# Patient Record
Sex: MALE | Race: WHITE | Employment: UNEMPLOYED | URBAN - METROPOLITAN AREA
[De-identification: names, ages, dates, MRNs, and addresses within clinical notes are randomized per-mention and may not be internally consistent; named-entity substitution may affect disease eponyms.]

---
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
## 2017-03-22 ENCOUNTER — HOSPITAL ENCOUNTER (OUTPATIENT)
Dept: LAB | Age: 52
Discharge: HOME | End: 2017-03-22
Payer: COMMERCIAL

## 2017-03-22 LAB
ALBUMIN SERPL BCP-MCNC: 3.7 G/DL (ref 3.5–5)
ALBUMIN/GLOB SERPL: 0.8 {RATIO} (ref 1.2–3.5)
ALP SERPL-CCNC: 137 U/L (ref 50–136)
ALT SERPL-CCNC: 41 U/L (ref 12–65)
ANION GAP BLD CALC-SCNC: 3 MMOL/L (ref 7–16)
AST SERPL W P-5'-P-CCNC: 47 U/L (ref 15–37)
BILIRUB SERPL-MCNC: 1.1 MG/DL (ref 0.2–1.1)
BUN SERPL-MCNC: 13 MG/DL (ref 6–23)
CALCIUM SERPL-MCNC: 8.9 MG/DL (ref 8.3–10.4)
CHLORIDE SERPL-SCNC: 99 MMOL/L (ref 98–107)
CO2 SERPL-SCNC: 33 MMOL/L (ref 21–32)
CREAT SERPL-MCNC: 1.03 MG/DL (ref 0.8–1.5)
GLOBULIN SER CALC-MCNC: 4.9 G/DL (ref 2.3–3.5)
GLUCOSE SERPL-MCNC: 94 MG/DL (ref 65–100)
POTASSIUM SERPL-SCNC: 3.1 MMOL/L (ref 3.5–5.1)
PROT SERPL-MCNC: 8.6 G/DL (ref 6.3–8.2)
SODIUM SERPL-SCNC: 135 MMOL/L (ref 136–145)

## 2017-03-22 PROCEDURE — 80053 COMPREHEN METABOLIC PANEL: CPT

## 2017-03-22 PROCEDURE — 36415 COLL VENOUS BLD VENIPUNCTURE: CPT

## 2017-06-07 ENCOUNTER — HOSPITAL ENCOUNTER (OUTPATIENT)
Dept: LAB | Age: 52
Discharge: HOME | End: 2017-06-07
Payer: SELF-PAY

## 2017-06-07 ENCOUNTER — HOSPITAL ENCOUNTER (OUTPATIENT)
Dept: LAB | Age: 52
Discharge: HOME | End: 2017-06-07
Payer: COMMERCIAL

## 2017-06-07 LAB
ALBUMIN SERPL BCP-MCNC: 3.6 G/DL (ref 3.5–5)
ALBUMIN/GLOB SERPL: 0.7 {RATIO} (ref 1.2–3.5)
ALP SERPL-CCNC: 152 U/L (ref 50–136)
ALT SERPL-CCNC: 29 U/L (ref 12–65)
ANION GAP BLD CALC-SCNC: 10 MMOL/L (ref 7–16)
APTT PPP: 27 SEC (ref 23.5–31.7)
AST SERPL W P-5'-P-CCNC: 36 U/L (ref 15–37)
BASOPHILS # BLD AUTO: 0 K/UL (ref 0–0.2)
BASOPHILS # BLD: 0 % (ref 0–2)
BILIRUB SERPL-MCNC: 1 MG/DL (ref 0.2–1.1)
BUN SERPL-MCNC: 15 MG/DL (ref 6–23)
CALCIUM SERPL-MCNC: 9.3 MG/DL (ref 8.3–10.4)
CHLORIDE SERPL-SCNC: 99 MMOL/L (ref 98–107)
CO2 SERPL-SCNC: 29 MMOL/L (ref 21–32)
CREAT SERPL-MCNC: 1.14 MG/DL (ref 0.8–1.5)
DIFFERENTIAL METHOD BLD: ABNORMAL
EOSINOPHIL # BLD: 0.1 K/UL (ref 0–0.8)
EOSINOPHIL NFR BLD: 2 % (ref 0.5–7.8)
ERYTHROCYTE [DISTWIDTH] IN BLOOD BY AUTOMATED COUNT: 13.5 % (ref 11.9–14.6)
GLOBULIN SER CALC-MCNC: 5.4 G/DL (ref 2.3–3.5)
GLUCOSE SERPL-MCNC: 97 MG/DL (ref 65–100)
HCT VFR BLD AUTO: 44.9 % (ref 41.1–50.3)
HGB BLD-MCNC: 15.6 G/DL (ref 13.6–17.2)
IMM GRANULOCYTES # BLD: 0 K/UL (ref 0–0.5)
IMM GRANULOCYTES NFR BLD AUTO: 0.2 % (ref 0–5)
INR PPP: 1.1 (ref 0.9–1.2)
LYMPHOCYTES # BLD AUTO: 20 % (ref 13–44)
LYMPHOCYTES # BLD: 0.9 K/UL (ref 0.5–4.6)
MCH RBC QN AUTO: 32.3 PG (ref 26.1–32.9)
MCHC RBC AUTO-ENTMCNC: 34.7 G/DL (ref 31.4–35)
MCV RBC AUTO: 93 FL (ref 79.6–97.8)
MONOCYTES # BLD: 0.4 K/UL (ref 0.1–1.3)
MONOCYTES NFR BLD AUTO: 9 % (ref 4–12)
NEUTS SEG # BLD: 3.2 K/UL (ref 1.7–8.2)
NEUTS SEG NFR BLD AUTO: 69 % (ref 43–78)
PLATELET # BLD AUTO: 151 K/UL (ref 150–450)
PMV BLD AUTO: 10.4 FL (ref 10.8–14.1)
POTASSIUM SERPL-SCNC: 3.4 MMOL/L (ref 3.5–5.1)
PROT SERPL-MCNC: 9 G/DL (ref 6.3–8.2)
PROTHROMBIN TIME: 11.4 SEC (ref 9.6–12)
RBC # BLD AUTO: 4.83 M/UL (ref 4.23–5.67)
SODIUM SERPL-SCNC: 138 MMOL/L (ref 136–145)
WBC # BLD AUTO: 4.6 K/UL (ref 4.3–11.1)

## 2017-06-07 PROCEDURE — 85610 PROTHROMBIN TIME: CPT | Performed by: INTERNAL MEDICINE

## 2017-06-07 PROCEDURE — 85730 THROMBOPLASTIN TIME PARTIAL: CPT | Performed by: INTERNAL MEDICINE

## 2017-06-07 PROCEDURE — 80053 COMPREHEN METABOLIC PANEL: CPT | Performed by: INTERNAL MEDICINE

## 2017-06-07 PROCEDURE — 85025 COMPLETE CBC W/AUTO DIFF WBC: CPT | Performed by: INTERNAL MEDICINE

## 2017-06-07 PROCEDURE — 80053 COMPREHEN METABOLIC PANEL: CPT

## 2017-06-08 ENCOUNTER — HOSPITAL ENCOUNTER (OUTPATIENT)
Dept: ULTRASOUND IMAGING | Age: 52
Discharge: HOME | End: 2017-06-08
Attending: INTERNAL MEDICINE
Payer: COMMERCIAL

## 2017-06-08 VITALS
DIASTOLIC BLOOD PRESSURE: 69 MMHG | RESPIRATION RATE: 18 BRPM | SYSTOLIC BLOOD PRESSURE: 111 MMHG | HEART RATE: 72 BPM | OXYGEN SATURATION: 100 %

## 2017-06-08 DIAGNOSIS — R18.8 ASCITES: ICD-10-CM

## 2017-06-08 LAB
APPEARANCE FLD: NORMAL
COLOR FLD: YELLOW
FLUID COMMENTS, FCOM: NORMAL
LYMPHOCYTES NFR FLD: 96 %
MONOS+MACROS NFR FLD: 2 %
NEUTS SEG NFR FLD: 2 %
NUC CELL # FLD: 694 /CU MM
RBC # FLD: NORMAL /CU MM
SPECIMEN SOURCE FLD: NORMAL

## 2017-06-08 PROCEDURE — 49083 ABD PARACENTESIS W/IMAGING: CPT

## 2017-06-08 PROCEDURE — 89050 BODY FLUID CELL COUNT: CPT | Performed by: INTERNAL MEDICINE

## 2017-06-08 PROCEDURE — 87205 SMEAR GRAM STAIN: CPT | Performed by: INTERNAL MEDICINE

## 2017-06-08 NOTE — PROCEDURES
Interventional Radiology Brief Procedure Note    Patient: Guillermina Hernandez MRN: [id number]  SSN: [id number]    YOB: 1965  Age: 46 y.o. Sex: male      Date of Procedure: 6/8/2017    Pre-Procedure Diagnosis: Recurret ascites. Recent abdominal hernia surgery. Cirrhosis. Post-Procedure Diagnosis: SAME    Procedure(s): Paracentesis    Brief Description of Procedure: as above    Performed By: Jim Morris MD     Assistants: None    Anesthesia: None    Estimated Blood Loss: None    Specimens: Cloudy ascites. Implants: None    Findings: as above    Complications: None    Recommendations: Discharge home. Recommend TIPS evaluation if ascites recurs. Follow Up: PRN.      Signed By: Jim Morris MD     June 8, 2017

## 2017-06-08 NOTE — PROGRESS NOTES
Interventional Radiology Post Paracentesis/Thoracentesis Note    6/8/2017    Procedure(s): Ultrasound guided Diagnostic Paracentesis Performed with 8 Sudanese catheter total volume 10,000 ml. Samples sent to lab. Preliminary Findings: moderate yellow. Complications: None    Plan:  Observation, check labs if drawn.           Chest X-Ray:  no    Full dictated report to follow    Signed By: Rylee Rothman RN

## 2017-06-08 NOTE — PROGRESS NOTES
Recovery period without difficulty. Pt alert and oriented and denies pain. Dressing is clean, dry, and intact. Reviewed discharge instructions with patient, verbalized understanding. Pt escorted to lobby discharge area. Vital signs  completed.

## 2017-06-08 NOTE — DISCHARGE INSTRUCTIONS
[address]  Department of Interventional Radiology  [facility]  [phone] Office  [phone] Fax    PARACENTESIS DISCHARGE INSTRUCTIONS    General Information:  During this procedure, the doctor will insert a needle into the abdomen to drain fluid. After the procedure, you will be able to take a deep breath much easier. The site of the puncture may ooze the first day. This will decrease and eventually stop. Paracentesis (draining fluid from the abdomen) sometimes makes patients hypotensive (low blood pressure). Your doctor may order for you to receive fluids or albumin (a volume booster) during the procedure through an IV site. Home Care Instructions:  Keep the puncture site clean and dry. No tub baths or swimming until puncture site heals. Showering is acceptable. Resume your normal diet, and resume your normal activity slowly and as you tolerate. If you are short of breath, rest. If shortness of breath does not ease, please call your ordering doctor. Fluid can re-accumulate in the chest and/or in the abdomen. If this should occur, your doctor needs to know as you may need to have the procedure done again. Call If:     You should call your Physician and/or the Radiology Nurse if you notice any signs of infection, like pus draining, or if it is swollen or reddened. Also call if you have a fever, or if you are bleeding from the puncture site more than a small amount on the dressing. Call if the puncture site keeps draining fluid. Some oozing is to be expected, but should slow and then stop. Call if you feel like you have pressure in your abdomen. SEEK IMMEDIATE CARE OR CALL 911 IF YOU SUDDENLY HAVE TROUBLE BREATHING, OR IF YOUR LIPS TURN BLUE, OR IF YOU NOTICE BLOOD IN YOUR SPUTUM. Follow-Up Instructions: Please see your ordering doctor as he/she has requested. To Reach Us:   If you have any questions about your procedure, please call the Interventional Radiology department at [phone]. After business hours (5pm) and weekends, call the answering service at [phone] and ask for the Radiologist on call to be paged. Date: 6/8/2017  Discharging Nurse: Desmond Tello RN    Interventional Radiology General Nurse Discharge    After general anesthesia or intravenous sedation, for 24 hours or while taking prescription Narcotics:  · Limit your activities  · Do not drive and operate hazardous machinery  · Do not make important personal or business decisions  · Do  not drink alcoholic beverages  · If you have not urinated within 8 hours after discharge, please contact your surgeon on call. * Please give a list of your current medications to your Primary Care Provider. * Please update this list whenever your medications are discontinued, doses are     changed, or new medications (including over-the-counter products) are added. * Please carry medication information at all times in case of emergency situations. These are general instructions for a healthy lifestyle:    No smoking/ No tobacco products/ Avoid exposure to second hand smoke  Surgeon General's Warning:  Quitting smoking now greatly reduces serious risk to your health. Obesity, smoking, and sedentary lifestyle greatly increases your risk for illness  A healthy diet, regular physical exercise & weight monitoring are important for maintaining a healthy lifestyle    You may be retaining fluid if you have a history of heart failure or if you experience any of the following symptoms:  Weight gain of 3 pounds or more overnight or 5 pounds in a week, increased swelling in our hands or feet or shortness of breath while lying flat in bed. Please call your doctor as soon as you notice any of these symptoms; do not wait until your next office visit.     Recognize signs and symptoms of STROKE:  F-face looks uneven    A-arms unable to move or move unevenly    S-speech slurred or non-existent    T-time-call 911 as soon as signs and symptoms begin-DO NOT go       Back to bed or wait to see if you get better-TIME IS BRAIN.

## 2017-06-08 NOTE — IP AVS SNAPSHOT
[address] 
[phone] Patient: Shiv Jamison MRN: [id number] :1965 You are allergic to the following No active allergies Recent Documentation Smoking Status Current Every Day Smoker Emergency Contacts Name Discharge Info Relation Home Work Mobile Wm. Chen Jernigan  Son [60] [phone] About your hospitalization You were admitted on:  2017 You last received care in the:  [facility] You were discharged on:  2017 Unit phone number:  [phone] Why you were hospitalized Your primary diagnosis was:  Not on File Providers Seen During Your Hospitalizations Provider Role Specialty Primary office phone Lottie Libman, MD Attending Provider Gastroenterology [phone] Your Primary Care Physician (PCP) Primary Care Physician Office Phone Office Fax Henna Tuttle [phone] Follow-up Information None Current Discharge Medication List  
  
ASK your doctor about these medications Dose & Instructions Dispensing Information Comments Morning Noon Evening Bedtime  
 furosemide 40 mg tablet Commonly known as:  LASIX Your last dose was: Your next dose is:    
   
   
 Dose:  40 mg Take 40 mg by mouth daily. Refills:  0  
     
   
   
   
  
 traMADol 50 mg tablet Commonly known as:  ULTRAM  
   
Your last dose was: Your next dose is:    
   
   
 Dose:  50 mg Take 1 Tab by mouth every six (6) hours as needed for Pain for up to 15 doses. Max Daily Amount: 200 mg. Quantity:  15 Tab Refills:  0 Discharge Instructions [address] Rohit Arrington Department of Interventional Radiology [facility] [phone] Office 
[phone] Fax PARACENTESIS DISCHARGE INSTRUCTIONS General Information: 
During this procedure, the doctor will insert a needle into the abdomen to drain fluid. After the procedure, you will be able to take a deep breath much easier. The site of the puncture may ooze the first day. This will decrease and eventually stop. Paracentesis (draining fluid from the abdomen) sometimes makes patients hypotensive (low blood pressure). Your doctor may order for you to receive fluids or albumin (a volume booster) during the procedure through an IV site. Home Care Instructions: 
Keep the puncture site clean and dry. No tub baths or swimming until puncture site heals. Showering is acceptable. Resume your normal diet, and resume your normal activity slowly and as you tolerate. If you are short of breath, rest. If shortness of breath does not ease, please call your ordering doctor. Fluid can re-accumulate in the chest and/or in the abdomen. If this should occur, your doctor needs to know as you may need to have the procedure done again. Call If: 
   You should call your Physician and/or the Radiology Nurse if you notice any signs of infection, like pus draining, or if it is swollen or reddened. Also call if you have a fever, or if you are bleeding from the puncture site more than a small amount on the dressing. Call if the puncture site keeps draining fluid. Some oozing is to be expected, but should slow and then stop. Call if you feel like you have pressure in your abdomen. SEEK IMMEDIATE CARE OR CALL 911 IF YOU SUDDENLY HAVE TROUBLE BREATHING, OR IF YOUR LIPS TURN BLUE, OR IF YOU NOTICE BLOOD IN YOUR SPUTUM. Follow-Up Instructions: Please see your ordering doctor as he/she has requested. To Reach Us: If you have any questions about your procedure, please call the Interventional Radiology department at [phone].  After [employer] hours (5pm) and weekends, call the answering service at [phone] and ask for the Radiologist on call to be paged. Date: 6/8/2017 Discharging Nurse: Adolph Maki RN Interventional Radiology General Nurse Discharge After general anesthesia or intravenous sedation, for 24 hours or while taking prescription Narcotics: · Limit your activities · Do not drive and operate hazardous machinery · Do not make important personal or business decisions · Do  not drink alcoholic beverages · If you have not urinated within 8 hours after discharge, please contact your surgeon on call. * Please give a list of your current medications to your Primary Care Provider. * Please update this list whenever your medications are discontinued, doses are 
   changed, or new medications (including over-the-counter products) are added. * Please carry medication information at all times in case of emergency situations. These are general instructions for a healthy lifestyle: No smoking/ No tobacco products/ Avoid exposure to second hand smoke Surgeon General's Warning:  Quitting smoking now greatly reduces serious risk to your health. Obesity, smoking, and sedentary lifestyle greatly increases your risk for illness A healthy diet, regular physical exercise & weight monitoring are important for maintaining a healthy lifestyle You may be retaining fluid if you have a history of heart failure or if you experience any of the following symptoms:  Weight gain of 3 pounds or more overnight or 5 pounds in a week, increased swelling in our hands or feet or shortness of breath while lying flat in bed. Please call your doctor as soon as you notice any of these symptoms; do not wait until your next office visit. Recognize signs and symptoms of STROKE: 
F-face looks uneven A-arms unable to move or move unevenly S-speech slurred or non-existent T-time-call 911 as soon as signs and symptoms begin-DO NOT go Back to bed or wait to see if you get better-TIME IS BRAIN. Discharge Orders None Introducing [facility]! [employer] introduces [employer] patient portal. Now you can access parts of your medical record, email your doctor's office, and request medication refills online. 1. In your internet browser, go to [email or web address] 2. Click on the First Time User? Click Here link in the Sign In box. You will see the New Member Sign Up page. 3. Enter your [employer] Access Code exactly as it appears below. You will not need to use this code after youve completed the sign-up process. If you do not sign up before the expiration date, you must request a new code. · [employer] Access Code: [email or web address] Expires: 6/19/2017  3:04 PM 
 
4. Enter the last four digits of your Social Security Number (xxxx) and Date of Birth (mm/dd/yyyy) as indicated and click Submit. You will be taken to the next sign-up page. 5. Create a [employer] ID. This will be your [employer] login ID and cannot be changed, so think of one that is secure and easy to remember. 6. Create a [employer] password. You can change your password at any time. 7. Enter your Password Reset Question and Answer. This can be used at a later time if you forget your password. 8. Enter your e-mail address. You will receive e-mail notification when new information is available in [address] 9. Click Sign Up. You can now view and download portions of your medical record. 10. Click the Download Summary menu link to download a portable copy of your medical information. If you have questions, please visit the Frequently Asked Questions section of the [employer] website. Remember, [employer] is NOT to be used for urgent needs. For medical emergencies, dial 911. Now available from your iPhone and Android! General Information Please provide this summary of care documentation to your next provider. Patient Signature:  ____________________________________________________________ Date:  ____________________________________________________________  
  
Keesha Kohler Provider Signature:  ____________________________________________________________ Date:  ____________________________________________________________

## 2017-06-11 LAB
BACTERIA SPEC CULT: NORMAL
GRAM STN SPEC: NORMAL
GRAM STN SPEC: NORMAL
SERVICE CMNT-IMP: NORMAL

## 2017-07-18 ENCOUNTER — HOSPITAL ENCOUNTER (OUTPATIENT)
Dept: ULTRASOUND IMAGING | Age: 52
Discharge: HOME | End: 2017-07-18
Attending: INTERNAL MEDICINE
Payer: COMMERCIAL

## 2017-07-18 VITALS
DIASTOLIC BLOOD PRESSURE: 71 MMHG | OXYGEN SATURATION: 97 % | SYSTOLIC BLOOD PRESSURE: 108 MMHG | RESPIRATION RATE: 18 BRPM | HEART RATE: 56 BPM

## 2017-07-18 DIAGNOSIS — R18.8 ASCITES: ICD-10-CM

## 2017-07-18 PROCEDURE — 74011250636 HC RX REV CODE- 250/636: Performed by: PHYSICIAN ASSISTANT

## 2017-07-18 PROCEDURE — 49083 ABD PARACENTESIS W/IMAGING: CPT

## 2017-07-18 PROCEDURE — P9047 ALBUMIN (HUMAN), 25%, 50ML: HCPCS | Performed by: PHYSICIAN ASSISTANT

## 2017-07-18 RX ORDER — ALBUMIN HUMAN 250 G/1000ML
25 SOLUTION INTRAVENOUS ONCE
Status: ACTIVE | OUTPATIENT
Start: 2017-07-18 | End: 2017-07-18

## 2017-07-18 NOTE — DISCHARGE INSTRUCTIONS
[address] [facility] [address]  Department of Interventional Radiology  [facility]  [phone] Office  [phone] Fax    PARACENTESIS DISCHARGE INSTRUCTIONS    General Information:  During this procedure, the doctor will insert a needle into the abdomen to drain fluid. After the procedure, you will be able to take a deep breath much easier. The site of the puncture may ooze the first day. This will decrease and eventually stop. Paracentesis (draining fluid from the abdomen) sometimes makes patients hypotensive (low blood pressure). Your doctor may order for you to receive fluids or albumin (a volume booster) during the procedure through an IV site. Home Care Instructions:  Keep the puncture site clean and dry. No tub baths or swimming until puncture site heals. Showering is acceptable. Resume your normal diet, and resume your normal activity slowly and as you tolerate. If you are short of breath, rest. If shortness of breath does not ease, please call your ordering doctor. Fluid can re-accumulate in the chest and/or in the abdomen. If this should occur, your doctor needs to know as you may need to have the procedure done again. Call If:     You should call your Physician and/or the Radiology Nurse if you notice any signs of infection, like pus draining, or if it is swollen or reddened. Also call if you have a fever, or if you are bleeding from the puncture site more than a small amount on the dressing. Call if the puncture site keeps draining fluid. Some oozing is to be expected, but should slow and then stop. Call if you feel like you have pressure in your abdomen. SEEK IMMEDIATE CARE OR CALL 911 IF YOU SUDDENLY HAVE TROUBLE BREATHING, OR IF YOUR LIPS TURN BLUE, OR IF YOU NOTICE BLOOD IN YOUR SPUTUM. Follow-Up Instructions: Please see your ordering doctor as he/she has requested. To Reach Us:       If you have any questions about your procedure, please call the Interventional Radiology department at [phone]. After business hours (5pm) and weekends, call the answering service at [phone] and ask for the Radiologist on call to be paged. Interventional Radiology General Nurse Discharge    After general anesthesia or intravenous sedation, for 24 hours or while taking prescription Narcotics:  · Limit your activities  · Do not drive and operate hazardous machinery  · Do not make important personal or business decisions  · Do  not drink alcoholic beverages  · If you have not urinated within 8 hours after discharge, please contact your surgeon on call. * Please give a list of your current medications to your Primary Care Provider. * Please update this list whenever your medications are discontinued, doses are     changed, or new medications (including over-the-counter products) are added. * Please carry medication information at all times in case of emergency situations. These are general instructions for a healthy lifestyle:    No smoking/ No tobacco products/ Avoid exposure to second hand smoke  Surgeon General's Warning:  Quitting smoking now greatly reduces serious risk to your health. Obesity, smoking, and sedentary lifestyle greatly increases your risk for illness  A healthy diet, regular physical exercise & weight monitoring are important for maintaining a healthy lifestyle    You may be retaining fluid if you have a history of heart failure or if you experience any of the following symptoms:  Weight gain of 3 pounds or more overnight or 5 pounds in a week, increased swelling in our hands or feet or shortness of breath while lying flat in bed. Please call your doctor as soon as you notice any of these symptoms; do not wait until your next office visit.     Recognize signs and symptoms of STROKE:  F-face looks uneven    A-arms unable to move or move unevenly    S-speech slurred or non-existent    T-time-call 911 as soon as signs and symptoms begin-DO NOT go       Back to bed or wait to see if you get better-TIME IS BRAIN. Date: 7/18/2017  Discharging Nurse:  Sunita Soriano RN

## 2017-07-18 NOTE — IP AVS SNAPSHOT
[address] 
[phone] Patient: Bart Carmen MRN: [id number] :1965 You are allergic to the following No active allergies Recent Documentation Smoking Status Current Every Day Smoker Emergency Contacts Name Discharge Info Relation Home Work Mobile Wm. Chen Jernigan  Son [22] [phone] About your hospitalization You were admitted on:  2017 You last received care in the:  [facility] RADIOLOGY ULTRASOUND You were discharged on:  2017 Unit phone number:  [phone] Why you were hospitalized Your primary diagnosis was:  Not on File Providers Seen During Your Hospitalizations Provider Role Specialty Primary office phone Vic Rojas MD Attending Provider Gastroenterology [phone] Your Primary Care Physician (PCP) Primary Care Physician Office Phone Office Fax [facility] [phone] Follow-up Information None Your Appointments 2017  8:00 AM EDT  
US GUIDED PARACENTISIS INIT with [facility] US LOGIC 7 UNIT 2, D NURSING, D IR RADIOLOGIST RESOURCE  
[facility] RADIOLOGY ULTRASOUND ([address] [place] [address]) [address] [place] [address]  
[phone] Interventional Radiology Procedure completed with ultrasound guidance. Referring Physicians: 1) Initial paracentesis patients required: H&P/recent office notes and lab work, no older than 30 days (CBC, BMP, PT/PTT) to Interventional Radiology to facilitate prompt scheduling. 2) Obtain clearance to hold blood thinners from prescribing Physician and give Patient instructions prior to arrival. 3) Patient may continue to eat or drink as normal prior to arrival. 4) Pt to arrive 15 minutes early.  5) Responsible adult  required to drive Patient home after recovery period. Recovery period can vary depending on sedation and patient condition. 6) Interventional Radiology Scheduling can be contacted at [phone] Current Discharge Medication List  
  
ASK your doctor about these medications Dose & Instructions Dispensing Information Comments Morning Noon Evening Bedtime  
 furosemide 40 mg tablet Commonly known as:  LASIX Your last dose was: Your next dose is:    
   
   
 Dose:  40 mg Take 40 mg by mouth daily. Refills:  0  
     
   
   
   
  
 traMADol 50 mg tablet Commonly known as:  ULTRAM  
   
Your last dose was: Your next dose is:    
   
   
 Dose:  50 mg Take 1 Tab by mouth every six (6) hours as needed for Pain for up to 15 doses. Max Daily Amount: 200 mg. Quantity:  15 Tab Refills:  0 Discharge Instructions 1102 [facility] [address] Department of Interventional Radiology [facility] 
[phone] Office 
[phone] Fax PARACENTESIS DISCHARGE INSTRUCTIONS General Information: 
During this procedure, the doctor will insert a needle into the abdomen to drain fluid. After the procedure, you will be able to take a deep breath much easier. The site of the puncture may ooze the first day. This will decrease and eventually stop. Paracentesis (draining fluid from the abdomen) sometimes makes patients hypotensive (low blood pressure). Your doctor may order for you to receive fluids or albumin (a volume booster) during the procedure through an IV site. Home Care Instructions: 
Keep the puncture site clean and dry. No tub baths or swimming until puncture site heals. Showering is acceptable. Resume your normal diet, and resume your normal activity slowly and as you tolerate.  If you are short of breath, rest. If shortness of breath does not ease, please call your ordering doctor. Fluid can re-accumulate in the chest and/or in the abdomen. If this should occur, your doctor needs to know as you may need to have the procedure done again. Call If: 
   You should call your Physician and/or the Radiology Nurse if you notice any signs of infection, like pus draining, or if it is swollen or reddened. Also call if you have a fever, or if you are bleeding from the puncture site more than a small amount on the dressing. Call if the puncture site keeps draining fluid. Some oozing is to be expected, but should slow and then stop. Call if you feel like you have pressure in your abdomen. SEEK IMMEDIATE CARE OR CALL 911 IF YOU SUDDENLY HAVE TROUBLE BREATHING, OR IF YOUR LIPS TURN BLUE, OR IF YOU NOTICE BLOOD IN YOUR SPUTUM. Follow-Up Instructions: Please see your ordering doctor as he/she has requested. To Reach Us: If you have any questions about your procedure, please call the Interventional Radiology department at [phone]. After business hours (5pm) and weekends, call the answering service at [phone] and ask for the Radiologist on call to be paged. Interventional Radiology General Nurse Discharge After general anesthesia or intravenous sedation, for 24 hours or while taking prescription Narcotics: · Limit your activities · Do not drive and operate hazardous machinery · Do not make important personal or business decisions · Do  not drink alcoholic beverages · If you have not urinated within 8 hours after discharge, please contact your surgeon on call. * Please give a list of your current medications to your Primary Care Provider. * Please update this list whenever your medications are discontinued, doses are 
   changed, or new medications (including over-the-counter products) are added. * Please carry medication information at all times in case of emergency situations. These are general instructions for a healthy lifestyle: No smoking/ No tobacco products/ Avoid exposure to second hand smoke Surgeon General's Warning:  Quitting smoking now greatly reduces serious risk to your health. Obesity, smoking, and sedentary lifestyle greatly increases your risk for illness A healthy diet, regular physical exercise & weight monitoring are important for maintaining a healthy lifestyle You may be retaining fluid if you have a history of heart failure or if you experience any of the following symptoms:  Weight gain of 3 pounds or more overnight or 5 pounds in a week, increased swelling in our hands or feet or shortness of breath while lying flat in bed. Please call your doctor as soon as you notice any of these symptoms; do not wait until your next office visit. Recognize signs and symptoms of STROKE: 
F-face looks uneven A-arms unable to move or move unevenly S-speech slurred or non-existent T-time-call 911 as soon as signs and symptoms begin-DO NOT go Back to bed or wait to see if you get better-TIME IS BRAIN. Date: 7/18/2017 Discharging Nurse: Samuel Crowder RN Discharge Orders None Introducing [facility] SERVICES! Kip Craig introduces [employer] patient portal. Now you can access parts of your medical record, email your doctor's office, and request medication refills online. 1. In your internet browser, go to [email or web address] 2. Click on the First Time User? Click Here link in the Sign In box. You will see the New Member Sign Up page. 3. Enter your [employer] Access Code exactly as it appears below. You will not need to use this code after youve completed the sign-up process. If you do not sign up before the expiration date, you must request a new code. · [employer] Access Code: [email or web address] Expires: 9/16/2017  3:49 PM 
 
4. Enter the last four digits of your Social Security Number (xxxx) and Date of Birth (mm/dd/yyyy) as indicated and click Submit.  You will be taken to the next sign-up page. 5. Create a [employer] ID. This will be your [employer] login ID and cannot be changed, so think of one that is secure and easy to remember. 6. Create a [employer] password. You can change your password at any time. 7. Enter your Password Reset Question and Answer. This can be used at a later time if you forget your password. 8. Enter your e-mail address. You will receive e-mail notification when new information is available in [address] 9. Click Sign Up. You can now view and download portions of your medical record. 10. Click the Download Summary menu link to download a portable copy of your medical information. If you have questions, please visit the Frequently Asked Questions section of the [employer] website. Remember, [employer] is NOT to be used for urgent needs. For medical emergencies, dial 911. Now available from your iPhone and Android! General Information Please provide this summary of care documentation to your next provider. Patient Signature:  ____________________________________________________________ Date:  ____________________________________________________________  
  
Gail Snider Provider Signature:  ____________________________________________________________ Date:  ____________________________________________________________

## 2017-07-18 NOTE — PROGRESS NOTES
Patient refusing to take albumin. Aldair Villarreal called to discuss with patient.  Decision made not take albumin

## 2017-07-18 NOTE — PROGRESS NOTES
After having reviewed written discharge instructions and patient not having any questions patient ambulated to waiting automobile with son at side.  Patient tolerated procedure well with no adverse effects noted

## 2017-08-17 ENCOUNTER — HOSPITAL ENCOUNTER (OUTPATIENT)
Dept: ULTRASOUND IMAGING | Age: 52
Discharge: HOME | End: 2017-08-17
Attending: INTERNAL MEDICINE
Payer: COMMERCIAL

## 2017-08-17 VITALS
HEART RATE: 77 BPM | DIASTOLIC BLOOD PRESSURE: 59 MMHG | SYSTOLIC BLOOD PRESSURE: 99 MMHG | RESPIRATION RATE: 19 BRPM | OXYGEN SATURATION: 98 %

## 2017-08-17 DIAGNOSIS — R18.8 ASCITES: ICD-10-CM

## 2017-08-17 PROCEDURE — 49083 ABD PARACENTESIS W/IMAGING: CPT

## 2017-08-17 PROCEDURE — 74011000250 HC RX REV CODE- 250: Performed by: PHYSICIAN ASSISTANT

## 2017-08-17 PROCEDURE — P9047 ALBUMIN (HUMAN), 25%, 50ML: HCPCS | Performed by: INTERNAL MEDICINE

## 2017-08-17 PROCEDURE — 74011250636 HC RX REV CODE- 250/636: Performed by: INTERNAL MEDICINE

## 2017-08-17 RX ORDER — ALBUMIN HUMAN 250 G/1000ML
50 SOLUTION INTRAVENOUS ONCE
Status: COMPLETED | OUTPATIENT
Start: 2017-08-17 | End: 2017-08-17

## 2017-08-17 RX ORDER — ALBUMIN HUMAN 250 G/1000ML
25 SOLUTION INTRAVENOUS ONCE
Status: COMPLETED | OUTPATIENT
Start: 2017-08-17 | End: 2017-08-17

## 2017-08-17 RX ORDER — LIDOCAINE HYDROCHLORIDE 20 MG/ML
.5-2 INJECTION, SOLUTION INFILTRATION; PERINEURAL ONCE
Status: COMPLETED | OUTPATIENT
Start: 2017-08-17 | End: 2017-08-17

## 2017-08-17 RX ADMIN — ALBUMIN (HUMAN) 25 G: 0.25 INJECTION, SOLUTION INTRAVENOUS at 16:48

## 2017-08-17 RX ADMIN — ALBUMIN (HUMAN) 50 G: 0.25 INJECTION, SOLUTION INTRAVENOUS at 16:36

## 2017-08-17 RX ADMIN — LIDOCAINE HYDROCHLORIDE 215 MG: 20 INJECTION, SOLUTION INFILTRATION; PERINEURAL at 15:59

## 2017-08-17 NOTE — IP AVS SNAPSHOT
[address] [place] [address] 
[phone] Patient: Vane Couch MRN: [id number] :1965 Current Discharge Medication List  
  
ASK your doctor about these medications Dose & Instructions Dispensing Information Comments Morning Noon Evening Bedtime  
 furosemide 40 mg tablet Commonly known as:  LASIX Your last dose was: Your next dose is:    
   
   
 Dose:  40 mg Take 40 mg by mouth daily. Refills:  0  
     
   
   
   
  
 traMADol 50 mg tablet Commonly known as:  ULTRAM  
   
Your last dose was: Your next dose is:    
   
   
 Dose:  50 mg Take 1 Tab by mouth every six (6) hours as needed for Pain for up to 15 doses. Max Daily Amount: 200 mg. Quantity:  15 Tab Refills:  0

## 2017-08-17 NOTE — PROGRESS NOTES
Interventional Radiology Post Paracentesis/Thoracentesis Note    8/17/2017    Procedure(s): Ultrasound guided Therapeutic Paracentesis Performed with 8 English catheter total volume 12879 ml. Preliminary Findings: large yellow and cloudy. Complications: None    Plan:  Observation, check labs if drawn.           Chest X-Ray:  no    Full dictated report to follow    Signed By: Queta Xiong

## 2017-08-17 NOTE — DISCHARGE INSTRUCTIONS
[address]  Department of Interventional Radiology  [address] [place] [address] [address] [facility]  [phone] Office  [phone] Fax    PARACENTESIS DISCHARGE INSTRUCTIONS    General Information:  During this procedure, the doctor will insert a needle into the abdomen to drain fluid. After the procedure, you will be able to take a deep breath much easier. The site of the puncture may ooze the first day. This will decrease and eventually stop. Paracentesis (draining fluid from the abdomen) sometimes makes patients hypotensive (low blood pressure). Your doctor may order for you to receive fluids or albumin (a volume booster) during the procedure through an IV site. Home Care Instructions:  Keep the puncture site clean and dry. No tub baths or swimming until puncture site heals. Showering is acceptable. Resume your normal diet, and resume your normal activity slowly and as you tolerate. If you are short of breath, rest. If shortness of breath does not ease, please call your ordering doctor. Fluid can re-accumulate in the chest and/or in the abdomen. If this should occur, your doctor needs to know as you may need to have the procedure done again. Call If:     You should call your Physician and/or the Radiology Nurse if you notice any signs of infection, like pus draining, or if it is swollen or reddened. Also call if you have a fever, or if you are bleeding from the puncture site more than a small amount on the dressing. Call if the puncture site keeps draining fluid. Some oozing is to be expected, but should slow and then stop. Call if you feel like you have pressure in your abdomen. SEEK IMMEDIATE CARE OR CALL 911 IF YOU SUDDENLY HAVE TROUBLE BREATHING, OR IF YOUR LIPS TURN BLUE, OR IF YOU NOTICE BLOOD IN YOUR SPUTUM. Follow-Up Instructions: Please see your ordering doctor as he/she has requested. To Reach Us:  If you have any questions about your procedure, please call the Interventional Radiology department at [phone]. After business hours (5pm) and weekends, call the answering service at [phone] and ask for the Radiologist on call to be paged. Interventional Radiology General Nurse Discharge    After general anesthesia or intravenous sedation, for 24 hours or while taking prescription Narcotics:  · Limit your activities  · Do not drive and operate hazardous machinery  · Do not make important personal or business decisions  · Do  not drink alcoholic beverages  · If you have not urinated within 8 hours after discharge, please contact your surgeon on call. * Please give a list of your current medications to your Primary Care Provider. * Please update this list whenever your medications are discontinued, doses are     changed, or new medications (including over-the-counter products) are added. * Please carry medication information at all times in case of emergency situations. These are general instructions for a healthy lifestyle:    No smoking/ No tobacco products/ Avoid exposure to second hand smoke  Surgeon General's Warning:  Quitting smoking now greatly reduces serious risk to your health. Obesity, smoking, and sedentary lifestyle greatly increases your risk for illness  A healthy diet, regular physical exercise & weight monitoring are important for maintaining a healthy lifestyle    You may be retaining fluid if you have a history of heart failure or if you experience any of the following symptoms:  Weight gain of 3 pounds or more overnight or 5 pounds in a week, increased swelling in our hands or feet or shortness of breath while lying flat in bed. Please call your doctor as soon as you notice any of these symptoms; do not wait until your next office visit.     Recognize signs and symptoms of STROKE:  F-face looks uneven    A-arms unable to move or move unevenly    S-speech slurred or non-existent    T-time-call 911 as soon as signs and symptoms begin-DO NOT go       Back to bed or wait to see if you get better-TIME IS BRAIN.         Date: 8/17/2017  Discharging Nurse: Randa Bailon

## 2017-08-17 NOTE — PROGRESS NOTES
Recovery period without difficulty. Pt alert and oriented and denies pain. Dressing is clean, dry, and intact. Reviewed discharge instructions with patient and he  verbalized understanding. Pt escorted to lobby discharge area via wheelchair. Vital signs completed.

## 2017-08-23 ENCOUNTER — HOSPITAL ENCOUNTER (OUTPATIENT)
Dept: NON INVASIVE DIAGNOSTICS | Age: 52
Discharge: HOME | End: 2017-08-23
Attending: INTERNAL MEDICINE
Payer: COMMERCIAL

## 2017-08-23 DIAGNOSIS — R18.8 OTHER ASCITES: ICD-10-CM

## 2017-08-23 DIAGNOSIS — K74.69 CRYPTOGENIC CIRRHOSIS (HCC): ICD-10-CM

## 2017-08-23 PROCEDURE — 74011250636 HC RX REV CODE- 250/636: Performed by: INTERNAL MEDICINE

## 2017-08-23 PROCEDURE — C8929 TTE W OR WO FOL WCON,DOPPLER: HCPCS

## 2017-08-23 PROCEDURE — 74011000250 HC RX REV CODE- 250: Performed by: INTERNAL MEDICINE

## 2017-08-23 RX ADMIN — PERFLUTREN 1 ML: 6.52 INJECTION, SUSPENSION INTRAVENOUS at 14:00

## 2017-09-07 ENCOUNTER — HOSPITAL ENCOUNTER (OUTPATIENT)
Dept: ULTRASOUND IMAGING | Age: 52
Discharge: HOME | End: 2017-09-07
Attending: INTERNAL MEDICINE
Payer: COMMERCIAL

## 2017-09-07 VITALS
OXYGEN SATURATION: 100 % | DIASTOLIC BLOOD PRESSURE: 68 MMHG | SYSTOLIC BLOOD PRESSURE: 104 MMHG | BODY MASS INDEX: 21.94 KG/M2 | WEIGHT: 162 LBS | HEART RATE: 70 BPM | RESPIRATION RATE: 17 BRPM | HEIGHT: 72 IN

## 2017-09-07 DIAGNOSIS — R18.8 ASCITES: ICD-10-CM

## 2017-09-07 PROCEDURE — 49083 ABD PARACENTESIS W/IMAGING: CPT

## 2017-09-07 PROCEDURE — 74011250636 HC RX REV CODE- 250/636: Performed by: PHYSICIAN ASSISTANT

## 2017-09-07 PROCEDURE — P9047 ALBUMIN (HUMAN), 25%, 50ML: HCPCS | Performed by: PHYSICIAN ASSISTANT

## 2017-09-07 RX ORDER — ALBUMIN HUMAN 250 G/1000ML
25-50 SOLUTION INTRAVENOUS ONCE
Status: COMPLETED | OUTPATIENT
Start: 2017-09-07 | End: 2017-09-07

## 2017-09-07 RX ADMIN — ALBUMIN (HUMAN) 50 G: 0.25 INJECTION, SOLUTION INTRAVENOUS at 12:22

## 2017-09-07 NOTE — IP AVS SNAPSHOT
[address] 
[phone] Patient: Jose  MRN: [id number] :1965 You are allergic to the following No active allergies Recent Documentation Smoking Status Current Every Day Smoker Emergency Contacts Name Discharge Info Relation Home Work Mobile Wm. Chen Jernigan  Son [22] [phone] About your hospitalization You were admitted on:  2017 You last received care in the:  [facility] RADIOLOGY ULTRASOUND You were discharged on:  2017 Unit phone number:  [phone] Why you were hospitalized Your primary diagnosis was:  Not on File Providers Seen During Your Hospitalizations Provider Role Specialty Primary office phone Franny Prado MD Attending Provider Gastroenterology [phone] Your Primary Care Physician (PCP) Primary Care Physician Office Phone Office Fax Oris Deloris [phone] Follow-up Information None Current Discharge Medication List  
  
ASK your doctor about these medications Dose & Instructions Dispensing Information Comments Morning Noon Evening Bedtime  
 furosemide 40 mg tablet Commonly known as:  LASIX Your last dose was: Your next dose is:    
   
   
 Dose:  40 mg Take 40 mg by mouth daily. Refills:  0  
     
   
   
   
  
 traMADol 50 mg tablet Commonly known as:  ULTRAM  
   
Your last dose was: Your next dose is:    
   
   
 Dose:  50 mg Take 1 Tab by mouth every six (6) hours as needed for Pain for up to 15 doses. Max Daily Amount: 200 mg. Quantity:  15 Tab Refills:  0 Discharge Instructions [address] Department of Interventional Radiology [facility] 
[phone] Office 
[phone] Fax PARACENTESIS DISCHARGE INSTRUCTIONS General Information: 
During this procedure, the doctor will insert a needle into the abdomen to drain fluid. After the procedure, you will be able to take a deep breath much easier. The site of the puncture may ooze the first day. This will decrease and eventually stop. Paracentesis (draining fluid from the abdomen) sometimes makes patients hypotensive (low blood pressure). Your doctor may order for you to receive fluids or albumin (a volume booster) during the procedure through an IV site. Home Care Instructions: 
Keep the puncture site clean and dry. No tub baths or swimming until puncture site heals. Showering is acceptable. Resume your normal diet, and resume your normal activity slowly and as you tolerate. If you are short of breath, rest. If shortness of breath does not ease, please call your ordering doctor. Fluid can re-accumulate in the chest and/or in the abdomen. If this should occur, your doctor needs to know as you may need to have the procedure done again. Call If: 
   You should call your Physician and/or the Radiology Nurse if you notice any signs of infection, like pus draining, or if it is swollen or reddened. Also call if you have a fever, or if you are bleeding from the puncture site more than a small amount on the dressing. Call if the puncture site keeps draining fluid. Some oozing is to be expected, but should slow and then stop. Call if you feel like you have pressure in your abdomen. SEEK IMMEDIATE CARE OR CALL 911 IF YOU SUDDENLY HAVE TROUBLE BREATHING, OR IF YOUR LIPS TURN BLUE, OR IF YOU NOTICE BLOOD IN YOUR SPUTUM. Follow-Up Instructions: Please see your ordering doctor as he/she has requested. To Reach Us: If you have any questions about your procedure, please call the Interventional Radiology department at [phone].  After [employer] hours (5pm) and weekends, call the answering service at [phone] and ask for the Radiologist on call to be paged. Interventional Radiology General Nurse Discharge After general anesthesia or intravenous sedation, for 24 hours or while taking prescription Narcotics: · Limit your activities · Do not drive and operate hazardous machinery · Do not make important personal or business decisions · Do  not drink alcoholic beverages · If you have not urinated within 8 hours after discharge, please contact your surgeon on call. * Please give a list of your current medications to your Primary Care Provider. * Please update this list whenever your medications are discontinued, doses are 
   changed, or new medications (including over-the-counter products) are added. * Please carry medication information at all times in case of emergency situations. These are general instructions for a healthy lifestyle: No smoking/ No tobacco products/ Avoid exposure to second hand smoke Surgeon General's Warning:  Quitting smoking now greatly reduces serious risk to your health. Obesity, smoking, and sedentary lifestyle greatly increases your risk for illness A healthy diet, regular physical exercise & weight monitoring are important for maintaining a healthy lifestyle You may be retaining fluid if you have a history of heart failure or if you experience any of the following symptoms:  Weight gain of 3 pounds or more overnight or 5 pounds in a week, increased swelling in our hands or feet or shortness of breath while lying flat in bed. Please call your doctor as soon as you notice any of these symptoms; do not wait until your next office visit. Recognize signs and symptoms of STROKE: 
F-face looks uneven A-arms unable to move or move unevenly S-speech slurred or non-existent T-time-call 911 as soon as signs and symptoms begin-DO NOT go  
 Back to bed or wait to see if you get better-TIME IS BRAIN. Date: 9/7/2017 Discharging Nurse: Devin Keith RN Discharge Orders None Introducing [facility] & HEALTH SERVICES! Jm Loyda introduces [employer] patient portal. Now you can access parts of your medical record, email your doctor's office, and request medication refills online. 1. In your internet browser, go to [email or web address] 2. Click on the First Time User? Click Here link in the Sign In box. You will see the New Member Sign Up page. 3. Enter your [employer] Access Code exactly as it appears below. You will not need to use this code after youve completed the sign-up process. If you do not sign up before the expiration date, you must request a new code. · [employer] Access Code: [email or web address] Expires: 9/16/2017  3:49 PM 
 
4. Enter the last four digits of your Social Security Number (xxxx) and Date of Birth (mm/dd/yyyy) as indicated and click Submit. You will be taken to the next sign-up page. 5. Create a [employer] ID. This will be your [employer] login ID and cannot be changed, so think of one that is secure and easy to remember. 6. Create a [employer] password. You can change your password at any time. 7. Enter your Password Reset Question and Answer. This can be used at a later time if you forget your password. 8. Enter your e-mail address. You will receive e-mail notification when new information is available in [address] 9. Click Sign Up. You can now view and download portions of your medical record. 10. Click the Download Summary menu link to download a portable copy of your medical information. If you have questions, please visit the Frequently Asked Questions section of the [employer] website. Remember, [employer] is NOT to be used for urgent needs. For medical emergencies, dial 911. Now available from your iPhone and Android! General Information Please provide this summary of care documentation to your next provider. Patient Signature:  ____________________________________________________________ Date:  ____________________________________________________________  
  
Enmanuel Cart Provider Signature:  ____________________________________________________________ Date:  ____________________________________________________________

## 2017-09-07 NOTE — IP AVS SNAPSHOT
[facility] 
 
 
 [address] [facility] [address] [facility] 
[phone] Patient: Duong Willis MRN: [id number] :1965 Current Discharge Medication List  
  
ASK your doctor about these medications Dose & Instructions Dispensing Information Comments Morning Noon Evening Bedtime  
 furosemide 40 mg tablet Commonly known as:  LASIX Your last dose was: Your next dose is:    
   
   
 Dose:  40 mg Take 40 mg by mouth daily. Refills:  0  
     
   
   
   
  
 traMADol 50 mg tablet Commonly known as:  ULTRAM  
   
Your last dose was: Your next dose is:    
   
   
 Dose:  50 mg Take 1 Tab by mouth every six (6) hours as needed for Pain for up to 15 doses. Max Daily Amount: 200 mg. Quantity:  15 Tab Refills:  0

## 2017-09-07 NOTE — DISCHARGE INSTRUCTIONS
[address]  Department of Interventional Radiology  [facility]  [phone] Office  [phone] Fax    PARACENTESIS DISCHARGE INSTRUCTIONS    General Information:  During this procedure, the doctor will insert a needle into the abdomen to drain fluid. After the procedure, you will be able to take a deep breath much easier. The site of the puncture may ooze the first day. This will decrease and eventually stop. Paracentesis (draining fluid from the abdomen) sometimes makes patients hypotensive (low blood pressure). Your doctor may order for you to receive fluids or albumin (a volume booster) during the procedure through an IV site. Home Care Instructions:  Keep the puncture site clean and dry. No tub baths or swimming until puncture site heals. Showering is acceptable. Resume your normal diet, and resume your normal activity slowly and as you tolerate. If you are short of breath, rest. If shortness of breath does not ease, please call your ordering doctor. Fluid can re-accumulate in the chest and/or in the abdomen. If this should occur, your doctor needs to know as you may need to have the procedure done again. Call If:     You should call your Physician and/or the Radiology Nurse if you notice any signs of infection, like pus draining, or if it is swollen or reddened. Also call if you have a fever, or if you are bleeding from the puncture site more than a small amount on the dressing. Call if the puncture site keeps draining fluid. Some oozing is to be expected, but should slow and then stop. Call if you feel like you have pressure in your abdomen. SEEK IMMEDIATE CARE OR CALL 911 IF YOU SUDDENLY HAVE TROUBLE BREATHING, OR IF YOUR LIPS TURN BLUE, OR IF YOU NOTICE BLOOD IN YOUR SPUTUM. Follow-Up Instructions: Please see your ordering doctor as he/she has requested. To Reach Us:   If you have any questions about your procedure, please call the Interventional Radiology department at [phone]. After business hours (5pm) and weekends, call the answering service at [phone] and ask for the Radiologist on call to be paged. Interventional Radiology General Nurse Discharge    After general anesthesia or intravenous sedation, for 24 hours or while taking prescription Narcotics:  · Limit your activities  · Do not drive and operate hazardous machinery  · Do not make important personal or business decisions  · Do  not drink alcoholic beverages  · If you have not urinated within 8 hours after discharge, please contact your surgeon on call. * Please give a list of your current medications to your Primary Care Provider. * Please update this list whenever your medications are discontinued, doses are     changed, or new medications (including over-the-counter products) are added. * Please carry medication information at all times in case of emergency situations. These are general instructions for a healthy lifestyle:    No smoking/ No tobacco products/ Avoid exposure to second hand smoke  Surgeon General's Warning:  Quitting smoking now greatly reduces serious risk to your health. Obesity, smoking, and sedentary lifestyle greatly increases your risk for illness  A healthy diet, regular physical exercise & weight monitoring are important for maintaining a healthy lifestyle    You may be retaining fluid if you have a history of heart failure or if you experience any of the following symptoms:  Weight gain of 3 pounds or more overnight or 5 pounds in a week, increased swelling in our hands or feet or shortness of breath while lying flat in bed. Please call your doctor as soon as you notice any of these symptoms; do not wait until your next office visit.     Recognize signs and symptoms of STROKE:  F-face looks uneven    A-arms unable to move or move unevenly    S-speech slurred or non-existent    T-time-call 911 as soon as signs and symptoms begin-DO NOT go       Back to bed or wait to see if you get better-TIME IS BRAIN.           Date: 9/7/2017  Discharging Nurse: Danny Newman RN

## 2017-09-07 NOTE — PROGRESS NOTES
Procedure complete. Pt tolerated well. Site covered with dermabond. No bleeding or leaking from site. Discharge instructions reviewed with pt. Copy given to pt. Pt discharged to home.

## 2017-09-07 NOTE — PROGRESS NOTES
Interventional Radiology Post Paracentesis Note    9/7/2017    Procedure(s): Ultrasound guided Therapeutic Paracentesis Performed with 8 Congolese catheter total volume 20399 ml. No samples sent to lab. Preliminary Findings: large cloudy and pale yellow. Complications: None    Plan:  Observation, check labs if drawn.           Chest X-Ray:  no    Full dictated report to follow    Signed By: Mateus Nichols RN

## 2017-09-18 ENCOUNTER — HOSPITAL ENCOUNTER (OUTPATIENT)
Dept: LAB | Age: 52
Discharge: HOME | End: 2017-09-18
Attending: RADIOLOGY
Payer: COMMERCIAL

## 2017-09-18 LAB
ALBUMIN SERPL-MCNC: 2.7 G/DL (ref 3.5–5)
ALBUMIN/GLOB SERPL: 0.6 {RATIO} (ref 1.2–3.5)
ALP SERPL-CCNC: 213 U/L (ref 50–136)
ALT SERPL-CCNC: 32 U/L (ref 12–65)
ANION GAP SERPL CALC-SCNC: 6 MMOL/L (ref 7–16)
APTT PPP: 26.1 SEC (ref 23.5–31.7)
AST SERPL-CCNC: 40 U/L (ref 15–37)
BILIRUB SERPL-MCNC: 0.7 MG/DL (ref 0.2–1.1)
BUN SERPL-MCNC: 16 MG/DL (ref 6–23)
CALCIUM SERPL-MCNC: 8.3 MG/DL (ref 8.3–10.4)
CHLORIDE SERPL-SCNC: 95 MMOL/L (ref 98–107)
CO2 SERPL-SCNC: 29 MMOL/L (ref 21–32)
CREAT SERPL-MCNC: 1.13 MG/DL (ref 0.8–1.5)
ERYTHROCYTE [DISTWIDTH] IN BLOOD BY AUTOMATED COUNT: 13.8 % (ref 11.9–14.6)
GLOBULIN SER CALC-MCNC: 4.5 G/DL (ref 2.3–3.5)
GLUCOSE SERPL-MCNC: 105 MG/DL (ref 65–100)
HCT VFR BLD AUTO: 39.8 % (ref 41.1–50.3)
HGB BLD-MCNC: 14.1 G/DL (ref 13.6–17.2)
INR PPP: 0.9 (ref 0.9–1.2)
MCH RBC QN AUTO: 32.6 PG (ref 26.1–32.9)
MCHC RBC AUTO-ENTMCNC: 35.4 G/DL (ref 31.4–35)
MCV RBC AUTO: 92.1 FL (ref 79.6–97.8)
PLATELET # BLD AUTO: 143 K/UL (ref 150–450)
PMV BLD AUTO: 9.6 FL (ref 10.8–14.1)
POTASSIUM SERPL-SCNC: 2.8 MMOL/L (ref 3.5–5.1)
PROT SERPL-MCNC: 7.2 G/DL (ref 6.3–8.2)
PROTHROMBIN TIME: 10 SEC (ref 9.6–12)
RBC # BLD AUTO: 4.32 M/UL (ref 4.23–5.67)
SODIUM SERPL-SCNC: 130 MMOL/L (ref 136–145)
WBC # BLD AUTO: 5.4 K/UL (ref 4.3–11.1)

## 2017-09-18 PROCEDURE — 85610 PROTHROMBIN TIME: CPT | Performed by: RADIOLOGY

## 2017-09-18 PROCEDURE — 85027 COMPLETE CBC AUTOMATED: CPT | Performed by: RADIOLOGY

## 2017-09-18 PROCEDURE — 80053 COMPREHEN METABOLIC PANEL: CPT | Performed by: RADIOLOGY

## 2017-09-18 PROCEDURE — 36415 COLL VENOUS BLD VENIPUNCTURE: CPT | Performed by: RADIOLOGY

## 2017-09-18 PROCEDURE — 85730 THROMBOPLASTIN TIME PARTIAL: CPT | Performed by: RADIOLOGY

## 2017-09-18 NOTE — PROGRESS NOTES
Department of Interventional Radiology  [phone]                                 Progress Note  Patient: Erik Francis MRN: [id number]  SSN: [id number]    YOB: 1965  Age: 46 y.o. Sex: male      Potassium this morning 2.8. I called pharmacy and pt this morning. Kdur 20 meq bid x 3 doses. Will recheck immediately on arrival tomorrow.      Zenobia Sommer PA-C

## 2017-09-19 ENCOUNTER — HOSPITAL ENCOUNTER (INPATIENT)
Age: 52
Setting detail: SURGERY ADMIT
LOS: 1 days | Discharge: HOME | DRG: 406 | End: 2017-09-19
Attending: RADIOLOGY | Admitting: RADIOLOGY
Payer: COMMERCIAL

## 2017-09-19 ENCOUNTER — ANESTHESIA (OUTPATIENT)
Dept: SURGERY | Age: 52
DRG: 406 | End: 2017-09-19
Payer: COMMERCIAL

## 2017-09-19 ENCOUNTER — ANESTHESIA EVENT (OUTPATIENT)
Dept: SURGERY | Age: 52
DRG: 406 | End: 2017-09-19
Payer: COMMERCIAL

## 2017-09-19 ENCOUNTER — HOSPITAL ENCOUNTER (INPATIENT)
Dept: INTERVENTIONAL RADIOLOGY/VASCULAR | Age: 52
LOS: 1 days | Discharge: HOME | DRG: 406 | End: 2017-09-20
Attending: RADIOLOGY | Admitting: RADIOLOGY
Payer: COMMERCIAL

## 2017-09-19 VITALS
DIASTOLIC BLOOD PRESSURE: 53 MMHG | SYSTOLIC BLOOD PRESSURE: 90 MMHG | RESPIRATION RATE: 19 BRPM | HEART RATE: 71 BPM | TEMPERATURE: 98.2 F | OXYGEN SATURATION: 100 %

## 2017-09-19 DIAGNOSIS — R18.8 ASCITES: ICD-10-CM

## 2017-09-19 DIAGNOSIS — K74.60 CIRRHOSIS (HCC): ICD-10-CM

## 2017-09-19 PROBLEM — K70.31 ALCOHOLIC CIRRHOSIS OF LIVER WITH ASCITES (HCC): Status: ACTIVE | Noted: 2017-09-19

## 2017-09-19 LAB
ANION GAP SERPL CALC-SCNC: 7 MMOL/L (ref 7–16)
BUN BLD-MCNC: 14 MG/DL (ref 8–26)
BUN SERPL-MCNC: 11 MG/DL (ref 6–23)
CA-I BLD-MCNC: 1.13 MMOL/L (ref 1.12–1.32)
CALCIUM SERPL-MCNC: 7.8 MG/DL (ref 8.3–10.4)
CHLORIDE BLD-SCNC: 96 MMOL/L (ref 98–107)
CHLORIDE SERPL-SCNC: 104 MMOL/L (ref 98–107)
CO2 BLD-SCNC: 23 MMOL/L (ref 21–32)
CO2 SERPL-SCNC: 26 MMOL/L (ref 21–32)
CREAT BLD-MCNC: 1 MG/DL (ref 0.8–1.5)
CREAT SERPL-MCNC: 0.74 MG/DL (ref 0.8–1.5)
GLUCOSE BLD-MCNC: 88 MG/DL (ref 65–100)
GLUCOSE SERPL-MCNC: 108 MG/DL (ref 65–100)
HGB BLD-MCNC: 12.2 G/DL (ref 13.6–17.2)
MAGNESIUM SERPL-MCNC: 1.7 MG/DL (ref 1.8–2.4)
POTASSIUM BLD-SCNC: 3.4 MMOL/L (ref 3.5–5.1)
POTASSIUM SERPL-SCNC: 3.6 MMOL/L (ref 3.5–5.1)
SODIUM BLD-SCNC: 133 MMOL/L (ref 136–145)
SODIUM SERPL-SCNC: 137 MMOL/L (ref 136–145)

## 2017-09-19 PROCEDURE — 74011250636 HC RX REV CODE- 250/636: Performed by: PHYSICIAN ASSISTANT

## 2017-09-19 PROCEDURE — 74011636320 HC RX REV CODE- 636/320: Performed by: RADIOLOGY

## 2017-09-19 PROCEDURE — 74430 CONTRAST X-RAY BLADDER: CPT

## 2017-09-19 PROCEDURE — 74011000250 HC RX REV CODE- 250

## 2017-09-19 PROCEDURE — 37182 INSERT HEPATIC SHUNT (TIPS): CPT

## 2017-09-19 PROCEDURE — 76060000035 HC ANESTHESIA 2 TO 2.5 HR: Performed by: RADIOLOGY

## 2017-09-19 PROCEDURE — 65270000029 HC RM PRIVATE

## 2017-09-19 PROCEDURE — 74011250636 HC RX REV CODE- 250/636

## 2017-09-19 PROCEDURE — 74011000250 HC RX REV CODE- 250: Performed by: RADIOLOGY

## 2017-09-19 PROCEDURE — 74011250636 HC RX REV CODE- 250/636: Performed by: RADIOLOGY

## 2017-09-19 PROCEDURE — 06183DY BYPASS PORTAL VEIN TO LOW VEIN W INTRALUM DEV, PERC: ICD-10-PCS | Performed by: RADIOLOGY

## 2017-09-19 PROCEDURE — 77030008703 HC TU ET UNCUF COVD -A: Performed by: ANESTHESIOLOGY

## 2017-09-19 PROCEDURE — 77030010507 HC ADH SKN DERMBND J&J -B

## 2017-09-19 PROCEDURE — 76210000017 HC OR PH I REC 1.5 TO 2 HR: Performed by: RADIOLOGY

## 2017-09-19 PROCEDURE — 77030018719 HC DRSG PTCH ANTIMIC J&J -A

## 2017-09-19 PROCEDURE — 0W9G3ZZ DRAINAGE OF PERITONEAL CAVITY, PERCUTANEOUS APPROACH: ICD-10-PCS | Performed by: RADIOLOGY

## 2017-09-19 PROCEDURE — 77030020782 HC GWN BAIR PAWS FLX 3M -B: Performed by: ANESTHESIOLOGY

## 2017-09-19 PROCEDURE — P9045 ALBUMIN (HUMAN), 5%, 250 ML: HCPCS | Performed by: RADIOLOGY

## 2017-09-19 PROCEDURE — C1887 CATHETER, GUIDING: HCPCS

## 2017-09-19 PROCEDURE — 77001 FLUOROGUIDE FOR VEIN DEVICE: CPT

## 2017-09-19 PROCEDURE — 74011000250 HC RX REV CODE- 250: Performed by: ANESTHESIOLOGY

## 2017-09-19 PROCEDURE — C1769 GUIDE WIRE: HCPCS

## 2017-09-19 PROCEDURE — C1751 CATH, INF, PER/CENT/MIDLINE: HCPCS

## 2017-09-19 PROCEDURE — 02H633Z INSERTION OF INFUSION DEVICE INTO RIGHT ATRIUM, PERCUTANEOUS APPROACH: ICD-10-PCS | Performed by: RADIOLOGY

## 2017-09-19 PROCEDURE — C1894 INTRO/SHEATH, NON-LASER: HCPCS

## 2017-09-19 PROCEDURE — 77030034849

## 2017-09-19 PROCEDURE — 74011250636 HC RX REV CODE- 250/636: Performed by: ANESTHESIOLOGY

## 2017-09-19 PROCEDURE — 77030002916 HC SUT ETHLN J&J -A

## 2017-09-19 PROCEDURE — C1729 CATH, DRAINAGE: HCPCS

## 2017-09-19 PROCEDURE — 74011250637 HC RX REV CODE- 250/637: Performed by: RADIOLOGY

## 2017-09-19 PROCEDURE — 77030032490 HC SLV COMPR SCD KNE COVD -B

## 2017-09-19 PROCEDURE — 80048 BASIC METABOLIC PNL TOTAL CA: CPT | Performed by: RADIOLOGY

## 2017-09-19 PROCEDURE — 77030008477 HC STYL SATN SLP COVD -A: Performed by: ANESTHESIOLOGY

## 2017-09-19 PROCEDURE — 80047 BASIC METABLC PNL IONIZED CA: CPT

## 2017-09-19 PROCEDURE — 85018 HEMOGLOBIN: CPT | Performed by: ANESTHESIOLOGY

## 2017-09-19 PROCEDURE — 83735 ASSAY OF MAGNESIUM: CPT | Performed by: PHYSICIAN ASSISTANT

## 2017-09-19 PROCEDURE — C1725 CATH, TRANSLUMIN NON-LASER: HCPCS

## 2017-09-19 PROCEDURE — C2628 CATHETER, OCCLUSION: HCPCS

## 2017-09-19 PROCEDURE — 77030019908 HC STETH ESOPH SIMS -A: Performed by: ANESTHESIOLOGY

## 2017-09-19 PROCEDURE — 77030013794 HC KT TRNSDUC BLD EDWD -B

## 2017-09-19 PROCEDURE — C1874 STENT, COATED/COV W/DEL SYS: HCPCS

## 2017-09-19 RX ORDER — ALBUMIN HUMAN 50 G/1000ML
25 SOLUTION INTRAVENOUS ONCE
Status: COMPLETED | OUTPATIENT
Start: 2017-09-19 | End: 2017-09-19

## 2017-09-19 RX ORDER — LIDOCAINE HYDROCHLORIDE 20 MG/ML
1-10 INJECTION, SOLUTION INFILTRATION; PERINEURAL ONCE
Status: COMPLETED | OUTPATIENT
Start: 2017-09-19 | End: 2017-09-19

## 2017-09-19 RX ORDER — DIPHENHYDRAMINE HCL 25 MG
50 CAPSULE ORAL
Status: DISCONTINUED | OUTPATIENT
Start: 2017-09-19 | End: 2017-09-20 | Stop reason: HOSPADM

## 2017-09-19 RX ORDER — ATROPA BELLADONNA AND OPIUM 16.2; 6 MG/1; MG/1
1 SUPPOSITORY RECTAL ONCE
Status: DISCONTINUED | OUTPATIENT
Start: 2017-09-19 | End: 2017-09-19

## 2017-09-19 RX ORDER — LIDOCAINE HYDROCHLORIDE 10 MG/ML
0.3 INJECTION INFILTRATION; PERINEURAL ONCE
Status: ACTIVE | OUTPATIENT
Start: 2017-09-19 | End: 2017-09-20

## 2017-09-19 RX ORDER — SUCCINYLCHOLINE CHLORIDE 20 MG/ML
INJECTION INTRAMUSCULAR; INTRAVENOUS AS NEEDED
Status: DISCONTINUED | OUTPATIENT
Start: 2017-09-19 | End: 2017-09-19 | Stop reason: HOSPADM

## 2017-09-19 RX ORDER — MIDAZOLAM HYDROCHLORIDE 1 MG/ML
2 INJECTION, SOLUTION INTRAMUSCULAR; INTRAVENOUS
Status: CANCELLED | OUTPATIENT
Start: 2017-09-19

## 2017-09-19 RX ORDER — LIDOCAINE HYDROCHLORIDE 20 MG/ML
INJECTION, SOLUTION EPIDURAL; INFILTRATION; INTRACAUDAL; PERINEURAL AS NEEDED
Status: DISCONTINUED | OUTPATIENT
Start: 2017-09-19 | End: 2017-09-19 | Stop reason: HOSPADM

## 2017-09-19 RX ORDER — ROCURONIUM BROMIDE 10 MG/ML
INJECTION, SOLUTION INTRAVENOUS AS NEEDED
Status: DISCONTINUED | OUTPATIENT
Start: 2017-09-19 | End: 2017-09-19 | Stop reason: HOSPADM

## 2017-09-19 RX ORDER — HYDROMORPHONE HYDROCHLORIDE 1 MG/ML
INJECTION, SOLUTION INTRAMUSCULAR; INTRAVENOUS; SUBCUTANEOUS
Status: DISCONTINUED
Start: 2017-09-19 | End: 2017-09-19

## 2017-09-19 RX ORDER — SODIUM CHLORIDE 0.9 % (FLUSH) 0.9 %
5-10 SYRINGE (ML) INJECTION EVERY 8 HOURS
Status: CANCELLED | OUTPATIENT
Start: 2017-09-19

## 2017-09-19 RX ORDER — POTASSIUM CHLORIDE 29.8 MG/ML
20 INJECTION INTRAVENOUS ONCE
Status: COMPLETED | OUTPATIENT
Start: 2017-09-19 | End: 2017-09-20

## 2017-09-19 RX ORDER — SODIUM CHLORIDE, SODIUM LACTATE, POTASSIUM CHLORIDE, CALCIUM CHLORIDE 600; 310; 30; 20 MG/100ML; MG/100ML; MG/100ML; MG/100ML
100 INJECTION, SOLUTION INTRAVENOUS CONTINUOUS
Status: DISCONTINUED | OUTPATIENT
Start: 2017-09-19 | End: 2017-09-19 | Stop reason: HOSPADM

## 2017-09-19 RX ORDER — IBUPROFEN 600 MG/1
600 TABLET ORAL
Status: DISCONTINUED | OUTPATIENT
Start: 2017-09-19 | End: 2017-09-20 | Stop reason: HOSPADM

## 2017-09-19 RX ORDER — OXYCODONE HYDROCHLORIDE 5 MG/1
10 TABLET ORAL
Status: DISCONTINUED | OUTPATIENT
Start: 2017-09-19 | End: 2017-09-19 | Stop reason: HOSPADM

## 2017-09-19 RX ORDER — HEPARIN SODIUM 200 [USP'U]/100ML
1000 INJECTION, SOLUTION INTRAVENOUS CONTINUOUS
Status: DISCONTINUED | OUTPATIENT
Start: 2017-09-19 | End: 2017-09-20 | Stop reason: HOSPADM

## 2017-09-19 RX ORDER — SODIUM CHLORIDE 0.9 % (FLUSH) 0.9 %
5-10 SYRINGE (ML) INJECTION EVERY 8 HOURS
Status: DISCONTINUED | OUTPATIENT
Start: 2017-09-19 | End: 2017-09-20 | Stop reason: HOSPADM

## 2017-09-19 RX ORDER — LACTULOSE 10 G/15ML
10 SOLUTION ORAL; RECTAL 3 TIMES DAILY
COMMUNITY
End: 2018-05-08

## 2017-09-19 RX ORDER — PROPOFOL 10 MG/ML
INJECTION, EMULSION INTRAVENOUS AS NEEDED
Status: DISCONTINUED | OUTPATIENT
Start: 2017-09-19 | End: 2017-09-19 | Stop reason: HOSPADM

## 2017-09-19 RX ORDER — ALBUMIN HUMAN 50 G/1000ML
25 SOLUTION INTRAVENOUS ONCE
Status: COMPLETED | OUTPATIENT
Start: 2017-09-19 | End: 2017-09-20

## 2017-09-19 RX ORDER — SODIUM CHLORIDE 0.9 % (FLUSH) 0.9 %
5-10 SYRINGE (ML) INJECTION AS NEEDED
Status: DISCONTINUED | OUTPATIENT
Start: 2017-09-19 | End: 2017-09-19 | Stop reason: HOSPADM

## 2017-09-19 RX ORDER — SODIUM CHLORIDE, SODIUM LACTATE, POTASSIUM CHLORIDE, CALCIUM CHLORIDE 600; 310; 30; 20 MG/100ML; MG/100ML; MG/100ML; MG/100ML
100 INJECTION, SOLUTION INTRAVENOUS CONTINUOUS
Status: DISCONTINUED | OUTPATIENT
Start: 2017-09-19 | End: 2017-09-19

## 2017-09-19 RX ORDER — ATROPA BELLADONNA AND OPIUM 16.2; 6 MG/1; MG/1
1 SUPPOSITORY RECTAL ONCE
Status: ACTIVE | OUTPATIENT
Start: 2017-09-19 | End: 2017-09-20

## 2017-09-19 RX ORDER — SODIUM CHLORIDE 9 MG/ML
125 INJECTION, SOLUTION INTRAVENOUS CONTINUOUS
Status: DISCONTINUED | OUTPATIENT
Start: 2017-09-19 | End: 2017-09-20 | Stop reason: HOSPADM

## 2017-09-19 RX ORDER — POTASSIUM CHLORIDE 29.8 MG/ML
20 INJECTION INTRAVENOUS ONCE
Status: DISPENSED | OUTPATIENT
Start: 2017-09-19 | End: 2017-09-20

## 2017-09-19 RX ORDER — HYDROMORPHONE HYDROCHLORIDE 2 MG/ML
0.5 INJECTION, SOLUTION INTRAMUSCULAR; INTRAVENOUS; SUBCUTANEOUS
Status: DISCONTINUED | OUTPATIENT
Start: 2017-09-19 | End: 2017-09-19 | Stop reason: HOSPADM

## 2017-09-19 RX ORDER — SODIUM CHLORIDE, SODIUM LACTATE, POTASSIUM CHLORIDE, CALCIUM CHLORIDE 600; 310; 30; 20 MG/100ML; MG/100ML; MG/100ML; MG/100ML
100 INJECTION, SOLUTION INTRAVENOUS CONTINUOUS
Status: CANCELLED | OUTPATIENT
Start: 2017-09-19 | End: 2017-09-20

## 2017-09-19 RX ORDER — OXYCODONE HYDROCHLORIDE 5 MG/1
5 TABLET ORAL
Status: DISCONTINUED | OUTPATIENT
Start: 2017-09-19 | End: 2017-09-20 | Stop reason: HOSPADM

## 2017-09-19 RX ORDER — LEVOFLOXACIN 5 MG/ML
500 INJECTION, SOLUTION INTRAVENOUS ONCE
Status: COMPLETED | OUTPATIENT
Start: 2017-09-19 | End: 2017-09-19

## 2017-09-19 RX ORDER — LIDOCAINE HYDROCHLORIDE 10 MG/ML
0.3 INJECTION INFILTRATION; PERINEURAL ONCE
Status: CANCELLED | OUTPATIENT
Start: 2017-09-19 | End: 2017-09-19

## 2017-09-19 RX ORDER — SODIUM CHLORIDE 0.9 % (FLUSH) 0.9 %
5-10 SYRINGE (ML) INJECTION AS NEEDED
Status: DISCONTINUED | OUTPATIENT
Start: 2017-09-19 | End: 2017-09-20 | Stop reason: HOSPADM

## 2017-09-19 RX ORDER — SODIUM CHLORIDE 0.9 % (FLUSH) 0.9 %
5-10 SYRINGE (ML) INJECTION AS NEEDED
Status: CANCELLED | OUTPATIENT
Start: 2017-09-19

## 2017-09-19 RX ORDER — MORPHINE SULFATE 10 MG/ML
2 INJECTION, SOLUTION INTRAMUSCULAR; INTRAVENOUS
Status: DISCONTINUED | OUTPATIENT
Start: 2017-09-19 | End: 2017-09-20 | Stop reason: HOSPADM

## 2017-09-19 RX ORDER — FENTANYL CITRATE 50 UG/ML
INJECTION, SOLUTION INTRAMUSCULAR; INTRAVENOUS AS NEEDED
Status: DISCONTINUED | OUTPATIENT
Start: 2017-09-19 | End: 2017-09-19 | Stop reason: HOSPADM

## 2017-09-19 RX ORDER — ZOLPIDEM TARTRATE 5 MG/1
5 TABLET ORAL
Status: DISCONTINUED | OUTPATIENT
Start: 2017-09-19 | End: 2017-09-20 | Stop reason: HOSPADM

## 2017-09-19 RX ORDER — SODIUM CHLORIDE, SODIUM LACTATE, POTASSIUM CHLORIDE, CALCIUM CHLORIDE 600; 310; 30; 20 MG/100ML; MG/100ML; MG/100ML; MG/100ML
INJECTION, SOLUTION INTRAVENOUS
Status: DISCONTINUED | OUTPATIENT
Start: 2017-09-19 | End: 2017-09-19 | Stop reason: HOSPADM

## 2017-09-19 RX ORDER — MIDAZOLAM HYDROCHLORIDE 1 MG/ML
2 INJECTION, SOLUTION INTRAMUSCULAR; INTRAVENOUS
Status: DISCONTINUED | OUTPATIENT
Start: 2017-09-19 | End: 2017-09-20 | Stop reason: HOSPADM

## 2017-09-19 RX ADMIN — FENTANYL CITRATE 50 MCG: 50 INJECTION, SOLUTION INTRAMUSCULAR; INTRAVENOUS at 09:45

## 2017-09-19 RX ADMIN — SODIUM CHLORIDE 125 ML/HR: 900 INJECTION, SOLUTION INTRAVENOUS at 15:53

## 2017-09-19 RX ADMIN — POTASSIUM CHLORIDE 20 MEQ: 400 INJECTION, SOLUTION INTRAVENOUS at 14:35

## 2017-09-19 RX ADMIN — ALBUMIN (HUMAN) 25 G: 12.5 INJECTION, SOLUTION INTRAVENOUS at 12:48

## 2017-09-19 RX ADMIN — FENTANYL CITRATE 50 MCG: 50 INJECTION, SOLUTION INTRAMUSCULAR; INTRAVENOUS at 09:52

## 2017-09-19 RX ADMIN — ROCURONIUM BROMIDE 15 MG: 10 INJECTION, SOLUTION INTRAVENOUS at 09:52

## 2017-09-19 RX ADMIN — IOPAMIDOL 90 ML: 612 INJECTION, SOLUTION INTRAVENOUS at 11:00

## 2017-09-19 RX ADMIN — HYDROMORPHONE HYDROCHLORIDE 0.5 MG: 2 INJECTION, SOLUTION INTRAMUSCULAR; INTRAVENOUS; SUBCUTANEOUS at 12:02

## 2017-09-19 RX ADMIN — HYDROMORPHONE HYDROCHLORIDE 0.5 MG: 2 INJECTION, SOLUTION INTRAMUSCULAR; INTRAVENOUS; SUBCUTANEOUS at 12:17

## 2017-09-19 RX ADMIN — HYDROMORPHONE HYDROCHLORIDE 0.5 MG: 2 INJECTION, SOLUTION INTRAMUSCULAR; INTRAVENOUS; SUBCUTANEOUS at 12:22

## 2017-09-19 RX ADMIN — Medication 10 ML: at 22:00

## 2017-09-19 RX ADMIN — HEPARIN SODIUM 2000 UNITS: 200 INJECTION, SOLUTION INTRAVENOUS at 10:19

## 2017-09-19 RX ADMIN — HYDROMORPHONE HYDROCHLORIDE 0.5 MG: 2 INJECTION, SOLUTION INTRAMUSCULAR; INTRAVENOUS; SUBCUTANEOUS at 12:43

## 2017-09-19 RX ADMIN — PROPOFOL 150 MG: 10 INJECTION, EMULSION INTRAVENOUS at 09:52

## 2017-09-19 RX ADMIN — SODIUM CHLORIDE 12.5 MG: 9 INJECTION INTRAMUSCULAR; INTRAVENOUS; SUBCUTANEOUS at 19:55

## 2017-09-19 RX ADMIN — MORPHINE SULFATE 2 MG: 10 INJECTION INTRAMUSCULAR; INTRAVENOUS; SUBCUTANEOUS at 14:34

## 2017-09-19 RX ADMIN — SUCCINYLCHOLINE CHLORIDE 180 MG: 20 INJECTION INTRAMUSCULAR; INTRAVENOUS at 09:52

## 2017-09-19 RX ADMIN — Medication 10 ML: at 14:35

## 2017-09-19 RX ADMIN — ALBUMIN (HUMAN) 25 G: 12.5 INJECTION, SOLUTION INTRAVENOUS at 13:00

## 2017-09-19 RX ADMIN — MORPHINE SULFATE 2 MG: 10 INJECTION INTRAMUSCULAR; INTRAVENOUS; SUBCUTANEOUS at 20:09

## 2017-09-19 RX ADMIN — FAMOTIDINE 20 MG: 10 INJECTION, SOLUTION INTRAVENOUS at 09:29

## 2017-09-19 RX ADMIN — SODIUM CHLORIDE, SODIUM LACTATE, POTASSIUM CHLORIDE, CALCIUM CHLORIDE: 600; 310; 30; 20 INJECTION, SOLUTION INTRAVENOUS at 09:14

## 2017-09-19 RX ADMIN — HYDROMORPHONE HYDROCHLORIDE 0.5 MG: 2 INJECTION, SOLUTION INTRAMUSCULAR; INTRAVENOUS; SUBCUTANEOUS at 11:58

## 2017-09-19 RX ADMIN — LIDOCAINE HYDROCHLORIDE 200 MG: 20 INJECTION, SOLUTION INFILTRATION; PERINEURAL at 10:26

## 2017-09-19 RX ADMIN — PROMETHAZINE HYDROCHLORIDE 6.25 MG: 25 INJECTION INTRAMUSCULAR; INTRAVENOUS at 13:25

## 2017-09-19 RX ADMIN — LIDOCAINE HYDROCHLORIDE 100 MG: 20 INJECTION, SOLUTION EPIDURAL; INFILTRATION; INTRACAUDAL; PERINEURAL at 09:52

## 2017-09-19 RX ADMIN — LEVOFLOXACIN 500 MG: 5 INJECTION, SOLUTION INTRAVENOUS at 09:43

## 2017-09-19 RX ADMIN — HYDROMORPHONE HYDROCHLORIDE 0.5 MG: 2 INJECTION, SOLUTION INTRAMUSCULAR; INTRAVENOUS; SUBCUTANEOUS at 12:53

## 2017-09-19 RX ADMIN — LACTULOSE 20 G: 20 SOLUTION ORAL at 18:04

## 2017-09-19 NOTE — PROGRESS NOTES
IR Nurse Pre-Procedure Checklist Part 2          Consent signed: Yes    H&P complete:  Yes    Antibiotics: Not applicable    Airway/Mallampati Done: Not applicable    Shaved: Yes    Pregnancy Form:Not applicable    Patient Position: Yes    MD Side: Yes     Biopsy Worksheet: Not applicable    Specimen Medium: Not applicable

## 2017-09-19 NOTE — CONSULTS
[facility] Consult Note       Primary GI Physician: Tommy Holliday MD  Consulting GI Physician: Smith Arias MD  Referring Physician:  Andre Loya MD    Consult Date:  9/19/2017  Admit Date:  9/19/2017    Chief Complaint:  Ascites / HE    Subjective:     History of Present Illness:  Patient is a 46 y.o. male who is seen in consultation at the request of Dr. Gaby Sims for Cirrhosis with ascites & HE. Patient is followed by Dr. Leanna Donovan for decompensated cryptogenic cirrhosis with ascites, portal HTN. Patient was last seen in our office on 8/18/17 for development of large volume ascites after recent repair of umbilical hernia. It was felt that pt would benefit from TIPS & underwent TIPS, para, & temporary central venous catheter this morning. Patient began lactulose several day in preparation of TIPS. He complains of pain where catheter was placed today. O/w no abdominal pain, N/V, or confusion. He is fully A&O x3. He complains of chronic fatigue & generalized weakness. No s/sx of GI bleeding. PMH:  Past Medical History:   Diagnosis Date    Cirrhosis of liver ([address]     undeterminable cause    Multiple fractures of ribs of both sides     Multiple, bilateral w/o pneumo: 2013       PSH:  Past Surgical History:   Procedure Laterality Date    HX HERNIA REPAIR      abd    HX KNEE ARTHROSCOPY  2005    HX OTHER SURGICAL      liver bx       Allergies:  No Known Allergies    Home Medications:  Prior to Admission medications    Medication Sig Start Date End Date Taking? Authorizing Provider   lactulose (CHRONULAC) 10 gram/15 mL solution Take 10 g by mouth three (3) times daily. Yes Historical Provider   torsemide (DEMADEX) 5 mg tablet Take  by mouth daily. Dose unknown, to bring dos for clarification   Yes Historical Provider   AMILORIDE HCL (AMILORIDE PO) Take  by mouth daily.  Dose unknown, to bring dos for clarification   Yes Historical Provider       Hospital Medications:  Current Facility-Administered Medications   Medication Dose Route Frequency    heparin (PF) 2 units/ml in NS infusion 2,000 Units  1,000 mL Irrigation CONTINUOUS    HYDROmorphone (PF) (DILAUDID) 1 mg/mL injection        HYDROmorphone (PF) (DILAUDID) 1 mg/mL injection        albumin human 5% (BUMINATE) solution 25 g  25 g IntraVENous ONCE    opium-belladonna (B&O 16-A SUPPRETTE) 16.2-60 mg suppository 1 Suppository  1 Suppository Rectal ONCE     Facility-Administered Medications Ordered in Other Encounters   Medication Dose Route Frequency    albumin human 5% (BUMINATE) solution 25 g  25 g IntraVENous ONCE    opium-belladonna (B&O 16-A SUPPRETTE) 16.2-60 mg suppository 1 Suppository  1 Suppository Rectal ONCE       Social History:  Social History   Substance Use Topics    Smoking status: Current Every Day Smoker     Packs/day: 0.50     Years: 4.00     Types: Cigarettes     Start date: 9/10/2010    Smokeless tobacco: Never Used    Alcohol use No       Family History:  Family History   Problem Relation Age of Onset    No Known Problems Mother     No Known Problems Father        Review of Systems:  A detailed 10 system ROS is obtained, with pertinent positives as listed above. All others are negative. Objective:     Physical Exam:  Vitals:  Visit Vitals    /68 (BP 1 Location: Right arm, BP Patient Position: At rest;Sitting)    Pulse 68    Temp 98.3 °F (36.8 °C)    Resp 16    Wt 71.7 kg (158 lb)    SpO2 100%    BMI 21.43 kg/m2     Gen:  Pt is alert, cooperative, NAD  Skin:  Extremities and face reveal no rashes. No Jaundice. HEENT: Sclerae anicteric. Extra-occular muscles are intact. No oral ulcers. No abnormal pigmentation of the lips. The neck is supple. Cardiovascular: Regular rate and rhythm. No murmurs, gallops, or rubs. Respiratory:  Clear breath sounds anteriorly with no wheezes, rales, or rhonchi. GI:  Abdomen nondistended, soft, and NONTENDER. NABS.  No enlargement of the liver or spleen. No masses palpable. Rectal:  Deferred  Musculoskeletal:  No pitting edema of the lower legs. Neurological:  Gross memory appears intact. Patient is alert and oriented. Psychiatric:  Mood appears appropriate with judgement intact. Lymphatic:  No cervical or supraclavicular adenopathy. Laboratory:    Recent Labs      09/18/17   0953   WBC  5.4   HGB  14.1   HCT  39.8*   PLT  143*   MCV  92.1   NA  130*   K  2.8*   CL  95*   CO2  29   BUN  16   CREA  1.13   CA  8.3   GLU  105*   AP  213*   SGOT  40*   ALT  32   TBILI  0.7   ALB  2.7*   TP  7.2   PTP  10.0   INR  0.9   APTT  26.1          Assessment:     Active Problems:  Alcoholic cirrhosis of liver with ascites ([facility] [address] (9/19/2017)    47 y/o male with decompensated cryptogenic cirrhosis who presents for TIPS procedure for refractory ascites. Tolerated procedure well. Presently no signs of confusion / HE. Plan:     IR to admit 23 obs. Lactulose 30cc bid. Reiterated that TIPS may cause worsening HE. Home in am if clinically stable. LETA w/ Dr. Isatu Robertson. Caridad Vincent PA-C    Patient is seen and examined in collaboration with Dr. Morelia Jimenez. Assessment and plan as per Dr. Eva Galeana    ATTENDING NOTE:  I agree with the above assessment and plan. Patient clinically without features of hepatic encephalopathy. Admitted following a TIPS today.     Morelia Jimenez MD

## 2017-09-19 NOTE — PERIOP NOTES
1146: PT received from IR s/p TIPS. C/o pain from hogan catheter. Catheter draining well. 1200: Medicating for pain  1210: Pt acting belligerent. Explained that we are giving him medication and that he does not need resort to foul language. Pt attempting to pull off leads. 1225: Continuing to medicate for pain. 1235: Spoke with Dr. Mellisa Yost and received permission to remove hogan. 1240: Hogan removed. Pt still complaining of pain. 1315:  TRANSFER - OUT REPORT:    Verbal report given to Amparo Emery (name) on [facility]  being transferred to [facility](unit) for routine post - op       Report consisted of patients Situation, Background, Assessment and   Recommendations(SBAR). Information from the following report(s) SBAR, Procedure Summary, Intake/Output and MAR was reviewed with the receiving nurse. Lines:   Quad Lumen 09/19/17 Right Internal jugular (Active)   Central Line Being Utilized No 9/19/2017 11:46 AM   Site Assessment Clean, dry, & intact 9/19/2017 11:46 AM   Infiltration Assessment 0 9/19/2017 11:46 AM   Affected Extremity/Extremities Color distal to insertion site pink (or appropriate for race); Pulses palpable 9/19/2017 11:46 AM   Date of Last Dressing Change 09/19/17 9/19/2017 11:46 AM   Dressing Status Clean, dry, & intact 9/19/2017 11:46 AM   Dressing Type Transparent 9/19/2017 11:46 AM       Peripheral IV 08/23/17 Left Antecubital (Active)       Peripheral IV 09/19/17 Left Forearm (Active)   Site Assessment Clean, dry, & intact 9/19/2017 11:46 AM   Phlebitis Assessment 0 9/19/2017 11:46 AM   Infiltration Assessment 0 9/19/2017 11:46 AM   Dressing Status Clean, dry, & intact 9/19/2017 11:46 AM   Dressing Type Transparent 9/19/2017 11:46 AM   Hub Color/Line Status Infusing 9/19/2017 11:46 AM        Opportunity for questions and clarification was provided.       Patient transported with:   Monitor  O2 @ 2 liters  Registered Nurse    VTE prophylaxis orders have been written for [employer] Alona Rodriguez. Patient and family given floor number and nurses name. Family updated re: pt status after security code verified. 1323: Pt vomited. Medicated for nausea. 1330: Transferred to ICU    See paper charting for vital sign records. Pt had two charts assigned related to IR procedure which resulted in the PACU chart being discharged preventing recording of medications and vital signs.

## 2017-09-19 NOTE — IP AVS SNAPSHOT
[facility] 
 
 
 [address] [place] [address] 
[phone] Patient: Lori Lozada MRN: [id number] :1965 You are allergic to the following No active allergies Recent Documentation Weight BMI Smoking Status 71.7 kg 21.43 kg/m2 Current Every Day Smoker Emergency Contacts Name Discharge Info Relation Home Work Mobile Wm. Chen Jernigan  Son [22] [phone] About your hospitalization You were admitted on:  2017 You last received care in the:  [facility] 3 INTENSIVE CARE You were discharged on:  2017 Unit phone number:  [phone] Why you were hospitalized Your primary diagnosis was:  Alcoholic Cirrhosis Of Liver With Ascites (Hcc) Providers Seen During Your Hospitalizations Provider Role Specialty Primary office phone Alesha Lancaster MD Attending Provider Radiology [phone] Your Primary Care Physician (PCP) Primary Care Physician Office Phone Office Fax Ismael Lewis [phone] Follow-up Information Follow up With Details Comments Contact Info Carl Gomez MD   [facility] [address] [facility] Jesenia Tran 
[phone] Current Discharge Medication List  
  
CONTINUE these medications which have NOT CHANGED Dose & Instructions Dispensing Information Comments Morning Noon Evening Bedtime AMILORIDE PO Your last dose was: Your next dose is:    
   
   
 Dose:  15 mg Take 15 mg by mouth two (2) times a day. Dose unknown, to bring dos for clarification Refills:  0  
     
   
   
   
  
 lactulose 10 gram/15 mL solution Commonly known as:  Adron Query Your last dose was: Your next dose is:    
   
   
 Dose:  10 g Take 10 g by mouth three (3) times daily. Refills:  0 torsemide 20 mg tablet Commonly known as:  DEMADEX Your last dose was: Your next dose is:    
   
   
 Dose:  40 mg Take 40 mg by mouth two (2) times a day. Refills:  0 Discharge Instructions Ascites: Care Instructions Your Care Instructions Ascites (say \"uh-SY-teez\") is a buildup of extra fluid in the belly. It can cause your belly to swell. It can also make it hard for you to breathe. Many diseases can cause ascites. But most people who get it have a liver problem. When the liver gets damaged, it can cause fluid to back up from the liver or from blood vessels. Then this fluid builds up in the belly. Your doctor may take a sample of the fluid in your belly with a thin needle. The sample is then tested to help find out the cause of the ascites. Treatment may include medicine. It may also include changing the way you eat so you don't eat a lot of salt. If your ascites is very bad and hard to treat, your doctor may need to use a needle to take out the fluid. Follow-up care is a key part of your treatment and safety. Be sure to make and go to all appointments, and call your doctor if you are having problems. It's also a good idea to know your test results and keep a list of the medicines you take. How can you care for yourself at home? · Be safe with medicines. Take your medicines exactly as prescribed. Call your doctor if you have any problems with your medicine. You will get more details on the specific medicines your doctor prescribes. · Eat low-salt foods, and don't add salt to your food. If you eat a lot of salt, it's harder to get rid of the extra fluid. Salt is in many prepared foods. These include hines, canned foods, snack foods, sauces, and soups. Look for products that are low-sodium or have reduced salt. · Do not drink any alcohol until you are all better.  Alcohol will damage the liver more. If your liver disease is caused by drinking alcohol, do not drink alcohol at all. Tell your doctor if you need help to quit. Counseling, support groups, and sometimes medicines can help you stay sober. · Talk to your doctor before you take any other medicines. These include over-the-counter medicines, vitamins, and herbal products. · Make sure your doctor knows all the medicines you take. Some medicines, such as acetaminophen (Tylenol), can make liver problems worse. When should you call for help? Call 911 anytime you think you may need emergency care. For example, call if: 
· You have severe trouble breathing. Call your doctor now or seek immediate medical care if: 
· You have new belly pain or swelling. · You have a fever not caused by the flu or some other illness that you know you have. · The swelling in your belly gets bigger, or you get a bulge near your belly button. Watch closely for changes in your health, and be sure to contact your doctor if: 
· You would like help to plan a diet that protects your liver. Where can you learn more? Go to http://elvia-danielle.info/. Enter B970 in the search box to learn more about \"Ascites: Care Instructions. \" Current as of: August 9, 2016 Content Version: 11.3 © 8455-1136 [employer]. Care instructions adapted under license by [employer] (which disclaims liability or warranty for this information). If you have questions about a medical condition or this instruction, always ask your healthcare professional. [place] [address] any warranty or liability for your use of this information. Cirrhosis: Care Instructions Your Care Instructions Cirrhosis occurs when healthy tissue in your liver gets scarred. This keeps the liver from working well. It usually happens after a liver has been inflamed for years. Cirrhosis is most often caused by alcohol abuse or hepatitis infection. But there are other causes too. These include medicines and too much fat in the liver. Conditions passed down in families and other disorders can also cause it. In some cases, no cause can be found. Treatment can't completely fix liver damage. But you may be able to slow or prevent more damage if you don't drink alcohol or use drugs that harm your liver. Follow-up care is a key part of your treatment and safety. Be sure to make and go to all appointments, and call your doctor if you are having problems. It's also a good idea to know your test results and keep a list of the medicines you take. How can you care for yourself at home? · Do not drink any alcohol. It can harm your liver. Talk to your doctor if you need help to stop drinking. · Be safe with medicines. Take your medicines exactly as prescribed. Call your doctor if you think you are having a problem with your medicine. · Talk to your doctor before you take any other medicines. These include over-the-counter medicines and herbal products. · Be careful taking acetaminophen (Tylenol), ibuprofen (Advil, Motrin), or naproxen (Aleve). These can sometimes cause more liver damage. Talk with your doctor if you're not sure which medicines are safe. · If your cirrhosis causes extra fluid to build up in your body, try not to eat a lot of salt. Use less salt when you cook and at the table. Don't eat fast foods or snack foods with a lot of salt. Extra fluid in your belly, legs, and chest can cause serious problems. · Work with your doctor or a dietitian to be sure you eat the right amount of carbohydrate, protein, fat, and sodium (salt). It's very important to choose the best foods for the health of your liver. · If your doctor recommends it, limit how much fluid you drink. · If your doctor recommends it, get more exercise. Walking is a good choice. Bit by bit, increase the amount you walk every day.  Try for at least 30 minutes on most days of the week. You also may want to swim, bike, or do other activities. When should you call for help? Call 911 anytime you think you may need emergency care. For example, call if: 
· You passed out (lost consciousness). Call your doctor now or seek immediate medical care if: 
· You feel very sleepy or confused. · You have new belly pain, or your pain gets worse. · You have a fever. · There is a new or increasing yellow tint to your skin or the whites of your eyes. · You have any abnormal bleeding, such as: 
¨ Nosebleeds. ¨ Vaginal bleeding that is different (heavier, more frequent, at a different time of the month) than what you are used to. ¨ Bloody or black stools, or rectal bleeding. ¨ Bloody or pink urine. Watch closely for changes in your health, and be sure to contact your doctor if you have any problems. Where can you learn more? Go to http://elvia-danielle.info/. Enter M412 in the search box to learn more about \"Cirrhosis: Care Instructions. \" Current as of: August 9, 2016 Content Version: 11.3 © 0013-0433 [employer]. Care instructions adapted under license by [employer] (which disclaims liability or warranty for this information). If you have questions about a medical condition or this instruction, always ask your healthcare professional. [place] [address] any warranty or liability for your use of this information. [place] [address] Department of Interventional Radiology 
[phone] Office 
[phone] Fax TRANSJUGULAR INTRAHEPATIC PORTOSYSTEMIC SHUNT (TIPS) DISCHARGE INSTRUCTIONS General Information: 
   Your gastroenterologist has spoken to our doctors and has decided for you to have a TIPS procedure done. TIPS stands for transjugular intrahepatic portosystemic shunt.  It is done to reduce the pressure within the portal vein, a large vessel in the liver. It can treat several disorders, including hemorrhage and ascites that is related to your liver disease. Home Care Instructions: You can resume your regular diet and medication regimen. Do not drink alcohol, drive, or make any important legal decisions in the next 24 hours. Do not lift anything heavier than a gallon of milk, or do anything strenuous for the next 24 hours. You will notice a dressing on your neck. This was the site of the insertion for the procedure. This dressing can be removed in 24 hours. You may take a shower after the bandage comes off. Do not take a bath, swim or immerse yourself in water until the wound on your neck has totally healed. Call If: 
   You should call your Physician and/or the Radiology Nurse if you have any bleeding other than a small spot on your bandage. Call if you have any signs of infection, fever, or increased pain at the site. Call if you have any questions of how to take care of your wound. Call if you notice your abdomen swelling. You may still have to come in for the paracentesis, but you should not have to come riddhi as often. Follow-Up Instructions: Please see your ordering doctor as he/she has requested. To Reach Us:   
 
 
Patient Signature: 
Date: 9/20/2017 Discharging Nurse: Kathie Napier RN Discharge Orders Procedure Order Date Status Priority Quantity Spec Type Associated Dx CALL YOUR DOCTOR For: Persistant nausea and vomiting. Abdominal distension. 09/20/17 0831 Normal Routine 1 Comments:  Abdominal distension. Questions: For:  Persistant nausea and vomiting. ACTIVITY AFTER DISCHARGE Patient should: Resume activity as tolerated. OK to shower tomorrow. 09/20/17 0831 Normal Routine 1 Comments:  OK to shower tomorrow. Questions: Patient should:  Resume activity as tolerated. DIET REGULAR No added salt 09/20/17 0831 Normal Routine 1 Questions: Additional options:  No added salt DRESSING, REMOVE IN 24 HOURS 09/20/17 0831 Normal Routine 1 Comments:  Remove dressing in 24 hours. NURSING-MISCELLANEOUS: Remove RIJV CVC. 09/20/17 0831 Normal Routine 1 Questions: Description of Order:  Remove RIJV CVC. Introducing [facility]! [facility] introduces MedShape patient portal. Now you can access parts of your medical record, email your doctor's office, and request medication refills online. 1. In your internet browser, go to [email or web address] 2. Click on the First Time User? Click Here link in the Sign In box. You will see the New Member Sign Up page. 3. Enter your MedShape Access Code exactly as it appears below. You will not need to use this code after youve completed the sign-up process. If you do not sign up before the expiration date, you must request a new code. · MedShape Access Code: [email or web address] Expires: 12/17/2017  9:46 AM 
 
4. Enter the last four digits of your Social Security Number (xxxx) and Date of Birth (mm/dd/yyyy) as indicated and click Submit. You will be taken to the next sign-up page. 5. Create a MedShape ID. This will be your MedShape login ID and cannot be changed, so think of one that is secure and easy to remember. 6. Create a MedShape password. You can change your password at any time. 7. Enter your Password Reset Question and Answer. This can be used at a later time if you forget your password. 8. Enter your e-mail address. You will receive e-mail notification when new information is available in [address] 9. Click Sign Up. You can now view and download portions of your medical record. 10. Click the Download Summary menu link to download a portable copy of your medical information. If you have questions, please visit the Frequently Asked Questions section of the MedShape website.  Remember, MedShape is NOT to be used for urgent needs. For medical emergencies, dial 911. Now available from your iPhone and Android! General Information Please provide this summary of care documentation to your next provider. Patient Signature:  ____________________________________________________________ Date:  ____________________________________________________________  
  
Xochitl German Provider Signature:  ____________________________________________________________ Date:  ____________________________________________________________

## 2017-09-19 NOTE — PROCEDURES
Interventional Radiology Brief Procedure Note    Patient: Lesley Yepez MRN: [id number]  SSN: [id number]    YOB: 1965  Age: 46 y.o. Sex: male      Date of Procedure: 9/19/2017    Pre-Procedure Diagnosis: Cirrhosis, Portal Hypertension, Ascites, mild Encephalopathy. Post-Procedure Diagnosis: SAME    Procedure(s): Paracentesis, Temporary Central Venous Catheter, TIPS. Brief Description of Procedure: as above. Performed By: Tere Alvarez MD     Assistants: None    Anesthesia: General    Estimated Blood Loss: Less than 10ml    Specimens: None    Implants: Vascular Stent-Graft    Findings: RA-PV gradient = 2 mmHg. RIJV CVC tip is in the RA. Chylous ascites. Complications: None    Recommendations: 1 hour bedrest.  Up in chair/Ambulate today. CVC is ready to use. Check labs tomorrow. Lactulose. Follow Up: Admit to 23 hour observation. Outpatient follow up w Dr. Rebecca Buckner.       Signed By: Tere Alvarez MD     September 19, 2017

## 2017-09-19 NOTE — PROGRESS NOTES
Patient admitted to 36. Alert, able to communicate needs. Skin assessment complete with Kati Levy RN. No breakdown noted to sacrum. Dermabond noted to R side of abdomen. VSS. Will continue to monitor closely.

## 2017-09-19 NOTE — PROGRESS NOTES
TRANSFER - OUT REPORT:    Verbal report given to Sudeep Ybarra RN with Javier Olsen CRNA on Lesley Yepez  being transferred to PACU for routine post - op       Report consisted of patients Situation, Background, Assessment and   Recommendations(SBAR). Information from the following report(s) SBAR, Kardex, Procedure Summary, Intake/Output and MAR was reviewed with the receiving nurse. Lines:   Quad Lumen 09/19/17 Right Internal jugular (Active)       Peripheral IV 08/23/17 Left Antecubital (Active)       Peripheral IV 09/19/17 Left Forearm (Active)   Site Assessment Clean, dry, & intact 9/19/2017  8:18 AM   Phlebitis Assessment 0 9/19/2017  8:18 AM   Infiltration Assessment 0 9/19/2017  8:18 AM   Dressing Status Clean, dry, & intact 9/19/2017  8:18 AM   Dressing Type Tape;Transparent 9/19/2017  8:18 AM   Hub Color/Line Status Pink;Flushed;Patent 9/19/2017  8:18 AM        Opportunity for questions and clarification was provided.

## 2017-09-19 NOTE — ANESTHESIA POSTPROCEDURE EVALUATION
Post-Anesthesia Evaluation and Assessment    Patient: Navneet Ann MRN: [id number]  SSN: [id number]    YOB: 1965  Age: 46 y.o. Sex: male       Cardiovascular Function/Vital Signs  Visit Vitals    BP 98/58 (BP 1 Location: Right arm, BP Patient Position: At rest)    Pulse 63    Temp 36.6 °C (97.8 °F)    Resp 16    SpO2 100%       Patient is status post general anesthesia for Procedure(s):  IR ANESTHESIA/ TIPS PROCEDURE /LIVER. Nausea/Vomiting: None    Postoperative hydration reviewed and adequate. Pain:  Pain Scale 1: Numeric (0 - 10) (09/19/17 1146)  Pain Intensity 1: 10 (09/19/17 1146)   Managed, he had significant pain immediately postop    Neurological Status:   Neuro (WDL): Exceptions to WDL (09/19/17 1146)  Neuro  Neurologic State: Agitated (09/19/17 1146)  LUE Motor Response: Purposeful (09/19/17 1146)  LLE Motor Response: Purposeful (09/19/17 1146)  RUE Motor Response: Purposeful (09/19/17 1146)  RLE Motor Response: Purposeful (09/19/17 1146)   At baseline    Mental Status and Level of Consciousness: Arousable    Pulmonary Status:   O2 Device: Nasal cannula (09/19/17 1146)   Adequate oxygenation and airway patent    Complications related to anesthesia: None    Post-anesthesia assessment completed.  No concerns    Signed By: Aletha Covington MD     September 19, 2017

## 2017-09-19 NOTE — CONSULTS
LEAPFROG PROTOCOL NOTE    Sandra Wells  9/19/2017    The patient is currently in the critical care setting managed by Dr. Jossy Cruz post TIPS procedure. The patient's chart is reviewed and the patient is discussed with the staff. Patient is currently hemodynamically stable. Patient has no needs identified for Intensivist management in the critical care setting at this time. Please notify us if can be of assistance. No charge billed to the patient. Thank you.     Osmani Barrientos MD

## 2017-09-19 NOTE — PROGRESS NOTES
TRANSFER - IN REPORT:    Verbal report received from [address] [place] [address] [address], RN(name) on [facility]  being received from Moya Okruga) for routine progression of care      Report consisted of patients Situation, Background, Assessment and   Recommendations(SBAR). Information from the following report(s) SBAR, Procedure Summary, Intake/Output, MAR and Recent Results was reviewed with the receiving nurse. Opportunity for questions and clarification was provided. Assessment completed upon patients arrival to unit and care assumed.

## 2017-09-19 NOTE — PROGRESS NOTES
Pressure of portal vein post TIPS 20/15 with mean 17 and right atrium pressure post TIPS 18/11 with mean of 15

## 2017-09-19 NOTE — PROGRESS NOTES
TRANSFER - OUT REPORT:    Verbal report given to Jeanette Bumpers, RN on [facility]  being transferred to ICU for routine post - op       Report consisted of patients Situation, Background, Assessment and   Recommendations(SBAR). Information from the following report(s) SBAR, Kardex, Procedure Summary, Intake/Output and MAR was reviewed with the receiving nurse. Lines:   Quad Lumen 09/19/17 Right Internal jugular (Active)       Peripheral IV 08/23/17 Left Antecubital (Active)       Peripheral IV 09/19/17 Left Forearm (Active)   Site Assessment Clean, dry, & intact 9/19/2017  8:18 AM   Phlebitis Assessment 0 9/19/2017  8:18 AM   Infiltration Assessment 0 9/19/2017  8:18 AM   Dressing Status Clean, dry, & intact 9/19/2017  8:18 AM   Dressing Type Tape;Transparent 9/19/2017  8:18 AM   Hub Color/Line Status Pink;Flushed;Patent 9/19/2017  8:18 AM        Opportunity for questions and clarification was provided.

## 2017-09-19 NOTE — ANESTHESIA PREPROCEDURE EVALUATION
Anesthetic History   No history of anesthetic complications            Review of Systems / Medical History  Patient summary reviewed and pertinent labs reviewed    Pulmonary  Within defined limits                 Neuro/Psych   Within defined limits           Cardiovascular                  Exercise tolerance: >4 METS     GI/Hepatic/Renal           Liver disease (cirrhosis)     Endo/Other  Within defined limits           Other Findings              Physical Exam    Airway  Mallampati: I  TM Distance: > 6 cm  Neck ROM: normal range of motion   Mouth opening: Normal     Cardiovascular    Rhythm: regular  Rate: normal         Dental    Dentition: Poor dentition  Comments:  Many broken and chipped   Pulmonary  Breath sounds clear to auscultation               Abdominal         Other Findings            Anesthetic Plan    ASA: 3  Anesthesia type: general          Induction: Intravenous  Anesthetic plan and risks discussed with: Patient

## 2017-09-19 NOTE — IP AVS SNAPSHOT
[facility] 
 
 
 [address] 
[phone] Patient: Madai Lozano MRN: [id number] :1965 Current Discharge Medication List  
  
CONTINUE these medications which have NOT CHANGED Dose & Instructions Dispensing Information Comments Morning Noon Evening Bedtime AMILORIDE PO Your last dose was: Your next dose is:    
   
   
 Dose:  15 mg Take 15 mg by mouth two (2) times a day. Dose unknown, to bring dos for clarification Refills:  0  
     
   
   
   
  
 lactulose 10 gram/15 mL solution Commonly known as:  Nisha Eves Your last dose was: Your next dose is:    
   
   
 Dose:  10 g Take 10 g by mouth three (3) times daily. Refills:  0  
     
   
   
   
  
 torsemide 20 mg tablet Commonly known as:  DEMADEX Your last dose was: Your next dose is:    
   
   
 Dose:  40 mg Take 40 mg by mouth two (2) times a day. Refills:  0

## 2017-09-19 NOTE — H&P
Department of Interventional Radiology  [phone]    History and Physical    Patient:  Geronimo Vance MRN:  [id number]  SSN:  [id number]    YOB: 1965  Age:  46 y.o. Sex:  male      Primary Care Provider:  Gui Sinha MD  Referring Physician:  Eleazar Perez MD    Subjective:     Chief Complaint: cirrhosis    History of the Present Illness: The patient is a 46 y.o. male who presents for TIPS placement, paracentesis. Alcoholic cirrhosis with massive ascites requiring multiple large volume paracenteses. He has been refractory to medical therapies in recent months. Began taking Lactulose the last few days in preparation for TIPS due to very mild encephalopathy. C/o fatigue, abdominal distention. NPO x meds. Took potassium supplement as directed, K now 3.4. Past Medical History:   Diagnosis Date    Cirrhosis of liver ([address]     undeterminable cause    Multiple fractures of ribs of both sides     Multiple, bilateral w/o pneumo: 2013     Past Surgical History:   Procedure Laterality Date    HX HERNIA REPAIR      abd    HX KNEE ARTHROSCOPY  2005    HX OTHER SURGICAL      liver bx        Review of Systems:    Pertinent items are noted in the History of Present Illness. No current outpatient prescriptions on file. No current facility-administered medications for this encounter.          No Known Allergies    Family History   Problem Relation Age of Onset    No Known Problems Mother     No Known Problems Father      Social History   Substance Use Topics    Smoking status: Current Every Day Smoker     Packs/day: 0.50     Years: 4.00     Types: Cigarettes     Start date: 9/10/2010    Smokeless tobacco: Never Used    Alcohol use No        Objective:       Physical Examination:    Vitals:    09/19/17 0810   BP: 109/68   Pulse: 68   Resp: 16   Temp: 98.3 °F (36.8 °C)   SpO2: 100%   Weight: 71.7 kg (158 lb)     Blood pressure 109/68, pulse 68, temperature 98.3 °F (36.8 °C), resp. rate 16, weight 71.7 kg (158 lb), SpO2 100 %.    Gen: awake, alert, NAD  No cardiac murmur  HEART: regular rate and rhythm  LUNG: clear to auscultation bilaterally  ABDOMEN: nontender, distended  EXTREMITIES: wasted, warm, no edema    Laboratory:     Lab Results   Component Value Date/Time    Sodium 130 09/18/2017 09:53 AM    Sodium 138 06/07/2017 11:38 AM    Potassium 2.8 09/18/2017 09:53 AM    Potassium 3.4 06/07/2017 11:38 AM    Chloride 95 09/18/2017 09:53 AM    Chloride 99 06/07/2017 11:38 AM    CO2 29 09/18/2017 09:53 AM    CO2 29 06/07/2017 11:38 AM    Anion gap 6 09/18/2017 09:53 AM    Anion gap 10 06/07/2017 11:38 AM    Glucose 105 09/18/2017 09:53 AM    Glucose 97 06/07/2017 11:38 AM    BUN 16 09/18/2017 09:53 AM    BUN 15 06/07/2017 11:38 AM    Creatinine 1.13 09/18/2017 09:53 AM    Creatinine 1.14 06/07/2017 11:38 AM    GFR est AA >60 09/18/2017 09:53 AM    GFR est AA >60 06/07/2017 11:38 AM    GFR est non-AA >60 09/18/2017 09:53 AM    GFR est non-AA >60 06/07/2017 11:38 AM    Calcium 8.3 09/18/2017 09:53 AM    Calcium 9.3 06/07/2017 11:38 AM    Albumin 2.7 09/18/2017 09:53 AM    Albumin 3.6 06/07/2017 11:38 AM    Protein, total 7.2 09/18/2017 09:53 AM    Protein, total 9.0 06/07/2017 11:38 AM    Globulin 4.5 09/18/2017 09:53 AM    Globulin 5.4 06/07/2017 11:38 AM    A-G Ratio 0.6 09/18/2017 09:53 AM    A-G Ratio 0.7 06/07/2017 11:38 AM    AST (SGOT) 40 09/18/2017 09:53 AM    AST (SGOT) 36 06/07/2017 11:38 AM    ALT (SGPT) 32 09/18/2017 09:53 AM    ALT (SGPT) 29 06/07/2017 11:38 AM     Lab Results   Component Value Date/Time    WBC 5.4 09/18/2017 09:53 AM    WBC 4.6 06/07/2017 11:38 AM    HGB 14.1 09/18/2017 09:53 AM    HGB 15.6 06/07/2017 11:38 AM    HCT 39.8 09/18/2017 09:53 AM    HCT 44.9 06/07/2017 11:38 AM    PLATELET 350 63/20/5756 09:53 AM    PLATELET 847 26/70/8763 11:38 AM     Lab Results   Component Value Date/Time    aPTT 26.1 09/18/2017 09:53 AM    aPTT 27.0 06/07/2017 11:38 AM Prothrombin time 10.0 09/18/2017 09:53 AM    Prothrombin time 11.4 06/07/2017 11:38 AM    INR 0.9 09/18/2017 09:53 AM    INR 1.1 06/07/2017 11:38 AM       Assessment:     Alcoholic cirrhosis, ascites    Plan:     Planned Procedure:  TIPS, paracentesis    Risks, benefits, and alternatives reviewed with patient and he agrees to proceed with the procedure.       Signed By: Adolfo Savage PA-C     September 19, 2017

## 2017-09-19 NOTE — PROGRESS NOTES
Interventional Radiology Post Paracentesis/Thoracentesis Note    9/19/2017    Procedure(s): Ultrasound guided Therapeutic Paracentesis Performed with 8 Romanian catheter total volume 87046 ml. Preliminary Findings: large cloudy and Chylous. Complications: None    Plan:  Observation, check labs if drawn.           Chest X-Ray:  no    Full dictated report to follow    Signed By: Darrian Rodgers RN

## 2017-09-20 VITALS
WEIGHT: 158 LBS | TEMPERATURE: 97.8 F | HEART RATE: 67 BPM | DIASTOLIC BLOOD PRESSURE: 64 MMHG | OXYGEN SATURATION: 100 % | SYSTOLIC BLOOD PRESSURE: 125 MMHG | RESPIRATION RATE: 38 BRPM | BODY MASS INDEX: 21.43 KG/M2

## 2017-09-20 LAB
ALBUMIN SERPL-MCNC: 1.8 G/DL (ref 3.5–5)
ALBUMIN/GLOB SERPL: 0.6 {RATIO} (ref 1.2–3.5)
ALP SERPL-CCNC: 113 U/L (ref 50–136)
ALT SERPL-CCNC: 298 U/L (ref 12–65)
ANION GAP SERPL CALC-SCNC: 7 MMOL/L (ref 7–16)
AST SERPL-CCNC: 481 U/L (ref 15–37)
BASOPHILS # BLD: 0 K/UL (ref 0–0.2)
BASOPHILS NFR BLD: 0 % (ref 0–2)
BILIRUB SERPL-MCNC: 0.8 MG/DL (ref 0.2–1.1)
BUN SERPL-MCNC: 8 MG/DL (ref 6–23)
CALCIUM SERPL-MCNC: 7.6 MG/DL (ref 8.3–10.4)
CHLORIDE SERPL-SCNC: 107 MMOL/L (ref 98–107)
CO2 SERPL-SCNC: 25 MMOL/L (ref 21–32)
CREAT SERPL-MCNC: 0.8 MG/DL (ref 0.8–1.5)
DIFFERENTIAL METHOD BLD: ABNORMAL
EOSINOPHIL # BLD: 0 K/UL (ref 0–0.8)
EOSINOPHIL NFR BLD: 1 % (ref 0.5–7.8)
ERYTHROCYTE [DISTWIDTH] IN BLOOD BY AUTOMATED COUNT: 14 % (ref 11.9–14.6)
GLOBULIN SER CALC-MCNC: 2.9 G/DL (ref 2.3–3.5)
GLUCOSE SERPL-MCNC: 113 MG/DL (ref 65–100)
HCT VFR BLD AUTO: 30.9 % (ref 41.1–50.3)
HGB BLD-MCNC: 11 G/DL (ref 13.6–17.2)
IMM GRANULOCYTES # BLD: 0 K/UL (ref 0–0.5)
IMM GRANULOCYTES NFR BLD: 0.3 % (ref 0–5)
INR PPP: 1.1 (ref 0.9–1.2)
LYMPHOCYTES # BLD: 0.5 K/UL (ref 0.5–4.6)
LYMPHOCYTES NFR BLD: 12 % (ref 13–44)
MCH RBC QN AUTO: 32.4 PG (ref 26.1–32.9)
MCHC RBC AUTO-ENTMCNC: 35.6 G/DL (ref 31.4–35)
MCV RBC AUTO: 91.2 FL (ref 79.6–97.8)
MONOCYTES # BLD: 0.4 K/UL (ref 0.1–1.3)
MONOCYTES NFR BLD: 9 % (ref 4–12)
NEUTS SEG # BLD: 3.1 K/UL (ref 1.7–8.2)
NEUTS SEG NFR BLD: 78 % (ref 43–78)
PLATELET # BLD AUTO: 96 K/UL (ref 150–450)
PMV BLD AUTO: 9.3 FL (ref 10.8–14.1)
POTASSIUM SERPL-SCNC: 3.6 MMOL/L (ref 3.5–5.1)
PROT SERPL-MCNC: 4.7 G/DL (ref 6.3–8.2)
PROTHROMBIN TIME: 12.4 SEC (ref 9.6–12)
RBC # BLD AUTO: 3.39 M/UL (ref 4.23–5.67)
SODIUM SERPL-SCNC: 139 MMOL/L (ref 136–145)
WBC # BLD AUTO: 4 K/UL (ref 4.3–11.1)

## 2017-09-20 PROCEDURE — 80053 COMPREHEN METABOLIC PANEL: CPT | Performed by: RADIOLOGY

## 2017-09-20 PROCEDURE — 74011250636 HC RX REV CODE- 250/636: Performed by: RADIOLOGY

## 2017-09-20 PROCEDURE — 74011250637 HC RX REV CODE- 250/637: Performed by: RADIOLOGY

## 2017-09-20 PROCEDURE — 85610 PROTHROMBIN TIME: CPT | Performed by: RADIOLOGY

## 2017-09-20 PROCEDURE — 85025 COMPLETE CBC W/AUTO DIFF WBC: CPT | Performed by: RADIOLOGY

## 2017-09-20 RX ORDER — TORSEMIDE 20 MG/1
40 TABLET ORAL DAILY
Status: DISCONTINUED | OUTPATIENT
Start: 2017-09-20 | End: 2017-09-20

## 2017-09-20 RX ORDER — AMILORIDE HYDROCHLORIDE 5 MG/1
5 TABLET ORAL EVERY 12 HOURS
Status: DISCONTINUED | OUTPATIENT
Start: 2017-09-20 | End: 2017-09-20

## 2017-09-20 RX ORDER — AMILORIDE HYDROCHLORIDE 5 MG/1
15 TABLET ORAL EVERY 12 HOURS
Status: DISCONTINUED | OUTPATIENT
Start: 2017-09-20 | End: 2017-09-20 | Stop reason: HOSPADM

## 2017-09-20 RX ORDER — TORSEMIDE 20 MG/1
40 TABLET ORAL 2 TIMES DAILY
Status: DISCONTINUED | OUTPATIENT
Start: 2017-09-20 | End: 2017-09-20 | Stop reason: HOSPADM

## 2017-09-20 RX ORDER — TORSEMIDE 20 MG/1
40 TABLET ORAL DAILY
COMMUNITY
End: 2021-05-27 | Stop reason: ALTCHOICE

## 2017-09-20 RX ADMIN — DIPHENHYDRAMINE HYDROCHLORIDE 50 MG: 25 CAPSULE ORAL at 03:00

## 2017-09-20 RX ADMIN — Medication 10 ML: at 05:21

## 2017-09-20 RX ADMIN — AMILORIDE HYDROCHLORIDE 15 MG: 5 TABLET ORAL at 08:46

## 2017-09-20 RX ADMIN — TORSEMIDE 40 MG: 20 TABLET ORAL at 08:46

## 2017-09-20 RX ADMIN — MORPHINE SULFATE 2 MG: 10 INJECTION INTRAMUSCULAR; INTRAVENOUS; SUBCUTANEOUS at 05:19

## 2017-09-20 RX ADMIN — OXYCODONE HYDROCHLORIDE 5 MG: 5 TABLET ORAL at 01:00

## 2017-09-20 NOTE — PROGRESS NOTES
Called IR to clarify Dr. Donna Price follow up order. PA states that IR office will call pt for follow up. Paged GI to clarify GI follow up appointment. Also offered flu vaccine to pt but pt refused.

## 2017-09-20 NOTE — DISCHARGE INSTRUCTIONS
Ascites: Care Instructions  Your Care Instructions  Ascites (say \"uh-SY-teez\") is a buildup of extra fluid in the belly. It can cause your belly to swell. It can also make it hard for you to breathe. Many diseases can cause ascites. But most people who get it have a liver problem. When the liver gets damaged, it can cause fluid to back up from the liver or from blood vessels. Then this fluid builds up in the belly. Your doctor may take a sample of the fluid in your belly with a thin needle. The sample is then tested to help find out the cause of the ascites. Treatment may include medicine. It may also include changing the way you eat so you don't eat a lot of salt. If your ascites is very bad and hard to treat, your doctor may need to use a needle to take out the fluid. Follow-up care is a key part of your treatment and safety. Be sure to make and go to all appointments, and call your doctor if you are having problems. It's also a good idea to know your test results and keep a list of the medicines you take. How can you care for yourself at home? · Be safe with medicines. Take your medicines exactly as prescribed. Call your doctor if you have any problems with your medicine. You will get more details on the specific medicines your doctor prescribes. · Eat low-salt foods, and don't add salt to your food. If you eat a lot of salt, it's harder to get rid of the extra fluid. Salt is in many prepared foods. These include hines, canned foods, snack foods, sauces, and soups. Look for products that are low-sodium or have reduced salt. · Do not drink any alcohol until you are all better. Alcohol will damage the liver more. If your liver disease is caused by drinking alcohol, do not drink alcohol at all. Tell your doctor if you need help to quit. Counseling, support groups, and sometimes medicines can help you stay sober. · Talk to your doctor before you take any other medicines.  These include over-the-counter medicines, vitamins, and herbal products. · Make sure your doctor knows all the medicines you take. Some medicines, such as acetaminophen (Tylenol), can make liver problems worse. When should you call for help? Call 911 anytime you think you may need emergency care. For example, call if:  · You have severe trouble breathing. Call your doctor now or seek immediate medical care if:  · You have new belly pain or swelling. · You have a fever not caused by the flu or some other illness that you know you have. · The swelling in your belly gets bigger, or you get a bulge near your belly button. Watch closely for changes in your health, and be sure to contact your doctor if:  · You would like help to plan a diet that protects your liver. Where can you learn more? Go to http://elvia-danielle.info/. Enter B970 in the search box to learn more about \"Ascites: Care Instructions. \"  Current as of: August 9, 2016  Content Version: 11.3  © 7232-3156 [employer]. Care instructions adapted under license by "[employer] (which disclaims liability or warranty for this information). If you have questions about a medical condition or this instruction, always ask your healthcare professional. [address] any warranty or liability for your use of this information. Cirrhosis: Care Instructions  Your Care Instructions    Cirrhosis occurs when healthy tissue in your liver gets scarred. This keeps the liver from working well. It usually happens after a liver has been inflamed for years. Cirrhosis is most often caused by alcohol abuse or hepatitis infection. But there are other causes too. These include medicines and too much fat in the liver. Conditions passed down in families and other disorders can also cause it. In some cases, no cause can be found. Treatment can't completely fix liver damage.  But you may be able to slow or prevent more damage if you don't drink alcohol or use drugs that harm your liver. Follow-up care is a key part of your treatment and safety. Be sure to make and go to all appointments, and call your doctor if you are having problems. It's also a good idea to know your test results and keep a list of the medicines you take. How can you care for yourself at home? · Do not drink any alcohol. It can harm your liver. Talk to your doctor if you need help to stop drinking. · Be safe with medicines. Take your medicines exactly as prescribed. Call your doctor if you think you are having a problem with your medicine. · Talk to your doctor before you take any other medicines. These include over-the-counter medicines and herbal products. · Be careful taking acetaminophen (Tylenol), ibuprofen (Advil, Motrin), or naproxen (Aleve). These can sometimes cause more liver damage. Talk with your doctor if you're not sure which medicines are safe. · If your cirrhosis causes extra fluid to build up in your body, try not to eat a lot of salt. Use less salt when you cook and at the table. Don't eat fast foods or snack foods with a lot of salt. Extra fluid in your belly, legs, and chest can cause serious problems. · Work with your doctor or a dietitian to be sure you eat the right amount of carbohydrate, protein, fat, and sodium (salt). It's very important to choose the best foods for the health of your liver. · If your doctor recommends it, limit how much fluid you drink. · If your doctor recommends it, get more exercise. Walking is a good choice. Bit by bit, increase the amount you walk every day. Try for at least 30 minutes on most days of the week. You also may want to swim, bike, or do other activities. When should you call for help? Call 911 anytime you think you may need emergency care. For example, call if:  · You passed out (lost consciousness). Call your doctor now or seek immediate medical care if:  · You feel very sleepy or confused.   · You have new belly pain, or your pain gets worse. · You have a fever. · There is a new or increasing yellow tint to your skin or the whites of your eyes. · You have any abnormal bleeding, such as:  ¨ Nosebleeds. ¨ Vaginal bleeding that is different (heavier, more frequent, at a different time of the month) than what you are used to. ¨ Bloody or black stools, or rectal bleeding. ¨ Bloody or pink urine. Watch closely for changes in your health, and be sure to contact your doctor if you have any problems. Where can you learn more? Go to http://elvia-danielle.info/. Enter M412 in the search box to learn more about \"Cirrhosis: Care Instructions. \"  Current as of: August 9, 2016  Content Version: 11.3  © 5318-6943 [employer]. Care instructions adapted under license by [employer] (which disclaims liability or warranty for this information). If you have questions about a medical condition or this instruction, always ask your healthcare professional. [place] [address] any warranty or liability for your use of this information. [address]  Department of Interventional Radiology  [phone] Office  [phone] Fax     TRANSJUGULAR INTRAHEPATIC PORTOSYSTEMIC SHUNT (TIPS) DISCHARGE INSTRUCTIONS    General Information:     Your gastroenterologist has spoken to our doctors and has decided for you to have a TIPS procedure done. TIPS stands for transjugular intrahepatic portosystemic shunt. It is done to reduce the pressure within the portal vein, a large vessel in the liver. It can treat several disorders, including hemorrhage and ascites that is related to your liver disease. Home Care Instructions: You can resume your regular diet and medication regimen. Do not drink alcohol, drive, or make any important legal decisions in the next 24 hours.  Do not lift anything heavier than a gallon of milk, or do anything strenuous for the next 24 hours. You will notice a dressing on your neck. This was the site of the insertion for the procedure. This dressing can be removed in 24 hours. You may take a shower after the bandage comes off. Do not take a bath, swim or immerse yourself in water until the wound on your neck has totally healed. Call If:     You should call your Physician and/or the Radiology Nurse if you have any bleeding other than a small spot on your bandage. Call if you have any signs of infection, fever, or increased pain at the site. Call if you have any questions of how to take care of your wound. Call if you notice your abdomen swelling. You may still have to come in for the paracentesis, but you should not have to come riddhi as often. Follow-Up Instructions: Please see your ordering doctor as he/she has requested.      To Reach Us:        Patient Signature:  Date: 9/20/2017  Discharging Nurse: Kellie Darden RN

## 2017-09-20 NOTE — PROGRESS NOTES
Gastroenterology Progress Note           Date of admission:  9/19/2017    Today's date:  9/20/2017    CC:    Status post TIPS    HPI:   No events overnight.      ROS:    Gen: No fevers, no chills   CV:   No chest pain, no SOB    Current Medications:     Current Facility-Administered Medications   Medication Dose Route Frequency    torsemide (DEMADEX) tablet 40 mg  40 mg Oral BID    lactulose (CHRONULAC) solution 20 g  30 mL Oral BID    aMILoride (MIDAMOR) tablet 15 mg  15 mg Oral Q12H    heparin (PF) 2 units/ml in NS infusion 2,000 Units  1,000 mL Irrigation CONTINUOUS    zolpidem (AMBIEN) tablet 5 mg  5 mg Oral QHS PRN    0.9% sodium chloride infusion  125 mL/hr IntraVENous CONTINUOUS    ibuprofen (MOTRIN) tablet 600 mg  600 mg Oral Q6H PRN    oxyCODONE IR (ROXICODONE) tablet 5 mg  5 mg Oral Q4H PRN    morphine injection 2 mg  2 mg IntraVENous Q1H PRN    promethazine (PHENERGAN) with saline injection 12.5 mg  12.5 mg IntraVENous Q4H PRN    diphenhydrAMINE (BENADRYL) capsule 50 mg  50 mg Oral Q4H PRN    midazolam (VERSED) injection 2 mg  2 mg IntraVENous ONCE PRN    sodium chloride (NS) flush 5-10 mL  5-10 mL IntraVENous Q8H    sodium chloride (NS) flush 5-10 mL  5-10 mL IntraVENous PRN       Physical Exam:   Vitals:  Visit Vitals    /62    Pulse 70    Temp 97.9 °F (36.6 °C)    Resp 21    Wt 71.7 kg (158 lb)    SpO2 100%    BMI 21.43 kg/m2     Intake/Output:     09/18 1901 - 09/20 0700  In: 800 [I.V.:800]  Out: 51704     HEENT:  No scleral icterus, no oral ulcers  Abdomen: Soft, distended, nontender  Extremities:  No cyanosis, no leg edema  Neuro:  Alert and oriented to person, place, and time    Data:     Recent Results (from the past 24 hour(s))   MAGNESIUM    Collection Time: 09/19/17  4:14 PM   Result Value Ref Range    Magnesium 1.7 (L) 1.8 - 2.4 mg/dL   HEMOGLOBIN    Collection Time: 09/19/17  4:14 PM   Result Value Ref Range    HGB 12.2 (L) 13.6 - 90.4 g/dL   METABOLIC PANEL, BASIC Collection Time: 09/19/17  4:14 PM   Result Value Ref Range    Sodium 137 136 - 145 mmol/L    Potassium 3.6 3.5 - 5.1 mmol/L    Chloride 104 98 - 107 mmol/L    CO2 26 21 - 32 mmol/L    Anion gap 7 7 - 16 mmol/L    Glucose 108 (H) 65 - 100 mg/dL    BUN 11 6 - 23 MG/DL    Creatinine 0.74 (L) 0.8 - 1.5 MG/DL    GFR est AA >60 >60 ml/min/1.73m2    GFR est non-AA >60 >60 ml/min/1.73m2    Calcium 7.8 (L) 8.3 - 10.4 MG/DL   CBC WITH AUTOMATED DIFF    Collection Time: 09/20/17  3:33 AM   Result Value Ref Range    WBC 4.0 (L) 4.3 - 11.1 K/uL    RBC 3.39 (L) 4.23 - 5.67 M/uL    HGB 11.0 (L) 13.6 - 17.2 g/dL    HCT 30.9 (L) 41.1 - 50.3 %    MCV 91.2 79.6 - 97.8 FL    MCH 32.4 26.1 - 32.9 PG    MCHC 35.6 (H) 31.4 - 35.0 g/dL    RDW 14.0 11.9 - 14.6 %    PLATELET 96 (L) 696 - 450 K/uL    MPV 9.3 (L) 10.8 - 14.1 FL    DF AUTOMATED      NEUTROPHILS 78 43 - 78 %    LYMPHOCYTES 12 (L) 13 - 44 %    MONOCYTES 9 4.0 - 12.0 %    EOSINOPHILS 1 0.5 - 7.8 %    BASOPHILS 0 0.0 - 2.0 %    IMMATURE GRANULOCYTES 0.3 0.0 - 5.0 %    ABS. NEUTROPHILS 3.1 1.7 - 8.2 K/UL    ABS. LYMPHOCYTES 0.5 0.5 - 4.6 K/UL    ABS. MONOCYTES 0.4 0.1 - 1.3 K/UL    ABS. EOSINOPHILS 0.0 0.0 - 0.8 K/UL    ABS. BASOPHILS 0.0 0.0 - 0.2 K/UL    ABS. IMM.  GRANS. 0.0 0.0 - 0.5 K/UL   PROTHROMBIN TIME + INR    Collection Time: 09/20/17  3:33 AM   Result Value Ref Range    Prothrombin time 12.4 (H) 9.6 - 12.0 sec    INR 1.1 0.9 - 1.2     METABOLIC PANEL, COMPREHENSIVE    Collection Time: 09/20/17  3:33 AM   Result Value Ref Range    Sodium 139 136 - 145 mmol/L    Potassium 3.6 3.5 - 5.1 mmol/L    Chloride 107 98 - 107 mmol/L    CO2 25 21 - 32 mmol/L    Anion gap 7 7 - 16 mmol/L    Glucose 113 (H) 65 - 100 mg/dL    BUN 8 6 - 23 MG/DL    Creatinine 0.80 0.8 - 1.5 MG/DL    GFR est AA >60 >60 ml/min/1.73m2    GFR est non-AA >60 >60 ml/min/1.73m2    Calcium 7.6 (L) 8.3 - 10.4 MG/DL    Bilirubin, total 0.8 0.2 - 1.1 MG/DL    ALT (SGPT) 298 (H) 12 - 65 U/L    AST (SGOT) 481 (H) 15 - 37 U/L    Alk. phosphatase 113 50 - 136 U/L    Protein, total 4.7 (L) 6.3 - 8.2 g/dL    Albumin 1.8 (L) 3.5 - 5.0 g/dL    Globulin 2.9 2.3 - 3.5 g/dL    A-G Ratio 0.6 (L) 1.2 - 3.5         Impression/Plan:       47 y/o male with decompensated cryptogenic cirrhosis who presents for TIPS procedure for refractory ascites. He also had hypokalemia on admission likely due to diuretic therapy. This has been corrected with potassium replacement. He will need to resume potassium sparing diuretic in addition to torsemide on discharge. 1. Agree with current strategy of care. 2. Continue torsemide 40 mg twice daily and amiloride 5 mg twice daily. 3. Lactulose per current regimen. 4. Anticipate discharge home today. Will arrange for close office follow up and recheck of BMP.     Signed:  Robin Vicente MD  9/20/2017  8:40 AM

## 2017-09-20 NOTE — PROGRESS NOTES
Department of Interventional Radiology  [phone]                                 Progress Note  Patient: Manju Dawkins MRN: [id number]  SSN: [id number]    YOB: 1965  Age: 46 y.o. Sex: male      Subjective:     Feels good except for \"gas\". No nausea or vomitting. No BM, yet. Wants to go home. Objective:     Vitals:    09/19/17 1912 09/20/17 0000 09/20/17 0219 09/20/17 0430   BP: 104/58 114/62 114/62 107/62   Pulse: 69 67 75 70   Resp: 25 24 16 21   Temp: 96.5 °F (35.8 °C) 98.2 °F (36.8 °C)  97.9 °F (36.6 °C)   SpO2: 96% 100% 100% 100%   Weight:          Intake and Output:             Physical Exam:   Heart:  RRR, no murmur. Lungs:  CTA and = Bi, anteriorly  Abdomen:  Mildly distended, umbilical hernia is soft, likely some ascites (much better than yesterday). Neck:  Dressing is C/D/I.      Lab/Data Review:  BMP:   Lab Results   Component Value Date/Time     09/20/2017 03:33 AM    K 3.6 09/20/2017 03:33 AM     09/20/2017 03:33 AM    CO2 25 09/20/2017 03:33 AM    AGAP 7 09/20/2017 03:33 AM     (H) 09/20/2017 03:33 AM    BUN 8 09/20/2017 03:33 AM    CREA 0.80 09/20/2017 03:33 AM    GFRAA >60 09/20/2017 03:33 AM    GFRNA >60 09/20/2017 03:33 AM     CMP:   Lab Results   Component Value Date/Time     09/20/2017 03:33 AM    K 3.6 09/20/2017 03:33 AM     09/20/2017 03:33 AM    CO2 25 09/20/2017 03:33 AM    AGAP 7 09/20/2017 03:33 AM     (H) 09/20/2017 03:33 AM    BUN 8 09/20/2017 03:33 AM    CREA 0.80 09/20/2017 03:33 AM    GFRAA >60 09/20/2017 03:33 AM    GFRNA >60 09/20/2017 03:33 AM    CA 7.6 (L) 09/20/2017 03:33 AM    MG 1.7 (L) 09/19/2017 04:14 PM    ALB 1.8 (L) 09/20/2017 03:33 AM    TP 4.7 (L) 09/20/2017 03:33 AM    GLOB 2.9 09/20/2017 03:33 AM    AGRAT 0.6 (L) 09/20/2017 03:33 AM    SGOT 481 (H) 09/20/2017 03:33 AM     (H) 09/20/2017 03:33 AM     CBC:   Lab Results   Component Value Date/Time    WBC 4.0 (L) 09/20/2017 03:33 AM    HGB 11.0 (L) 09/20/2017 03:33 AM    HCT 30.9 (L) 09/20/2017 03:33 AM    PLT 96 (L) 09/20/2017 03:33 AM     COAGS:   Lab Results   Component Value Date/Time    PTP 12.4 (H) 09/20/2017 03:33 AM    INR 1.1 09/20/2017 03:33 AM     Liver Panel:   Lab Results   Component Value Date/Time    ALB 1.8 (L) 09/20/2017 03:33 AM    TP 4.7 (L) 09/20/2017 03:33 AM    GLOB 2.9 09/20/2017 03:33 AM    AGRAT 0.6 (L) 09/20/2017 03:33 AM    SGOT 481 (H) 09/20/2017 03:33 AM     (H) 09/20/2017 03:33 AM     09/20/2017 03:33 AM     Assessment:   Elevated LFT's to be expected after TIPS. K+ is normal range. Plt count should improve w time. Overall, doing very well POD #1 from TIPS, Paracentesis, and CVC placment. Plan:     Remove CVC. Restart Diuretics. Rx for Roxicodone, Lactulose, Zofran given to patient. Follow up w Dr. Haley Fu. Discharge home this morning. Patient instructed to call if ascites returns. He understands that is could take 3 months to completely resolve. TIPS revision in 3-4 months, then PRN.       Signed By: Mayi Martínez MD     September 20, 2017

## 2017-09-20 NOTE — PROGRESS NOTES
IV and CVC removed per order. Dsg CDI. DC instructions given to pt. pt verbalized understanding of DC instructions. States he does not want to wait for GI to call back for follow up appointment clarifications and that he would call Babara Lombard himself. This was clarified twice with pt. Pt had scripts written by Dr. Joey Lott at discharge. He declined education on these, stating, \"I know what they are for\". VSS. NAD. 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

## 2017-09-20 NOTE — PROGRESS NOTES
Bedside and Verbal shift change report given to Isai by Femi Barron. Report included the following information SBAR, Kardex, ED Summary, Procedure Summary, Intake/Output, MAR, Accordion, Recent Results, Med Rec Status and Cardiac Rhythm NSR.

## 2017-10-13 ENCOUNTER — HOSPITAL ENCOUNTER (OUTPATIENT)
Dept: ULTRASOUND IMAGING | Age: 52
Discharge: HOME | End: 2017-10-13
Attending: INTERNAL MEDICINE
Payer: COMMERCIAL

## 2017-10-13 VITALS
OXYGEN SATURATION: 100 % | HEART RATE: 72 BPM | SYSTOLIC BLOOD PRESSURE: 112 MMHG | DIASTOLIC BLOOD PRESSURE: 54 MMHG | RESPIRATION RATE: 18 BRPM | TEMPERATURE: 97.9 F

## 2017-10-13 DIAGNOSIS — R18.8 ASCITES: ICD-10-CM

## 2017-10-13 PROCEDURE — 74011250636 HC RX REV CODE- 250/636: Performed by: RADIOLOGY

## 2017-10-13 PROCEDURE — 74011000250 HC RX REV CODE- 250: Performed by: RADIOLOGY

## 2017-10-13 PROCEDURE — P9047 ALBUMIN (HUMAN), 25%, 50ML: HCPCS | Performed by: RADIOLOGY

## 2017-10-13 PROCEDURE — 49083 ABD PARACENTESIS W/IMAGING: CPT

## 2017-10-13 RX ORDER — LIDOCAINE HYDROCHLORIDE 20 MG/ML
40-120 INJECTION, SOLUTION INFILTRATION; PERINEURAL ONCE
Status: COMPLETED | OUTPATIENT
Start: 2017-10-13 | End: 2017-10-13

## 2017-10-13 RX ORDER — ALBUMIN HUMAN 250 G/1000ML
37.5 SOLUTION INTRAVENOUS ONCE
Status: COMPLETED | OUTPATIENT
Start: 2017-10-13 | End: 2017-10-13

## 2017-10-13 RX ADMIN — LIDOCAINE HYDROCHLORIDE 60 MG: 20 INJECTION, SOLUTION INFILTRATION; PERINEURAL at 14:50

## 2017-10-13 RX ADMIN — ALBUMIN (HUMAN) 37.5 G: 0.25 INJECTION, SOLUTION INTRAVENOUS at 15:06

## 2017-10-13 NOTE — PROGRESS NOTES
Patient to [address] [facility] room for procedure. Patient awake and alert, verbalized name, , and procedure to be performed. Patient moved to procedure table independently.

## 2017-10-13 NOTE — IP AVS SNAPSHOT
Jessica Lacey 
 
 
 [address] [facility] [address] [facility] 
[phone] Patient: Mee Bergeron MRN: [id number] :1965 Current Discharge Medication List  
  
ASK your doctor about these medications Dose & Instructions Dispensing Information Comments Morning Noon Evening Bedtime AMILORIDE PO Your last dose was: Your next dose is:    
   
   
 Dose:  15 mg Take 15 mg by mouth two (2) times a day. Dose unknown, to bring dos for clarification Refills:  0  
     
   
   
   
  
 lactulose 10 gram/15 mL solution Commonly known as:  Tonna Elly Your last dose was: Your next dose is:    
   
   
 Dose:  10 g Take 10 g by mouth three (3) times daily. Refills:  0  
     
   
   
   
  
 torsemide 20 mg tablet Commonly known as:  DEMADEX Your last dose was: Your next dose is:    
   
   
 Dose:  40 mg Take 40 mg by mouth two (2) times a day. Refills:  0

## 2017-10-13 NOTE — IP AVS SNAPSHOT
[address] 
[phone] Patient: Wilmon Brittle MRN: [id number] :1965 You are allergic to the following No active allergies Recent Documentation Smoking Status Current Every Day Smoker Emergency Contacts Name Discharge Info Relation Home Work Mobile Wm. Chen Jernigan  Son [22] [phone] About your hospitalization You were admitted on:  2017 You last received care in the:  D RADIOLOGY ULTRASOUND You were discharged on:  2017 Unit phone number:  [phone] Why you were hospitalized Your primary diagnosis was:  Not on File Providers Seen During Your Hospitalizations Provider Role Specialty Primary office phone Shakira Vela MD Attending Provider Gastroenterology [phone] Your Primary Care Physician (PCP) Primary Care Physician Office Phone Office Fax Oma Indra [phone] Follow-up Information None Current Discharge Medication List  
  
ASK your doctor about these medications Dose & Instructions Dispensing Information Comments Morning Noon Evening Bedtime AMILORIDE PO Your last dose was: Your next dose is:    
   
   
 Dose:  15 mg Take 15 mg by mouth two (2) times a day. Dose unknown, to bring dos for clarification Refills:  0  
     
   
   
   
  
 lactulose 10 gram/15 mL solution Commonly known as:  Corlis Fabry Your last dose was: Your next dose is:    
   
   
 Dose:  10 g Take 10 g by mouth three (3) times daily. Refills:  0  
     
   
   
   
  
 torsemide 20 mg tablet Commonly known as:  DEMADEX Your last dose was: Your next dose is:    
   
   
 Dose:  40 mg Take 40 mg by mouth two (2) times a day. Refills:  0 Discharge Instructions Rickey 34 [address] Department of Interventional Radiology [facility] 
[phone] Office 
[phone] Fax PARACENTESIS DISCHARGE INSTRUCTIONS General Information: 
During this procedure, the doctor will insert a needle into the abdomen to drain fluid. After the procedure, you will be able to take a deep breath much easier. The site of the puncture may ooze the first day. This will decrease and eventually stop. Paracentesis (draining fluid from the abdomen) sometimes makes patients hypotensive (low blood pressure). Your doctor may order for you to receive fluids or albumin (a volume booster) during the procedure through an IV site. Home Care Instructions: 
Keep the puncture site clean and dry. No tub baths or swimming until puncture site heals. Showering is acceptable. Resume your normal diet, and resume your normal activity slowly and as you tolerate. If you are short of breath, rest. If shortness of breath does not ease, please call your ordering doctor. Fluid can re-accumulate in the chest and/or in the abdomen. If this should occur, your doctor needs to know as you may need to have the procedure done again. Call If: 
   You should call your Physician and/or the Radiology Nurse if you notice any signs of infection, like pus draining, or if it is swollen or reddened. Also call if you have a fever, or if you are bleeding from the puncture site more than a small amount on the dressing. Call if the puncture site keeps draining fluid. Some oozing is to be expected, but should slow and then stop. Call if you feel like you have pressure in your abdomen. SEEK IMMEDIATE CARE OR CALL 911 IF YOU SUDDENLY HAVE TROUBLE BREATHING, OR IF YOUR LIPS TURN BLUE, OR IF YOU NOTICE BLOOD IN YOUR SPUTUM. Follow-Up Instructions: Please see your ordering doctor as he/she has requested. To Reach Us: If you have any questions about your procedure, please call the Interventional Radiology department at [phone]. After business hours (5pm) and weekends, call the answering service at [phone] and ask for the Radiologist on call to be paged. Date: 10/13/2017 Discharging Nurse: Chava Herrera RN Discharge Orders None Introducing [facility]! [employer] introduces Runner patient portal. Now you can access parts of your medical record, email your doctor's office, and request medication refills online. 1. In your internet browser, go to [email or web address] 2. Click on the First Time User? Click Here link in the Sign In box. You will see the New Member Sign Up page. 3. Enter your Runner Access Code exactly as it appears below. You will not need to use this code after youve completed the sign-up process. If you do not sign up before the expiration date, you must request a new code. · Runner Access Code: [email or web address] Expires: 12/17/2017  9:46 AM 
 
4. Enter the last four digits of your Social Security Number (xxxx) and Date of Birth (mm/dd/yyyy) as indicated and click Submit. You will be taken to the next sign-up page. 5. Create a Runner ID. This will be your Runner login ID and cannot be changed, so think of one that is secure and easy to remember. 6. Create a Runner password. You can change your password at any time. 7. Enter your Password Reset Question and Answer. This can be used at a later time if you forget your password. 8. Enter your e-mail address. You will receive e-mail notification when new information is available in [address] 9. Click Sign Up. You can now view and download portions of your medical record. 10. Click the Download Summary menu link to download a portable copy of your medical information.  
 
If you have questions, please visit the Frequently Asked Questions section of the CoinSe[employer]. Remember, MyChart is NOT to be used for urgent needs. For medical emergencies, dial 911. Now available from your iPhone and Android! General Information Please provide this summary of care documentation to your next provider. Patient Signature:  ____________________________________________________________ Date:  ____________________________________________________________  
  
Jacqlyn Newcomer Provider Signature:  ____________________________________________________________ Date:  ____________________________________________________________

## 2017-10-13 NOTE — PROGRESS NOTES
Recovery period without difficulty. Pt alert and oriented and denies pain. Dressing is clean, dry, and intact. Reviewed discharge instructions with patient verbalized understanding. Pt escorted to lobby discharge area via. Vital signs and Sade score completed.

## 2017-10-13 NOTE — DISCHARGE INSTRUCTIONS
Pauligi 34 [address]  Department of Interventional Radiology  [facility]  [phone] Office  [phone] Fax    PARACENTESIS DISCHARGE INSTRUCTIONS    General Information:  During this procedure, the doctor will insert a needle into the abdomen to drain fluid. After the procedure, you will be able to take a deep breath much easier. The site of the puncture may ooze the first day. This will decrease and eventually stop. Paracentesis (draining fluid from the abdomen) sometimes makes patients hypotensive (low blood pressure). Your doctor may order for you to receive fluids or albumin (a volume booster) during the procedure through an IV site. Home Care Instructions:  Keep the puncture site clean and dry. No tub baths or swimming until puncture site heals. Showering is acceptable. Resume your normal diet, and resume your normal activity slowly and as you tolerate. If you are short of breath, rest. If shortness of breath does not ease, please call your ordering doctor. Fluid can re-accumulate in the chest and/or in the abdomen. If this should occur, your doctor needs to know as you may need to have the procedure done again. Call If:     You should call your Physician and/or the Radiology Nurse if you notice any signs of infection, like pus draining, or if it is swollen or reddened. Also call if you have a fever, or if you are bleeding from the puncture site more than a small amount on the dressing. Call if the puncture site keeps draining fluid. Some oozing is to be expected, but should slow and then stop. Call if you feel like you have pressure in your abdomen. SEEK IMMEDIATE CARE OR CALL 911 IF YOU SUDDENLY HAVE TROUBLE BREATHING, OR IF YOUR LIPS TURN BLUE, OR IF YOU NOTICE BLOOD IN YOUR SPUTUM. Follow-Up Instructions: Please see your ordering doctor as he/she has requested. To Reach Us:     If you have any questions about your procedure, please call the Interventional Radiology department at [phone]. After business hours (5pm) and weekends, call the answering service at [phone] and ask for the Radiologist on call to be paged.           Date: 10/13/2017  Discharging Nurse: Atilio Naqvi RN

## 2017-11-02 ENCOUNTER — HOSPITAL ENCOUNTER (OUTPATIENT)
Dept: ULTRASOUND IMAGING | Age: 52
Discharge: HOME | End: 2017-11-02
Attending: INTERNAL MEDICINE
Payer: COMMERCIAL

## 2017-11-02 DIAGNOSIS — R18.8 OTHER ASCITES: ICD-10-CM

## 2017-11-02 DIAGNOSIS — K74.69 OTHER CIRRHOSIS OF LIVER (HCC): ICD-10-CM

## 2017-11-02 DIAGNOSIS — K74.69 CRYPTOGENIC CIRRHOSIS (HCC): ICD-10-CM

## 2017-11-02 DIAGNOSIS — Z95.828 PRESENCE OF OTHER VASCULAR IMPLANTS AND GRAFTS: ICD-10-CM

## 2017-11-02 PROCEDURE — 93975 VASCULAR STUDY: CPT

## 2017-11-02 PROCEDURE — 76700 US EXAM ABDOM COMPLETE: CPT

## 2017-11-03 ENCOUNTER — HOSPITAL ENCOUNTER (OUTPATIENT)
Dept: ULTRASOUND IMAGING | Age: 52
Discharge: HOME | End: 2017-11-03
Attending: INTERNAL MEDICINE
Payer: COMMERCIAL

## 2017-11-03 VITALS
SYSTOLIC BLOOD PRESSURE: 99 MMHG | RESPIRATION RATE: 18 BRPM | DIASTOLIC BLOOD PRESSURE: 58 MMHG | TEMPERATURE: 98.1 F | OXYGEN SATURATION: 100 % | HEART RATE: 66 BPM

## 2017-11-03 DIAGNOSIS — R18.8 ASCITES: ICD-10-CM

## 2017-11-03 PROCEDURE — 74011250636 HC RX REV CODE- 250/636: Performed by: PHYSICIAN ASSISTANT

## 2017-11-03 PROCEDURE — P9047 ALBUMIN (HUMAN), 25%, 50ML: HCPCS | Performed by: PHYSICIAN ASSISTANT

## 2017-11-03 PROCEDURE — 49083 ABD PARACENTESIS W/IMAGING: CPT

## 2017-11-03 RX ORDER — ALBUMIN HUMAN 250 G/1000ML
25 SOLUTION INTRAVENOUS ONCE
Status: COMPLETED | OUTPATIENT
Start: 2017-11-03 | End: 2017-11-03

## 2017-11-03 RX ORDER — LIDOCAINE HYDROCHLORIDE 20 MG/ML
40-120 INJECTION, SOLUTION INFILTRATION; PERINEURAL ONCE
Status: ACTIVE | OUTPATIENT
Start: 2017-11-03 | End: 2017-11-04

## 2017-11-03 RX ADMIN — ALBUMIN (HUMAN) 25 G: 0.25 INJECTION, SOLUTION INTRAVENOUS at 14:26

## 2017-11-03 NOTE — IP AVS SNAPSHOT
303 [facility] 
 
 
 2329 [facility] 322 [facility] 
[phone] Patient: Lashawn Holcomb MRN: [id number] :1965 About your hospitalization You were admitted on:  November 3, 2017 You last received care in the:  [facility] RADIOLOGY ULTRASOUND You were discharged on:  November 3, 2017 Why you were hospitalized Your primary diagnosis was:  Not on File Things You Need To Do (next 8 weeks)  [address] [facility] with D US LOGIC 7 UNIT 2 at  3:30 PM  
Interventional Radiology Procedure completed with ultrasound guidance. Referring Physicians: 1) Initial paracentesis patients required: H&P/recent office notes and lab work, no older than 30 days (CBC, BMP, PT/PTT) to Interventional Radiology to facilitate prompt scheduling. 2) Obtain clearance to hold blood thinners from prescribing Physician and give Patient instructions prior to arrival. 3) Patient may continue to eat or drink as normal prior to arrival. 4) Pt to arrive 15 minutes early. 5) Responsible adult  required to drive Patient home after recovery period. Recovery period can vary depending on sedation and patient condition. 6) Interventional Radiology Scheduling can be contacted at [phone] Where:  [facility] RADIOLOGY ULTRASOUND ([address] [place] [address]) [address] [facility] with D NURSING at  3:30 PM  
Interventional Radiology Procedure completed with ultrasound guidance. Referring Physicians: 1) Initial paracentesis patients required: H&P/recent office notes and lab work, no older than 30 days (CBC, BMP, PT/PTT) to Interventional Radiology to facilitate prompt scheduling. 2) Obtain clearance to hold blood thinners from prescribing Physician and give Patient instructions prior to arrival. 3) Patient may continue to eat or drink as normal prior to arrival. 4) Pt to arrive 15 minutes early.  5) Responsible adult  required to drive Patient home after recovery period. Recovery period can vary depending on sedation and patient condition. 6) Interventional Radiology Scheduling can be contacted at [phone] Where:  [facility] ([address] [place] [address]) Freddie Bishop with IMANI VERA RESOURCE 2 at  3:30 PM  
Interventional Radiology Procedure completed with ultrasound guidance. Referring Physicians: 1) Initial paracentesis patients required: H&P/recent office notes and lab work, no older than 30 days (CBC, BMP, PT/PTT) to Interventional Radiology to facilitate prompt scheduling. 2) Obtain clearance to hold blood thinners from prescribing Physician and give Patient instructions prior to arrival. 3) Patient may continue to eat or drink as normal prior to arrival. 4) Pt to arrive 15 minutes early. 5) Responsible adult  required to drive Patient home after recovery period. Recovery period can vary depending on sedation and patient condition. 6) Interventional Radiology Scheduling can be contacted at [phone] Where:  [facility]iology [facility] ([address] [place] [address]) Discharge Orders None A check alberto indicates which time of day the medication should be taken. My Medications ASK your physician about these medications Instructions Each Dose to Equal  
 Morning Noon Evening Bedtime AMILORIDE PO Your last dose was: Your next dose is: Take 15 mg by mouth two (2) times a day. Dose unknown, to bring dos for clarification 15 mg  
    
   
   
   
  
 lactulose 10 gram/15 mL solution Commonly known as:  Eron Sensing Your last dose was: Your next dose is: Take 10 g by mouth three (3) times daily. 10 g  
    
   
   
   
  
 torsemide 20 mg tablet Commonly known as:  DEMADEX Your last dose was: Your next dose is: Take 40 mg by mouth two (2) times a day. 40 mg Discharge Instructions Jessiei 34 [address] Department of Interventional Radiology [facility] 
[phone] Office 
[phone] Fax PARACENTESIS DISCHARGE INSTRUCTIONS General Information: 
During this procedure, the doctor will insert a needle into the abdomen to drain fluid. After the procedure, you will be able to take a deep breath much easier. The site of the puncture may ooze the first day. This will decrease and eventually stop. Paracentesis (draining fluid from the abdomen) sometimes makes patients hypotensive (low blood pressure). Your doctor may order for you to receive fluids or albumin (a volume booster) during the procedure through an IV site. Home Care Instructions: 
Keep the puncture site clean and dry. No tub baths or swimming until puncture site heals. Showering is acceptable. Resume your normal diet, and resume your normal activity slowly and as you tolerate. If you are short of breath, rest. If shortness of breath does not ease, please call your ordering doctor. Fluid can re-accumulate in the chest and/or in the abdomen. If this should occur, your doctor needs to know as you may need to have the procedure done again. Call If: 
   You should call your Physician and/or the Radiology Nurse if you notice any signs of infection, like pus draining, or if it is swollen or reddened. Also call if you have a fever, or if you are bleeding from the puncture site more than a small amount on the dressing. Call if the puncture site keeps draining fluid. Some oozing is to be expected, but should slow and then stop. Call if you feel like you have pressure in your abdomen. SEEK IMMEDIATE CARE OR CALL 911 IF YOU SUDDENLY HAVE TROUBLE BREATHING, OR IF YOUR LIPS TURN BLUE, OR IF YOU NOTICE BLOOD IN YOUR SPUTUM. Follow-Up Instructions: Please see your ordering doctor as he/she has requested. To Reach Us: If you have any questions about your procedure, please call the Interventional Radiology department at [phone]. After business hours (5pm) and weekends, call the answering service at [phone] and ask for the Radiologist on call to be paged. Date: 11/3/2017 Discharging Nurse: Yahir Butler RN Introducing [facility]! [employer] introduces digitalbox patient portal. Now you can access parts of your medical record, email your doctor's office, and request medication refills online. 1. In your internet browser, go to [email or web address] 2. Click on the First Time User? Click Here link in the Sign In box. You will see the New Member Sign Up page. 3. Enter your digitalbox Access Code exactly as it appears below. You will not need to use this code after youve completed the sign-up process. If you do not sign up before the expiration date, you must request a new code. · digitalbox Access Code: [email or web address] Expires: 12/17/2017  9:46 AM 
 
4. Enter the last four digits of your Social Security Number (xxxx) and Date of Birth (mm/dd/yyyy) as indicated and click Submit. You will be taken to the next sign-up page. 5. Create a digitalbox ID. This will be your digitalbox login ID and cannot be changed, so think of one that is secure and easy to remember. 6. Create a digitalbox password. You can change your password at any time. 7. Enter your Password Reset Question and Answer. This can be used at a later time if you forget your password. 8. Enter your e-mail address. You will receive e-mail notification when new information is available in [address] 9. Click Sign Up. You can now view and download portions of your medical record. 10. Click the Download Summary menu link to download a portable copy of your medical information. If you have questions, please visit the Frequently Asked Questions section of the MyChart website. Remember, Mapflowhart is NOT to be used for urgent needs. For medical emergencies, dial 911. Now available from your iPhone and Android! Providers Seen During Your Hospitalization Provider Specialty Primary office phone Chelita Jean MD Gastroenterology [phone] Your Primary Care Physician (PCP) Primary Care Physician Office Phone Office Fax Mike Rees [phone] You are allergic to the following No active allergies Recent Documentation Smoking Status Current Every Day Smoker Emergency Contacts Name Discharge Info Relation Home Work Mobile [employer] Communications[employer]ee Siddiqui Jr. [employer]  Son [58] [phone] Patient Belongings The following personal items are in your possession at time of discharge: 
                             
 
  
  
 Please provide this summary of care documentation to your next provider. Signatures-by signing, you are acknowledging that this After Visit Summary has been reviewed with you and you have received a copy. Patient Signature:  ____________________________________________________________ Date:  ____________________________________________________________  
  
To Teixeira Provider Signature:  ____________________________________________________________ Date:  ____________________________________________________________

## 2017-11-03 NOTE — IP AVS SNAPSHOT
[address] 
[phone] Patient: Bernie Medrano MRN: [id number] :1965 My Medications ASK your physician about these medications Instructions Each Dose to Equal  
 Morning Noon Evening Bedtime AMILORIDE PO Your last dose was: Your next dose is: Take 15 mg by mouth two (2) times a day. Dose unknown, to bring dos for clarification 15 mg  
    
   
   
   
  
 lactulose 10 gram/15 mL solution Commonly known as:  Maggie Guadalupe Your last dose was: Your next dose is: Take 10 g by mouth three (3) times daily. 10 g  
    
   
   
   
  
 torsemide 20 mg tablet Commonly known as:  DEMADEX Your last dose was: Your next dose is: Take 40 mg by mouth two (2) times a day.   
 40 mg

## 2017-11-03 NOTE — PROGRESS NOTES
Post paracentesis with 9400 ml's removed, patient tolerating well. Patient denies any c/o's at this time. Patient discharged home at this time.

## 2017-11-03 NOTE — DISCHARGE INSTRUCTIONS
1108 [facility] [address]  Department of Interventional Radiology  [facility]  [phone] Office  [phone] Fax    PARACENTESIS DISCHARGE INSTRUCTIONS    General Information:  During this procedure, the doctor will insert a needle into the abdomen to drain fluid. After the procedure, you will be able to take a deep breath much easier. The site of the puncture may ooze the first day. This will decrease and eventually stop. Paracentesis (draining fluid from the abdomen) sometimes makes patients hypotensive (low blood pressure). Your doctor may order for you to receive fluids or albumin (a volume booster) during the procedure through an IV site. Home Care Instructions:  Keep the puncture site clean and dry. No tub baths or swimming until puncture site heals. Showering is acceptable. Resume your normal diet, and resume your normal activity slowly and as you tolerate. If you are short of breath, rest. If shortness of breath does not ease, please call your ordering doctor. Fluid can re-accumulate in the chest and/or in the abdomen. If this should occur, your doctor needs to know as you may need to have the procedure done again. Call If:     You should call your Physician and/or the Radiology Nurse if you notice any signs of infection, like pus draining, or if it is swollen or reddened. Also call if you have a fever, or if you are bleeding from the puncture site more than a small amount on the dressing. Call if the puncture site keeps draining fluid. Some oozing is to be expected, but should slow and then stop. Call if you feel like you have pressure in your abdomen. SEEK IMMEDIATE CARE OR CALL 911 IF YOU SUDDENLY HAVE TROUBLE BREATHING, OR IF YOUR LIPS TURN BLUE, OR IF YOU NOTICE BLOOD IN YOUR SPUTUM. Follow-Up Instructions: Please see your ordering doctor as he/she has requested. To Reach Us:     If you have any questions about your procedure, please call the Interventional Radiology department at [phone]. After business hours (5pm) and weekends, call the answering service at [phone] and ask for the Radiologist on call to be paged.        Date: 11/3/2017  Discharging Nurse: Jevon Pineda RN

## 2017-11-03 NOTE — PROCEDURES
Interventional Radiology Brief Procedure Note    Patient: Mikhail Márquez MRN: [id number]  SSN: [id number]    YOB: 1965  Age: 46 y.o. Sex: male      Date of Procedure: 11/3/2017    Pre-Procedure Diagnosis: Recurrent ascites. Post-Procedure Diagnosis: SAME    Procedure(s): Paracentesis    Brief Description of Procedure: as above    Performed By: Jossy Washington MD     Assistants: None    Anesthesia: None    Estimated Blood Loss: Less than 10ml    Specimens: None    Implants: None    Findings: Milky ascites. Complications: None    Recommendations: None     Follow Up: TIPS revision in about 4 weeks.      Signed By: Jossy Washington MD     November 3, 2017

## 2017-11-03 NOTE — PROGRESS NOTES
IR Nurse Pre-Procedure Checklist Part 2          Consent signed: Yes    H&P complete:  Not applicable    Antibiotics: Not applicable    Airway/Mallampati Done: Not applicable    Shaved: Not applicable    Pregnancy Form:Not applicable    Patient Position: Yes    MD Side: Yes     Biopsy Worksheet: Not applicable    Specimen Medium: Not applicable

## 2017-11-03 NOTE — PROGRESS NOTES
Patient to [address] [facility] [facility] room for procedure. Patient awake and alert, verbalized name, , and procedure to be performed. Patient moved to procedure table independently.

## 2017-11-26 ENCOUNTER — HOSPITAL ENCOUNTER (EMERGENCY)
Age: 52
Discharge: HOME | End: 2017-11-26
Attending: EMERGENCY MEDICINE
Payer: COMMERCIAL

## 2017-11-26 ENCOUNTER — APPOINTMENT (OUTPATIENT)
Dept: GENERAL RADIOLOGY | Age: 52
End: 2017-11-26
Attending: EMERGENCY MEDICINE
Payer: COMMERCIAL

## 2017-11-26 VITALS
DIASTOLIC BLOOD PRESSURE: 65 MMHG | WEIGHT: 135 LBS | HEIGHT: 72 IN | RESPIRATION RATE: 16 BRPM | HEART RATE: 75 BPM | SYSTOLIC BLOOD PRESSURE: 110 MMHG | BODY MASS INDEX: 18.28 KG/M2 | OXYGEN SATURATION: 99 % | TEMPERATURE: 98 F

## 2017-11-26 DIAGNOSIS — E87.6 HYPOKALEMIA: ICD-10-CM

## 2017-11-26 DIAGNOSIS — R07.9 CHEST PAIN, UNSPECIFIED TYPE: Primary | ICD-10-CM

## 2017-11-26 LAB
ALBUMIN SERPL-MCNC: 2 G/DL (ref 3.5–5)
ALBUMIN/GLOB SERPL: 0.5 {RATIO} (ref 1.2–3.5)
ALP SERPL-CCNC: 147 U/L (ref 50–136)
ALT SERPL-CCNC: 24 U/L (ref 12–65)
ANION GAP SERPL CALC-SCNC: 10 MMOL/L (ref 7–16)
AST SERPL-CCNC: 48 U/L (ref 15–37)
ATRIAL RATE: 89 BPM
BASOPHILS # BLD: 0 K/UL (ref 0–0.2)
BASOPHILS NFR BLD: 0 % (ref 0–2)
BILIRUB SERPL-MCNC: 1.1 MG/DL (ref 0.2–1.1)
BUN SERPL-MCNC: 11 MG/DL (ref 6–23)
CALCIUM SERPL-MCNC: 7.9 MG/DL (ref 8.3–10.4)
CALCULATED P AXIS, ECG09: 77 DEGREES
CALCULATED R AXIS, ECG10: 41 DEGREES
CALCULATED T AXIS, ECG11: 17 DEGREES
CHLORIDE SERPL-SCNC: 99 MMOL/L (ref 98–107)
CO2 SERPL-SCNC: 28 MMOL/L (ref 21–32)
CREAT SERPL-MCNC: 1.35 MG/DL (ref 0.8–1.5)
DIAGNOSIS, 93000: NORMAL
DIFFERENTIAL METHOD BLD: ABNORMAL
EOSINOPHIL # BLD: 0.1 K/UL (ref 0–0.8)
EOSINOPHIL NFR BLD: 2 % (ref 0.5–7.8)
ERYTHROCYTE [DISTWIDTH] IN BLOOD BY AUTOMATED COUNT: 15.4 % (ref 11.9–14.6)
GLOBULIN SER CALC-MCNC: 4.4 G/DL (ref 2.3–3.5)
GLUCOSE SERPL-MCNC: 119 MG/DL (ref 65–100)
HCT VFR BLD AUTO: 32.2 % (ref 41.1–50.3)
HGB BLD-MCNC: 11.5 G/DL (ref 13.6–17.2)
IMM GRANULOCYTES # BLD: 0 K/UL (ref 0–0.5)
IMM GRANULOCYTES NFR BLD AUTO: 0 % (ref 0–5)
LYMPHOCYTES # BLD: 0.8 K/UL (ref 0.5–4.6)
LYMPHOCYTES NFR BLD: 14 % (ref 13–44)
MCH RBC QN AUTO: 34 PG (ref 26.1–32.9)
MCHC RBC AUTO-ENTMCNC: 35.7 G/DL (ref 31.4–35)
MCV RBC AUTO: 95.3 FL (ref 79.6–97.8)
MONOCYTES # BLD: 0.3 K/UL (ref 0.1–1.3)
MONOCYTES NFR BLD: 6 % (ref 4–12)
NEUTS SEG # BLD: 4.3 K/UL (ref 1.7–8.2)
NEUTS SEG NFR BLD: 78 % (ref 43–78)
P-R INTERVAL, ECG05: 136 MS
PLATELET # BLD AUTO: 90 K/UL (ref 150–450)
PMV BLD AUTO: 9 FL (ref 10.8–14.1)
POTASSIUM SERPL-SCNC: 2.3 MMOL/L (ref 3.5–5.1)
PROT SERPL-MCNC: 6.4 G/DL (ref 6.3–8.2)
Q-T INTERVAL, ECG07: 406 MS
QRS DURATION, ECG06: 100 MS
QTC CALCULATION (BEZET), ECG08: 493 MS
RBC # BLD AUTO: 3.38 M/UL (ref 4.23–5.67)
SODIUM SERPL-SCNC: 137 MMOL/L (ref 136–145)
TROPONIN I SERPL-MCNC: <0.04 NG/ML (ref 0.02–0.05)
VENTRICULAR RATE, ECG03: 89 BPM
WBC # BLD AUTO: 5.5 K/UL (ref 4.3–11.1)

## 2017-11-26 PROCEDURE — 74011250637 HC RX REV CODE- 250/637: Performed by: EMERGENCY MEDICINE

## 2017-11-26 PROCEDURE — 71020 XR CHEST PA LAT: CPT

## 2017-11-26 PROCEDURE — 84484 ASSAY OF TROPONIN QUANT: CPT | Performed by: EMERGENCY MEDICINE

## 2017-11-26 PROCEDURE — 99284 EMERGENCY DEPT VISIT MOD MDM: CPT | Performed by: EMERGENCY MEDICINE

## 2017-11-26 PROCEDURE — 80053 COMPREHEN METABOLIC PANEL: CPT | Performed by: EMERGENCY MEDICINE

## 2017-11-26 PROCEDURE — 85025 COMPLETE CBC W/AUTO DIFF WBC: CPT | Performed by: EMERGENCY MEDICINE

## 2017-11-26 PROCEDURE — 93005 ELECTROCARDIOGRAM TRACING: CPT | Performed by: EMERGENCY MEDICINE

## 2017-11-26 RX ORDER — POTASSIUM CHLORIDE 20 MEQ/1
40 TABLET, EXTENDED RELEASE ORAL
Status: COMPLETED | OUTPATIENT
Start: 2017-11-26 | End: 2017-11-26

## 2017-11-26 RX ORDER — POTASSIUM CHLORIDE 1500 MG/1
20 TABLET, FILM COATED, EXTENDED RELEASE ORAL 2 TIMES DAILY
Qty: 14 TAB | Refills: 0 | Status: SHIPPED | OUTPATIENT
Start: 2017-11-26 | End: 2017-12-03

## 2017-11-26 RX ADMIN — POTASSIUM CHLORIDE 40 MEQ: 20 TABLET, EXTENDED RELEASE ORAL at 17:24

## 2017-11-26 NOTE — ED PROVIDER NOTES
HPI:  26-year-old male history of liver cirrhosis, ascites, rib fractures in the past is able to complain of palpitation, chest discomfort earlier today. Currently asymptomatic. Has chronic cramps that has been ongoing for months. denies any fever. Had his ascites drained approximately 1 month ago. Has an appointment this week. Feels his belly is getting larger in. No pain. Has decreased appetite overall that has been ongoing for months. Admits to using medical marijuana for appetite stimulant    ROS  Constitutional: No fever  Skin: no rash  Eye: No vision changes  ENMT: No sore throat  Respiratory: No shortness of breath  Cardiovascular: No chest pain  Gastrointestinal: No vomiting, no nausea, no diarrhea, no abdominal pain  : No dysuria  MSK: No back pain  Neuro: No headache, no change in mental status, no numbness, no tingling, no weakness  All other review of systems positive per history of present illness and the above otherwise negative or noncontributory. Visit Vitals    /61 (BP 1 Location: Left arm, BP Patient Position: At rest)    Pulse 78    Temp 97.8 °F (36.6 °C)    Resp 16    Ht 6' (1.829 m)    Wt 61.2 kg (135 lb)    SpO2 99%    BMI 18.31 kg/m2     Past Medical History:   Diagnosis Date    Cirrhosis of liver (HCC)     undeterminable cause    Multiple fractures of ribs of both sides     Multiple, bilateral w/o pneumo: 2013     Past Surgical History:   Procedure Laterality Date    HX HERNIA REPAIR      abd    HX KNEE ARTHROSCOPY  2005    HX OTHER SURGICAL      liver bx     Prior to Admission Medications   Prescriptions Last Dose Informant Patient Reported? Taking? AMILORIDE HCL (AMILORIDE PO)   Yes No   Sig: Take 15 mg by mouth two (2) times a day. Dose unknown, to bring dos for clarification    lactulose (CHRONULAC) 10 gram/15 mL solution   Yes No   Sig: Take 10 g by mouth three (3) times daily.    torsemide (DEMADEX) 20 mg tablet   Yes No   Sig: Take 40 mg by mouth two (2) times a day. Facility-Administered Medications: None         Adult Exam   General: alert, no acute distress  Head: normocephalic, atraumatic  ENT: moist mucous membranes  Neck: supple, non-tender; full range of motion  Cardiovascular: regular rate and rhythm, normal peripheral perfusion, no edema  Respiratory: lungs are clear to auscultation; normal respirations; no wheezing, rales or rhonchi  Gastrointestinal: soft, no pain. Ascites. Positive fluid wave. Anterior abdominal hernia. Does not appear consistent with strangulation, incarceration; no rebound or guarding, no peritoneal signs, no distension  Back: non-tender, full range of motion  Musculoskeletal: normal range of motion, normal strength, no gross deformities  Neurological: alert and oriented x 4, no gross focal deficits; normal speech  Psychiatric: cooperative; appropriate mood and affect    MDM:he is nontoxic well-appearing. Wants to go home at this time. Does not want to stay. Stated he feels fine now. He just wanted to get checked out. EKG obtained interpreted by me rate of 89 times her normal axis and interval.  No STEMI noted. No ST depression, T wave, U-wave noted. No ectopy of [place]. Potassium level 2.3.  mildly elevated creatinine compared to usual.  Recommend admission for IV hydration, IV potassium chloride. However the patient stated \"I hate the hospital\" I don't want to be here\" I would rather just go home. Patient is awake, alert. Oriented. Understand that by mouth treatment alone would not bring up his potassium level to an adequate level. I will give him 40 mEq of potassium chloride here and discharged home with 7 additional days of 40 mEq potassium chloride. He is aware that he should come back should he change his mind about admission to the hospital.  He is to follow-up with his doctor within the next 1-2 days for further repeat lab work and assessment.     Xr Chest Pa Lat    Result Date: 11/26/2017  History: chest pain, tachycardia Two views chest Findings: The lungs are well expanded and clear. The cardiac silhouette, and mediastinal contour, and osseous structures are normal.     Impression: Unremarkable two-view chest.     Recent Results (from the past 8 hour(s))   EKG, 12 LEAD, INITIAL    Collection Time: 11/26/17  3:32 PM   Result Value Ref Range    Ventricular Rate 89 BPM    Atrial Rate 89 BPM    P-R Interval 136 ms    QRS Duration 100 ms    Q-T Interval 406 ms    QTC Calculation (Bezet) 493 ms    Calculated P Axis 77 degrees    Calculated R Axis 41 degrees    Calculated T Axis 17 degrees    Diagnosis       !!! Poor data quality, interpretation may be adversely affected  Normal sinus rhythm  Right atrial enlargement  Possible Inferior infarct , age undetermined  Abnormal ECG  No previous ECGs available     CBC WITH AUTOMATED DIFF    Collection Time: 11/26/17  3:59 PM   Result Value Ref Range    WBC 5.5 4.3 - 11.1 K/uL    RBC 3.38 (L) 4.23 - 5.67 M/uL    HGB 11.5 (L) 13.6 - 17.2 g/dL    HCT 32.2 (L) 41.1 - 50.3 %    MCV 95.3 79.6 - 97.8 FL    MCH 34.0 (H) 26.1 - 32.9 PG    MCHC 35.7 (H) 31.4 - 35.0 g/dL    RDW 15.4 (H) 11.9 - 14.6 %    PLATELET 90 (L) 984 - 450 K/uL    MPV 9.0 (L) 10.8 - 14.1 FL    DF AUTOMATED      NEUTROPHILS 78 43 - 78 %    LYMPHOCYTES 14 13 - 44 %    MONOCYTES 6 4.0 - 12.0 %    EOSINOPHILS 2 0.5 - 7.8 %    BASOPHILS 0 0.0 - 2.0 %    IMMATURE GRANULOCYTES 0 0.0 - 5.0 %    ABS. NEUTROPHILS 4.3 1.7 - 8.2 K/UL    ABS. LYMPHOCYTES 0.8 0.5 - 4.6 K/UL    ABS. MONOCYTES 0.3 0.1 - 1.3 K/UL    ABS. EOSINOPHILS 0.1 0.0 - 0.8 K/UL    ABS. BASOPHILS 0.0 0.0 - 0.2 K/UL    ABS. IMM.  GRANS. 0.0 0.0 - 0.5 K/UL   METABOLIC PANEL, COMPREHENSIVE    Collection Time: 11/26/17  3:59 PM   Result Value Ref Range    Sodium 137 136 - 145 mmol/L    Potassium 2.3 (LL) 3.5 - 5.1 mmol/L    Chloride 99 98 - 107 mmol/L    CO2 28 21 - 32 mmol/L    Anion gap 10 7 - 16 mmol/L    Glucose 119 (H) 65 - 100 mg/dL    BUN 11 6 - 23 MG/DL    Creatinine 1.35 0.8 - 1.5 MG/DL    GFR est AA >60 >60 ml/min/1.73m2    GFR est non-AA 59 (L) >60 ml/min/1.73m2    Calcium 7.9 (L) 8.3 - 10.4 MG/DL    Bilirubin, total 1.1 0.2 - 1.1 MG/DL    ALT (SGPT) 24 12 - 65 U/L    AST (SGOT) 48 (H) 15 - 37 U/L    Alk. phosphatase 147 (H) 50 - 136 U/L    Protein, total 6.4 6.3 - 8.2 g/dL    Albumin 2.0 (L) 3.5 - 5.0 g/dL    Globulin 4.4 (H) 2.3 - 3.5 g/dL    A-G Ratio 0.5 (L) 1.2 - 3.5     TROPONIN I    Collection Time: 11/26/17  3:59 PM   Result Value Ref Range    Troponin-I, Qt. <0.04 0.02 - 0.05 NG/ML             Dragon voice recognition software was used to create this note. Although the note has been reviewed and corrected where necessary, additional errors may have been overlooked and remain in the text.

## 2017-11-26 NOTE — DISCHARGE INSTRUCTIONS
Chest Pain: Care Instructions  Your Care Instructions    There are many things that can cause chest pain. Some are not serious and will get better on their own in a few days. But some kinds of chest pain need more testing and treatment. Your doctor may have recommended a follow-up visit in the next 8 to 12 hours. If you are not getting better, you may need more tests or treatment. Even though your doctor has released you, you still need to watch for any problems. The doctor carefully checked you, but sometimes problems can develop later. If you have new symptoms or if your symptoms do not get better, get medical care right away. If you have worse or different chest pain or pressure that lasts more than 5 minutes or you passed out (lost consciousness), call 911 or seek other emergency help right away. A medical visit is only one step in your treatment. Even if you feel better, you still need to do what your doctor recommends, such as going to all suggested follow-up appointments and taking medicines exactly as directed. This will help you recover and help prevent future problems. How can you care for yourself at home? · Rest until you feel better. · Take your medicine exactly as prescribed. Call your doctor if you think you are having a problem with your medicine. · Do not drive after taking a prescription pain medicine. When should you call for help? Call 911 if:  ? · You passed out (lost consciousness). ? · You have severe difficulty breathing. ? · You have symptoms of a heart attack. These may include:  ¨ Chest pain or pressure, or a strange feeling in your chest.  ¨ Sweating. ¨ Shortness of breath. ¨ Nausea or vomiting. ¨ Pain, pressure, or a strange feeling in your back, neck, jaw, or upper belly or in one or both shoulders or arms. ¨ Lightheadedness or sudden weakness. ¨ A fast or irregular heartbeat.   After you call 911, the  may tell you to chew 1 adult-strength or 2 to 4 low-dose aspirin. Wait for an ambulance. Do not try to drive yourself. ?Call your doctor today if:  ? · You have any trouble breathing. ? · Your chest pain gets worse. ? · You are dizzy or lightheaded, or you feel like you may faint. ? · You are not getting better as expected. ? · You are having new or different chest pain. Where can you learn more? Go to http://elvia-danielle.info/. Enter A120 in the search box to learn more about \"Chest Pain: Care Instructions. \"  Current as of: March 20, 2017  Content Version: 11.4  © 5903-5600 [employer]. Care instructions adapted under license by [employer] (which disclaims liability or warranty for this information). If you have questions about a medical condition or this instruction, always ask your healthcare professional. [address] any warranty or liability for your use of this information. Hypokalemia: Care Instructions  Your Care Instructions    Hypokalemia (say \"ru-tu-lbg-ZIGGY-carlos-uh\") is a low level of potassium. The heart, muscles, kidneys, and nervous system all need potassium to work well. This problem has many different causes. Kidney problems, diet, and medicines like diuretics and laxatives can cause it. So can vomiting or diarrhea. In some cases, cancer is the cause. Your doctor may do tests to find the cause of your low potassium levels. You may need medicines to bring your potassium levels back to normal. You may also need regular blood tests to check your potassium. If you have very low potassium, you may need intravenous (IV) medicines. You also may need tests to check the electrical activity of your heart. Heart problems caused by low potassium levels can be very serious. Follow-up care is a key part of your treatment and safety. Be sure to make and go to all appointments, and call your doctor if you are having problems.  It's also a good idea to know your test results and keep a list of the medicines you take. How can you care for yourself at home? · If your doctor recommends it, eat foods that have a lot of potassium. These include fresh fruits, juices, and vegetables. They also include nuts, beans, and milk. · Be safe with medicines. If your doctor prescribes medicines or potassium supplements, take them exactly as directed. Call your doctor if you have any problems with your medicines. · Get your potassium levels tested as often as your doctor tells you. When should you call for help? Call 911 anytime you think you may need emergency care. For example, call if:  ? · You feel like your heart is missing beats. Heart problems caused by low potassium can cause death. ? · You passed out (lost consciousness). ? · You have a seizure. ?Call your doctor now or seek immediate medical care if:  ? · You feel weak or unusually tired. ? · You have severe arm or leg cramps. ? · You have tingling or numbness. ? · You feel sick to your stomach, or you vomit. ? · You have belly cramps. ? · You feel bloated or constipated. ? · You have to urinate a lot. ? · You feel very thirsty most of the time. ? · You are dizzy or lightheaded, or you feel like you may faint. ? · You feel depressed, or you lose touch with reality. ? Watch closely for changes in your health, and be sure to contact your doctor if:  ? · You do not get better as expected. Where can you learn more? Go to http://elvia-danielle.info/. Enter G358 in the search box to learn more about \"Hypokalemia: Care Instructions. \"  Current as of: May 12, 2017  Content Version: 11.4  © 8078-8359 [employer]. Care instructions adapted under license by [employer] (which disclaims liability or warranty for this information).  If you have questions about a medical condition or this instruction, always ask your healthcare professional. [place] any warranty or liability for your use of this information. Electrolyte Imbalance: Care Instructions  Your Care Instructions  Electrolytes are minerals in your blood. They include sodium, potassium, calcium, and magnesium. When they are not at the right levels, you can feel very ill. You may not know what is causing it, but you know something is wrong. You may feel weak or numb, have muscle spasms, or twitch. Your heart may beat fast. Symptoms are different with each mineral. Too much is as bad as too little. Minerals help keep your body working as it should. Vomiting, diarrhea, and fever can cause you to lose minerals. A problem with your kidneys can tip a mineral out of balance. So can taking certain medicines. Your doctor may do more tests. He or she may change your medicine and diet. If you are low in one or more minerals, they may be given through a tube into your vein (IV). Your doctor may have you take or drink special fluids or pills. If your kidneys are failing, your blood may be filtered. This is called dialysis. It can put the minerals back in balance. Follow-up care is a key part of your treatment and safety. Be sure to make and go to all appointments, and call your doctor if you are having problems. It's also a good idea to know your test results and keep a list of the medicines you take. How can you care for yourself at home? · Take your medicines exactly as prescribed. Call your doctor if you have any problems with your medicines. You will get more details on the specific medicines your doctor prescribes. · Do not take any medicine without talking to your doctor first. This includes prescription, over-the-counter, and herbal medicines. · If you have kidney, heart, or liver disease and have to limit fluids, talk with your doctor before you increase the amount of fluids you drink. · Your doctor or dietitian may give you a diet plan to help balance your minerals.  Follow the diet carefully. When should you call for help? Call 911 anytime you think you may need emergency care. For example, call if:  ? · You passed out (lost consciousness). ? · Your heartbeat seems to be irregular. It might be speeding up and then slowing down or skipping beats. ?Call your doctor now or seek immediate medical care if:  ? · You have muscle aches. ? · You feel very weak. ? · You are confused or cannot think clearly. ? Watch closely for changes in your health, and be sure to contact your doctor if:  ? · You do not get better as expected. Where can you learn more? Go to http://elvia-danielle.info/. Enter X916 in the search box to learn more about \"Electrolyte Imbalance: Care Instructions. \"  Current as of: May 12, 2017  Content Version: 11.4  © 5328-8370 [employer]. Care instructions adapted under license by [employer] (which disclaims liability or warranty for this information). If you have questions about a medical condition or this instruction, always ask your healthcare professional. [address] any warranty or liability for your use of this information.

## 2017-11-26 NOTE — ED TRIAGE NOTES
Patient advises that 1 hour prior to arrival he felt \"like my heart was beating out of my chest\". Patient complaining of chest tightness prior to arrival cannot advise if he still is or not per patient. Patient advises that he has been having some episodes of feeling dizzy. Patient advises cirrhosis and was drained av 1 month ago, due this week.

## 2017-11-29 ENCOUNTER — HOSPITAL ENCOUNTER (OUTPATIENT)
Dept: ULTRASOUND IMAGING | Age: 52
Discharge: HOME | End: 2017-11-29
Attending: INTERNAL MEDICINE
Payer: COMMERCIAL

## 2017-11-29 VITALS
WEIGHT: 163 LBS | RESPIRATION RATE: 18 BRPM | SYSTOLIC BLOOD PRESSURE: 106 MMHG | BODY MASS INDEX: 22.11 KG/M2 | DIASTOLIC BLOOD PRESSURE: 64 MMHG | TEMPERATURE: 98.4 F | HEART RATE: 73 BPM

## 2017-11-29 DIAGNOSIS — K74.69 OTHER CIRRHOSIS OF LIVER (HCC): ICD-10-CM

## 2017-11-29 PROCEDURE — P9047 ALBUMIN (HUMAN), 25%, 50ML: HCPCS | Performed by: PHYSICIAN ASSISTANT

## 2017-11-29 PROCEDURE — 74011250636 HC RX REV CODE- 250/636: Performed by: PHYSICIAN ASSISTANT

## 2017-11-29 PROCEDURE — 49083 ABD PARACENTESIS W/IMAGING: CPT

## 2017-11-29 RX ORDER — ALBUMIN HUMAN 250 G/1000ML
12.5 SOLUTION INTRAVENOUS ONCE
Status: COMPLETED | OUTPATIENT
Start: 2017-11-29 | End: 2017-11-29

## 2017-11-29 RX ADMIN — ALBUMIN (HUMAN) 12.5 G: 0.25 INJECTION, SOLUTION INTRAVENOUS at 12:39

## 2017-11-29 NOTE — DISCHARGE INSTRUCTIONS
[address]  Department of Interventional Radiology  [address] [place] [address] [address] [facility]  [phone] Office  [phone] Fax    [facility] DISCHARGE INSTRUCTIONS    General Information:  During this procedure, the doctor will insert a needle into the abdomen to drain fluid. After the procedure, you will be able to take a deep breath much easier. The site of the puncture may ooze the first day. This will decrease and eventually stop. Paracentesis (draining fluid from the abdomen) sometimes makes patients hypotensive (low blood pressure). Your doctor may order for you to receive fluids or albumin (a volume booster) during the procedure through an IV site. Home Care Instructions:  Keep the puncture site clean and dry. No tub baths or swimming until puncture site heals. Showering is acceptable. Resume your normal diet, and resume your normal activity slowly and as you tolerate. If you are short of breath, rest. If shortness of breath does not ease, please call your ordering doctor. Fluid can re-accumulate in the chest and/or in the abdomen. If this should occur, your doctor needs to know as you may need to have the procedure done again. The surgical glue should slough off in about 2 weeks. Call If:     You should call your Physician and/or the Radiology Nurse if you notice any signs of infection, like pus draining, or if it is swollen or reddened. Also call if you have a fever, or if you are bleeding from the puncture site more than a small amount on the dressing. Call if the puncture site keeps draining fluid. Some oozing is to be expected, but should slow and then stop. Call if you feel like you have pressure in your abdomen. SEEK IMMEDIATE CARE OR CALL 911 IF YOU SUDDENLY HAVE TROUBLE BREATHING, OR IF YOUR LIPS TURN BLUE, OR IF YOU NOTICE BLOOD IN YOUR SPUTUM. Follow-Up Instructions: Please see your ordering doctor as he/she has requested.      To Reach Us: If you have any questions about your procedure, please call the Interventional Radiology department at [phone]. After business hours (5pm) and weekends, call the answering service at [phone] and ask for the Radiologist on call to be paged. Interventional Radiology General Nurse Discharge      * Please give a list of your current medications to your Primary Care Provider. * Please update this list whenever your medications are discontinued, doses are     changed, or new medications (including over-the-counter products) are added. * Please carry medication information at all times in case of emergency situations. These are general instructions for a healthy lifestyle:    No smoking/ No tobacco products/ Avoid exposure to second hand smoke  Surgeon General's Warning:  Quitting smoking now greatly reduces serious risk to your health. Obesity, smoking, and sedentary lifestyle greatly increases your risk for illness  A healthy diet, regular physical exercise & weight monitoring are important for maintaining a healthy lifestyle    You may be retaining fluid if you have a history of heart failure or if you experience any of the following symptoms:  Weight gain of 3 pounds or more overnight or 5 pounds in a week, increased swelling in our hands or feet or shortness of breath while lying flat in bed. Please call your doctor as soon as you notice any of these symptoms; do not wait until your next office visit. Recognize signs and symptoms of STROKE:  F-face looks uneven    A-arms unable to move or move unevenly    S-speech slurred or non-existent    T-time-call 911 as soon as signs and symptoms begin-DO NOT go       Back to bed or wait to see if you get better-TIME IS BRAIN.           Date: 11/29/2017  Discharging Nurse: Lisy Hess RN

## 2017-11-29 NOTE — IP AVS SNAPSHOT
Corby Negron 
 
 
 [address] [facility] [address] [facility] 
[phone] Patient: Silverio Travis MRN: [id number] :1965 About your hospitalization You were admitted on:  2017 You last received care in the:  [facility] RADIOLOGY ULTRASOUND You were discharged on:  2017 Why you were hospitalized Your primary diagnosis was:  Not on File Things You Need To Do (next 8 weeks) Thursday Dec 07, 2017 IR REVISE TIPS HEP SHNT with [facility] IR UNIT 1 at  8:00 AM  
Interventional Radiology Procedure Referring Physicians: 1) Fax H&P/recent office notes and lab work, no older than 30 days (CBC, BMP, PT/PTT) to Interventional Radiology to facilitate prompt scheduling. 2)Patients with contrast dye allergies must be pre medicated prior to arrival. 3) Obtain clearance to hold blood thinners from prescribing Physician and give Patient instructions prior to arrival. 4)Hold oral diabetic medications the day of the procedure. If Insulin is required, take 1/2 dose the day of the procedure. 5) Pt should not eat or drink anything past midnight 6) Pt to arrive 1 hour to 1.5 hours early depending upon sedation method. 7) Responsible adult  required to drive Patient home after recovery period. Recovery period can vary depending on sedation and patient condition. 8) Requires approval from Radiologist prior to scheduling. 9) Interventional Radiology Scheduling can be contacted at [phone] Where:  [facility] Specials ([address] [place] [address]) IR REVISE TIPS HEP SHNT with [facility] IR RADIOLOGIST RESOURCE at  8:00 AM  
Interventional Radiology Procedure Referring Physicians: 1) Fax H&P/recent office notes and lab work, no older than 30 days (CBC, BMP, PT/PTT) to Interventional Radiology to facilitate prompt scheduling.  2)Patients with contrast dye allergies must be pre medicated prior to arrival. 3) Obtain clearance to hold blood thinners from prescribing Physician and give Patient instructions prior to arrival. 4)Hold oral diabetic medications the day of the procedure. If Insulin is required, take 1/2 dose the day of the procedure. 5) Pt should not eat or drink anything past midnight 6) Pt to arrive 1 hour to 1.5 hours early depending upon sedation method. 7) Responsible adult  required to drive Patient home after recovery period. Recovery period can vary depending on sedation and patient condition. 8) Requires approval from Radiologist prior to scheduling. 9) Interventional Radiology Scheduling can be contacted at [phone] Where:  [facility] ([address] [place] [address]) IR REVISE TIPS HEP SHNT with [facility] IR ANES NOT REQUIRED at  8:00 AM  
Interventional Radiology Procedure Referring Physicians: 1) Fax H&P/recent office notes and lab work, no older than 30 days (CBC, BMP, PT/PTT) to Interventional Radiology to facilitate prompt scheduling. 2)Patients with contrast dye allergies must be pre medicated prior to arrival. 3) Obtain clearance to hold blood thinners from prescribing Physician and give Patient instructions prior to arrival. 4)Hold oral diabetic medications the day of the procedure. If Insulin is required, take 1/2 dose the day of the procedure. 5) Pt should not eat or drink anything past midnight 6) Pt to arrive 1 hour to 1.5 hours early depending upon sedation method. 7) Responsible adult  required to drive Patient home after recovery period. Recovery period can vary depending on sedation and patient condition. 8) Requires approval from Radiologist prior to scheduling. 9) Interventional Radiology Scheduling can be contacted at [phone] Where:  [facility] ([address] [place] [address]) Discharge Orders None A check alberto indicates which time of day the medication should be taken. My Medications ASK your physician about these medications Instructions Each Dose to Equal  
 Morning Noon Evening Bedtime AMILORIDE PO Your last dose was: Your next dose is: Take 15 mg by mouth two (2) times a day. Dose unknown, to bring dos for clarification 15 mg  
    
   
   
   
  
 lactulose 10 gram/15 mL solution Commonly known as:  Donchuy Robertsier Your last dose was: Your next dose is: Take 10 g by mouth three (3) times daily. 10 g  
    
   
   
   
  
 potassium chloride SR 20 mEq tablet Commonly known as:  K-TAB Your last dose was: Your next dose is: Take 1 Tab by mouth two (2) times a day for 7 days. 20 mEq  
    
   
   
   
  
 torsemide 20 mg tablet Commonly known as:  DEMADEX Your last dose was: Your next dose is: Take 40 mg by mouth two (2) times a day. 40 mg Discharge Instructions Rickey [address] Department of Interventional Radiology [facility] 
[phone] Office 
[phone] Fax PARACENTESIS DISCHARGE INSTRUCTIONS General Information: 
During this procedure, the doctor will insert a needle into the abdomen to drain fluid. After the procedure, you will be able to take a deep breath much easier. The site of the puncture may ooze the first day. This will decrease and eventually stop. Paracentesis (draining fluid from the abdomen) sometimes makes patients hypotensive (low blood pressure). Your doctor may order for you to receive fluids or albumin (a volume booster) during the procedure through an IV site. Home Care Instructions: 
Keep the puncture site clean and dry. No tub baths or swimming until puncture site heals. Showering is acceptable. Resume your normal diet, and resume your normal activity slowly and as you tolerate.  If you are short of breath, rest. If shortness of breath does not ease, please call your ordering doctor. Fluid can re-accumulate in the chest and/or in the abdomen. If this should occur, your doctor needs to know as you may need to have the procedure done again. The surgical glue should slough off in about 2 weeks. Call If: 
   You should call your Physician and/or the Radiology Nurse if you notice any signs of infection, like pus draining, or if it is swollen or reddened. Also call if you have a fever, or if you are bleeding from the puncture site more than a small amount on the dressing. Call if the puncture site keeps draining fluid. Some oozing is to be expected, but should slow and then stop. Call if you feel like you have pressure in your abdomen. SEEK IMMEDIATE CARE OR CALL 911 IF YOU SUDDENLY HAVE TROUBLE BREATHING, OR IF YOUR LIPS TURN BLUE, OR IF YOU NOTICE BLOOD IN YOUR SPUTUM. Follow-Up Instructions: Please see your ordering doctor as he/she has requested. To Reach Us: If you have any questions about your procedure, please call the Interventional Radiology department at [phone]. After business hours (5pm) and weekends, call the answering service at [phone] and ask for the Radiologist on call to be paged. Interventional Radiology General Nurse Discharge * Please give a list of your current medications to your Primary Care Provider. * Please update this list whenever your medications are discontinued, doses are 
   changed, or new medications (including over-the-counter products) are added. * Please carry medication information at all times in case of emergency situations. These are general instructions for a healthy lifestyle: No smoking/ No tobacco products/ Avoid exposure to second hand smoke Surgeon General's Warning:  Quitting smoking now greatly reduces serious risk to your health. Obesity, smoking, and sedentary lifestyle greatly increases your risk for illness A healthy diet, regular physical exercise & weight monitoring are important for maintaining a healthy lifestyle You may be retaining fluid if you have a history of heart failure or if you experience any of the following symptoms:  Weight gain of 3 pounds or more overnight or 5 pounds in a week, increased swelling in our hands or feet or shortness of breath while lying flat in bed. Please call your doctor as soon as you notice any of these symptoms; do not wait until your next office visit. Recognize signs and symptoms of STROKE: 
F-face looks uneven A-arms unable to move or move unevenly S-speech slurred or non-existent T-time-call 911 as soon as signs and symptoms begin-DO NOT go Back to bed or wait to see if you get better-TIME IS BRAIN. Date: 11/29/2017 Discharging Nurse: Cozette Bamberger, RN Introducing [facility]! [address] introduces [employer] patient portal. Now you can access parts of your medical record, email your doctor's office, and request medication refills online. 1. In your internet browser, go to [email or web address] 2. Click on the First Time User? Click Here link in the Sign In box. You will see the New Member Sign Up page. 3. Enter your [employer] Access Code exactly as it appears below. You will not need to use this code after youve completed the sign-up process. If you do not sign up before the expiration date, you must request a new code. · [employer] Access Code: [email or web address] Expires: 12/17/2017  8:46 AM 
 
4. Enter the last four digits of your Social Security Number (xxxx) and Date of Birth (mm/dd/yyyy) as indicated and click Submit. You will be taken to the next sign-up page. 5. Create a [employer]a ID. This will be your [employer] login ID and cannot be changed, so think of one that is secure and easy to remember. 6. Create a [employer] password. You can change your password at any time. 7. Enter your Password Reset Question and Answer. This can be used at a later time if you forget your password. 8. Enter your e-mail address. You will receive e-mail notification when new information is available in [address] 9. Click Sign Up. You can now view and download portions of your medical record. 10. Click the Download Summary menu link to download a portable copy of your medical information. If you have questions, please visit the Frequently Asked Questions section of the [employer] website. Remember, [employer] is NOT to be used for urgent needs. For medical emergencies, dial 911. Now available from your iPhone and Android! Unresulted Labs-Please follow up with your PCP about these lab tests Order Current Status US GUIDE PARACENTESIS In process Providers Seen During Your Hospitalization Provider Specialty Primary office phone Franklin Rice MD Gastroenterology [phone] Your Primary Care Physician (PCP) Primary Care Physician Office Phone Office Fax Danya Long [phone] You are allergic to the following No active allergies Recent Documentation Smoking Status Current Every Day Smoker Emergency Contacts Name Discharge Info Relation Home Work Mobile [employer] S[employer]  Son [53] [phone] Patient Belongings The following personal items are in your possession at time of discharge: 
                             
 
  
  
 Please provide this summary of care documentation to your next provider. Signatures-by signing, you are acknowledging that this After Visit Summary has been reviewed with you and you have received a copy. Patient Signature:  ____________________________________________________________ Date:  ____________________________________________________________  
  
[place]  Provider Signature: ____________________________________________________________ Date:  ____________________________________________________________

## 2017-11-29 NOTE — IP AVS SNAPSHOT
[address] [place] [address] 
[phone] Patient: Nba Kapoor MRN: [id number] :1965 My Medications ASK your physician about these medications Instructions Each Dose to Equal  
 Morning Noon Evening Bedtime AMILORIDE PO Your last dose was: Your next dose is: Take 15 mg by mouth two (2) times a day. Dose unknown, to bring dos for clarification 15 mg  
    
   
   
   
  
 lactulose 10 gram/15 mL solution Commonly known as:  Rinku Guess Your last dose was: Your next dose is: Take 10 g by mouth three (3) times daily. 10 g  
    
   
   
   
  
 potassium chloride SR 20 mEq tablet Commonly known as:  K-TAB Your last dose was: Your next dose is: Take 1 Tab by mouth two (2) times a day for 7 days. 20 mEq  
    
   
   
   
  
 torsemide 20 mg tablet Commonly known as:  DEMADEX Your last dose was: Your next dose is: Take 40 mg by mouth two (2) times a day.   
 40 mg

## 2017-12-07 ENCOUNTER — HOSPITAL ENCOUNTER (OUTPATIENT)
Dept: INTERVENTIONAL RADIOLOGY/VASCULAR | Age: 52
Discharge: HOME | End: 2017-12-07
Attending: RADIOLOGY
Payer: COMMERCIAL

## 2017-12-07 VITALS
HEIGHT: 72 IN | DIASTOLIC BLOOD PRESSURE: 60 MMHG | SYSTOLIC BLOOD PRESSURE: 97 MMHG | TEMPERATURE: 98.1 F | BODY MASS INDEX: 20.99 KG/M2 | RESPIRATION RATE: 19 BRPM | HEART RATE: 62 BPM | WEIGHT: 155 LBS | OXYGEN SATURATION: 100 %

## 2017-12-07 DIAGNOSIS — R18.8 ASCITES: ICD-10-CM

## 2017-12-07 LAB
APPEARANCE FLD: NORMAL
BUN BLD-MCNC: 14 MG/DL (ref 8–26)
CA-I BLD-MCNC: 1.18 MMOL/L (ref 1.12–1.32)
CHLORIDE BLD-SCNC: 97 MMOL/L (ref 98–107)
CO2 BLD-SCNC: 27 MMOL/L (ref 21–32)
COLOR FLD: NORMAL
CREAT BLD-MCNC: 1 MG/DL (ref 0.8–1.5)
EOSINOPHIL NFR FLD MANUAL: 9 %
GLUCOSE BLD-MCNC: 92 MG/DL (ref 65–100)
LYMPHOCYTES NFR FLD: 82 %
NEUTROPHILS NFR FLD: 9 %
NUC CELL # FLD: 315 /CU MM
POTASSIUM BLD-SCNC: 3.1 MMOL/L (ref 3.5–5.1)
RBC # FLD: NORMAL /CU MM
SODIUM BLD-SCNC: 135 MMOL/L (ref 136–145)
SPECIMEN SOURCE FLD: NORMAL
WBC MORPH BLD: NORMAL

## 2017-12-07 PROCEDURE — 83615 LACTATE (LD) (LDH) ENZYME: CPT | Performed by: RADIOLOGY

## 2017-12-07 PROCEDURE — 82664 ELECTROPHORETIC TEST: CPT | Performed by: RADIOLOGY

## 2017-12-07 PROCEDURE — 84478 ASSAY OF TRIGLYCERIDES: CPT | Performed by: RADIOLOGY

## 2017-12-07 PROCEDURE — C1769 GUIDE WIRE: HCPCS

## 2017-12-07 PROCEDURE — 77030032500 HC CATH SZ PIG PRFRM MRTM -B

## 2017-12-07 PROCEDURE — 84157 ASSAY OF PROTEIN OTHER: CPT | Performed by: RADIOLOGY

## 2017-12-07 PROCEDURE — 77030013794 HC KT TRNSDUC BLD EDWD -B

## 2017-12-07 PROCEDURE — 99152 MOD SED SAME PHYS/QHP 5/>YRS: CPT

## 2017-12-07 PROCEDURE — 74011636320 HC RX REV CODE- 636/320: Performed by: RADIOLOGY

## 2017-12-07 PROCEDURE — 99153 MOD SED SAME PHYS/QHP EA: CPT

## 2017-12-07 PROCEDURE — 75889 VEIN X-RAY LIVER W/HEMODYNAM: CPT

## 2017-12-07 PROCEDURE — 80047 BASIC METABLC PNL IONIZED CA: CPT

## 2017-12-07 PROCEDURE — 74011250636 HC RX REV CODE- 250/636

## 2017-12-07 PROCEDURE — 74011000250 HC RX REV CODE- 250: Performed by: RADIOLOGY

## 2017-12-07 PROCEDURE — C1894 INTRO/SHEATH, NON-LASER: HCPCS

## 2017-12-07 PROCEDURE — C1729 CATH, DRAINAGE: HCPCS

## 2017-12-07 PROCEDURE — 89050 BODY FLUID CELL COUNT: CPT | Performed by: RADIOLOGY

## 2017-12-07 PROCEDURE — 77030004521 HC CATH ANGI DX COOK -B

## 2017-12-07 PROCEDURE — 74011250636 HC RX REV CODE- 250/636: Performed by: RADIOLOGY

## 2017-12-07 RX ORDER — MIDAZOLAM HYDROCHLORIDE 1 MG/ML
.5-2 INJECTION, SOLUTION INTRAMUSCULAR; INTRAVENOUS
Status: DISCONTINUED | OUTPATIENT
Start: 2017-12-07 | End: 2017-12-07 | Stop reason: ALTCHOICE

## 2017-12-07 RX ORDER — FENTANYL CITRATE 50 UG/ML
25-100 INJECTION, SOLUTION INTRAMUSCULAR; INTRAVENOUS
Status: DISCONTINUED | OUTPATIENT
Start: 2017-12-07 | End: 2017-12-07 | Stop reason: ALTCHOICE

## 2017-12-07 RX ORDER — DIPHENHYDRAMINE HYDROCHLORIDE 50 MG/ML
25-50 INJECTION, SOLUTION INTRAMUSCULAR; INTRAVENOUS ONCE
Status: COMPLETED | OUTPATIENT
Start: 2017-12-07 | End: 2017-12-07

## 2017-12-07 RX ORDER — OXYCODONE AND ACETAMINOPHEN 7.5; 325 MG/1; MG/1
1 TABLET ORAL
Status: DISCONTINUED | OUTPATIENT
Start: 2017-12-07 | End: 2017-12-11 | Stop reason: HOSPADM

## 2017-12-07 RX ORDER — SODIUM CHLORIDE 9 MG/ML
25 INJECTION, SOLUTION INTRAVENOUS ONCE
Status: COMPLETED | OUTPATIENT
Start: 2017-12-07 | End: 2017-12-07

## 2017-12-07 RX ORDER — SODIUM CHLORIDE 9 MG/ML
150 INJECTION, SOLUTION INTRAVENOUS CONTINUOUS
Status: ACTIVE | OUTPATIENT
Start: 2017-12-07 | End: 2017-12-08

## 2017-12-07 RX ORDER — HEPARIN SODIUM 200 [USP'U]/100ML
1000 INJECTION, SOLUTION INTRAVENOUS
Status: DISCONTINUED | OUTPATIENT
Start: 2017-12-07 | End: 2017-12-07 | Stop reason: ALTCHOICE

## 2017-12-07 RX ORDER — LIDOCAINE HYDROCHLORIDE 20 MG/ML
1-20 INJECTION, SOLUTION INFILTRATION; PERINEURAL
Status: DISCONTINUED | OUTPATIENT
Start: 2017-12-07 | End: 2017-12-07 | Stop reason: ALTCHOICE

## 2017-12-07 RX ORDER — ONDANSETRON 2 MG/ML
4 INJECTION INTRAMUSCULAR; INTRAVENOUS AS NEEDED
Status: DISCONTINUED | OUTPATIENT
Start: 2017-12-07 | End: 2017-12-11 | Stop reason: HOSPADM

## 2017-12-07 RX ADMIN — FENTANYL CITRATE 25 MCG: 50 INJECTION, SOLUTION INTRAMUSCULAR; INTRAVENOUS at 08:55

## 2017-12-07 RX ADMIN — HEPARIN SODIUM 2000 UNITS: 200 INJECTION, SOLUTION INTRAVENOUS at 09:00

## 2017-12-07 RX ADMIN — MIDAZOLAM HYDROCHLORIDE 0.5 MG: 1 INJECTION, SOLUTION INTRAMUSCULAR; INTRAVENOUS at 08:36

## 2017-12-07 RX ADMIN — FENTANYL CITRATE 50 MCG: 50 INJECTION, SOLUTION INTRAMUSCULAR; INTRAVENOUS at 08:30

## 2017-12-07 RX ADMIN — SODIUM CHLORIDE 25 ML/HR: 900 INJECTION, SOLUTION INTRAVENOUS at 08:21

## 2017-12-07 RX ADMIN — MIDAZOLAM HYDROCHLORIDE 1 MG: 1 INJECTION, SOLUTION INTRAMUSCULAR; INTRAVENOUS at 08:24

## 2017-12-07 RX ADMIN — MIDAZOLAM HYDROCHLORIDE 1 MG: 1 INJECTION, SOLUTION INTRAMUSCULAR; INTRAVENOUS at 08:30

## 2017-12-07 RX ADMIN — FENTANYL CITRATE 25 MCG: 50 INJECTION, SOLUTION INTRAMUSCULAR; INTRAVENOUS at 08:36

## 2017-12-07 RX ADMIN — MIDAZOLAM HYDROCHLORIDE 0.5 MG: 1 INJECTION, SOLUTION INTRAMUSCULAR; INTRAVENOUS at 08:55

## 2017-12-07 RX ADMIN — LIDOCAINE HYDROCHLORIDE 140 MG: 20 INJECTION, SOLUTION INFILTRATION; PERINEURAL at 08:45

## 2017-12-07 RX ADMIN — HEPARIN SODIUM 2000 UNITS: 200 INJECTION, SOLUTION INTRAVENOUS at 08:45

## 2017-12-07 RX ADMIN — IOPAMIDOL 34 ML: 612 INJECTION, SOLUTION INTRAVENOUS at 09:13

## 2017-12-07 RX ADMIN — HEPARIN SODIUM 2000 UNITS: 200 INJECTION, SOLUTION INTRAVENOUS at 09:15

## 2017-12-07 RX ADMIN — DIPHENHYDRAMINE HYDROCHLORIDE 50 MG: 50 INJECTION, SOLUTION INTRAMUSCULAR; INTRAVENOUS at 08:24

## 2017-12-07 RX ADMIN — FENTANYL CITRATE 50 MCG: 50 INJECTION, SOLUTION INTRAMUSCULAR; INTRAVENOUS at 08:24

## 2017-12-07 NOTE — DISCHARGE INSTRUCTIONS
[address]  Department of Interventional Radiology  [phone] Office  [phone] Fax     TRANSJUGULAR INTRAHEPATIC PORTOSYSTEMIC SHUNT (TIPS) DISCHARGE INSTRUCTIONS    General Information:     Your gastroenterologist has spoken to our doctors and has decided for you to have a TIPS procedure done. TIPS stands for transjugular intrahepatic portosystemic shunt. It is done to reduce the pressure within the portal vein, a large vessel in the liver. It can treat several disorders, including hemorrhage and ascites that is related to your liver disease. Home Care Instructions: You can resume your regular diet and medication regimen. Do not drink alcohol, drive, or make any important legal decisions in the next 24 hours. Do not lift anything heavier than a gallon of milk, or do anything strenuous for the next 24 hours. You will notice a dressing on your neck. This was the site of the insertion for the procedure. This dressing can be removed in 24 hours. You may take a shower after the bandage comes off. Do not take a bath, swim or immerse yourself in water until the wound on your neck has totally healed. Call If:     You should call your Physician and/or the Radiology Nurse if you have any bleeding other than a small spot on your bandage. Call if you have any signs of infection, fever, or increased pain at the site. Call if you have any questions of how to take care of your wound. Call if you notice your abdomen swelling. You may still have to come in for the paracentesis, but you should not have to come riddhi as often. Follow-Up Instructions: Please see your ordering doctor as he/she has requested. To Reach Us: If you have any questions about your procedure, please call the Interventional Radiology department at [phone].  After business hours (5pm) and weekends, call the answering service at [phone] and ask for the Radiologist on call to be paged. Tiigi 63 039 [address]  Department of Interventional Radiology  [facility]  [phone] Office  [phone] Fax    PARACENTESIS DISCHARGE INSTRUCTIONS    General Information:  During this procedure, the doctor will insert a needle into the abdomen to drain fluid. After the procedure, you will be able to take a deep breath much easier. The site of the puncture may ooze the first day. This will decrease and eventually stop. Paracentesis (draining fluid from the abdomen) sometimes makes patients hypotensive (low blood pressure). Your doctor may order for you to receive fluids or albumin (a volume booster) during the procedure through an IV site. Home Care Instructions:  Keep the puncture site clean and dry. No tub baths or swimming until puncture site heals. Showering is acceptable. Resume your normal diet, and resume your normal activity slowly and as you tolerate. If you are short of breath, rest. If shortness of breath does not ease, please call your ordering doctor. Fluid can re-accumulate in the chest and/or in the abdomen. If this should occur, your doctor needs to know as you may need to have the procedure done again. Call If:     You should call your Physician and/or the Radiology Nurse if you notice any signs of infection, like pus draining, or if it is swollen or reddened. Also call if you have a fever, or if you are bleeding from the puncture site more than a small amount on the dressing. Call if the puncture site keeps draining fluid. Some oozing is to be expected, but should slow and then stop. Call if you feel like you have pressure in your abdomen. SEEK IMMEDIATE CARE OR CALL 911 IF YOU SUDDENLY HAVE TROUBLE BREATHING, OR IF YOUR LIPS TURN BLUE, OR IF YOU NOTICE BLOOD IN YOUR SPUTUM. Follow-Up Instructions: Please see your ordering doctor as he/she has requested.      Interventional Radiology General Nurse Discharge    After general anesthesia or intravenous sedation, for 24 hours or while taking prescription Narcotics:  · Limit your activities  · Do not drive and operate hazardous machinery  · Do not make important personal or business decisions  · Do  not drink alcoholic beverages  · If you have not urinated within 8 hours after discharge, please contact your surgeon on call. * Please give a list of your current medications to your Primary Care Provider. * Please update this list whenever your medications are discontinued, doses are     changed, or new medications (including over-the-counter products) are added. * Please carry medication information at all times in case of emergency situations. These are general instructions for a healthy lifestyle:    No smoking/ No tobacco products/ Avoid exposure to second hand smoke  Surgeon General's Warning:  Quitting smoking now greatly reduces serious risk to your health. Obesity, smoking, and sedentary lifestyle greatly increases your risk for illness  A healthy diet, regular physical exercise & weight monitoring are important for maintaining a healthy lifestyle    You may be retaining fluid if you have a history of heart failure or if you experience any of the following symptoms:  Weight gain of 3 pounds or more overnight or 5 pounds in a week, increased swelling in our hands or feet or shortness of breath while lying flat in bed. Please call your doctor as soon as you notice any of these symptoms; do not wait until your next office visit. Recognize signs and symptoms of STROKE:  F-face looks uneven    A-arms unable to move or move unevenly    S-speech slurred or non-existent    T-time-call 911 as soon as signs and symptoms begin-DO NOT go       Back to bed or wait to see if you get better-TIME IS BRAIN.         Date: 12/7/2017  Discharging Nurse: Merirtt Tyler                Patient Signature:  Date: 12/7/2017  Discharging Nurse: Dash Ellis

## 2017-12-07 NOTE — PROGRESS NOTES
TRANSFER - OUT REPORT:    Verbal report given to Karl Garcia RN (name) on [facility]  being transferred to IR Recovery (unit) for routine progression of care       Report consisted of patients Situation, Background, Assessment and   Recommendations(SBAR). Information from the following report(s) Procedure Summary, Intake/Output and MAR was reviewed with the receiving nurse. Opportunity for questions and clarification was provided. Conscious Sedation:   150 Mcg of Fentanyl administered  3 Mg of Versed administered  50 mg of Benadryl administered    Pt tolerated procedure well.      Visit Vitals    BP 98/55    Pulse 69    Temp 98.1 °F (36.7 °C)    Resp 20    Ht 6' (1.829 m)    Wt 70.3 kg (155 lb)    SpO2 100%    BMI 21.02 kg/m2     Past Medical History:   Diagnosis Date    Cirrhosis of liver (HCC)     undeterminable cause    Multiple fractures of ribs of both sides     Multiple, bilateral w/o pneumo: 2013     Peripheral IV 12/07/17 Left Arm (Active)   Site Assessment Clean, dry, & intact 12/7/2017  7:53 AM   Phlebitis Assessment 0 12/7/2017  7:53 AM   Infiltration Assessment 0 12/7/2017  7:53 AM   Dressing Status Clean, dry, & intact 12/7/2017  7:53 AM

## 2017-12-07 NOTE — PROCEDURES
Department of Interventional Radiology  [phone]        Interventional Radiology Brief Procedure Note    Patient: Zac Oswald MRN: [id number]  SSN: [id number]    YOB: 1965  Age: 46 y.o. Sex: male      Date of Procedure: 12/7/2017    Pre-Procedure Diagnosis:  Recurrent ascites after TIPS (9/19/17), cirrhosis, portal hypertension, controlled encephalopathy, recurrent umbilical hernia. Post-Procedure Diagnosis: SAME    Procedure(s): TIPS revision w pressure measurement. Brief Description of Procedure: as above    Performed By: Kayce Waterman MD     Assistants: None    Anesthesia:Moderate Sedation    Estimated Blood Loss: Less than 10ml    Specimens: Ascites to Laboratory    Implants: None    Findings: Portal Vein to RA gradient = 2 mmHg. Complications: None    Recommendations: 1 hour bedrest.  Paracentesis PRN. Follow Up: Will discuss with Dr. Raquel Shane.       Signed By: Kayce Waterman MD     December 7, 2017

## 2017-12-07 NOTE — H&P
Department of Interventional Radiology  [phone]    History and Physical    Patient:  Gerald Dimas MRN:  [id number]  SSN:  [id number]    YOB: 1965  Age:  46 y.o. Sex:  male      Primary Care Provider:  Raghav Rios MD  Referring Physician:  Shireen Robles MD    Subjective:     Chief Complaint: ascites    History of the Present Illness: The patient is a 46 y.o. male who presents for TIPS revision. Alcoholic cirrhosis, s/p TIPS for refractory ascites 9/2017. Continues to accumulates ascites requiring recurrent paracentesis; although, not as frequently as pre TIPS. Not taking Lactulose-make him feel awful and foggy. Taking Xifaxin.  NPO. Past Medical History:   Diagnosis Date    Cirrhosis of liver ([address]     undeterminable cause    Multiple fractures of ribs of both sides     Multiple, bilateral w/o pneumo: 2013     Past Surgical History:   Procedure Laterality Date    HX HERNIA REPAIR      abd    HX KNEE ARTHROSCOPY  2005    HX OTHER SURGICAL      liver bx        Review of Systems:    Pertinent items are noted in the History of Present Illness. Current Outpatient Prescriptions   Medication Sig    torsemide (DEMADEX) 20 mg tablet Take 40 mg by mouth two (2) times a day.  AMILORIDE HCL (AMILORIDE PO) Take 15 mg by mouth two (2) times a day. Dose unknown, to bring dos for clarification     lactulose (CHRONULAC) 10 gram/15 mL solution Take 10 g by mouth three (3) times daily.      Current Facility-Administered Medications   Medication Dose Route Frequency    heparin (PF) 2 units/ml in NS infusion 2,000 Units  1,000 mL Irrigation Rad Multiple    0.9% sodium chloride infusion  25 mL/hr IntraVENous ONCE    diphenhydrAMINE (BENADRYL) injection 25-50 mg  25-50 mg IntraVENous ONCE    fentaNYL citrate (PF) injection  mcg   mcg IntraVENous Multiple    midazolam (VERSED) injection 0.5-2 mg  0.5-2 mg IntraVENous Rad Multiple    lidocaine (XYLOCAINE) 20 mg/mL (2 %) injection  mg  1-20 mL SubCUTAneous Multiple    iopamidol (ISOVUE 300) 61 % contrast injection 1-300 mL  1-300 mL IntraVENous RAD ONCE        No Known Allergies    Family History   Problem Relation Age of Onset    No Known Problems Mother     No Known Problems Father      Social History   Substance Use Topics    Smoking status: Current Every Day Smoker     Packs/day: 0.50     Years: 4.00     Types: Cigarettes     Start date: 9/10/2010    Smokeless tobacco: Never Used    Alcohol use No        Objective:       Physical Examination:    Vitals:    12/07/17 0752 12/07/17 0755   BP: 107/57    Pulse: 69    Resp: 18    Temp: 98.1 °F (36.7 °C)    SpO2: 99%    Weight:  70.3 kg (155 lb)   Height: 6' (1.829 m)      Blood pressure 107/57, pulse 69, temperature 98.1 °F (36.7 °C), resp. rate 18, height 6' (1.829 m), weight 70.3 kg (155 lb), SpO2 99 %. HEART: regular rate and rhythm  LUNG: clear to auscultation bilaterally  ABDOMEN: soft, sl distended with fluid wave.   EXTREMITIES: warm, wasted    Laboratory:     Lab Results   Component Value Date/Time    Sodium 137 11/26/2017 03:59 PM    Sodium 139 09/20/2017 03:33 AM    Potassium 2.3 11/26/2017 03:59 PM    Potassium 3.6 09/20/2017 03:33 AM    Chloride 99 11/26/2017 03:59 PM    Chloride 107 09/20/2017 03:33 AM    CO2 28 11/26/2017 03:59 PM    CO2 25 09/20/2017 03:33 AM    Anion gap 10 11/26/2017 03:59 PM    Anion gap 7 09/20/2017 03:33 AM    Glucose 119 11/26/2017 03:59 PM    Glucose 113 09/20/2017 03:33 AM    BUN 11 11/26/2017 03:59 PM    BUN 8 09/20/2017 03:33 AM    Creatinine 1.35 11/26/2017 03:59 PM    Creatinine 0.80 09/20/2017 03:33 AM    GFR est AA >60 11/26/2017 03:59 PM    GFR est AA >60 09/20/2017 03:33 AM    GFR est non-AA 59 11/26/2017 03:59 PM    GFR est non-AA >60 09/20/2017 03:33 AM    Calcium 7.9 11/26/2017 03:59 PM    Calcium 7.6 09/20/2017 03:33 AM    Magnesium 1.7 09/19/2017 04:14 PM    Albumin 2.0 11/26/2017 03:59 PM    Albumin 1.8 09/20/2017 03:33 AM    Protein, total 6.4 11/26/2017 03:59 PM    Protein, total 4.7 09/20/2017 03:33 AM    Globulin 4.4 11/26/2017 03:59 PM    Globulin 2.9 09/20/2017 03:33 AM    A-G Ratio 0.5 11/26/2017 03:59 PM    A-G Ratio 0.6 09/20/2017 03:33 AM    AST (SGOT) 48 11/26/2017 03:59 PM    AST (SGOT) 481 09/20/2017 03:33 AM    ALT (SGPT) 24 11/26/2017 03:59 PM    ALT (SGPT) 298 09/20/2017 03:33 AM     Lab Results   Component Value Date/Time    WBC 5.5 11/26/2017 03:59 PM    WBC 4.0 09/20/2017 03:33 AM    HGB 11.5 11/26/2017 03:59 PM    HGB 11.0 09/20/2017 03:33 AM    HCT 32.2 11/26/2017 03:59 PM    HCT 30.9 09/20/2017 03:33 AM    PLATELET 90 07/17/9940 03:59 PM    PLATELET 96 12/25/7021 03:33 AM     Lab Results   Component Value Date/Time    aPTT 26.1 09/18/2017 09:53 AM    aPTT 27.0 06/07/2017 11:38 AM    Prothrombin time 12.4 09/20/2017 03:33 AM    Prothrombin time 10.0 09/18/2017 09:53 AM    INR 1.1 09/20/2017 03:33 AM    INR 0.9 09/18/2017 09:53 AM       Assessment:     Alcoholic cirrhosis, refractory ascites    Plan:     Planned Procedure:  TIPS revision. Paracentesis    Risks, benefits, and alternatives reviewed with patient and he agrees to proceed with the procedure.       Signed By: Karyn Maddox PA-C     December 7, 2017

## 2017-12-07 NOTE — IP AVS SNAPSHOT
[address] 
[phone] Patient: Beka Smith MRN: [id number] :1965 My Medications ASK your physician about these medications Instructions Each Dose to Equal  
 Morning Noon Evening Bedtime AMILORIDE PO Your last dose was: Your next dose is: Take 15 mg by mouth two (2) times a day. Dose unknown, to bring dos for clarification 15 mg  
    
   
   
   
  
 lactulose 10 gram/15 mL solution Commonly known as:  Chanelle Kitten Your last dose was: Your next dose is: Take 10 g by mouth three (3) times daily. 10 g  
    
   
   
   
  
 torsemide 20 mg tablet Commonly known as:  DEMADEX Your last dose was: Your next dose is: Take 40 mg by mouth two (2) times a day.   
 40 mg

## 2017-12-07 NOTE — IP AVS SNAPSHOT
[address] [place] [address] 
[phone] Patient: Haile Robbins MRN: [id number] :1965 About your hospitalization You were admitted on:  2017 You last received care in the:  [facility] Radiology Specials You were discharged on:  2017 Why you were hospitalized Your primary diagnosis was:  Not on File Discharge Orders None A check alberto indicates which time of day the medication should be taken. My Medications ASK your physician about these medications Instructions Each Dose to Equal  
 Morning Noon Evening Bedtime AMILORIDE PO Your last dose was: Your next dose is: Take 15 mg by mouth two (2) times a day. Dose unknown, to bring dos for clarification 15 mg  
    
   
   
   
  
 lactulose 10 gram/15 mL solution Commonly known as:  Sung Quiver Your last dose was: Your next dose is: Take 10 g by mouth three (3) times daily. 10 g  
    
   
   
   
  
 torsemide 20 mg tablet Commonly known as:  DEMADEX Your last dose was: Your next dose is: Take 40 mg by mouth two (2) times a day. 40 mg Discharge Instructions [address] [facility] Department of Interventional Radiology 
[phone] Office 
[phone] Fax TRANSJUGULAR INTRAHEPATIC PORTOSYSTEMIC SHUNT (TIPS) DISCHARGE INSTRUCTIONS General Information: 
   Your gastroenterologist has spoken to our doctors and has decided for you to have a TIPS procedure done. TIPS stands for transjugular intrahepatic portosystemic shunt. It is done to reduce the pressure within the portal vein, a large vessel in the liver. It can treat several disorders, including hemorrhage and ascites that is related to your liver disease. Home Care Instructions: You can resume your regular diet and medication regimen. Do not drink alcohol, drive, or make any important legal decisions in the next 24 hours. Do not lift anything heavier than a gallon of milk, or do anything strenuous for the next 24 hours. You will notice a dressing on your neck. This was the site of the insertion for the procedure. This dressing can be removed in 24 hours. You may take a shower after the bandage comes off. Do not take a bath, swim or immerse yourself in water until the wound on your neck has totally healed. Call If: 
   You should call your Physician and/or the Radiology Nurse if you have any bleeding other than a small spot on your bandage. Call if you have any signs of infection, fever, or increased pain at the site. Call if you have any questions of how to take care of your wound. Call if you notice your abdomen swelling. You may still have to come in for the paracentesis, but you should not have to come riddhi as often. Follow-Up Instructions: Please see your ordering doctor as he/she has requested. To Reach Us: If you have any questions about your procedure, please call the Interventional Radiology department at [phone]. After business hours (5pm) and weekends, call the answering service at [phone] and ask for the Radiologist on call to be paged. [address] Department of Interventional Radiology [facility] 
[phone] Office 
[phone] Fax PARACENTESIS DISCHARGE INSTRUCTIONS General Information: 
During this procedure, the doctor will insert a needle into the abdomen to drain fluid. After the procedure, you will be able to take a deep breath much easier. The site of the puncture may ooze the first day. This will decrease and eventually stop. Paracentesis (draining fluid from the abdomen) sometimes makes patients hypotensive (low blood pressure).  Your doctor may order for you to receive fluids or albumin (a volume booster) during the procedure through an IV site. Home Care Instructions: 
Keep the puncture site clean and dry. No tub baths or swimming until puncture site heals. Showering is acceptable. Resume your normal diet, and resume your normal activity slowly and as you tolerate. If you are short of breath, rest. If shortness of breath does not ease, please call your ordering doctor. Fluid can re-accumulate in the chest and/or in the abdomen. If this should occur, your doctor needs to know as you may need to have the procedure done again. Call If: 
   You should call your Physician and/or the Radiology Nurse if you notice any signs of infection, like pus draining, or if it is swollen or reddened. Also call if you have a fever, or if you are bleeding from the puncture site more than a small amount on the dressing. Call if the puncture site keeps draining fluid. Some oozing is to be expected, but should slow and then stop. Call if you feel like you have pressure in your abdomen. SEEK IMMEDIATE CARE OR CALL 911 IF YOU SUDDENLY HAVE TROUBLE BREATHING, OR IF YOUR LIPS TURN BLUE, OR IF YOU NOTICE BLOOD IN YOUR SPUTUM. Follow-Up Instructions: Please see your ordering doctor as he/she has requested. Interventional Radiology General Nurse Discharge After general anesthesia or intravenous sedation, for 24 hours or while taking prescription Narcotics: · Limit your activities · Do not drive and operate hazardous machinery · Do not make important personal or business decisions · Do  not drink alcoholic beverages · If you have not urinated within 8 hours after discharge, please contact your surgeon on call. * Please give a list of your current medications to your Primary Care Provider. * Please update this list whenever your medications are discontinued, doses are 
   changed, or new medications (including over-the-counter products) are added. * Please carry medication information at all times in case of emergency situations. These are general instructions for a healthy lifestyle: No smoking/ No tobacco products/ Avoid exposure to second hand smoke Surgeon General's Warning:  Quitting smoking now greatly reduces serious risk to your health. Obesity, smoking, and sedentary lifestyle greatly increases your risk for illness A healthy diet, regular physical exercise & weight monitoring are important for maintaining a healthy lifestyle You may be retaining fluid if you have a history of heart failure or if you experience any of the following symptoms:  Weight gain of 3 pounds or more overnight or 5 pounds in a week, increased swelling in our hands or feet or shortness of breath while lying flat in bed. Please call your doctor as soon as you notice any of these symptoms; do not wait until your next office visit. Recognize signs and symptoms of STROKE: 
F-face looks uneven A-arms unable to move or move unevenly S-speech slurred or non-existent T-time-call 911 as soon as signs and symptoms begin-DO NOT go Back to bed or wait to see if you get better-TIME IS BRAIN. Date: 12/7/2017 Discharging Nurse: Sam Smith Patient Signature: 
Date: 12/7/2017 Discharging Nurse: Sam Smith Introducing [facility]! Liza Meléndez introduces [employer] patient portal. Now you can access parts of your medical record, email your doctor's office, and request medication refills online. 1. In your internet browser, go to [email or web address] 2. Click on the First Time User? Click Here link in the Sign In box. You will see the New Member Sign Up page. 3. Enter your [employer] Access Code exactly as it appears below. You will not need to use this code after youve completed the sign-up process. If you do not sign up before the expiration date, you must request a new code. · [employer] Access Code: [email or web address] Expires: 12/17/2017  8:46 AM 
 
4. Enter the last four digits of your Social Security Number (xxxx) and Date of Birth (mm/dd/yyyy) as indicated and click Submit. You will be taken to the next sign-up page. 5. Create a [employer] ID. This will be your [employer] login ID and cannot be changed, so think of one that is secure and easy to remember. 6. Create a [employer] password. You can change your password at any time. 7. Enter your Password Reset Question and Answer. This can be used at a later time if you forget your password. 8. Enter your e-mail address. You will receive e-mail notification when new information is available in [address] 9. Click Sign Up. You can now view and download portions of your medical record. 10. Click the Download Summary menu link to download a portable copy of your medical information. If you have questions, please visit the Frequently Asked Questions section of the [employer] website. Remember, [employer] is NOT to be used for urgent needs. For medical emergencies, dial 911. Now available from your iPhone and Android! Unresulted Labs-Please follow up with your PCP about these lab tests Order Current Status IR VENOGRAPHY HEPATIC W PRESSURES In process Providers Seen During Your Hospitalization Provider Specialty Primary office phone Nae Cantu MD Radiology [phone] Your Primary Care Physician (PCP) Primary Care Physician Office Phone Office Fax Shireen Garcia [phone] You are allergic to the following No active allergies Recent Documentation Height Weight BMI Smoking Status 1.829 m 70.3 kg 21.02 kg/m2 Current Every Day Smoker Emergency Contacts Name Discharge Info Relation Home Work Mobile Wm. Chen Carrera. [employer]  Son [[phone] Patient Belongings The following personal items are in your possession at time of discharge: 
     Visual Aid: None Please provide this summary of care documentation to your next provider. Signatures-by signing, you are acknowledging that this After Visit Summary has been reviewed with you and you have received a copy. Patient Signature:  ____________________________________________________________ Date:  ____________________________________________________________  
  
[facility] Provider Signature:  ____________________________________________________________ Date:  ____________________________________________________________

## 2017-12-07 NOTE — PROGRESS NOTES
Recovery period without difficulty. Pt alert and oriented and denies pain. Dressing is clean, dry, and intact. Reviewed discharge instructions with patient and sister, both verbalized understanding. [facility] Vital signs and Sade score completed.

## 2017-12-08 LAB
LDH FLD L TO P-CCNC: 111 U/L
PROT FLD-MCNC: 4.4 G/DL
SPECIMEN SOURCE FLD: NORMAL
SPECIMEN SOURCE FLD: NORMAL
SPECIMEN SOURCE: NORMAL
TRIGL FLD-MCNC: 1248 MG/DL

## 2017-12-13 LAB
CHYLOMICRON SCREEN, CHYLMT: ABNORMAL
SPECIMEN SOURCE: ABNORMAL

## 2017-12-20 ENCOUNTER — HOSPITAL ENCOUNTER (OUTPATIENT)
Dept: ULTRASOUND IMAGING | Age: 52
Discharge: HOME | End: 2017-12-20
Attending: INTERNAL MEDICINE
Payer: COMMERCIAL

## 2017-12-20 VITALS
TEMPERATURE: 97.2 F | RESPIRATION RATE: 16 BRPM | DIASTOLIC BLOOD PRESSURE: 58 MMHG | SYSTOLIC BLOOD PRESSURE: 101 MMHG | HEART RATE: 61 BPM | OXYGEN SATURATION: 98 %

## 2017-12-20 DIAGNOSIS — R18.8 ASCITES: ICD-10-CM

## 2017-12-20 PROCEDURE — 88305 TISSUE EXAM BY PATHOLOGIST: CPT | Performed by: INTERNAL MEDICINE

## 2017-12-20 PROCEDURE — 74011250636 HC RX REV CODE- 250/636: Performed by: INTERNAL MEDICINE

## 2017-12-20 PROCEDURE — P9047 ALBUMIN (HUMAN), 25%, 50ML: HCPCS | Performed by: INTERNAL MEDICINE

## 2017-12-20 PROCEDURE — 49083 ABD PARACENTESIS W/IMAGING: CPT

## 2017-12-20 PROCEDURE — 82150 ASSAY OF AMYLASE: CPT | Performed by: INTERNAL MEDICINE

## 2017-12-20 PROCEDURE — 88112 CYTOPATH CELL ENHANCE TECH: CPT | Performed by: INTERNAL MEDICINE

## 2017-12-20 RX ORDER — ALBUMIN HUMAN 250 G/1000ML
25 SOLUTION INTRAVENOUS ONCE
Status: COMPLETED | OUTPATIENT
Start: 2017-12-20 | End: 2017-12-20

## 2017-12-20 RX ADMIN — ALBUMIN (HUMAN) 25 G: 0.25 INJECTION, SOLUTION INTRAVENOUS at 10:21

## 2017-12-20 NOTE — PROGRESS NOTES
IR Nurse Pre-Procedure Checklist Part 2          Consent signed: Yes    H&P complete:  Not applicable    Antibiotics: Not applicable    Airway/Mallampati Done: Not applicable    Shaved: Not applicable    Pregnancy Form:Not applicable    Patient Position: Yes    MD Side: Yes     Biopsy Worksheet: Yes    Specimen Medium: Not applicable

## 2017-12-20 NOTE — DISCHARGE INSTRUCTIONS
[address]  Department of Interventional Radiology  [facility]  [phone] Office  [phone] Fax    PARACENTESIS DISCHARGE INSTRUCTIONS    General Information:  During this procedure, the doctor will insert a needle into the abdomen to drain fluid. After the procedure, you will be able to take a deep breath much easier. The site of the puncture may ooze the first day. This will decrease and eventually stop. Paracentesis (draining fluid from the abdomen) sometimes makes patients hypotensive (low blood pressure). Your doctor may order for you to receive fluids or albumin (a volume booster) during the procedure through an IV site. Home Care Instructions:  Keep the puncture site clean and dry. No tub baths or swimming until puncture site heals. Showering is acceptable. Resume your normal diet, and resume your normal activity slowly and as you tolerate. If you are short of breath, rest. If shortness of breath does not ease, please call your ordering doctor. Fluid can re-accumulate in the chest and/or in the abdomen. If this should occur, your doctor needs to know as you may need to have the procedure done again. Call If:     You should call your Physician and/or the Radiology Nurse if you notice any signs of infection, like pus draining, or if it is swollen or reddened. Also call if you have a fever, or if you are bleeding from the puncture site more than a small amount on the dressing. Call if the puncture site keeps draining fluid. Some oozing is to be expected, but should slow and then stop. Call if you feel like you have pressure in your abdomen. SEEK IMMEDIATE CARE OR CALL 911 IF YOU SUDDENLY HAVE TROUBLE BREATHING, OR IF YOUR LIPS TURN BLUE, OR IF YOU NOTICE BLOOD IN YOUR SPUTUM. Follow-Up Instructions: Please see your ordering doctor as he/she has requested. To Reach Us:   If you have any questions about your procedure, please call the Interventional Radiology department at [phone]. After business hours (5pm) and weekends, call the answering service at [phone] and ask for the Radiologist on call to be paged. Interventional Radiology General Nurse Discharge    After general anesthesia or intravenous sedation, for 24 hours or while taking prescription Narcotics:  · Limit your activities  · Do not drive and operate hazardous machinery  · Do not make important personal or business decisions  · Do  not drink alcoholic beverages  · If you have not urinated within 8 hours after discharge, please contact your surgeon on call. * Please give a list of your current medications to your Primary Care Provider. * Please update this list whenever your medications are discontinued, doses are     changed, or new medications (including over-the-counter products) are added. * Please carry medication information at all times in case of emergency situations. These are general instructions for a healthy lifestyle:    No smoking/ No tobacco products/ Avoid exposure to second hand smoke  Surgeon General's Warning:  Quitting smoking now greatly reduces serious risk to your health. Obesity, smoking, and sedentary lifestyle greatly increases your risk for illness  A healthy diet, regular physical exercise & weight monitoring are important for maintaining a healthy lifestyle    You may be retaining fluid if you have a history of heart failure or if you experience any of the following symptoms:  Weight gain of 3 pounds or more overnight or 5 pounds in a week, increased swelling in our hands or feet or shortness of breath while lying flat in bed. Please call your doctor as soon as you notice any of these symptoms; do not wait until your next office visit.     Recognize signs and symptoms of STROKE:  F-face looks uneven    A-arms unable to move or move unevenly    S-speech slurred or non-existent    T-time-call 911 as soon as signs and symptoms begin-DO NOT go       Back to bed or wait to see if you get better-TIME IS BRAIN.         Date: 12/20/2017  Discharging Nurse: Jose Manuel Garcia RN

## 2017-12-20 NOTE — PROGRESS NOTES
I have reviewed discharge instructions with the patient. The patient verbalized understanding. Opportunities given for questions and clarification. Patient ambulatory to exit with steady gait. Discharged with family.

## 2017-12-20 NOTE — IP AVS SNAPSHOT
Esmer Ido 
 
 
 [address] [facility] [address] [facility] 
[phone] Patient: Sandie Eugene MRN: [id number] :1965 About your hospitalization You were admitted on:  2017 You last received care in the:  [facility] RADIOLOGY ULTRASOUND You were discharged on:  2017 Why you were hospitalized Your primary diagnosis was:  Not on File Discharge Orders None A check alberto indicates which time of day the medication should be taken. My Medications ASK your physician about these medications Instructions Each Dose to Equal  
 Morning Noon Evening Bedtime AMILORIDE PO Your last dose was: Your next dose is: Take 15 mg by mouth two (2) times a day. Dose unknown, to bring dos for clarification 15 mg  
    
   
   
   
  
 lactulose 10 gram/15 mL solution Commonly known as:  Charmian Goltz Your last dose was: Your next dose is: Take 10 g by mouth three (3) times daily. 10 g  
    
   
   
   
  
 torsemide 20 mg tablet Commonly known as:  DEMADEX Your last dose was: Your next dose is: Take 40 mg by mouth two (2) times a day. 40 mg Discharge Instructions [address] Department of Interventional Radiology [facility] 
[phone] Office 
[phone] Fax PARACENTESIS DISCHARGE INSTRUCTIONS General Information: 
During this procedure, the doctor will insert a needle into the abdomen to drain fluid. After the procedure, you will be able to take a deep breath much easier. The site of the puncture may ooze the first day. This will decrease and eventually stop. Paracentesis (draining fluid from the abdomen) sometimes makes patients hypotensive (low blood pressure).  Your doctor may order for you to receive fluids or albumin (a volume booster) during the procedure through an IV site. Home Care Instructions: 
Keep the puncture site clean and dry. No tub baths or swimming until puncture site heals. Showering is acceptable. Resume your normal diet, and resume your normal activity slowly and as you tolerate. If you are short of breath, rest. If shortness of breath does not ease, please call your ordering doctor. Fluid can re-accumulate in the chest and/or in the abdomen. If this should occur, your doctor needs to know as you may need to have the procedure done again. Call If: 
   You should call your Physician and/or the Radiology Nurse if you notice any signs of infection, like pus draining, or if it is swollen or reddened. Also call if you have a fever, or if you are bleeding from the puncture site more than a small amount on the dressing. Call if the puncture site keeps draining fluid. Some oozing is to be expected, but should slow and then stop. Call if you feel like you have pressure in your abdomen. SEEK IMMEDIATE CARE OR CALL 911 IF YOU SUDDENLY HAVE TROUBLE BREATHING, OR IF YOUR LIPS TURN BLUE, OR IF YOU NOTICE BLOOD IN YOUR SPUTUM. Follow-Up Instructions: Please see your ordering doctor as he/she has requested. To Reach Us: If you have any questions about your procedure, please call the Interventional Radiology department at [phone]. After business hours (5pm) and weekends, call the answering service at [phone] and ask for the Radiologist on call to be paged. Interventional Radiology General Nurse Discharge After general anesthesia or intravenous sedation, for 24 hours or while taking prescription Narcotics: · Limit your activities · Do not drive and operate hazardous machinery · Do not make important personal or business decisions · Do  not drink alcoholic beverages · If you have not urinated within 8 hours after discharge, please contact your surgeon on call. * Please give a list of your current medications to your Primary Care Provider. * Please update this list whenever your medications are discontinued, doses are 
   changed, or new medications (including over-the-counter products) are added. * Please carry medication information at all times in case of emergency situations. These are general instructions for a healthy lifestyle: No smoking/ No tobacco products/ Avoid exposure to second hand smoke Surgeon General's Warning:  Quitting smoking now greatly reduces serious risk to your health. Obesity, smoking, and sedentary lifestyle greatly increases your risk for illness A healthy diet, regular physical exercise & weight monitoring are important for maintaining a healthy lifestyle You may be retaining fluid if you have a history of heart failure or if you experience any of the following symptoms:  Weight gain of 3 pounds or more overnight or 5 pounds in a week, increased swelling in our hands or feet or shortness of breath while lying flat in bed. Please call your doctor as soon as you notice any of these symptoms; do not wait until your next office visit. Recognize signs and symptoms of STROKE: 
F-face looks uneven A-arms unable to move or move unevenly S-speech slurred or non-existent T-time-call 911 as soon as signs and symptoms begin-DO NOT go Back to bed or wait to see if you get better-TIME IS BRAIN. Date: 12/20/2017 Discharging Nurse: Aleksandr Cordero RN Introducing [facility] & HEALTH SERVICES! Angie Rothman introduces [employer]ziy patient portal. Now you can access parts of your medical record, email your doctor's office, and request medication refills online. 1. In your internet browser, go to [email or web address] 2. Click on the First Time User? Click Here link in the Sign In box. You will see the New Member Sign Up page. 3. Enter your [place] Therapistt Access Code exactly as it appears below. You will not need to use this code after youve completed the sign-up process. If you do not sign up before the expiration date, you must request a new code. · [employer] Access Code: [facility] Expires: 3/17/2018 12:54 PM 
 
4. Enter the last four digits of your Social Security Number (xxxx) and Date of Birth (mm/dd/yyyy) as indicated and click Submit. You will be taken to the next sign-up page. 5. Create a [place] Therapistt ID. This will be your [employer] login ID and cannot be changed, so think of one that is secure and easy to remember. 6. Create a [employer] password. You can change your password at any time. 7. Enter your Password Reset Question and Answer. This can be used at a later time if you forget your password. 8. Enter your e-mail address. You will receive e-mail notification when new information is available in [address] 9. Click Sign Up. You can now view and download portions of your medical record. 10. Click the Download Summary menu link to download a portable copy of your medical information. If you have questions, please visit the Frequently Asked Questions section of the [employer] website. Remember, [employer] is NOT to be used for urgent needs. For medical emergencies, dial 911. Now available from your iPhone and Android! Unresulted Labs-Please follow up with your PCP about these lab tests Order Current Status US GUIDE PARACENTESIS In process Providers Seen During Your Hospitalization Provider Specialty Primary office phone Sharri Leija MD Gastroenterology [phone] Your Primary Care Physician (PCP) Primary Care Physician Office Phone Office Fax Teagan Lock [phone] You are allergic to the following No active allergies Recent Documentation Smoking Status Current Every Day Smoker Emergency Contacts Name Discharge Info Relation Home Work Mobile Wm. Chen Jernigan  Son [93] [phone] Patient Belongings The following personal items are in your possession at time of discharge: 
                             
 
  
  
 Please provide this summary of care documentation to your next provider. Signatures-by signing, you are acknowledging that this After Visit Summary has been reviewed with you and you have received a copy. Patient Signature:  ____________________________________________________________ Date:  ____________________________________________________________  
  
[facility] Provider Signature:  ____________________________________________________________ Date:  ____________________________________________________________

## 2017-12-20 NOTE — PROGRESS NOTES
Interventional Radiology Post Paracentesis/Thoracentesis Note    12/20/2017    Procedure(s): Ultrasound guided Diagnostic Paracentesis Performed with 8 Maori catheter total volume 8600 ml. Samples sent to lab. Preliminary Findings: moderate yellow and cloudy. Complications: None    Plan:  Observation, check labs if drawn.           Chest X-Ray:  no    Full dictated report to follow    Signed By: Ricardo Lam RN

## 2017-12-21 LAB
AMYLASE FLD-CCNC: 57 U/L
SPECIMEN SOURCE FLD: NORMAL

## 2018-01-17 ENCOUNTER — HOSPITAL ENCOUNTER (OUTPATIENT)
Dept: ULTRASOUND IMAGING | Age: 53
Discharge: HOME | End: 2018-01-17
Attending: INTERNAL MEDICINE
Payer: MEDICARE

## 2018-01-17 VITALS
HEIGHT: 72 IN | RESPIRATION RATE: 16 BRPM | OXYGEN SATURATION: 99 % | DIASTOLIC BLOOD PRESSURE: 55 MMHG | HEART RATE: 65 BPM | TEMPERATURE: 98.1 F | BODY MASS INDEX: 20.99 KG/M2 | SYSTOLIC BLOOD PRESSURE: 95 MMHG | WEIGHT: 155 LBS

## 2018-01-17 DIAGNOSIS — K74.60 CIRRHOSIS OF LIVER (HCC): ICD-10-CM

## 2018-01-17 PROCEDURE — 74011250636 HC RX REV CODE- 250/636: Performed by: PHYSICIAN ASSISTANT

## 2018-01-17 PROCEDURE — 77030010507 US GUIDE PARACENTESIS

## 2018-01-17 PROCEDURE — P9047 ALBUMIN (HUMAN), 25%, 50ML: HCPCS | Performed by: PHYSICIAN ASSISTANT

## 2018-01-17 RX ORDER — ALBUMIN HUMAN 250 G/1000ML
25 SOLUTION INTRAVENOUS ONCE
Status: COMPLETED | OUTPATIENT
Start: 2018-01-17 | End: 2018-01-17

## 2018-01-17 RX ADMIN — ALBUMIN (HUMAN) 12.5 G: 0.25 INJECTION, SOLUTION INTRAVENOUS at 12:51

## 2018-01-17 NOTE — IP AVS SNAPSHOT
[address] 
 
 
 [address] [facility] [address] [facility] 
[phone] Patient: Daya Thompson MRN: [id number] :1965 About your hospitalization You were admitted on:  2018 You last received care in the:  [facility] RADIOLOGY ULTRASOUND You were discharged on:  2018 Why you were hospitalized Your primary diagnosis was:  Not on File Follow-up Information None Your Scheduled Appointments 2018  2:00 PM EST  
US GUIDED PARACENTISIS INIT with SFD US LOGIC 7 UNIT 2, SFD NURSING, SFD IR PA RESOURCE 2  
[facility] RADIOLOGY ULTRASOUND ([address] [place] [address]) [address] [facility] [address] [facility]  
[phone] Interventional Radiology Procedure completed with ultrasound guidance. Referring Physicians: 1) Initial paracentesis patients required: H&P/recent office notes and lab work, no older than 30 days (CBC, BMP, PT/PTT) to Interventional Radiology to facilitate prompt scheduling. 2) Obtain clearance to hold blood thinners from prescribing Physician and give Patient instructions prior to arrival. 3) Patient may continue to eat or drink as normal prior to arrival. 4) Pt to arrive 15 minutes early. 5) Responsible adult  required to drive Patient home after recovery period. Recovery period can vary depending on sedation and patient condition. 6) Interventional Radiology Scheduling can be contacted at [phone] Discharge Orders None A check alberto indicates which time of day the medication should be taken. My Medications ASK your doctor about these medications Instructions Each Dose to Equal  
 Morning Noon Evening Bedtime AMILORIDE PO Your last dose was: Your next dose is: Take 15 mg by mouth two (2) times a day. Dose unknown, to bring dos for clarification  15 mg  
    
   
   
   
  
 lactulose 10 gram/15 mL solution Commonly known as:  Ebony Sessions Your last dose was: Your next dose is: Take 10 g by mouth three (3) times daily. 10 g  
    
   
   
   
  
 torsemide 20 mg tablet Commonly known as:  DEMADEX Your last dose was: Your next dose is: Take 40 mg by mouth two (2) times a day. 40 mg Discharge Instructions Rickey [address] Department of Interventional Radiology [facility] 
[phone] Office 
[phone] Fax PARACENTESIS DISCHARGE INSTRUCTIONS General Information: 
During this procedure, the doctor will insert a needle into the abdomen to drain fluid. After the procedure, you will be able to take a deep breath much easier. The site of the puncture may ooze the first day. This will decrease and eventually stop. Paracentesis (draining fluid from the abdomen) sometimes makes patients hypotensive (low blood pressure). Your doctor may order for you to receive fluids or albumin (a volume booster) during the procedure through an IV site. Home Care Instructions: 
Keep the puncture site clean and dry. No tub baths or swimming until puncture site heals. Showering is acceptable. Resume your normal diet, and resume your normal activity slowly and as you tolerate. If you are short of breath, rest. If shortness of breath does not ease, please call your ordering doctor. Fluid can re-accumulate in the chest and/or in the abdomen. If this should occur, your doctor needs to know as you may need to have the procedure done again. Call If: 
   You should call your Physician and/or the Radiology Nurse if you notice any signs of infection, like pus draining, or if it is swollen or reddened. Also call if you have a fever, or if you are bleeding from the puncture site more than a small amount on the dressing.  Call if the puncture site keeps draining fluid. Some oozing is to be expected, but should slow and then stop. Call if you feel like you have pressure in your abdomen. SEEK IMMEDIATE CARE OR CALL 911 IF YOU SUDDENLY HAVE TROUBLE BREATHING, OR IF YOUR LIPS TURN BLUE, OR IF YOU NOTICE BLOOD IN YOUR SPUTUM. Follow-Up Instructions: Please see your ordering doctor as he/she has requested. To Reach Us: If you have any questions about your procedure, please call the Interventional Radiology department at [phone]. After business hours (5pm) and weekends, call the answering service at [phone] and ask for the Radiologist on call to be paged. Si tiene Preguntas acerca del procedimiento, por favor llame al departamento de Radiología Intervencional al [phone]. Después de horas de oficina (5 pm) y los fines de [place], llamar al [place] de llamadas al [phone] y pregunte por el Radiologo de Sarah Bankshikortney. Interventional Radiology General Nurse Discharge After general anesthesia or intravenous sedation, for 24 hours or while taking prescription Narcotics: · Limit your activities · Do not drive and operate hazardous machinery · Do not make important personal or business decisions · Do  not drink alcoholic beverages · If you have not urinated within 8 hours after discharge, please contact your surgeon on call. * Please give a list of your current medications to your Primary Care Provider. * Please update this list whenever your medications are discontinued, doses are 
   changed, or new medications (including over-the-counter products) are added. * Please carry medication information at all times in case of emergency situations. These are general instructions for a healthy lifestyle: No smoking/ No tobacco products/ Avoid exposure to second hand smoke Surgeon General's Warning:  Quitting smoking now greatly reduces serious risk to your health. Obesity, smoking, and sedentary lifestyle greatly increases your risk for illness A healthy diet, regular physical exercise & weight monitoring are important for maintaining a healthy lifestyle You may be retaining fluid if you have a history of heart failure or if you experience any of the following symptoms:  Weight gain of 3 pounds or more overnight or 5 pounds in a week, increased swelling in our hands or feet or shortness of breath while lying flat in bed. Please call your doctor as soon as you notice any of these symptoms; do not wait until your next office visit. Recognize signs and symptoms of STROKE: 
F-face looks uneven A-arms unable to move or move unevenly S-speech slurred or non-existent T-time-call 911 as soon as signs and symptoms begin-DO NOT go Back to bed or wait to see if you get better-TIME IS BRAIN. Date: 1/17/2018 Discharging Nurse: Chipper Angelucci, RN Introducing [facility] & HEALTH SERVICES! Kennedi Miranda introduces [employer] patient portal. Now you can access parts of your medical record, email your doctor's office, and request medication refills online. 1. In your internet browser, go to [email or web address] 2. Click on the First Time User? Click Here link in the Sign In box. You will see the New Member Sign Up page. 3. Enter your [employer] Access Code exactly as it appears below. You will not need to use this code after youve completed the sign-up process. If you do not sign up before the expiration date, you must request a new code. · [employer] Access Code: [facility] Expires: 3/17/2018 12:54 PM 
 
4. Enter the last four digits of your Social Security Number (xxxx) and Date of Birth (mm/dd/yyyy) as indicated and click Submit. You will be taken to the next sign-up page. 5. Create a [employer] ID. This will be your [employer] login ID and cannot be changed, so think of one that is secure and easy to remember. 6. Create a [employer] password. You can change your password at any time. 7. Enter your Password Reset Question and Answer. This can be used at a later time if you forget your password. 8. Enter your e-mail address. You will receive e-mail notification when new information is available in [address] 9. Click Sign Up. You can now view and download portions of your medical record. 10. Click the Download Summary menu link to download a portable copy of your medical information. If you have questions, please visit the Frequently Asked Questions section of the [employer] website. Remember, [employer] is NOT to be used for urgent needs. For medical emergencies, dial 911. Now available from your iPhone and Android! Providers Seen During Your Hospitalization Provider Specialty Primary office phone Libra Pettit MD Gastroenterology [phone] Your Primary Care Physician (PCP) Primary Care Physician Office Phone Office Fax Gisell [facility] [phone] You are allergic to the following No active allergies Recent Documentation Height Weight BMI Smoking Status 1.829 m 70.3 kg 21.02 kg/m2 Current Every Day Smoker Emergency Contacts Name Discharge Info Relation Home Work Mobile [employer]. [place] JrGee SpectraRep  Son [22] [phone] Patient Belongings The following personal items are in your possession at time of discharge: 
                             
 
  
  
 Please provide this summary of care documentation to your next provider. Signatures-by signing, you are acknowledging that this After Visit Summary has been reviewed with you and you have received a copy. Patient Signature:  ____________________________________________________________ Date:  ____________________________________________________________  
  
Denny Payor  Provider Signature: ____________________________________________________________ Date:  ____________________________________________________________

## 2018-01-17 NOTE — IP AVS SNAPSHOT
[place] 
 
 
 [address] [facility]  [address] [facility] 
[phone] Patient: Mitali Eli MRN: [id number] :1965 A check alberto indicates which time of day the medication should be taken. My Medications ASK your doctor about these medications Instructions Each Dose to Equal  
 Morning Noon Evening Bedtime AMILORIDE PO Your last dose was: Your next dose is: Take 15 mg by mouth two (2) times a day. Dose unknown, to bring dos for clarification 15 mg  
    
   
   
   
  
 lactulose 10 gram/15 mL solution Commonly known as:  Marisue Natter Your last dose was: Your next dose is: Take 10 g by mouth three (3) times daily. 10 g  
    
   
   
   
  
 torsemide 20 mg tablet Commonly known as:  DEMADEX Your last dose was: Your next dose is: Take 40 mg by mouth two (2) times a day.   
 40 mg

## 2018-01-17 NOTE — DISCHARGE INSTRUCTIONS
Jessiei 34 [address]  Department of Interventional Radiology  [facility]  [phone] Office  [phone] Fax    PARACENTESIS DISCHARGE INSTRUCTIONS    General Information:  During this procedure, the doctor will insert a needle into the abdomen to drain fluid. After the procedure, you will be able to take a deep breath much easier. The site of the puncture may ooze the first day. This will decrease and eventually stop. Paracentesis (draining fluid from the abdomen) sometimes makes patients hypotensive (low blood pressure). Your doctor may order for you to receive fluids or albumin (a volume booster) during the procedure through an IV site. Home Care Instructions:  Keep the puncture site clean and dry. No tub baths or swimming until puncture site heals. Showering is acceptable. Resume your normal diet, and resume your normal activity slowly and as you tolerate. If you are short of breath, rest. If shortness of breath does not ease, please call your ordering doctor. Fluid can re-accumulate in the chest and/or in the abdomen. If this should occur, your doctor needs to know as you may need to have the procedure done again. Call If:     You should call your Physician and/or the Radiology Nurse if you notice any signs of infection, like pus draining, or if it is swollen or reddened. Also call if you have a fever, or if you are bleeding from the puncture site more than a small amount on the dressing. Call if the puncture site keeps draining fluid. Some oozing is to be expected, but should slow and then stop. Call if you feel like you have pressure in your abdomen. SEEK IMMEDIATE CARE OR CALL 911 IF YOU SUDDENLY HAVE TROUBLE BREATHING, OR IF YOUR LIPS TURN BLUE, OR IF YOU NOTICE BLOOD IN YOUR SPUTUM. Follow-Up Instructions: Please see your ordering doctor as he/she has requested. To Reach Us:     If you have any questions about your procedure, please call the Interventional Radiology department at [phone]. After business hours (5pm) and weekends, call the answering service at [phone] and ask for the Radiologist on call to be paged. Si tiene Preguntas acerca del procedimiento, por favor llame al departamento de Radiología Intervencional al [phone]. Después de horas de oficina (5 pm) y los fines de [place], llamar al Charlee Raj de llamadas al [phone] y pregunte por el Radiologo de Sarah Aaron. Interventional Radiology General Nurse Discharge    After general anesthesia or intravenous sedation, for 24 hours or while taking prescription Narcotics:  · Limit your activities  · Do not drive and operate hazardous machinery  · Do not make important personal or business decisions  · Do  not drink alcoholic beverages  · If you have not urinated within 8 hours after discharge, please contact your surgeon on call. * Please give a list of your current medications to your Primary Care Provider. * Please update this list whenever your medications are discontinued, doses are     changed, or new medications (including over-the-counter products) are added. * Please carry medication information at all times in case of emergency situations. These are general instructions for a healthy lifestyle:    No smoking/ No tobacco products/ Avoid exposure to second hand smoke  Surgeon General's Warning:  Quitting smoking now greatly reduces serious risk to your health. Obesity, smoking, and sedentary lifestyle greatly increases your risk for illness  A healthy diet, regular physical exercise & weight monitoring are important for maintaining a healthy lifestyle    You may be retaining fluid if you have a history of heart failure or if you experience any of the following symptoms:  Weight gain of 3 pounds or more overnight or 5 pounds in a week, increased swelling in our hands or feet or shortness of breath while lying flat in bed.   Please call your doctor as soon as you notice any of these symptoms; do not wait until your next office visit. Recognize signs and symptoms of STROKE:  F-face looks uneven    A-arms unable to move or move unevenly    S-speech slurred or non-existent    T-time-call 911 as soon as signs and symptoms begin-DO NOT go       Back to bed or wait to see if you get better-TIME IS BRAIN.       Date: 1/17/2018  Discharging Nurse: Oscar Serrano RN

## 2018-02-08 ENCOUNTER — HOSPITAL ENCOUNTER (OUTPATIENT)
Dept: ULTRASOUND IMAGING | Age: 53
Discharge: HOME | End: 2018-02-08
Attending: INTERNAL MEDICINE
Payer: MEDICARE

## 2018-02-08 VITALS
OXYGEN SATURATION: 100 % | DIASTOLIC BLOOD PRESSURE: 62 MMHG | HEART RATE: 74 BPM | BODY MASS INDEX: 23.57 KG/M2 | SYSTOLIC BLOOD PRESSURE: 108 MMHG | WEIGHT: 174 LBS | TEMPERATURE: 97.5 F | RESPIRATION RATE: 16 BRPM | HEIGHT: 72 IN

## 2018-02-08 DIAGNOSIS — I89.8: ICD-10-CM

## 2018-02-08 LAB
INR BLD: 1.3 (ref 0.9–1.2)
PT BLD: 15.6 SECS (ref 9.6–11.6)

## 2018-02-08 PROCEDURE — 74011250636 HC RX REV CODE- 250/636: Performed by: PHYSICIAN ASSISTANT

## 2018-02-08 PROCEDURE — 85610 PROTHROMBIN TIME: CPT

## 2018-02-08 PROCEDURE — C1729 CATH, DRAINAGE: HCPCS

## 2018-02-08 PROCEDURE — P9047 ALBUMIN (HUMAN), 25%, 50ML: HCPCS | Performed by: PHYSICIAN ASSISTANT

## 2018-02-08 PROCEDURE — 74011000250 HC RX REV CODE- 250: Performed by: PHYSICIAN ASSISTANT

## 2018-02-08 RX ORDER — ALBUMIN HUMAN 250 G/1000ML
25 SOLUTION INTRAVENOUS ONCE
Status: COMPLETED | OUTPATIENT
Start: 2018-02-08 | End: 2018-02-08

## 2018-02-08 RX ORDER — LIDOCAINE HYDROCHLORIDE 20 MG/ML
20-200 INJECTION, SOLUTION INFILTRATION; PERINEURAL ONCE
Status: COMPLETED | OUTPATIENT
Start: 2018-02-08 | End: 2018-02-08

## 2018-02-08 RX ADMIN — ALBUMIN (HUMAN) 25 G: 0.25 INJECTION, SOLUTION INTRAVENOUS at 15:40

## 2018-02-08 RX ADMIN — LIDOCAINE HYDROCHLORIDE 200 MG: 20 INJECTION, SOLUTION INFILTRATION; PERINEURAL at 14:45

## 2018-02-08 NOTE — DISCHARGE INSTRUCTIONS
[address]  Department of Interventional Radiology  [facility]  [phone] Office  [phone] Fax    PARACENTESIS DISCHARGE INSTRUCTIONS    General Information:  During this procedure, the doctor will insert a needle into the abdomen to drain fluid. After the procedure, you will be able to take a deep breath much easier. The site of the puncture may ooze the first day. This will decrease and eventually stop. Paracentesis (draining fluid from the abdomen) sometimes makes patients hypotensive (low blood pressure). Your doctor may order for you to receive fluids or albumin (a volume booster) during the procedure through an IV site. Home Care Instructions:  Keep the puncture site clean and dry. No tub baths or swimming until puncture site heals. Showering is acceptable. Resume your normal diet, and resume your normal activity slowly and as you tolerate. If you are short of breath, rest. If shortness of breath does not ease, please call your ordering doctor. Fluid can re-accumulate in the chest and/or in the abdomen. If this should occur, your doctor needs to know as you may need to have the procedure done again. Call If:     You should call your Physician and/or the Radiology Nurse if you notice any signs of infection, like pus draining, or if it is swollen or reddened. Also call if you have a fever, or if you are bleeding from the puncture site more than a small amount on the dressing. Call if the puncture site keeps draining fluid. Some oozing is to be expected, but should slow and then stop. Call if you feel like you have pressure in your abdomen. SEEK IMMEDIATE CARE OR CALL 911 IF YOU SUDDENLY HAVE TROUBLE BREATHING, OR IF YOUR LIPS TURN BLUE, OR IF YOU NOTICE BLOOD IN YOUR SPUTUM. Follow-Up Instructions: Please see your ordering doctor as he/she has requested. To Reach Us:     If you have any questions about your procedure, please call the Interventional Radiology department at [phone]. After business hours (5pm) and weekends, call the answering service at [phone] and ask for the Radiologist on call to be paged. Interventional Radiology General Nurse Discharge    After general anesthesia or intravenous sedation, for 24 hours or while taking prescription Narcotics:  · Limit your activities  · Do not drive and operate hazardous machinery  · Do not make important personal or business decisions  · Do  not drink alcoholic beverages  · If you have not urinated within 8 hours after discharge, please contact your surgeon on call. * Please give a list of your current medications to your Primary Care Provider. * Please update this list whenever your medications are discontinued, doses are     changed, or new medications (including over-the-counter products) are added. * Please carry medication information at all times in case of emergency situations. These are general instructions for a healthy lifestyle:    No smoking/ No tobacco products/ Avoid exposure to second hand smoke  Surgeon General's Warning:  Quitting smoking now greatly reduces serious risk to your health. Obesity, smoking, and sedentary lifestyle greatly increases your risk for illness  A healthy diet, regular physical exercise & weight monitoring are important for maintaining a healthy lifestyle    You may be retaining fluid if you have a history of heart failure or if you experience any of the following symptoms:  Weight gain of 3 pounds or more overnight or 5 pounds in a week, increased swelling in our hands or feet or shortness of breath while lying flat in bed. Please call your doctor as soon as you notice any of these symptoms; do not wait until your next office visit.     Recognize signs and symptoms of STROKE:  F-face looks uneven    A-arms unable to move or move unevenly    S-speech slurred or non-existent    T-time-call 911 as soon as signs and symptoms begin-DO NOT go       Back to bed or wait to see if you get better-TIME IS BRAIN. Date: 2/8/2018  Discharging Nurse:  Godwin Peña RN

## 2018-02-08 NOTE — PROGRESS NOTES
Interventional Radiology Post Paracentesis  2/8/2018    Procedure(s): Ultrasound guided Diagnostic Paracentesis Performed with 8 Bengali catheter total volume 8600 ml. Samples sent to lab. Preliminary Findings: large cloudy, pink. Complications: None    Plan:  Observation, check labs if drawn.           Chest X-Ray:  no    Full dictated report to follow    Signed By: Josue Lopez RN

## 2018-02-08 NOTE — PROGRESS NOTES
Verbal order received from Luann Pitts PA-C to administer 25g of albumin IV for 8 liters of ascites removed.

## 2018-02-08 NOTE — IP AVS SNAPSHOT
[address] [facility] 
 
 
 [address] [facility] 
[phone] Patient: Aleja Friedman MRN: [id number] :1965 About your hospitalization You were admitted on:  2018 You last received care in the:  [facility] RADIOLOGY ULTRASOUND You were discharged on:  2018 Why you were hospitalized Your primary diagnosis was:  Not on File Follow-up Information None Your Scheduled Appointments 2018  3:45 PM EST  
CT CHEST ABD PELVIS W WO CONT with SFD CT 59 SLICE UNIT 1  
[facility] RADIOLOGY CT SCAN ([address] [place] [address]) [address] [facility]  
[phone] PATIENT ARRIVAL 1. Arrive 1.5 hours before exam time for registration and to drink oral contrast. 2. Please arrive well hydrated. Drink plenty of fluids the day before and the day of your study. Do not eat anything 4 hours before your arrival time, however please continue to drink clear liquids until 2 hours prior to the arrival time. 3. If diabetic, please bring a list of any diabetic medications. 4. Dialysis patients do not need to have hydration. It is recommended the patient have dialysis within 24 hours after the test.  
  
    
  
Discharge Orders None A check alberto indicates which time of day the medication should be taken. My Medications ASK your doctor about these medications Instructions Each Dose to Equal  
 Morning Noon Evening Bedtime AMILORIDE PO Your last dose was: Your next dose is: Take 15 mg by mouth two (2) times a day. Dose unknown, to bring dos for clarification 15 mg  
    
   
   
   
  
 lactulose 10 gram/15 mL solution Commonly known as:  Marlea Morelle Your last dose was: Your next dose is: Take 10 g by mouth three (3) times daily. 10 g  
    
   
   
   
  
 torsemide 20 mg tablet Commonly known as:  DEMADEX Your last dose was: Your next dose is: Take 40 mg by mouth two (2) times a day. 40 mg Discharge Instructions [address] Department of Interventional Radiology [address] [place] [address] [facility] 
[phone] Office 
[phone] Fax PARACENTESIS DISCHARGE INSTRUCTIONS General Information: 
During this procedure, the doctor will insert a needle into the abdomen to drain fluid. After the procedure, you will be able to take a deep breath much easier. The site of the puncture may ooze the first day. This will decrease and eventually stop. Paracentesis (draining fluid from the abdomen) sometimes makes patients hypotensive (low blood pressure). Your doctor may order for you to receive fluids or albumin (a volume booster) during the procedure through an IV site. Home Care Instructions: 
Keep the puncture site clean and dry. No tub baths or swimming until puncture site heals. Showering is acceptable. Resume your normal diet, and resume your normal activity slowly and as you tolerate. If you are short of breath, rest. If shortness of breath does not ease, please call your ordering doctor. Fluid can re-accumulate in the chest and/or in the abdomen. If this should occur, your doctor needs to know as you may need to have the procedure done again. Call If: 
   You should call your Physician and/or the Radiology Nurse if you notice any signs of infection, like pus draining, or if it is swollen or reddened. Also call if you have a fever, or if you are bleeding from the puncture site more than a small amount on the dressing. Call if the puncture site keeps draining fluid. Some oozing is to be expected, but should slow and then stop. Call if you feel like you have pressure in your abdomen.  SEEK IMMEDIATE CARE OR CALL 911 IF YOU SUDDENLY HAVE TROUBLE BREATHING, OR IF YOUR LIPS TURN BLUE, OR IF YOU NOTICE BLOOD IN YOUR SPUTUM. Follow-Up Instructions: Please see your ordering doctor as he/she has requested. To Reach Us: If you have any questions about your procedure, please call the Interventional Radiology department at [phone]. After business hours (5pm) and weekends, call the answering service at [phone] and ask for the Radiologist on call to be paged. Interventional Radiology General Nurse Discharge After general anesthesia or intravenous sedation, for 24 hours or while taking prescription Narcotics: · Limit your activities · Do not drive and operate hazardous machinery · Do not make important personal or business decisions · Do  not drink alcoholic beverages · If you have not urinated within 8 hours after discharge, please contact your surgeon on call. * Please give a list of your current medications to your Primary Care Provider. * Please update this list whenever your medications are discontinued, doses are 
   changed, or new medications (including over-the-counter products) are added. * Please carry medication information at all times in case of emergency situations. These are general instructions for a healthy lifestyle: No smoking/ No tobacco products/ Avoid exposure to second hand smoke Surgeon General's Warning:  Quitting smoking now greatly reduces serious risk to your health. Obesity, smoking, and sedentary lifestyle greatly increases your risk for illness A healthy diet, regular physical exercise & weight monitoring are important for maintaining a healthy lifestyle You may be retaining fluid if you have a history of heart failure or if you experience any of the following symptoms:  Weight gain of 3 pounds or more overnight or 5 pounds in a week, increased swelling in our hands or feet or shortness of breath while lying flat in bed.   Please call your doctor as soon as you notice any of these symptoms; do not wait until your next office visit. Recognize signs and symptoms of STROKE: 
F-face looks uneven A-arms unable to move or move unevenly S-speech slurred or non-existent T-time-call 911 as soon as signs and symptoms begin-DO NOT go Back to bed or wait to see if you get better-TIME IS BRAIN. Date: 2/8/2018 Discharging Nurse: Slayden Area, RN Introducing [facility]! Dany Gentile introduces [employer] patient portal. Now you can access parts of your medical record, email your doctor's office, and request medication refills online. 1. In your internet browser, go to [email or web address] 2. Click on the First Time User? Click Here link in the Sign In box. You will see the New Member Sign Up page. 3. Enter your [employer] Access Code exactly as it appears below. You will not need to use this code after youve completed the sign-up process. If you do not sign up before the expiration date, you must request a new code. · [employer] Access Code: [facility] Expires: 3/17/2018 12:54 PM 
 
4. Enter the last four digits of your Social Security Number (xxxx) and Date of Birth (mm/dd/yyyy) as indicated and click Submit. You will be taken to the next sign-up page. 5. Create a [employer] ID. This will be your [employer] login ID and cannot be changed, so think of one that is secure and easy to remember. 6. Create a [employer] password. You can change your password at any time. 7. Enter your Password Reset Question and Answer. This can be used at a later time if you forget your password. 8. Enter your e-mail address. You will receive e-mail notification when new information is available in [address] 9. Click Sign Up. You can now view and download portions of your medical record. 10. Click the Download Summary menu link to download a portable copy of your medical information. If you have questions, please visit the Frequently Asked Questions section of the MyChart website. Remember, MyChart is NOT to be used for urgent needs. For medical emergencies, dial 911. Now available from your iPhone and Android! Providers Seen During Your Hospitalization Provider Specialty Primary office phone Kristina Carreon MD Gastroenterology [phone] Your Primary Care Physician (PCP) Primary Care Physician Office Phone Office Fax Joaquim Burnette [phone] You are allergic to the following No active allergies Recent Documentation Smoking Status Current Every Day Smoker Emergency Contacts Name Discharge Info Relation Home Work Mobile [employer]  Son [[phone]] [phone] Patient Belongings The following personal items are in your possession at time of discharge: 
                             
 
  
  
 Please provide this summary of care documentation to your next provider. Signatures-by signing, you are acknowledging that this After Visit Summary has been reviewed with you and you have received a copy. Patient Signature:  ____________________________________________________________ Date:  ____________________________________________________________  
  
Naima Torrez Provider Signature:  ____________________________________________________________ Date:  ____________________________________________________________

## 2018-02-08 NOTE — PROGRESS NOTES
Notified by Karen Loaiza in lab, flow cytometry unable to be completed at this time as there is no diagnosis under consideration noted. Dr. Antonio Tamez office notified. Awaiting return call from Dr. Antonio Tamez for additional orders.

## 2018-02-09 PROCEDURE — 88185 FLOWCYTOMETRY/TC ADD-ON: CPT | Performed by: INTERNAL MEDICINE

## 2018-02-09 PROCEDURE — 88184 FLOWCYTOMETRY/ TC 1 MARKER: CPT | Performed by: INTERNAL MEDICINE

## 2018-02-22 ENCOUNTER — HOSPITAL ENCOUNTER (OUTPATIENT)
Dept: ULTRASOUND IMAGING | Age: 53
Discharge: HOME | End: 2018-02-22
Attending: INTERNAL MEDICINE
Payer: MEDICARE

## 2018-02-22 VITALS
HEART RATE: 61 BPM | OXYGEN SATURATION: 99 % | DIASTOLIC BLOOD PRESSURE: 68 MMHG | TEMPERATURE: 98 F | RESPIRATION RATE: 16 BRPM | SYSTOLIC BLOOD PRESSURE: 113 MMHG

## 2018-02-22 DIAGNOSIS — I89.9 LYMPHATIC CHANNEL DISORDER, NONINFECTIOUS: ICD-10-CM

## 2018-02-22 PROCEDURE — P9047 ALBUMIN (HUMAN), 25%, 50ML: HCPCS | Performed by: INTERNAL MEDICINE

## 2018-02-22 PROCEDURE — 77030010507 US GUIDE PARACENTESIS

## 2018-02-22 PROCEDURE — 74011250636 HC RX REV CODE- 250/636: Performed by: INTERNAL MEDICINE

## 2018-02-22 RX ORDER — ALBUMIN HUMAN 250 G/1000ML
50 SOLUTION INTRAVENOUS ONCE
Status: COMPLETED | OUTPATIENT
Start: 2018-02-22 | End: 2018-02-22

## 2018-02-22 RX ADMIN — ALBUMIN (HUMAN) 50 G: 0.25 INJECTION, SOLUTION INTRAVENOUS at 17:19

## 2018-02-22 NOTE — DISCHARGE INSTRUCTIONS
[address]  Department of Interventional Radiology  [facility]  [phone] Office  [phone] Fax    PARACENTESIS DISCHARGE INSTRUCTIONS    General Information:  During this procedure, the doctor will insert a needle into the abdomen to drain fluid. After the procedure, you will be able to take a deep breath much easier. The site of the puncture may ooze the first day. This will decrease and eventually stop. Paracentesis (draining fluid from the abdomen) sometimes makes patients hypotensive (low blood pressure). Your doctor may order for you to receive fluids or albumin (a volume booster) during the procedure through an IV site. Home Care Instructions:  Keep the puncture site clean and dry. No tub baths or swimming until puncture site heals. Showering is acceptable. Resume your normal diet, and resume your normal activity slowly and as you tolerate. If you are short of breath, rest. If shortness of breath does not ease, please call your ordering doctor. Fluid can re-accumulate in the chest and/or in the abdomen. If this should occur, your doctor needs to know as you may need to have the procedure done again. Call If:     You should call your Physician and/or the Radiology Nurse if you notice any signs of infection, like pus draining, or if it is swollen or reddened. Also call if you have a fever, or if you are bleeding from the puncture site more than a small amount on the dressing. Call if the puncture site keeps draining fluid. Some oozing is to be expected, but should slow and then stop. Call if you feel like you have pressure in your abdomen. SEEK IMMEDIATE CARE OR CALL 911 IF YOU SUDDENLY HAVE TROUBLE BREATHING, OR IF YOUR LIPS TURN BLUE, OR IF YOU NOTICE BLOOD IN YOUR SPUTUM. Follow-Up Instructions: Please see your ordering doctor as he/she has requested. To Reach Us:   If you have any questions about your procedure, please call the Interventional Radiology department at [phone]. After business hours (5pm) and weekends, call the answering service at [phone] and ask for the Radiologist on call to be paged. Interventional Radiology General Nurse Discharge    After general anesthesia or intravenous sedation, for 24 hours or while taking prescription Narcotics:  · Limit your activities  · Do not drive and operate hazardous machinery  · Do not make important personal or business decisions  · Do  not drink alcoholic beverages  · If you have not urinated within 8 hours after discharge, please contact your surgeon on call. * Please give a list of your current medications to your Primary Care Provider. * Please update this list whenever your medications are discontinued, doses are     changed, or new medications (including over-the-counter products) are added. * Please carry medication information at all times in case of emergency situations. These are general instructions for a healthy lifestyle:    No smoking/ No tobacco products/ Avoid exposure to second hand smoke  Surgeon General's Warning:  Quitting smoking now greatly reduces serious risk to your health. Obesity, smoking, and sedentary lifestyle greatly increases your risk for illness  A healthy diet, regular physical exercise & weight monitoring are important for maintaining a healthy lifestyle    You may be retaining fluid if you have a history of heart failure or if you experience any of the following symptoms:  Weight gain of 3 pounds or more overnight or 5 pounds in a week, increased swelling in our hands or feet or shortness of breath while lying flat in bed. Please call your doctor as soon as you notice any of these symptoms; do not wait until your next office visit.     Recognize signs and symptoms of STROKE:  F-face looks uneven    A-arms unable to move or move unevenly    S-speech slurred or non-existent    T-time-call 101 as soon as signs and symptoms begin-DO NOT go       Back to bed or wait to see if you get better-TIME IS BRAIN.         Date: 2/22/2018  Discharging Nurse: Denae Spain RN

## 2018-02-22 NOTE — IP AVS SNAPSHOT
303 [facility] 
 
 
 [address] [place] [address] 322 [facility] 
[phone] Patient: Wendie Austin MRN: [id number] :1965 About your hospitalization You were admitted on:  2018 You last received care in the:  D RADIOLOGY ULTRASOUND You were discharged on:  2018 Why you were hospitalized Your primary diagnosis was:  Not on File Follow-up Information None Your Scheduled Appointments 2018  3:45 PM EST  
CT CHEST WO CONT with SFD CT 59 SLICE UNIT 1  
[facility] RADIOLOGY CT SCAN ([address] [place] [address]) [address] [facility] [address] 322 [address] [facility]  
[phone] PATIENT ARRIVAL Please report 30 minutes early to check in. Discharge Orders None A check alberto indicates which time of day the medication should be taken. My Medications ASK your doctor about these medications Instructions Each Dose to Equal  
 Morning Noon Evening Bedtime AMILORIDE PO Your last dose was: Your next dose is: Take 15 mg by mouth two (2) times a day. Dose unknown, to bring dos for clarification 15 mg  
    
   
   
   
  
 lactulose 10 gram/15 mL solution Commonly known as:  Wilmon Mark Your last dose was: Your next dose is: Take 10 g by mouth three (3) times daily. 10 g  
    
   
   
   
  
 torsemide 20 mg tablet Commonly known as:  DEMADEX Your last dose was: Your next dose is: Take 40 mg by mouth two (2) times a day. 40 mg Discharge Instructions 111 [facility] [address] Department of Interventional Radiology [facility] 
[phone] Office 
[phone] Fax PARACENTESIS DISCHARGE INSTRUCTIONS General Information: During this procedure, the doctor will insert a needle into the abdomen to drain fluid. After the procedure, you will be able to take a deep breath much easier. The site of the puncture may ooze the first day. This will decrease and eventually stop. Paracentesis (draining fluid from the abdomen) sometimes makes patients hypotensive (low blood pressure). Your doctor may order for you to receive fluids or albumin (a volume booster) during the procedure through an IV site. Home Care Instructions: 
Keep the puncture site clean and dry. No tub baths or swimming until puncture site heals. Showering is acceptable. Resume your normal diet, and resume your normal activity slowly and as you tolerate. If you are short of breath, rest. If shortness of breath does not ease, please call your ordering doctor. Fluid can re-accumulate in the chest and/or in the abdomen. If this should occur, your doctor needs to know as you may need to have the procedure done again. Call If: 
   You should call your Physician and/or the Radiology Nurse if you notice any signs of infection, like pus draining, or if it is swollen or reddened. Also call if you have a fever, or if you are bleeding from the puncture site more than a small amount on the dressing. Call if the puncture site keeps draining fluid. Some oozing is to be expected, but should slow and then stop. Call if you feel like you have pressure in your abdomen. SEEK IMMEDIATE CARE OR CALL 911 IF YOU SUDDENLY HAVE TROUBLE BREATHING, OR IF YOUR LIPS TURN BLUE, OR IF YOU NOTICE BLOOD IN YOUR SPUTUM. Follow-Up Instructions: Please see your ordering doctor as he/she has requested. To Reach Us: If you have any questions about your procedure, please call the Interventional Radiology department at [phone]. After business hours (5pm) and weekends, call the answering service at [phone] and ask for the Radiologist on call to be paged. Interventional Radiology General Nurse Discharge After general anesthesia or intravenous sedation, for 24 hours or while taking prescription Narcotics: · Limit your activities · Do not drive and operate hazardous machinery · Do not make important personal or business decisions · Do  not drink alcoholic beverages · If you have not urinated within 8 hours after discharge, please contact your surgeon on call. * Please give a list of your current medications to your Primary Care Provider. * Please update this list whenever your medications are discontinued, doses are 
   changed, or new medications (including over-the-counter products) are added. * Please carry medication information at all times in case of emergency situations. These are general instructions for a healthy lifestyle: No smoking/ No tobacco products/ Avoid exposure to second hand smoke Surgeon General's Warning:  Quitting smoking now greatly reduces serious risk to your health. Obesity, smoking, and sedentary lifestyle greatly increases your risk for illness A healthy diet, regular physical exercise & weight monitoring are important for maintaining a healthy lifestyle You may be retaining fluid if you have a history of heart failure or if you experience any of the following symptoms:  Weight gain of 3 pounds or more overnight or 5 pounds in a week, increased swelling in our hands or feet or shortness of breath while lying flat in bed. Please call your doctor as soon as you notice any of these symptoms; do not wait until your next office visit. Recognize signs and symptoms of STROKE: 
F-face looks uneven A-arms unable to move or move unevenly S-speech slurred or non-existent T-time-call 911 as soon as signs and symptoms begin-DO NOT go Back to bed or wait to see if you get better-TIME IS BRAIN. Date: 2/22/2018 Discharging Nurse: Farhad Olivarez RN Introducing [facility] & HEALTH SERVICES! Adela Villegas introduces MaxVision patient portal. Now you can access parts of your medical record, email your doctor's office, and request medication refills online. 1. In your internet browser, go to [email or web address] 2. Click on the First Time User? Click Here link in the Sign In box. You will see the New Member Sign Up page. 3. Enter your MaxVision Access Code exactly as it appears below. You will not need to use this code after youve completed the sign-up process. If you do not sign up before the expiration date, you must request a new code. · MaxVision Access Code: [facility] Expires: 3/17/2018 12:54 PM 
 
4. Enter the last four digits of your Social Security Number (xxxx) and Date of Birth (mm/dd/yyyy) as indicated and click Submit. You will be taken to the next sign-up page. 5. Create a MaxVision ID. This will be your MaxVision login ID and cannot be changed, so think of one that is secure and easy to remember. 6. Create a MaxVision password. You can change your password at any time. 7. Enter your Password Reset Question and Answer. This can be used at a later time if you forget your password. 8. Enter your e-mail address. You will receive e-mail notification when new information is available in [address] 9. Click Sign Up. You can now view and download portions of your medical record. 10. Click the Download Summary menu link to download a portable copy of your medical information. If you have questions, please visit the Frequently Asked Questions section of the MaxVision website. Remember, MaxVision is NOT to be used for urgent needs. For medical emergencies, dial 911. Now available from your iPhone and Android! Providers Seen During Your Hospitalization Provider Specialty Primary office phone Divya Phillips MD Gastroenterology [phone] Your Primary Care Physician (PCP) Primary Care Physician Office Phone Office Fax [facility] [phone] You are allergic to the following No active allergies Recent Documentation Smoking Status Current Every Day Smoker Emergency Contacts Name Discharge Info Relation Home Work Mobile . [employer]  Son [89] [phone] Patient Belongings The following personal items are in your possession at time of discharge: 
                             
 
  
  
 Please provide this summary of care documentation to your next provider. Signatures-by signing, you are acknowledging that this After Visit Summary has been reviewed with you and you have received a copy. Patient Signature:  ____________________________________________________________ Date:  ____________________________________________________________  
  
Titus Phen Provider Signature:  ____________________________________________________________ Date:  ____________________________________________________________

## 2018-02-22 NOTE — PROGRESS NOTES
I have reviewed discharge instructions with the patient. The patient verbalized understanding. Opportunities given for questions and clarification. Patient ambulatory to exit with steady gait.

## 2018-02-22 NOTE — PROGRESS NOTES
Interventional Radiology Post Paracentesis/Thoracentesis Note    2/22/2018    Procedure(s): Ultrasound guided Therapeutic Paracentesis Performed with 8 Syriac catheter total volume 87143 ml. Samples sent to lab. Preliminary Findings: large yellow and cloudy. Complications: None    Plan:  Observation, check labs if drawn.           Chest X-Ray:  no    Full dictated report to follow    Signed By: Dinesh Schuler RN

## 2018-02-22 NOTE — PROCEDURES
Interventional Radiology Brief Procedure Note    Patient: Wendie Austin MRN: [id number]  SSN: [id number]    YOB: 1965  Age: 46 y.o. Sex: male      Date of Procedure: 2/22/2018    Pre-Procedure Diagnosis: Liver failure. Chylous ascites. Post-Procedure Diagnosis: SAME    Procedure(s): Paracentesis    Brief Description of Procedure: as above    Performed By: Yayo Romero MD     Assistants: None    Anesthesia: Lidocaine    Estimated Blood Loss: None    Specimens: None    Implants: None    Findings: Milky-peach colored ascites    Complications: None    Recommendations: Return when necessary. Follow Up: PRN.      Signed By: Yayo Romero MD     February 22, 2018

## 2018-03-14 ENCOUNTER — HOSPITAL ENCOUNTER (OUTPATIENT)
Dept: ULTRASOUND IMAGING | Age: 53
Discharge: HOME | End: 2018-03-14
Attending: INTERNAL MEDICINE
Payer: MEDICARE

## 2018-03-14 VITALS
DIASTOLIC BLOOD PRESSURE: 59 MMHG | HEART RATE: 64 BPM | WEIGHT: 174 LBS | BODY MASS INDEX: 23.57 KG/M2 | SYSTOLIC BLOOD PRESSURE: 106 MMHG | OXYGEN SATURATION: 100 % | HEIGHT: 72 IN | RESPIRATION RATE: 16 BRPM | TEMPERATURE: 98.3 F

## 2018-03-14 DIAGNOSIS — I89.8: ICD-10-CM

## 2018-03-14 PROCEDURE — 77030010507 US GUIDE PARACENTESIS

## 2018-03-14 PROCEDURE — P9047 ALBUMIN (HUMAN), 25%, 50ML: HCPCS | Performed by: PHYSICIAN ASSISTANT

## 2018-03-14 PROCEDURE — 74011250636 HC RX REV CODE- 250/636: Performed by: PHYSICIAN ASSISTANT

## 2018-03-14 PROCEDURE — 74011000250 HC RX REV CODE- 250: Performed by: PHYSICIAN ASSISTANT

## 2018-03-14 RX ORDER — ALBUMIN HUMAN 250 G/1000ML
12.5 SOLUTION INTRAVENOUS ONCE
Status: COMPLETED | OUTPATIENT
Start: 2018-03-14 | End: 2018-03-14

## 2018-03-14 RX ORDER — ALBUMIN HUMAN 250 G/1000ML
12.5 SOLUTION INTRAVENOUS ONCE
Status: ACTIVE | OUTPATIENT
Start: 2018-03-14 | End: 2018-03-15

## 2018-03-14 RX ORDER — LIDOCAINE HYDROCHLORIDE 20 MG/ML
1-20 INJECTION, SOLUTION INFILTRATION; PERINEURAL
Status: DISCONTINUED | OUTPATIENT
Start: 2018-03-14 | End: 2018-03-18 | Stop reason: HOSPADM

## 2018-03-14 RX ADMIN — LIDOCAINE HYDROCHLORIDE 100 MG: 20 INJECTION, SOLUTION INFILTRATION; PERINEURAL at 15:39

## 2018-03-14 RX ADMIN — ALBUMIN (HUMAN) 12.5 G: 0.25 INJECTION, SOLUTION INTRAVENOUS at 15:57

## 2018-03-14 RX ADMIN — ALBUMIN (HUMAN) 12.5 G: 0.25 INJECTION, SOLUTION INTRAVENOUS at 16:07

## 2018-03-14 RX ADMIN — SODIUM BICARBONATE 1 ML: 0.2 INJECTION, SOLUTION INTRAVENOUS at 15:39

## 2018-03-14 NOTE — IP AVS SNAPSHOT
303 [facility] 
 
 
 2329 [facility] 322 [address] [facility] 
[phone] Patient: Madalyn Martinez MRN: [id number] :1965 About your hospitalization You were admitted on:  2018 You last received care in the:  [facility] RADIOLOGY ULTRASOUND You were discharged on:  2018 Why you were hospitalized Your primary diagnosis was:  Not on File Follow-up Information None Your Scheduled Appointments 2018  3:30 PM EDT  
CT CHEST W CONT with SFD CT 59 SLICE UNIT 1  
[facility] RADIOLOGY CT SCAN ([address] [place] [address]) 2329 [facility] 322 [address] [facility]  
[phone] PATIENT ARRIVAL 1. Please report 30 minutes before exam for registration. 2. Please arrive well hydrated. Drink plenty of fluids the day before and the day of your study. Do not eat anything 4 hours before your arrival time, however please continue to drink clear liquids until 2 hours prior to the arrival time. 3. If diabetic, please bring a list of any diabetic medications. 4. Dialysis patients do not need to have hydration. It is recommended the patient have dialysis within 24 hours after the test.  
  
    
[address] [facility] [address] [place], [address] [facility]- 2nd floor of Outpatient Medical Office Building Discharge Orders None A check alberto indicates which time of day the medication should be taken. My Medications ASK your doctor about these medications Instructions Each Dose to Equal  
 Morning Noon Evening Bedtime AMILORIDE PO Your last dose was: Your next dose is: Take 15 mg by mouth two (2) times a day. Dose unknown, to bring dos for clarification 15 mg  
    
   
   
   
  
 lactulose 10 gram/15 mL solution Commonly known as:  Dawson Niño Your last dose was: Your next dose is: Take 10 g by mouth three (3) times daily. 10 g torsemide 20 mg tablet Commonly known as:  DEMADEX Your last dose was: Your next dose is: Take 40 mg by mouth two (2) times a day. 40 mg Discharge Instructions Rickey [address] Department of Interventional Radiology [facility] 
[phone] Office 
[phone] Fax PARACENTESIS DISCHARGE INSTRUCTIONS General Information: 
During this procedure, the doctor will insert a needle into the abdomen to drain fluid. After the procedure, you will be able to take a deep breath much easier. The site of the puncture may ooze the first day. This will decrease and eventually stop. Paracentesis (draining fluid from the abdomen) sometimes makes patients hypotensive (low blood pressure). Your doctor may order for you to receive fluids or albumin (a volume booster) during the procedure through an IV site. Home Care Instructions: 
Keep the puncture site clean and dry. No tub baths or swimming until puncture site heals. Showering is acceptable. Resume your normal diet, and resume your normal activity slowly and as you tolerate. If you are short of breath, rest. If shortness of breath does not ease, please call your ordering doctor. Fluid can re-accumulate in the chest and/or in the abdomen. If this should occur, your doctor needs to know as you may need to have the procedure done again. Call If: 
   You should call your Physician and/or the Radiology Nurse if you notice any signs of infection, like pus draining, or if it is swollen or reddened. Also call if you have a fever, or if you are bleeding from the puncture site more than a small amount on the dressing. Call if the puncture site keeps draining fluid. Some oozing is to be expected, but should slow and then stop.  Call if you feel like you have pressure in your abdomen. SEEK IMMEDIATE CARE OR CALL 911 IF YOU SUDDENLY HAVE TROUBLE BREATHING, OR IF YOUR LIPS TURN BLUE, OR IF YOU NOTICE BLOOD IN YOUR SPUTUM. Follow-Up Instructions: Please see your ordering doctor as he/she has requested. To Reach Us: If you have any questions about your procedure, please call the Interventional Radiology department at [phone]. After business hours (5pm) and weekends, call the answering service at [phone] and ask for the Radiologist on call to be paged. Si tiene Preguntas acerca del procedimiento, por favor llame al departamento de Radiología Intervencional al [phone]. Después de horas de oficina (5 pm) y los fines de [place], llamar al Stewart Buenrostro de llamadas al [phone] y pregunte por el Radiologo de Sarah Bankshikortney. Interventional Radiology General Nurse Discharge After general anesthesia or intravenous sedation, for 24 hours or while taking prescription Narcotics: · Limit your activities · Do not drive and operate hazardous machinery · Do not make important personal or business decisions · Do  not drink alcoholic beverages · If you have not urinated within 8 hours after discharge, please contact your surgeon on call. * Please give a list of your current medications to your Primary Care Provider. * Please update this list whenever your medications are discontinued, doses are 
   changed, or new medications (including over-the-counter products) are added. * Please carry medication information at all times in case of emergency situations. These are general instructions for a healthy lifestyle: No smoking/ No tobacco products/ Avoid exposure to second hand smoke Surgeon General's Warning:  Quitting smoking now greatly reduces serious risk to your health. Obesity, smoking, and sedentary lifestyle greatly increases your risk for illness A healthy diet, regular physical exercise & weight monitoring are important for maintaining a healthy lifestyle You may be retaining fluid if you have a history of heart failure or if you experience any of the following symptoms:  Weight gain of 3 pounds or more overnight or 5 pounds in a week, increased swelling in our hands or feet or shortness of breath while lying flat in bed. Please call your doctor as soon as you notice any of these symptoms; do not wait until your next office visit. Recognize signs and symptoms of STROKE: 
F-face looks uneven A-arms unable to move or move unevenly S-speech slurred or non-existent T-time-call 911 as soon as signs and symptoms begin-DO NOT go Back to bed or wait to see if you get better-TIME IS BRAIN. Date: 3/14/2018 Discharging Nurse: Marin Rodríguez RN Introducing [facility] & HEALTH SERVICES! [facility] introduces Link To Media patient portal. Now you can access parts of your medical record, email your doctor's office, and request medication refills online. 1. In your internet browser, go to [email or web address]. [email or web address] Online 2. Click on the First Time User? Click Here link in the Sign In box. You will see the New Member Sign Up page. 3. Enter your Link To Media Access Code exactly as it appears below. You will not need to use this code after youve completed the sign-up process. If you do not sign up before the expiration date, you must request a new code. · [employer] To [employer] Access Code: [email or web address] Expires: 6/11/2018  5:23 AM 
 
4. Enter the last four digits of your Social Security Number (xxxx) and Date of Birth (mm/dd/yyyy) as indicated and click Submit. You will be taken to the next sign-up page. 5. Create a Link To Media ID. This will be your Link To Media login ID and cannot be changed, so think of one that is secure and easy to remember. 6. Create a [employer] password. You can change your password at any time. 7. Enter your Password Reset Question and Answer.  This can be used at a later time if you forget your password. 8. Enter your e-mail address. You will receive e-mail notification when new information is available in [address] 9. Click Sign Up. You can now view and download portions of your medical record. 10. Click the Download Summary menu link to download a portable copy of your medical information. If you have questions, please visit the Frequently Asked Questions section of the [employer] website. Remember, [employer] is NOT to be used for urgent needs. For medical emergencies, dial 911. Now available from your iPhone and Android! Unresulted Labs-Please follow up with your PCP about these lab tests Order Current Status US GUIDE PARACENTESIS In process Providers Seen During Your Hospitalization Provider Specialty Primary office phone Destiny Guzmán MD Gastroenterology [phone] Your Primary Care Physician (PCP) Primary Care Physician Office Phone Office Fax Osiel Arroyoesteban [phone] You are allergic to the following No active allergies Recent Documentation Height Weight BMI Smoking Status 1.829 m 78.9 kg 23.6 kg/m2 Current Every Day Smoker Emergency Contacts Name Discharge Info Relation Home Work Mobile [employer]. [employer]  Son [82] [phone] Patient Belongings The following personal items are in your possession at time of discharge: 
     Visual Aid: Glasses Please provide this summary of care documentation to your next provider. Signatures-by signing, you are acknowledging that this After Visit Summary has been reviewed with you and you have received a copy. Patient Signature:  ____________________________________________________________ Date:  ____________________________________________________________  
  
Vera Weller  Provider Signature: ____________________________________________________________ Date:  ____________________________________________________________

## 2018-03-14 NOTE — PROGRESS NOTES
Interventional Radiology Post Paracentesis/Thoracentesis Note    10/12/2017    Procedure(s): Ultrasound guided Therapeutic Paracentesis Performed with 8 Turkish catheter total volume 8600 ml. Samples sent to lab. Preliminary Findings: moderate yellow and cloudy. Complications: None    Plan:  Observation, check labs if drawn.           Chest X-Ray:  no    Full dictated report to follow

## 2018-03-14 NOTE — DISCHARGE INSTRUCTIONS
[address] [facility] [address]  Department of Interventional Radiology  [facility]  [phone] Office  [phone] Fax    PARACENTESIS DISCHARGE INSTRUCTIONS    General Information:  During this procedure, the doctor will insert a needle into the abdomen to drain fluid. After the procedure, you will be able to take a deep breath much easier. The site of the puncture may ooze the first day. This will decrease and eventually stop. Paracentesis (draining fluid from the abdomen) sometimes makes patients hypotensive (low blood pressure). Your doctor may order for you to receive fluids or albumin (a volume booster) during the procedure through an IV site. Home Care Instructions:  Keep the puncture site clean and dry. No tub baths or swimming until puncture site heals. Showering is acceptable. Resume your normal diet, and resume your normal activity slowly and as you tolerate. If you are short of breath, rest. If shortness of breath does not ease, please call your ordering doctor. Fluid can re-accumulate in the chest and/or in the abdomen. If this should occur, your doctor needs to know as you may need to have the procedure done again. Call If:     You should call your Physician and/or the Radiology Nurse if you notice any signs of infection, like pus draining, or if it is swollen or reddened. Also call if you have a fever, or if you are bleeding from the puncture site more than a small amount on the dressing. Call if the puncture site keeps draining fluid. Some oozing is to be expected, but should slow and then stop. Call if you feel like you have pressure in your abdomen. SEEK IMMEDIATE CARE OR CALL 911 IF YOU SUDDENLY HAVE TROUBLE BREATHING, OR IF YOUR LIPS TURN BLUE, OR IF YOU NOTICE BLOOD IN YOUR SPUTUM. Follow-Up Instructions: Please see your ordering doctor as he/she has requested. To Reach Us:     If you have any questions about your procedure, please call the Interventional Radiology department at [phone]. After business hours (5pm) and weekends, call the answering service at [phone] and ask for the Radiologist on call to be paged. Si tiene Preguntas acerca del procedimiento, por favor llame al departamento de Radiología Intervencional al [phone]. Después de horas de oficina (5 pm) y los fines de [place], llamar al Porfirio Finely de llamadas al [phone] y pregunte por el Radiologo de [place] [facility]. Interventional Radiology General Nurse Discharge    After general anesthesia or intravenous sedation, for 24 hours or while taking prescription Narcotics:  · Limit your activities  · Do not drive and operate hazardous machinery  · Do not make important personal or business decisions  · Do  not drink alcoholic beverages  · If you have not urinated within 8 hours after discharge, please contact your surgeon on call. * Please give a list of your current medications to your Primary Care Provider. * Please update this list whenever your medications are discontinued, doses are     changed, or new medications (including over-the-counter products) are added. * Please carry medication information at all times in case of emergency situations. These are general instructions for a healthy lifestyle:    No smoking/ No tobacco products/ Avoid exposure to second hand smoke  Surgeon General's Warning:  Quitting smoking now greatly reduces serious risk to your health. Obesity, smoking, and sedentary lifestyle greatly increases your risk for illness  A healthy diet, regular physical exercise & weight monitoring are important for maintaining a healthy lifestyle    You may be retaining fluid if you have a history of heart failure or if you experience any of the following symptoms:  Weight gain of 3 pounds or more overnight or 5 pounds in a week, increased swelling in our hands or feet or shortness of breath while lying flat in bed.   Please call your doctor as soon as you notice any of these symptoms; do not wait until your next office visit. Recognize signs and symptoms of STROKE:  F-face looks uneven    A-arms unable to move or move unevenly    S-speech slurred or non-existent    T-time-call 911 as soon as signs and symptoms begin-DO NOT go       Back to bed or wait to see if you get better-TIME IS BRAIN.       Date: 3/14/2018  Discharging Nurse: Easton Aparicio RN

## 2018-03-30 ENCOUNTER — HOSPITAL ENCOUNTER (OUTPATIENT)
Dept: CT IMAGING | Age: 53
Discharge: HOME | End: 2018-03-30
Attending: INTERNAL MEDICINE
Payer: MEDICARE

## 2018-03-30 ENCOUNTER — HOSPITAL ENCOUNTER (OUTPATIENT)
Dept: ULTRASOUND IMAGING | Age: 53
Discharge: HOME | End: 2018-03-30
Attending: INTERNAL MEDICINE
Payer: MEDICARE

## 2018-03-30 VITALS
TEMPERATURE: 98.1 F | HEART RATE: 66 BPM | SYSTOLIC BLOOD PRESSURE: 105 MMHG | DIASTOLIC BLOOD PRESSURE: 60 MMHG | RESPIRATION RATE: 16 BRPM | OXYGEN SATURATION: 100 %

## 2018-03-30 DIAGNOSIS — I89.8: ICD-10-CM

## 2018-03-30 DIAGNOSIS — I89.8 OTHER SPECIFIED NONINFECTIVE DISORDERS OF LYMPHATIC VESSELS AND LYMPH NODES: ICD-10-CM

## 2018-03-30 DIAGNOSIS — R18.8 OTHER ASCITES: ICD-10-CM

## 2018-03-30 DIAGNOSIS — K72.90 HEPATIC FAILURE, UNSPECIFIED WITHOUT COMA (HCC): ICD-10-CM

## 2018-03-30 PROCEDURE — 74011250636 HC RX REV CODE- 250/636: Performed by: INTERNAL MEDICINE

## 2018-03-30 PROCEDURE — P9047 ALBUMIN (HUMAN), 25%, 50ML: HCPCS | Performed by: INTERNAL MEDICINE

## 2018-03-30 PROCEDURE — 74011000250 HC RX REV CODE- 250: Performed by: PHYSICIAN ASSISTANT

## 2018-03-30 PROCEDURE — C1729 CATH, DRAINAGE: HCPCS

## 2018-03-30 PROCEDURE — 74011636320 HC RX REV CODE- 636/320: Performed by: INTERNAL MEDICINE

## 2018-03-30 PROCEDURE — 74011000258 HC RX REV CODE- 258: Performed by: INTERNAL MEDICINE

## 2018-03-30 PROCEDURE — P9047 ALBUMIN (HUMAN), 25%, 50ML: HCPCS | Performed by: PHYSICIAN ASSISTANT

## 2018-03-30 PROCEDURE — 71260 CT THORAX DX C+: CPT

## 2018-03-30 PROCEDURE — 74011250636 HC RX REV CODE- 250/636: Performed by: PHYSICIAN ASSISTANT

## 2018-03-30 RX ORDER — ALBUMIN HUMAN 250 G/1000ML
12.5 SOLUTION INTRAVENOUS ONCE
Status: COMPLETED | OUTPATIENT
Start: 2018-03-30 | End: 2018-03-30

## 2018-03-30 RX ORDER — LIDOCAINE HYDROCHLORIDE 20 MG/ML
1-20 INJECTION, SOLUTION INFILTRATION; PERINEURAL
Status: DISCONTINUED | OUTPATIENT
Start: 2018-03-30 | End: 2018-03-30

## 2018-03-30 RX ORDER — LIDOCAINE HYDROCHLORIDE 10 MG/ML
1-20 INJECTION INFILTRATION; PERINEURAL
Status: DISCONTINUED | OUTPATIENT
Start: 2018-03-30 | End: 2018-04-03 | Stop reason: HOSPADM

## 2018-03-30 RX ORDER — SODIUM CHLORIDE 0.9 % (FLUSH) 0.9 %
10 SYRINGE (ML) INJECTION
Status: COMPLETED | OUTPATIENT
Start: 2018-03-30 | End: 2018-03-30

## 2018-03-30 RX ADMIN — IOPAMIDOL 80 ML: 755 INJECTION, SOLUTION INTRAVENOUS at 14:01

## 2018-03-30 RX ADMIN — LIDOCAINE HYDROCHLORIDE 8 ML: 10 INJECTION, SOLUTION INFILTRATION; PERINEURAL at 13:04

## 2018-03-30 RX ADMIN — ALBUMIN (HUMAN) 12.5 G: 0.25 INJECTION, SOLUTION INTRAVENOUS at 13:28

## 2018-03-30 RX ADMIN — SODIUM BICARBONATE 2 ML: 0.2 INJECTION, SOLUTION INTRAVENOUS at 13:04

## 2018-03-30 RX ADMIN — Medication 10 ML: at 14:01

## 2018-03-30 RX ADMIN — SODIUM CHLORIDE 100 ML: 900 INJECTION, SOLUTION INTRAVENOUS at 14:01

## 2018-03-30 RX ADMIN — ALBUMIN (HUMAN) 12.5 G: 0.25 INJECTION, SOLUTION INTRAVENOUS at 13:40

## 2018-03-30 NOTE — PROGRESS NOTES
Interventional Radiology Post Paracentesis/Thoracentesis Note    10/12/2017    Procedure(s): Ultrasound guided Therapeutic Paracentesis Performed with 8 Greek catheter total volume 8350 ml. Samples sent to lab. Preliminary Findings: large yellow and cloudy. Complications: None    Plan:  Observation, check labs if drawn.           Chest X-Ray:  no    Full dictated report to follow

## 2018-03-30 NOTE — IP AVS SNAPSHOT
[address] [facility] 
 
 
 [address] 
[phone] Patient: Ryan Yeager MRN: [id number] :1965 About your hospitalization You were admitted on:  2018 You last received care in the:  [facility] RADIOLOGY ULTRASOUND You were discharged on:  2018 Why you were hospitalized Your primary diagnosis was:  Not on File Follow-up Information None Your Scheduled Appointments 2018  3:30 PM EDT  
CT CHEST W CONT with SFD CT 59 SLICE UNIT 1  
[facility] RADIOLOGY CT SCAN ([address] [place] [address]) [address] [place] [address]  
[phone] PATIENT ARRIVAL 1. Please report 30 minutes before exam for registration. 2. Please arrive well hydrated. Drink plenty of fluids the day before and the day of your study. Do not eat anything 4 hours before your arrival time, however please continue to drink clear liquids until 2 hours prior to the arrival time. 3. If diabetic, please bring a list of any diabetic medications. 4. Dialysis patients do not need to have hydration. It is recommended the patient have dialysis within 24 hours after the test.  
  
    
[address], [address] [place] [address]- 2nd floor of Outpatient Medical Office Building Discharge Orders None A check alberto indicates which time of day the medication should be taken. My Medications ASK your doctor about these medications Instructions Each Dose to Equal  
 Morning Noon Evening Bedtime AMILORIDE PO Your last dose was: Your next dose is: Take 15 mg by mouth two (2) times a day. Dose unknown, to bring dos for clarification 15 mg  
    
   
   
   
  
 lactulose 10 gram/15 mL solution Commonly known as:  Maggy Anderson Your last dose was: Your next dose is: Take 10 g by mouth three (3) times daily. 10 g torsemide 20 mg tablet Commonly known as:  DEMADEX Your last dose was: Your next dose is: Take 40 mg by mouth two (2) times a day. 40 mg Discharge Instructions Rickey [address] Department of Interventional Radiology [facility] 
[phone] Office 
[phone] Fax PARACENTESIS DISCHARGE INSTRUCTIONS General Information: 
During this procedure, the doctor will insert a needle into the abdomen to drain fluid. After the procedure, you will be able to take a deep breath much easier. The site of the puncture may ooze the first day. This will decrease and eventually stop. Paracentesis (draining fluid from the abdomen) sometimes makes patients hypotensive (low blood pressure). Your doctor may order for you to receive fluids or albumin (a volume booster) during the procedure through an IV site. Home Care Instructions: 
Keep the puncture site clean and dry. No tub baths or swimming until puncture site heals. Showering is acceptable. Resume your normal diet, and resume your normal activity slowly and as you tolerate. If you are short of breath, rest. If shortness of breath does not ease, please call your ordering doctor. Fluid can re-accumulate in the chest and/or in the abdomen. If this should occur, your doctor needs to know as you may need to have the procedure done again. Call If: 
   You should call your Physician and/or the Radiology Nurse if you notice any signs of infection, like pus draining, or if it is swollen or reddened. Also call if you have a fever, or if you are bleeding from the puncture site more than a small amount on the dressing. Call if the puncture site keeps draining fluid. Some oozing is to be expected, but should slow and then stop.  Call if you feel like you have pressure in your abdomen. SEEK IMMEDIATE CARE OR CALL 911 IF YOU SUDDENLY HAVE TROUBLE BREATHING, OR IF YOUR LIPS TURN BLUE, OR IF YOU NOTICE BLOOD IN YOUR SPUTUM. Follow-Up Instructions: Please see your ordering doctor as he/she has requested. To Reach Us: If you have any questions about your procedure, please call the Interventional Radiology department at [phone]. After business hours (5pm) and weekends, call the answering service at [phone] and ask for the Radiologist on call to be paged. Si tiene Preguntas acerca del procedimiento, por favor llame al departamento de Radiología Intervencional al [phone]. Después de horas de oficina (5 pm) y los fines de [place], llamar al [place] de llamadas al [phone] y pregunte por el Radiologo de [facility]. Interventional Radiology General Nurse Discharge After general anesthesia or intravenous sedation, for 24 hours or while taking prescription Narcotics: · Limit your activities · Do not drive and operate hazardous machinery · Do not make important personal or business decisions · Do  not drink alcoholic beverages · If you have not urinated within 8 hours after discharge, please contact your surgeon on call. * Please give a list of your current medications to your Primary Care Provider. * Please update this list whenever your medications are discontinued, doses are 
   changed, or new medications (including over-the-counter products) are added. * Please carry medication information at all times in case of emergency situations. These are general instructions for a healthy lifestyle: No smoking/ No tobacco products/ Avoid exposure to second hand smoke Surgeon General's Warning:  Quitting smoking now greatly reduces serious risk to your health. Obesity, smoking, and sedentary lifestyle greatly increases your risk for illness A healthy diet, regular physical exercise & weight monitoring are important for maintaining a healthy lifestyle You may be retaining fluid if you have a history of heart failure or if you experience any of the following symptoms:  Weight gain of 3 pounds or more overnight or 5 pounds in a week, increased swelling in our hands or feet or shortness of breath while lying flat in bed. Please call your doctor as soon as you notice any of these symptoms; do not wait until your next office visit. Recognize signs and symptoms of STROKE: 
F-face looks uneven A-arms unable to move or move unevenly S-speech slurred or non-existent T-time-call 911 as soon as signs and symptoms begin-DO NOT go Back to bed or wait to see if you get better-TIME IS BRAIN. Date: 3/30/2018 Discharging Nurse: Robert Bergman RN Introducing [facility]! [facility] introduces [employer] patient portal. Now you can access parts of your medical record, email your doctor's office, and request medication refills online. 1. In your internet browser, go to [email or web address] 2. Click on the First Time User? Click Here link in the Sign In box. You will see the New Member Sign Up page. 3. Enter your [employer] Access Code exactly as it appears below. You will not need to use this code after youve completed the sign-up process. If you do not sign up before the expiration date, you must request a new code. · [employer] Access Code: [email or web address] Expires: 6/11/2018  5:23 AM 
 
4. Enter the last four digits of your Social Security Number (xxxx) and Date of Birth (mm/dd/yyyy) as indicated and click Submit. You will be taken to the next sign-up page. 5. Create a [employer] ID. This will be your [employer] login ID and cannot be changed, so think of one that is secure and easy to remember. 6. Create a [employer] password. You can change your password at any time. 7. Enter your Password Reset Question and Answer.  This can be used at a later time if you forget your password. 8. Enter your e-mail address. You will receive e-mail notification when new information is available in [address] 9. Click Sign Up. You can now view and download portions of your medical record. 10. Click the Download Summary menu link to download a portable copy of your medical information. If you have questions, please visit the Frequently Asked Questions section of the [employer] website. Remember, [employer] is NOT to be used for urgent needs. For medical emergencies, dial 911. Now available from your iPhone and Android! Introducing Kevin Spear As a [facility] patient, I wanted to make you aware of our electronic visit tool called Kevin Spear. [facility] 24/7 allows you to connect within minutes with a medical provider 24 hours a day, seven days a week via a mobile device or tablet or logging into a secure website from your computer. You can access Kevin Spear from anywhere in the [place]. A virtual visit might be right for you when you have a simple condition and feel like you just dont want to get out of bed, or cant get away from work for an appointment, when your regular [facility] provider is not available (evenings, weekends or holidays), or when youre out of town and need minor care. Electronic visits cost only $49 and if the [facility] 24/7 provider determines a prescription is needed to treat your condition, one can be electronically transmitted to a nearby pharmacy*. Please take a moment to enroll today if you have not already done so. The enrollment process is free and takes just a few minutes. To enroll, please download the [facility] 24/7 av to your tablet or phone, or visit [email or web address] to enroll on your computer.    
And, as an [address] [place] [address] patient with a [employer] account, the results of your visits will be scanned into your electronic medical record and your primary care provider will be able to view the scanned results. We urge you to continue to see your regular [facility] provider for your ongoing medical care. And while your primary care provider may not be the one available when you seek a Kevin Spear virtual visit, the peace of mind you get from getting a real diagnosis real time can be priceless. For more information on Kevin Carrerafin, view our Frequently Asked Questions (FAQs) at [email or web address]. Sincerely, 
 
Apple Cohen MD 
Chief Medical Officer [address] *:  certain medications cannot be prescribed via Kevin [facility] pte Unresulted Labs-Please follow up with your PCP about these lab tests Order Current Status US PARACENTESIS ABD W IMAGING In process Providers Seen During Your Hospitalization Provider Specialty Primary office phone Lana Cohen MD Gastroenterology [phone] Your Primary Care Physician (PCP) Primary Care Physician Office Phone Office Fax Osbaldo Daniel [phone] You are allergic to the following No active allergies Recent Documentation Smoking Status Current Every Day Smoker Emergency Contacts Name Discharge Info Relation Home Work Mobile Animail Chen [employer]  Son [21] [phone] Patient Belongings The following personal items are in your possession at time of discharge: 
                             
 
  
  
 Please provide this summary of care documentation to your next provider. Signatures-by signing, you are acknowledging that this After Visit Summary has been reviewed with you and you have received a copy. Patient Signature:  ____________________________________________________________ Date:  ____________________________________________________________  
  
Larri Hazard  Provider Signature: ____________________________________________________________ Date:  ____________________________________________________________

## 2018-04-02 ENCOUNTER — HOSPITAL ENCOUNTER (EMERGENCY)
Age: 53
Discharge: HOME | End: 2018-04-02
Attending: EMERGENCY MEDICINE
Payer: MEDICARE

## 2018-04-02 VITALS
RESPIRATION RATE: 20 BRPM | SYSTOLIC BLOOD PRESSURE: 122 MMHG | HEART RATE: 84 BPM | HEIGHT: 72 IN | OXYGEN SATURATION: 99 % | DIASTOLIC BLOOD PRESSURE: 60 MMHG | WEIGHT: 160 LBS | BODY MASS INDEX: 21.67 KG/M2 | TEMPERATURE: 98.4 F

## 2018-04-02 DIAGNOSIS — K42.0 INCARCERATED UMBILICAL HERNIA: Primary | ICD-10-CM

## 2018-04-02 LAB
ALBUMIN SERPL-MCNC: 2.1 G/DL (ref 3.5–5)
ALBUMIN/GLOB SERPL: 0.5 {RATIO} (ref 1.2–3.5)
ALP SERPL-CCNC: 90 U/L (ref 50–136)
ALT SERPL-CCNC: 24 U/L (ref 12–65)
ANION GAP SERPL CALC-SCNC: 9 MMOL/L (ref 7–16)
APTT PPP: 28.4 SEC (ref 23.2–35.3)
AST SERPL-CCNC: 40 U/L (ref 15–37)
BASOPHILS # BLD: 0 K/UL (ref 0–0.2)
BASOPHILS NFR BLD: 0 % (ref 0–2)
BILIRUB SERPL-MCNC: 1.1 MG/DL (ref 0.2–1.1)
BUN SERPL-MCNC: 13 MG/DL (ref 6–23)
CALCIUM SERPL-MCNC: 8 MG/DL (ref 8.3–10.4)
CHLORIDE SERPL-SCNC: 100 MMOL/L (ref 98–107)
CO2 SERPL-SCNC: 28 MMOL/L (ref 21–32)
CREAT SERPL-MCNC: 1.23 MG/DL (ref 0.8–1.5)
DIFFERENTIAL METHOD BLD: ABNORMAL
EOSINOPHIL # BLD: 0.1 K/UL (ref 0–0.8)
EOSINOPHIL NFR BLD: 2 % (ref 0.5–7.8)
ERYTHROCYTE [DISTWIDTH] IN BLOOD BY AUTOMATED COUNT: 14 % (ref 11.9–14.6)
GLOBULIN SER CALC-MCNC: 4.1 G/DL (ref 2.3–3.5)
GLUCOSE SERPL-MCNC: 99 MG/DL (ref 65–100)
HCT VFR BLD AUTO: 36.8 % (ref 41.1–50.3)
HGB BLD-MCNC: 13 G/DL (ref 13.6–17.2)
IMM GRANULOCYTES # BLD: 0 K/UL (ref 0–0.5)
IMM GRANULOCYTES NFR BLD AUTO: 0 % (ref 0–5)
INR PPP: 1.2
LYMPHOCYTES # BLD: 0.6 K/UL (ref 0.5–4.6)
LYMPHOCYTES NFR BLD: 10 % (ref 13–44)
MCH RBC QN AUTO: 33.1 PG (ref 26.1–32.9)
MCHC RBC AUTO-ENTMCNC: 35.3 G/DL (ref 31.4–35)
MCV RBC AUTO: 93.6 FL (ref 79.6–97.8)
MONOCYTES # BLD: 0.4 K/UL (ref 0.1–1.3)
MONOCYTES NFR BLD: 6 % (ref 4–12)
NEUTS SEG # BLD: 4.5 K/UL (ref 1.7–8.2)
NEUTS SEG NFR BLD: 82 % (ref 43–78)
PLATELET # BLD AUTO: 89 K/UL (ref 150–450)
PMV BLD AUTO: 9.8 FL (ref 10.8–14.1)
POTASSIUM SERPL-SCNC: 3.2 MMOL/L (ref 3.5–5.1)
PROT SERPL-MCNC: 6.2 G/DL (ref 6.3–8.2)
PROTHROMBIN TIME: 15.4 SEC (ref 11.5–14.5)
RBC # BLD AUTO: 3.93 M/UL (ref 4.23–5.67)
SODIUM SERPL-SCNC: 137 MMOL/L (ref 136–145)
WBC # BLD AUTO: 5.5 K/UL (ref 4.3–11.1)

## 2018-04-02 PROCEDURE — 74011250636 HC RX REV CODE- 250/636: Performed by: EMERGENCY MEDICINE

## 2018-04-02 PROCEDURE — 96374 THER/PROPH/DIAG INJ IV PUSH: CPT | Performed by: EMERGENCY MEDICINE

## 2018-04-02 PROCEDURE — 81003 URINALYSIS AUTO W/O SCOPE: CPT | Performed by: EMERGENCY MEDICINE

## 2018-04-02 PROCEDURE — 85025 COMPLETE CBC W/AUTO DIFF WBC: CPT | Performed by: EMERGENCY MEDICINE

## 2018-04-02 PROCEDURE — 96376 TX/PRO/DX INJ SAME DRUG ADON: CPT | Performed by: EMERGENCY MEDICINE

## 2018-04-02 PROCEDURE — 80053 COMPREHEN METABOLIC PANEL: CPT | Performed by: EMERGENCY MEDICINE

## 2018-04-02 PROCEDURE — 85610 PROTHROMBIN TIME: CPT | Performed by: EMERGENCY MEDICINE

## 2018-04-02 PROCEDURE — 96375 TX/PRO/DX INJ NEW DRUG ADDON: CPT | Performed by: EMERGENCY MEDICINE

## 2018-04-02 PROCEDURE — 85730 THROMBOPLASTIN TIME PARTIAL: CPT | Performed by: EMERGENCY MEDICINE

## 2018-04-02 PROCEDURE — 99283 EMERGENCY DEPT VISIT LOW MDM: CPT | Performed by: EMERGENCY MEDICINE

## 2018-04-02 RX ORDER — ONDANSETRON 2 MG/ML
4 INJECTION INTRAMUSCULAR; INTRAVENOUS
Status: COMPLETED | OUTPATIENT
Start: 2018-04-02 | End: 2018-04-02

## 2018-04-02 RX ORDER — HYDROMORPHONE HYDROCHLORIDE 2 MG/ML
1 INJECTION, SOLUTION INTRAMUSCULAR; INTRAVENOUS; SUBCUTANEOUS
Status: COMPLETED | OUTPATIENT
Start: 2018-04-02 | End: 2018-04-02

## 2018-04-02 RX ADMIN — HYDROMORPHONE HYDROCHLORIDE 1 MG: 2 INJECTION, SOLUTION INTRAMUSCULAR; INTRAVENOUS; SUBCUTANEOUS at 18:41

## 2018-04-02 RX ADMIN — ONDANSETRON 4 MG: 2 INJECTION INTRAMUSCULAR; INTRAVENOUS at 17:56

## 2018-04-02 RX ADMIN — HYDROMORPHONE HYDROCHLORIDE 1 MG: 2 INJECTION, SOLUTION INTRAMUSCULAR; INTRAVENOUS; SUBCUTANEOUS at 17:56

## 2018-04-02 NOTE — ED TRIAGE NOTES
Patient complains of abdominal pain, patient has an umbilical hernia and is unable to be reduced per patient.

## 2018-04-02 NOTE — DISCHARGE INSTRUCTIONS
Try to avoid straining. See her gastroenterologist to recheck in the next day or 2. Recheck for recurrent problems or high fever or vomiting. Hernia: Care Instructions  Your Care Instructions    A hernia develops when tissue bulges through a weak spot in the wall of your belly. The groin area and the navel are common areas for a hernia. A hernia can also develop near the area of a surgery you had before. Pressure from lifting, straining, or coughing can tear the weak area, causing the hernia to bulge and be painful. If you cannot push a hernia back into place, the tissue may become trapped outside the belly wall. If the hernia gets twisted and loses its blood supply, it will swell and die. This is called a strangulated hernia. It usually causes a lot of pain. It needs treatment right away. Some hernias need to be repaired to prevent a strangulated hernia. If your hernia causes symptoms or is large, you may need surgery. Follow-up care is a key part of your treatment and safety. Be sure to make and go to all appointments, and call your doctor if you are having problems. It's also a good idea to know your test results and keep a list of the medicines you take. How can you care for yourself at home? · Take care when lifting heavy objects. · Stay at a healthy weight. · Do not smoke. Smoking can cause coughing, which can cause your hernia to bulge. If you need help quitting, talk to your doctor about stop-smoking programs and medicines. These can increase your chances of quitting for good. · Talk with your doctor before wearing a corset or truss for a hernia. These devices are not recommended for treating hernias and sometimes can do more harm than good. There may be certain situations when your doctor thinks a truss would work, but these are rare. When should you call for help? Call your doctor now or seek immediate medical care if:  ? · You have new or worse belly pain. ? · You are vomiting.    ? · You cannot pass stools or gas. ? · You cannot push the hernia back into place with gentle pressure when you are lying down. ? · The area over the hernia turns red or becomes tender. ? Watch closely for changes in your health, and be sure to contact your doctor if you have any problems. Where can you learn more? Go to http://elvia-danielle.info/. Enter C129 in the search box to learn more about \"Hernia: Care Instructions. \"  Current as of: May 12, 2017  Content Version: 11.4  © 4853-1318 [employer]. Care instructions adapted under license by [employer] (which disclaims liability or warranty for this information). If you have questions about a medical condition or this instruction, always ask your healthcare professional. [address] any warranty or liability for your use of this information.

## 2018-04-02 NOTE — ED NOTES
I have reviewed discharge instructions with the patient. The patient verbalized understanding. Patient left ED via Discharge Method: ambulatory to Home with (family). Opportunity for questions and clarification provided. Patient given 0 scripts. To continue your aftercare when you leave the hospital, you may receive an automated call from our care team to check in on how you are doing. This is a free service and part of our promise to provide the best care and service to meet your aftercare needs.  If you have questions, or wish to unsubscribe from this service please call [phone]. Thank you for Choosing our [facility].

## 2018-04-02 NOTE — ED PROVIDER NOTES
HPI Comments: 63-year-old male  Has a history of cirrhosis, TIPS procedure, repair of umbilical hernia. He had paracentesis recently. Noticing increasing swelling around his umbilical hernia repair site. Increasing pain in the last 12-25 hours. No vomiting no bleeding no diarrhea with blood. Had surgery on this umbilical hernia about one year ago. Patient is a 46 y.o. male presenting with abdominal pain. The history is provided by the patient. Abdominal Pain    This is a new problem. The current episode started 12 to 24 hours ago. The problem occurs constantly. The problem has not changed since onset. The pain is located in the periumbilical region. The pain is moderate. Associated symptoms include nausea. Pertinent negatives include no fever, no diarrhea, no vomiting, no dysuria, no chest pain and no back pain. Nothing worsens the pain. The pain is relieved by nothing. Past Medical History:   Diagnosis Date    Cirrhosis of liver ([facility]     undeterminable cause    Multiple fractures of ribs of both sides     Multiple, bilateral w/o pneumo: 2013       Past Surgical History:   Procedure Laterality Date    HX HERNIA REPAIR      abd    HX KNEE ARTHROSCOPY  2005    HX OTHER SURGICAL      liver bx         Family History:   Problem Relation Age of Onset    No Known Problems Mother     No Known Problems Father        Social History     Social History    Marital status: SINGLE     Spouse name: N/A    Number of children: N/A    Years of education: N/A     Occupational History    Not on file. Social History Main Topics    Smoking status: Current Every Day Smoker     Packs/day: 0.50     Years: 4.00     Types: Cigarettes     Start date: 9/10/2010    Smokeless tobacco: Never Used    Alcohol use No    Drug use: Yes     Special: Marijuana    Sexual activity: Not on file     Other Topics Concern    Not on file     Social History Narrative    The patient is .   He has a  for a food . He has no known exposure to TB. He has always lived in this general area. ALLERGIES: Review of patient's allergies indicates no known allergies. Review of Systems   Constitutional: Negative for chills and fever. Respiratory: Negative for cough and shortness of breath. Cardiovascular: Negative for chest pain and palpitations. Gastrointestinal: Positive for abdominal pain and nausea. Negative for diarrhea and vomiting. Genitourinary: Negative for dysuria and flank pain. Musculoskeletal: Negative for back pain and neck pain. Skin: Negative for color change and rash. All other systems reviewed and are negative. Vitals:    04/02/18 1657   BP: 116/55   Pulse: 77   Resp: 18   Temp: 98.1 °F (36.7 °C)   SpO2: 100%   Weight: 72.6 kg (160 lb)   Height: 6' (1.829 m)            Physical Exam   Constitutional: He is oriented to person, place, and time. He appears well-developed and well-nourished. No distress. HENT:   Head: Normocephalic and atraumatic. Mouth/Throat: No oropharyngeal exudate. Eyes: Conjunctivae and EOM are normal. Pupils are equal, round, and reactive to light. Neck: Normal range of motion. Neck supple. Cardiovascular: Normal rate, regular rhythm and intact distal pulses. No murmur heard. Pulmonary/Chest: Breath sounds normal. No respiratory distress. Abdominal: Soft. Bowel sounds are normal. He exhibits no mass. There is tenderness in the periumbilical area. There is no rebound, no guarding, no tenderness at McBurney's point and negative Barron's sign. A hernia is present. patient has mass effect which feels like small bowel all in the region of the umbilicus. It is tender. The most inferior aspect of the mass, called to the incision, appears to be most tender. Neurological: He is alert and oriented to person, place, and time. Gait normal.   Nl speech   Skin: Skin is warm and dry. Psychiatric: He has a normal mood and affect.  His speech is normal.   Nursing note and vitals reviewed. MDM  Number of Diagnoses or Management Options  Diagnosis management comments: Hernia, possibly strangulated. Sedation, Trendelenburg, attempted reduction. If that fails, CT scan. Amount and/or Complexity of Data Reviewed  Clinical lab tests: ordered and reviewed  Review and summarize past medical records: yes    Risk of Complications, Morbidity, and/or Mortality  Presenting problems: moderate  Diagnostic procedures: low  Management options: moderate    Patient Progress  Patient progress: stable        ED Course       Procedures      Results Include:    Recent Results (from the past 24 hour(s))   CBC WITH AUTOMATED DIFF    Collection Time: 04/02/18  5:55 PM   Result Value Ref Range    WBC 5.5 4.3 - 11.1 K/uL    RBC 3.93 (L) 4.23 - 5.67 M/uL    HGB 13.0 (L) 13.6 - 17.2 g/dL    HCT 36.8 (L) 41.1 - 50.3 %    MCV 93.6 79.6 - 97.8 FL    MCH 33.1 (H) 26.1 - 32.9 PG    MCHC 35.3 (H) 31.4 - 35.0 g/dL    RDW 14.0 11.9 - 14.6 %    PLATELET 89 (L) 376 - 450 K/uL    MPV 9.8 (L) 10.8 - 14.1 FL    DF AUTOMATED      NEUTROPHILS 82 (H) 43 - 78 %    LYMPHOCYTES 10 (L) 13 - 44 %    MONOCYTES 6 4.0 - 12.0 %    EOSINOPHILS 2 0.5 - 7.8 %    BASOPHILS 0 0.0 - 2.0 %    IMMATURE GRANULOCYTES 0 0.0 - 5.0 %    ABS. NEUTROPHILS 4.5 1.7 - 8.2 K/UL    ABS. LYMPHOCYTES 0.6 0.5 - 4.6 K/UL    ABS. MONOCYTES 0.4 0.1 - 1.3 K/UL    ABS. EOSINOPHILS 0.1 0.0 - 0.8 K/UL    ABS. BASOPHILS 0.0 0.0 - 0.2 K/UL    ABS. IMM.  GRANS. 0.0 0.0 - 0.5 K/UL   METABOLIC PANEL, COMPREHENSIVE    Collection Time: 04/02/18  5:55 PM   Result Value Ref Range    Sodium 137 136 - 145 mmol/L    Potassium 3.2 (L) 3.5 - 5.1 mmol/L    Chloride 100 98 - 107 mmol/L    CO2 28 21 - 32 mmol/L    Anion gap 9 7 - 16 mmol/L    Glucose 99 65 - 100 mg/dL    BUN 13 6 - 23 MG/DL    Creatinine 1.23 0.8 - 1.5 MG/DL    GFR est AA >60 >60 ml/min/1.73m2    GFR est non-AA >60 >60 ml/min/1.73m2    Calcium 8.0 (L) 8.3 - 10.4 MG/DL Bilirubin, total 1.1 0.2 - 1.1 MG/DL    ALT (SGPT) 24 12 - 65 U/L    AST (SGOT) 40 (H) 15 - 37 U/L    Alk. phosphatase 90 50 - 136 U/L    Protein, total 6.2 (L) 6.3 - 8.2 g/dL    Albumin 2.1 (L) 3.5 - 5.0 g/dL    Globulin 4.1 (H) 2.3 - 3.5 g/dL    A-G Ratio 0.5 (L) 1.2 - 3.5     PROTHROMBIN TIME + INR    Collection Time: 04/02/18  5:55 PM   Result Value Ref Range    Prothrombin time 15.4 (H) 11.5 - 14.5 sec    INR 1.2     PTT    Collection Time: 04/02/18  5:55 PM   Result Value Ref Range    aPTT 28.4 23.2 - 35.3 SEC        after second dose of pain medication and the patient was able to reduce the hernia himself. Very minimal tenderness now of. Bowel still purchased through the large defect but is easily reducible. But with the patient's gastroenterologist regarding follow-up.

## 2018-04-03 ENCOUNTER — APPOINTMENT (OUTPATIENT)
Dept: CT IMAGING | Age: 53
End: 2018-04-03
Attending: EMERGENCY MEDICINE
Payer: MEDICARE

## 2018-04-03 ENCOUNTER — APPOINTMENT (OUTPATIENT)
Dept: GENERAL RADIOLOGY | Age: 53
End: 2018-04-03
Attending: EMERGENCY MEDICINE
Payer: MEDICARE

## 2018-04-03 ENCOUNTER — HOSPITAL ENCOUNTER (EMERGENCY)
Age: 53
Discharge: HOSPICE | End: 2018-04-03
Attending: EMERGENCY MEDICINE
Payer: MEDICARE

## 2018-04-03 VITALS
RESPIRATION RATE: 22 BRPM | OXYGEN SATURATION: 99 % | HEART RATE: 71 BPM | SYSTOLIC BLOOD PRESSURE: 112 MMHG | DIASTOLIC BLOOD PRESSURE: 61 MMHG

## 2018-04-03 DIAGNOSIS — R10.84 ABDOMINAL PAIN, GENERALIZED: Primary | ICD-10-CM

## 2018-04-03 DIAGNOSIS — K70.31 ALCOHOLIC CIRRHOSIS OF LIVER WITH ASCITES (HCC): ICD-10-CM

## 2018-04-03 DIAGNOSIS — K56.609 SMALL BOWEL OBSTRUCTION (HCC): ICD-10-CM

## 2018-04-03 LAB
ANION GAP SERPL CALC-SCNC: 6 MMOL/L (ref 7–16)
BUN SERPL-MCNC: 16 MG/DL (ref 6–23)
CALCIUM SERPL-MCNC: 8.8 MG/DL (ref 8.3–10.4)
CHLORIDE SERPL-SCNC: 96 MMOL/L (ref 98–107)
CO2 SERPL-SCNC: 32 MMOL/L (ref 21–32)
CREAT SERPL-MCNC: 1.29 MG/DL (ref 0.8–1.5)
ERYTHROCYTE [DISTWIDTH] IN BLOOD BY AUTOMATED COUNT: 13.9 % (ref 11.9–14.6)
GLUCOSE SERPL-MCNC: 103 MG/DL (ref 65–100)
HCT VFR BLD AUTO: 34.2 % (ref 41.1–50.3)
HGB BLD-MCNC: 12.4 G/DL (ref 13.6–17.2)
LACTATE BLD-SCNC: 2.6 MMOL/L (ref 0.5–1.9)
LACTATE BLD-SCNC: 2.9 MMOL/L (ref 0.5–1.9)
MCH RBC QN AUTO: 33.9 PG (ref 26.1–32.9)
MCHC RBC AUTO-ENTMCNC: 36.3 G/DL (ref 31.4–35)
MCV RBC AUTO: 93.4 FL (ref 79.6–97.8)
PHOSPHATE SERPL-MCNC: 3.2 MG/DL (ref 2.5–4.5)
PLATELET # BLD AUTO: 94 K/UL (ref 150–450)
PMV BLD AUTO: 10 FL (ref 10.8–14.1)
POTASSIUM SERPL-SCNC: 2.7 MMOL/L (ref 3.5–5.1)
RBC # BLD AUTO: 3.66 M/UL (ref 4.23–5.67)
SODIUM SERPL-SCNC: 134 MMOL/L (ref 136–145)
WBC # BLD AUTO: 5.5 K/UL (ref 4.3–11.1)

## 2018-04-03 PROCEDURE — 74011250636 HC RX REV CODE- 250/636

## 2018-04-03 PROCEDURE — 96368 THER/DIAG CONCURRENT INF: CPT | Performed by: EMERGENCY MEDICINE

## 2018-04-03 PROCEDURE — 85027 COMPLETE CBC AUTOMATED: CPT | Performed by: EMERGENCY MEDICINE

## 2018-04-03 PROCEDURE — 74018 RADEX ABDOMEN 1 VIEW: CPT

## 2018-04-03 PROCEDURE — 96365 THER/PROPH/DIAG IV INF INIT: CPT | Performed by: EMERGENCY MEDICINE

## 2018-04-03 PROCEDURE — 80048 BASIC METABOLIC PNL TOTAL CA: CPT | Performed by: EMERGENCY MEDICINE

## 2018-04-03 PROCEDURE — 84100 ASSAY OF PHOSPHORUS: CPT | Performed by: EMERGENCY MEDICINE

## 2018-04-03 PROCEDURE — 99284 EMERGENCY DEPT VISIT MOD MDM: CPT | Performed by: EMERGENCY MEDICINE

## 2018-04-03 PROCEDURE — 74177 CT ABD & PELVIS W/CONTRAST: CPT

## 2018-04-03 PROCEDURE — 74011636320 HC RX REV CODE- 636/320: Performed by: EMERGENCY MEDICINE

## 2018-04-03 PROCEDURE — 74011250636 HC RX REV CODE- 250/636: Performed by: SURGERY

## 2018-04-03 PROCEDURE — 74011000258 HC RX REV CODE- 258: Performed by: EMERGENCY MEDICINE

## 2018-04-03 PROCEDURE — 96361 HYDRATE IV INFUSION ADD-ON: CPT | Performed by: EMERGENCY MEDICINE

## 2018-04-03 PROCEDURE — 74011250637 HC RX REV CODE- 250/637: Performed by: EMERGENCY MEDICINE

## 2018-04-03 PROCEDURE — 96376 TX/PRO/DX INJ SAME DRUG ADON: CPT | Performed by: EMERGENCY MEDICINE

## 2018-04-03 PROCEDURE — 83605 ASSAY OF LACTIC ACID: CPT

## 2018-04-03 PROCEDURE — 96366 THER/PROPH/DIAG IV INF ADDON: CPT | Performed by: EMERGENCY MEDICINE

## 2018-04-03 PROCEDURE — 74011250636 HC RX REV CODE- 250/636: Performed by: EMERGENCY MEDICINE

## 2018-04-03 PROCEDURE — 96375 TX/PRO/DX INJ NEW DRUG ADDON: CPT | Performed by: EMERGENCY MEDICINE

## 2018-04-03 RX ORDER — MIDAZOLAM HYDROCHLORIDE 1 MG/ML
0.5 INJECTION, SOLUTION INTRAMUSCULAR; INTRAVENOUS ONCE
Status: COMPLETED | OUTPATIENT
Start: 2018-04-03 | End: 2018-04-03

## 2018-04-03 RX ORDER — POTASSIUM CHLORIDE 14.9 MG/ML
20 INJECTION INTRAVENOUS
Status: DISCONTINUED | OUTPATIENT
Start: 2018-04-03 | End: 2018-04-03 | Stop reason: HOSPADM

## 2018-04-03 RX ORDER — FENTANYL 50 UG/1
1 PATCH TRANSDERMAL
Status: DISCONTINUED | OUTPATIENT
Start: 2018-04-03 | End: 2018-04-03 | Stop reason: HOSPADM

## 2018-04-03 RX ORDER — SODIUM CHLORIDE 9 MG/ML
250 INJECTION, SOLUTION INTRAVENOUS CONTINUOUS
Status: DISCONTINUED | OUTPATIENT
Start: 2018-04-03 | End: 2018-04-03 | Stop reason: HOSPADM

## 2018-04-03 RX ORDER — ONDANSETRON 2 MG/ML
8 INJECTION INTRAMUSCULAR; INTRAVENOUS
Status: COMPLETED | OUTPATIENT
Start: 2018-04-03 | End: 2018-04-03

## 2018-04-03 RX ORDER — ONDANSETRON 2 MG/ML
4 INJECTION INTRAMUSCULAR; INTRAVENOUS
Status: COMPLETED | OUTPATIENT
Start: 2018-04-03 | End: 2018-04-03

## 2018-04-03 RX ORDER — POTASSIUM CHLORIDE 29.8 MG/ML
40 INJECTION INTRAVENOUS ONCE
Status: DISCONTINUED | OUTPATIENT
Start: 2018-04-03 | End: 2018-04-03

## 2018-04-03 RX ORDER — MORPHINE SULFATE 2 MG/ML
2 INJECTION, SOLUTION INTRAMUSCULAR; INTRAVENOUS
Status: COMPLETED | OUTPATIENT
Start: 2018-04-03 | End: 2018-04-03

## 2018-04-03 RX ORDER — ONDANSETRON 2 MG/ML
INJECTION INTRAMUSCULAR; INTRAVENOUS
Status: COMPLETED
Start: 2018-04-03 | End: 2018-04-03

## 2018-04-03 RX ORDER — SODIUM CHLORIDE 0.9 % (FLUSH) 0.9 %
10 SYRINGE (ML) INJECTION
Status: COMPLETED | OUTPATIENT
Start: 2018-04-03 | End: 2018-04-03

## 2018-04-03 RX ORDER — MORPHINE SULFATE 4 MG/ML
4 INJECTION, SOLUTION INTRAMUSCULAR; INTRAVENOUS ONCE
Status: COMPLETED | OUTPATIENT
Start: 2018-04-03 | End: 2018-04-03

## 2018-04-03 RX ORDER — MORPHINE SULFATE 4 MG/ML
4 INJECTION, SOLUTION INTRAMUSCULAR; INTRAVENOUS
Status: COMPLETED | OUTPATIENT
Start: 2018-04-03 | End: 2018-04-03

## 2018-04-03 RX ADMIN — SODIUM CHLORIDE 100 ML: 900 INJECTION, SOLUTION INTRAVENOUS at 12:18

## 2018-04-03 RX ADMIN — PIPERACILLIN SODIUM,TAZOBACTAM SODIUM 4.5 G: 4; .5 INJECTION, POWDER, FOR SOLUTION INTRAVENOUS at 14:33

## 2018-04-03 RX ADMIN — MORPHINE SULFATE 2 MG: 2 INJECTION, SOLUTION INTRAMUSCULAR; INTRAVENOUS at 13:19

## 2018-04-03 RX ADMIN — MORPHINE SULFATE 4 MG: 4 INJECTION, SOLUTION INTRAMUSCULAR; INTRAVENOUS at 12:32

## 2018-04-03 RX ADMIN — ONDANSETRON 4 MG: 2 INJECTION INTRAMUSCULAR; INTRAVENOUS at 11:24

## 2018-04-03 RX ADMIN — POTASSIUM CHLORIDE 20 MEQ: 200 INJECTION, SOLUTION INTRAVENOUS at 13:20

## 2018-04-03 RX ADMIN — ONDANSETRON 8 MG: 2 INJECTION INTRAMUSCULAR; INTRAVENOUS at 15:31

## 2018-04-03 RX ADMIN — MORPHINE SULFATE 4 MG: 4 INJECTION, SOLUTION INTRAMUSCULAR; INTRAVENOUS at 11:24

## 2018-04-03 RX ADMIN — Medication 10 ML: at 12:18

## 2018-04-03 RX ADMIN — MIDAZOLAM HYDROCHLORIDE 0.5 MG: 1 INJECTION, SOLUTION INTRAMUSCULAR; INTRAVENOUS at 13:19

## 2018-04-03 RX ADMIN — IOPAMIDOL 100 ML: 755 INJECTION, SOLUTION INTRAVENOUS at 12:18

## 2018-04-03 RX ADMIN — SODIUM CHLORIDE 250 ML/HR: 900 INJECTION, SOLUTION INTRAVENOUS at 11:22

## 2018-04-03 NOTE — ED NOTES
TRANSFER - OUT REPORT:    Verbal report given to [facility] RN(name) on Walter Zhong  being transferred to [address] ED(unit) for routine progression of care. Report consisted of patients Situation, Background, Assessment and   Recommendations(SBAR). Information from the following report(s) ED Summary was reviewed with the receiving nurse. Lines:   Peripheral IV 04/03/18 Right Antecubital (Active)   Site Assessment Clean, dry, & intact 4/3/2018  3:05 PM   Phlebitis Assessment 0 4/3/2018  3:05 PM   Infiltration Assessment 0 4/3/2018  3:05 PM   Dressing Status Clean, dry, & intact 4/3/2018  3:05 PM        Opportunity for questions and clarification was provided.       Patient transported with:  Denver Dust

## 2018-04-03 NOTE — PROGRESS NOTES
's initial visit in ER to convey care and concern. No needs were voiced by  Jordin January.     Kinza Chaudhry 68  Board Certified

## 2018-04-03 NOTE — ED NOTES
Patient actively vomiting at this time, talked with Dr. Deja Pleitez who advises that he will order 8 mg Zofran IV.

## 2018-04-03 NOTE — ED NOTES
Patient leaving facility at this time with [facility] in no acute distress.

## 2018-04-03 NOTE — CONSULTS
[facility]  [facility]. [address] [place] [address], [address] [facility]  [phone]    Consult Note   Ariel Wagner   MRN: [id number]     : 1965        HPI: Ariel Wagner is a 46 y.o. male who is seen at the request of Dr. Julian Noland at [address] for PSBO secondary to recurrent umbilical hernia. The patient has recurrent ascites and due to thinning skin over an umbilical hernia it was repaired last year by Dr. Sy Morales at [address]. The patient has noticed recurrence of his hernia over the last several weeks. It is difficult to assess with wax and wane of ascites. He had not had issues with obstipation or obstruction or severe pain until yesterday. He continued to pass gas and bowel movements this morning. CT shows a multi-defect periumbilical hernia. There are decompressed loops beneath the inferior defect consistent with partial obstruction. He has a history of alcoholic cirrhosis status post paracentesis presents to the emergency department with worsening abdominal pain. He was seen here yesterday had an incarcerated umbilical hernia. With time morphine Trendelenburg and gentle pressure it was reduced and he was able to go home     States he started having recurrent pain. Nausea. Not able to take anything by mouth. No fever. No alleviating     Patient states he is being evaluated for possible transplant by the [facility] liver transplant team but is now being placed on [address] list due to improved MELD score. He doesn't want to have any mesh in his abdomen. Had a previous hernia repair that [address] last with primary repair.   Pain started yesterday  The pain is described as sharp  Severity of pain is moderate  Onset of pain was gradual  Pain is located in the diffuse  Aggravating factors: everything  Associated symptoms: nausea      Past Medical History:   Diagnosis Date    Cirrhosis of liver ([facility])     undeterminable cause    Multiple fractures of ribs of both sides Multiple, bilateral w/o pneumo: 2013     Past Surgical History:   Procedure Laterality Date    HX HERNIA REPAIR      abd    HX KNEE ARTHROSCOPY  2005    HX OTHER SURGICAL      liver bx     Current Facility-Administered Medications   Medication Dose Route Frequency    0.9% sodium chloride infusion  250 mL/hr IntraVENous CONTINUOUS    potassium chloride 20 mEq in 100 ml IVPB  20 mEq IntraVENous Q2H     Current Outpatient Prescriptions   Medication Sig    torsemide (DEMADEX) 20 mg tablet Take 40 mg by mouth two (2) times a day.  lactulose (CHRONULAC) 10 gram/15 mL solution Take 10 g by mouth three (3) times daily.  AMILORIDE HCL (AMILORIDE PO) Take 15 mg by mouth two (2) times a day. Dose unknown, to bring dos for clarification      ALLERGIES:  Review of patient's allergies indicates no known allergies. Social History     Social History    Marital status: SINGLE     Spouse name: N/A    Number of children: N/A    Years of education: N/A     Social History Main Topics    Smoking status: Current Every Day Smoker     Packs/day: 0.50     Years: 4.00     Types: Cigarettes     Start date: 9/10/2010    Smokeless tobacco: Never Used    Alcohol use No    Drug use: Yes     Special: Marijuana    Sexual activity: Not Asked     Other Topics Concern    None     Social History Narrative    The patient is . He has a  for a food . He has no known exposure to TB. He has always lived in this general area. History   Smoking Status    Current Every Day Smoker    Packs/day: 0.50    Years: 4.00    Types: Cigarettes    Start date: 9/10/2010   Smokeless Tobacco    Never Used     Family History   Problem Relation Age of Onset    No Known Problems Mother     No Known Problems Father        ROS: The patient has no difficulty with chest pain or shortness of breath. No fever or chills. The patient denies any personal or family history of abnormal clotting or bleeding.   Comprehensive review of systems was otherwise unremarkable except as noted above. Physical Exam:   Constitutional: Alert oriented cooperative patient in no acute distress. Visit Vitals    /61    Pulse 71    Resp 22    SpO2 99%     Eyes:Sclera are clear without icterus. ENMT: no obvious neck masses, no ear or lip lesions  CV: RRR. Normal perfusion  Resp: No JVD. Breathing is  non-labored. GI: soft and non-distended, right lower quadrant ecchymosis from recent paracentesis, well-healed transverse periumbilical incision scar, the abdomen is soft and nondistended, intra-abdominal contents are easily palpable via this 6 mm meter defect. Suture material is palpable towards the right side of the defect. A more firm loop of bowel is present to the right lower side of this defect that is compressible but not reducible. There is some pain with this manipulation. Attempts to reduce it from manipulation through the hernia repair were also not possible. Musculoskeletal: unremarkable with normal function. Neuro:  No obvious focal deficits  Psychiatric: normal affect and mood, no memory impairment    Lab Results   Component Value Date/Time    WBC 5.5 04/03/2018 11:27 AM    HGB 12.4 (L) 04/03/2018 11:27 AM    HCT 34.2 (L) 04/03/2018 11:27 AM    PLATELET 94 (L) 61/98/3049 11:27 AM    MCV 93.4 04/03/2018 11:27 AM       Lab Results   Component Value Date/Time    Sodium 134 (L) 04/03/2018 11:27 AM    Potassium 2.7 (LL) 04/03/2018 11:27 AM    Chloride 96 (L) 04/03/2018 11:27 AM    CO2 32 04/03/2018 11:27 AM    BUN 16 04/03/2018 11:27 AM    Creatinine 1.29 04/03/2018 11:27 AM    Glucose 103 (H) 04/03/2018 11:27 AM    INR 1.2 04/02/2018 05:55 PM    aPTT 28.4 04/02/2018 05:55 PM    Bilirubin, total 1.1 04/02/2018 05:55 PM    AST (SGOT) 40 (H) 04/02/2018 05:55 PM    Alk.  phosphatase 90 04/02/2018 05:55 PM    Lipase 194 08/07/2015 01:40 PM     Lab Results   Component Value Date/Time    ALT (SGPT) 24 04/02/2018 05:55 PM     CT abdomen and pelvis done with IV contrast only using ER protocol April 3, 2018  Reference exam: September 13, 2017  Indication: History of cirrhosis, TIPSS procedure, hernia and small bowel  obstruction  Technique: Radiation dose reduction techniques were used for this study using  one or more of the following: automated exposure control; adjustment of mA  and/or kV (according to patient size);  iterative reconstruction. 5 mm axial  images were taken through the abdomen and pelvis using IV contrast. 100 cc  Isovue-370 IV contrast was administered to better evaluate the abdominal and  pelvic contents. Abdomen: Right lower lobe posteromedially again shows a pleural-based smooth  bordered nodule measuring 5 mm unchanged with some dependent edema now seen in  both lower lobes, left lung base shows a questionable nodule within the edema  measuring only 1 mm. Large amount of free fluid is seen in the abdomen, with a  TIPS shunt now present within the liver. The liver has a very lobular contour  and is small in appearance, spleen, pancreas, adrenals, kidneys appear normal.  There is normal-appearing opacification of the portal vein although the  intrahepatic portions are very small. There is no intraperitoneal free air nor  abnormal adenopathy seen. There are air-filled loops of small bowel present,  extending out into a anterior abdominal wall hernia that appears to have at  least 2 separate orifices near the umbilicus, with some fluid extending out as  well. The more inferior orifice shows air-filled loop of bowel entering, and  decompressed bowel exiting suggesting this is the area of partial obstruction. Pelvis: Free fluid is seen in the pelvis, there is no free air nor abnormal  adenopathy seen. The bladder appears normal. There are some diverticula without  evidence of diverticulitis. The appendix appears normal.     IMPRESSION:  1.  What appear to be 2 orifices for anterior abdominal wall hernias, the more  inferior and right lateral appears to be the cause for at least partial small  bowel obstruction. 2. Pulmonary nodule in the right base again seen with one on the left that may  be new. Please see below. Fleischner Society 2017 recommendations for follow-up of small lung nodules  detected incidentally on CT scan (patient's greater than 28years old):  Multiple:  < 6 mm  low risk: No follow-up needed. High risk: optional CT at 12 months. If stable no further  follow-up  Low risk: Minimal or no smoking history or other risk factors. High risk: History of smoking or other risk factors. Risk factors: Lung cancer in first-degree relative; exposure to asbestos, radon,  uranium. Title: Ultrasound guided paracentesis: 3/30/2018  History: 63-year-old male with alcoholic cirrhosis, ascites. :  Rosalba Cannon PA-C  Supervising Physician: Ashu Pelaez M.D. Consent:  Informed written and oral consent was obtained from the patient after  the risks and benefits were discussed. All questions were answered and the  patient requested that we proceed. Procedure:  Maximal sterile barrier technique was used. Following routine prep  and drape of the abdomen, a local field block with 1% lidocaine was achieved. Ultrasound evaluation was performed. Fluid was identified in the peritoneal cavity. Using real-time ultrasound  guidance, the peritoneal cavity was accessed with an 8 Croatian centesis catheter. The catheter was connected to Vacutainer bottles. A total of 8350 cc of chylous fluid was removed. The catheter was removed and a  bandage applied. Complications: None. Findings: Large-volume chylous ascites.     IMPRESSION: Uncomplicated ultrasound guided paracentesis. Assessment/Plan:     Marilou Johnson is a 46 y.o. male who has stage IV liver disease with recalcitrant ascites being treated with frequent paracentesis.   He has recurrent umbilical hernia that was repaired last year at [address]. He has at least 1 Incarcerated Loop of bowel that is not reducible. He is in no distress. CT did not show any evidence of ischemia or perforation. Discussion for transfer to [address] was performed and he was accepted for transfer. He is kept n.p.o. He was given a dose of Zosyn. Patient is in agreement with relocation to [facility]. He will arrive at their emergency department.     Problem List  Date Reviewed: 9/20/2017          Codes Class Noted    Alcoholic cirrhosis of liver with ascites ([facility] ICD-10-CM: K70.31  ICD-9-CM: 571.2  9/19/2017        Pulmonary nodule (Chronic) ICD-10-CM: R91.1  ICD-9-CM: 793.11  3/25/2014        Hx of tobacco use (Chronic) ICD-10-CM: Y57.380  ICD-9-CM: V15.82  3/25/2014        Adenopathy, subcarinal (Chronic) ICD-10-CM: R59.1  ICD-9-CM: 785.6  3/25/2014                Ruperto Bumpers, MD,  FACS

## 2018-04-03 NOTE — ED PROVIDER NOTES
CDNotes Templates                               Emergency Department    Chief Complaint:  Abdominal pain  HPI:  26-year-old male with history of alcoholic cirrhosis status post paracentesis presents to the emergency department with worsening abdominal pain. He was seen here yesterday had an incarcerated umbilical hernia. With time morphine Trendelenburg and gentle pressure it was reduced and he was able to go home    States he started having recurrent pain. Nausea. Not able to take anything by mouth. No fever. No alleviating    Patient states he is being evaluated for possible transplant by the [facility] liver transplant team.  He doesn't want to have any mesh in his abdomen. Had a previous hernia repair that [facility] sometime ago which clearly did not work. Pain started yesterday  The pain is described as sharp  Severity of pain is moderate  Onset of pain was gradual  Pain is located in the diffuse  Aggravating factors: everything  Associated symptoms: nausea  Historian:   patient  Review of Systems:  Include pertinent positive and negatives.     CONST:  Denies: fever, chills, weakness  ENT:  Denies: sore throat, nasal congestion  EYES:  Denies: vision changes  CARDIO: Denies: chest pain, palpitations  RESP:  Denies: cough, shortness of breath  GI:  Per HPI  :  Denies: urinary symptoms  MUSC: Denies: muscle, joint pain  SKIN:  Denies: rash  NEURO: Denies: headache  Past Medical History:  Past Medical History:   Diagnosis Date    Cirrhosis of liver ([address]     undeterminable cause    Multiple fractures of ribs of both sides     Multiple, bilateral w/o pneumo: 2013     Past Surgical History:   Procedure Laterality Date    HX HERNIA REPAIR      abd    HX KNEE ARTHROSCOPY  2005    HX OTHER SURGICAL      liver bx     Social History   Substance Use Topics    Smoking status: Current Every Day Smoker     Packs/day: 0.50     Years: 4.00     Types: Cigarettes     Start date: 9/10/2010    Smokeless tobacco: Never Used  Alcohol use No     Family History   Problem Relation Age of Onset    No Known Problems Mother     No Known Problems Father      Previous Medications    AMILORIDE HCL (AMILORIDE PO)    Take 15 mg by mouth two (2) times a day. Dose unknown, to bring dos for clarification     LACTULOSE (CHRONULAC) 10 GRAM/15 ML SOLUTION    Take 10 g by mouth three (3) times daily. TORSEMIDE (DEMADEX) 20 MG TABLET    Take 40 mg by mouth two (2) times a day. Allergies as of 04/03/2018    (No Known Allergies)         Physical Exam:    Vital signs:   Visit Vitals    /61    Pulse 71    Resp 22    SpO2 99%       Vital signs were reviewed. General Appear: alert, no acute distress  Ears/Nose/Throat: mucosa moist, pharynx clear, no cervical nodes  Eyes:   PERRL, anicteric  Cardiovascular: regular rate and rhythm, no murmur  Respiratory:  clear to auscultation bilaterally without wheeze/rales/rhonchi  Abdomen:  Abdomen is soft. Diffusely tender. Positive percussion tenderness. Hypoactive bowel sounds.   Musculoskeletal: no joint tenderness  Skin:   no rash, skin dry  Neurologic:  alert and oriented, non focal  _______________________________________________________________________    LABS/RADIOLOGY/EKG:    Labs:     Results for orders placed or performed during the hospital encounter of 04/03/18   CBC W/O DIFF   Result Value Ref Range    WBC 5.5 4.3 - 11.1 K/uL    RBC 3.66 (L) 4.23 - 5.67 M/uL    HGB 12.4 (L) 13.6 - 17.2 g/dL    HCT 34.2 (L) 41.1 - 50.3 %    MCV 93.4 79.6 - 97.8 FL    MCH 33.9 (H) 26.1 - 32.9 PG    MCHC 36.3 (H) 31.4 - 35.0 g/dL    RDW 13.9 11.9 - 14.6 %    PLATELET 94 (L) 443 - 450 K/uL    MPV 10.0 (L) 10.8 - 58.9 FL   METABOLIC PANEL, BASIC   Result Value Ref Range    Sodium 134 (L) 136 - 145 mmol/L    Potassium 2.7 (LL) 3.5 - 5.1 mmol/L    Chloride 96 (L) 98 - 107 mmol/L    CO2 32 21 - 32 mmol/L    Anion gap 6 (L) 7 - 16 mmol/L    Glucose 103 (H) 65 - 100 mg/dL    BUN 16 6 - 23 MG/DL    Creatinine 1. 29 0.8 - 1.5 MG/DL    GFR est AA >60 >60 ml/min/1.73m2    GFR est non-AA >60 >60 ml/min/1.73m2    Calcium 8.8 8.3 - 10.4 MG/DL   POC LACTIC ACID   Result Value Ref Range    Lactic Acid (POC) 2.9 (H) 0.5 - 1.9 mmol/L         Labs were reviewed and interpreted by me. Radiology studies performed:    XR ABD (KUB)   Final Result   IMPRESSION: Ileus versus an early or partial small bowel obstruction, follow-up   imaging recommended. CT ABD PELV W CONT    (Results Pending)       ________________________________________________________________________  Progress:    Patient resting. Appears uncomfortable. KUB shows dilated loops of small bowel. Discussed with Katharine Perez from surgery. They will come and see. We'll obtain a CT abdomen and pelvis. Patient's lactate is noted to be elevated. I don't think he has an infectious process at this time. continue to monitor vital signs and labs to see if he develops Sirs or signs of organ dysfunction. Nursing notes were reviewed: yes  Old medical records: obtained and reviewed:  yes  Discussed patient with another provider: yes  _______________________________________________________________________  Assessment/Plan:    Differential includes incarcerated hernia. Strength hernia. Bowel instructions secondary to scar tissue.  _______________________________________________________________________  Condition:  guarded  Disposition:  Likely admission  Diagnosis:  Abdominal pain, small bowel instruction        Timmy Klinefelter, M.D. [facility] [employer]; version 2.0; revised April, 2016.

## 2018-04-03 NOTE — ED TRIAGE NOTES
Pt was seen here for the same thing yesterday. Pt states that he needs to lay down now and isn't going to answer triage questions as he \"was just here last night\".

## 2018-05-08 ENCOUNTER — HOSPITAL ENCOUNTER (OUTPATIENT)
Dept: ULTRASOUND IMAGING | Age: 53
Discharge: HOME | End: 2018-05-08
Attending: INTERNAL MEDICINE
Payer: MEDICARE

## 2018-05-08 VITALS
WEIGHT: 160 LBS | SYSTOLIC BLOOD PRESSURE: 107 MMHG | BODY MASS INDEX: 21.7 KG/M2 | DIASTOLIC BLOOD PRESSURE: 58 MMHG | HEART RATE: 76 BPM | RESPIRATION RATE: 16 BRPM | OXYGEN SATURATION: 100 %

## 2018-05-08 DIAGNOSIS — I89.8 OTHER SPECIFIED NONINFECTIVE DISORDERS OF LYMPHATIC VESSELS AND LYMPH NODES: ICD-10-CM

## 2018-05-08 LAB
INR BLD: 1.3 (ref 0.9–1.2)
PT BLD: 14.9 SECS (ref 9.6–11.6)

## 2018-05-08 PROCEDURE — 74011000250 HC RX REV CODE- 250: Performed by: PHYSICIAN ASSISTANT

## 2018-05-08 PROCEDURE — 85610 PROTHROMBIN TIME: CPT

## 2018-05-08 PROCEDURE — C1729 CATH, DRAINAGE: HCPCS

## 2018-05-08 RX ORDER — HYDROXYZINE 50 MG/1
50 TABLET, FILM COATED ORAL
COMMUNITY
End: 2019-04-29

## 2018-05-08 RX ORDER — MIRTAZAPINE 15 MG/1
15 TABLET, FILM COATED ORAL
COMMUNITY
End: 2019-04-29

## 2018-05-08 RX ORDER — WARFARIN SODIUM 5 MG/1
5 TABLET ORAL DAILY
COMMUNITY
End: 2019-04-29

## 2018-05-08 RX ORDER — POTASSIUM CHLORIDE 1500 MG/1
40 TABLET, FILM COATED, EXTENDED RELEASE ORAL DAILY
COMMUNITY
End: 2018-09-26

## 2018-05-08 RX ORDER — ERGOCALCIFEROL 1.25 MG/1
50000 CAPSULE ORAL
COMMUNITY
End: 2019-04-29

## 2018-05-08 RX ORDER — ENOXAPARIN SODIUM 100 MG/ML
70 INJECTION SUBCUTANEOUS EVERY 12 HOURS
COMMUNITY
End: 2019-04-29

## 2018-05-08 RX ORDER — LIDOCAINE HYDROCHLORIDE 10 MG/ML
1-20 INJECTION, SOLUTION EPIDURAL; INFILTRATION; INTRACAUDAL; PERINEURAL
Status: DISCONTINUED | OUTPATIENT
Start: 2018-05-08 | End: 2018-05-12 | Stop reason: HOSPADM

## 2018-05-08 RX ORDER — AMILORIDE HYDROCHLORIDE 5 MG/1
30 TABLET ORAL DAILY
COMMUNITY
End: 2021-02-23

## 2018-05-08 RX ADMIN — SODIUM BICARBONATE 2 ML: 0.2 INJECTION, SOLUTION INTRAVENOUS at 15:14

## 2018-05-08 RX ADMIN — LIDOCAINE HYDROCHLORIDE 5 ML: 10 INJECTION, SOLUTION EPIDURAL; INFILTRATION; INTRACAUDAL at 15:14

## 2018-05-08 NOTE — IP AVS SNAPSHOT
[facility] 
 
 
 [address] [address] [address] [facility] 
[phone] Patient: Le Vallejo MRN: [id number] :1965 About your hospitalization You were admitted on: May 8, 2018 You last received care in the:  [facility] RADIOLOGY ULTRASOUND You were discharged on: May 8, 2018 Why you were hospitalized Your primary diagnosis was:  Not on File Follow-up Information None Your Scheduled Appointments Friday May 18, 2018 12:00 PM EDT  
US PARACENTISIS ABD WITH IMG with SFD US LOGIC 7 UNIT 1, SFD NURSING, SFD IR RADIOLOGIST RESOURCE  
[facility] RADIOLOGY ULTRASOUND ([address] [place] [address]) [address] [address] [address] [facility]  
[phone] PATIENT ARRIVAL Please report 30 minutes early to check in.  - Sedation: No sedation required. - Lab work:  No labs required.  
  
    
[address]- 2nd floor of Outpatient Medical Office Building Discharge Orders None A check alberto indicates which time of day the medication should be taken. My Medications ASK your doctor about these medications Instructions Each Dose to Equal  
 Morning Noon Evening Bedtime aMILoride 5 mg tablet Commonly known as:  Juan Miguel Dexter What changed:  Another medication with the same name was removed. Continue taking this medication, and follow the directions you see here. Your last dose was: Your next dose is: Take 15 mg by mouth daily. 15 mg COUMADIN 5 mg tablet Generic drug:  warfarin Your last dose was: Your next dose is: Take 5 mg by mouth daily. 5 mg  
    
   
   
   
  
 hydrOXYzine HCl 50 mg tablet Commonly known as:  ATARAX Your last dose was: Your next dose is: Take 50 mg by mouth daily as needed for Itching.   
 50 mg  
    
   
   
   
  
 LOVENOX 80 mg/0.8 mL injection Generic drug:  enoxaparin Your last dose was: Your next dose is:    
   
   
 70 mg by SubCUTAneous route every twelve (12) hours. 70 mg  
    
   
   
   
  
 potassium chloride SR 20 mEq tablet Commonly known as:  K-TAB Your last dose was: Your next dose is: Take 40 mEq by mouth daily. 40 mEq REMERON 15 mg tablet Generic drug:  mirtazapine Your last dose was: Your next dose is: Take 15 mg by mouth nightly as needed for Other (sleep). 15 mg  
    
   
   
   
  
 torsemide 20 mg tablet Commonly known as:  DEMADEX Your last dose was: Your next dose is: Take 40 mg by mouth daily. 40 mg  
    
   
   
   
  
 VITAMIN D2 50,000 unit capsule Generic drug:  ergocalciferol Your last dose was: Your next dose is: Take 50,000 Units by mouth every seven (7) days. [address]24 Units Discharge Instructions Rickey [address] Department of Interventional Radiology [facility] 
[phone] Office 
[phone] Fax PARACENTESIS DISCHARGE INSTRUCTIONS General Information: 
During this procedure, the doctor will insert a needle into the abdomen to drain fluid. After the procedure, you will be able to take a deep breath much easier. The site of the puncture may ooze the first day. This will decrease and eventually stop. Paracentesis (draining fluid from the abdomen) sometimes makes patients hypotensive (low blood pressure). Your doctor may order for you to receive fluids or albumin (a volume booster) during the procedure through an IV site. Home Care Instructions: 
Keep the puncture site clean and dry. No tub baths or swimming until puncture site heals. Showering is acceptable.  Resume your normal diet, and resume your normal activity slowly and as you tolerate. If you are short of breath, rest. If shortness of breath does not ease, please call your ordering doctor. Fluid can re-accumulate in the chest and/or in the abdomen. If this should occur, your doctor needs to know as you may need to have the procedure done again. Call If: 
   You should call your Physician and/or the Radiology Nurse if you notice any signs of infection, like pus draining, or if it is swollen or reddened. Also call if you have a fever, or if you are bleeding from the puncture site more than a small amount on the dressing. Call if the puncture site keeps draining fluid. Some oozing is to be expected, but should slow and then stop. Call if you feel like you have pressure in your abdomen. SEEK IMMEDIATE CARE OR CALL 911 IF YOU SUDDENLY HAVE TROUBLE BREATHING, OR IF YOUR LIPS TURN BLUE, OR IF YOU NOTICE BLOOD IN YOUR SPUTUM. Follow-Up Instructions: Please see your ordering doctor as he/she has requested. To Reach Us: If you have any questions about your procedure, please call the Interventional Radiology department at [phone]. After business hours (5pm) and weekends, call the answering service at [phone] and ask for the Radiologist on call to be paged. Interventional Radiology General Nurse Discharge * Please give a list of your current medications to your Primary Care Provider. * Please update this list whenever your medications are discontinued, doses are 
   changed, or new medications (including over-the-counter products) are added. * Please carry medication information at all times in case of emergency situations. These are general instructions for a healthy lifestyle: No smoking/ No tobacco products/ Avoid exposure to second hand smoke Surgeon General's Warning:  Quitting smoking now greatly reduces serious risk to your health.  
 
Obesity, smoking, and sedentary lifestyle greatly increases your risk for illness A healthy diet, regular physical exercise & weight monitoring are important for maintaining a healthy lifestyle You may be retaining fluid if you have a history of heart failure or if you experience any of the following symptoms:  Weight gain of 3 pounds or more overnight or 5 pounds in a week, increased swelling in our hands or feet or shortness of breath while lying flat in bed. Please call your doctor as soon as you notice any of these symptoms; do not wait until your next office visit. Recognize signs and symptoms of STROKE: 
F-face looks uneven A-arms unable to move or move unevenly S-speech slurred or non-existent T-time-call 911 as soon as signs and symptoms begin-DO NOT go Back to bed or wait to see if you get better-TIME IS BRAIN. Date: 5/8/2018 Discharging Nurse: Jm Hebert RN Introducing [facility]! [facility] introduces [employer] patient portal. Now you can access parts of your medical record, email your doctor's office, and request medication refills online. 1. In your internet browser, go to [email or web address]://[email or web address]. [employer] [facility] 2. Click on the First Time User? Click Here link in the Sign In box. You will see the New Member Sign Up page. 3. Enter your [employer] Access Code exactly as it appears below. You will not need to use this code after youve completed the sign-up process. If you do not sign up before the expiration date, you must request a new code. · [employer] Access Code: [email or web address] Expires: 6/11/2018  5:23 AM 
 
4. Enter the last four digits of your Social Security Number (xxxx) and Date of Birth (mm/dd/yyyy) as indicated and click Submit. You will be taken to the next sign-up page. 5. Create a DealDash ID. This will be your [employer] login ID and cannot be changed, so think of one that is secure and easy to remember. 6. Create a [employer] password. You can change your password at any time. 7. Enter your Password Reset Question and Answer. This can be used at a later time if you forget your password. 8. Enter your e-mail address. You will receive e-mail notification when new information is available in [address] 9. Click Sign Up. You can now view and download portions of your medical record. 10. Click the Download Summary menu link to download a portable copy of your medical information. If you have questions, please visit the Frequently Asked Questions section of the Mail[employer] website. Remember, Mailpile is NOT to be used for urgent needs. For medical emergencies, dial 911. Now available from your iPhone and Android! Introducing Kevin Spear As a [employer] patient, I wanted to make you aware of our electronic visit tool called Kevin Spear. [employer] 24/7 allows you to connect within minutes with a medical provider 24 hours a day, seven days a week via a mobile device or tablet or logging into a secure website from your computer. You can access Kevin Spear from anywhere in the [place]. A virtual visit might be right for you when you have a simple condition and feel like you just dont want to get out of bed, or cant get away from work for an appointment, when your regular [employer] provider is not available (evenings, weekends or holidays), or when youre out of town and need minor care. Electronic visits cost only $49 and if the [employer] 24/7 provider determines a prescription is needed to treat your condition, one can be electronically transmitted to a nearby pharmacy*. Please take a moment to enroll today if you have not already done so. The enrollment process is free and takes just a few minutes. To enroll, please download the [employer] 24/7 av to your tablet or phone, or visit [email or web address] to enroll on your computer.    
And, as an [address] [place] [address] patient with a [employer] account, the results of your visits will be scanned into your electronic medical record and your primary care provider will be able to view the scanned results. We urge you to continue to see your regular [facility] provider for your ongoing medical care. And while your primary care provider may not be the one available when you seek a Kevin Carrerafin virtual visit, the peace of mind you get from getting a real diagnosis real time can be priceless. For more information on [facility] [employer], view our Frequently Asked Questions (FAQs) at [email or web address]. Sincerely, 
 
Maryana Grace MD 
Chief Medical Officer [facility] Teri Bhatti *:  certain medications cannot be prescribed via [facility] Providers Seen During Your Hospitalization Provider Specialty Primary office phone Christo West MD Gastroenterology [phone] Your Primary Care Physician (PCP) Primary Care Physician Office Phone Office Fax RACHNA BAL ** None ** ** None ** You are allergic to the following Allergen Reactions Tramadol Nausea and Vomiting Pt. states Recent Documentation Weight BMI Smoking Status 72.6 kg 21.7 kg/m2 Current Every Day Smoker Emergency Contacts Name Discharge Info Relation Home Work Mobile [employer] Chen Poshmark  Son [22] [phone] Christopher Krishnamurthy DISCHARGE CAREGIVER [3] Brother [24] [phone] Patient Belongings The following personal items are in your possession at time of discharge: 
                             
 
  
  
 Please provide this summary of care documentation to your next provider. Signatures-by signing, you are acknowledging that this After Visit Summary has been reviewed with you and you have received a copy. Patient Signature:  ____________________________________________________________ Date:  ____________________________________________________________  
  
Siddiqui Livings Provider Signature:  ____________________________________________________________ Date:  ____________________________________________________________

## 2018-05-08 NOTE — DISCHARGE INSTRUCTIONS
[address] [facility]  Department of Interventional Radiology  [facility]  [phone] Office  [phone] Fax    PARACENTESIS DISCHARGE INSTRUCTIONS    General Information:  During this procedure, the doctor will insert a needle into the abdomen to drain fluid. After the procedure, you will be able to take a deep breath much easier. The site of the puncture may ooze the first day. This will decrease and eventually stop. Paracentesis (draining fluid from the abdomen) sometimes makes patients hypotensive (low blood pressure). Your doctor may order for you to receive fluids or albumin (a volume booster) during the procedure through an IV site. Home Care Instructions:  Keep the puncture site clean and dry. No tub baths or swimming until puncture site heals. Showering is acceptable. Resume your normal diet, and resume your normal activity slowly and as you tolerate. If you are short of breath, rest. If shortness of breath does not ease, please call your ordering doctor. Fluid can re-accumulate in the chest and/or in the abdomen. If this should occur, your doctor needs to know as you may need to have the procedure done again. Call If:     You should call your Physician and/or the Radiology Nurse if you notice any signs of infection, like pus draining, or if it is swollen or reddened. Also call if you have a fever, or if you are bleeding from the puncture site more than a small amount on the dressing. Call if the puncture site keeps draining fluid. Some oozing is to be expected, but should slow and then stop. Call if you feel like you have pressure in your abdomen. SEEK IMMEDIATE CARE OR CALL 911 IF YOU SUDDENLY HAVE TROUBLE BREATHING, OR IF YOUR LIPS TURN BLUE, OR IF YOU NOTICE BLOOD IN YOUR SPUTUM. Follow-Up Instructions: Please see your ordering doctor as he/she has requested. To Reach Us:   If you have any questions about your procedure, please call the Interventional Radiology department at [phone]. After business hours (5pm) and weekends, call the answering service at [phone] and ask for the Radiologist on call to be paged. Interventional Radiology General Nurse Discharge      * Please give a list of your current medications to your Primary Care Provider. * Please update this list whenever your medications are discontinued, doses are     changed, or new medications (including over-the-counter products) are added. * Please carry medication information at all times in case of emergency situations. These are general instructions for a healthy lifestyle:    No smoking/ No tobacco products/ Avoid exposure to second hand smoke  Surgeon General's Warning:  Quitting smoking now greatly reduces serious risk to your health. Obesity, smoking, and sedentary lifestyle greatly increases your risk for illness  A healthy diet, regular physical exercise & weight monitoring are important for maintaining a healthy lifestyle    You may be retaining fluid if you have a history of heart failure or if you experience any of the following symptoms:  Weight gain of 3 pounds or more overnight or 5 pounds in a week, increased swelling in our hands or feet or shortness of breath while lying flat in bed. Please call your doctor as soon as you notice any of these symptoms; do not wait until your next office visit. Recognize signs and symptoms of STROKE:  F-face looks uneven    A-arms unable to move or move unevenly    S-speech slurred or non-existent    T-time-call 911 as soon as signs and symptoms begin-DO NOT go       Back to bed or wait to see if you get better-TIME IS BRAIN.             Date: 5/8/2018  Discharging Nurse: Dolores Berman RN

## 2018-05-08 NOTE — IP AVS SNAPSHOT
[address] 
[phone] Patient: Kenrick Tristan MRN: [id number] :1965 A check alberto indicates which time of day the medication should be taken. My Medications ASK your doctor about these medications Instructions Each Dose to Equal  
 Morning Noon Evening Bedtime aMILoride 5 mg tablet Commonly known as:  Felton Maldonado What changed:  Another medication with the same name was removed. Continue taking this medication, and follow the directions you see here. Your last dose was: Your next dose is: Take 15 mg by mouth daily. 15 mg COUMADIN 5 mg tablet Generic drug:  warfarin Your last dose was: Your next dose is: Take 5 mg by mouth daily. 5 mg  
    
   
   
   
  
 hydrOXYzine HCl 50 mg tablet Commonly known as:  ATARAX Your last dose was: Your next dose is: Take 50 mg by mouth daily as needed for Itching. 50 mg  
    
   
   
   
  
 LOVENOX 80 mg/0.8 mL injection Generic drug:  enoxaparin Your last dose was: Your next dose is:    
   
   
 70 mg by SubCUTAneous route every twelve (12) hours. 70 mg  
    
   
   
   
  
 potassium chloride SR 20 mEq tablet Commonly known as:  K-TAB Your last dose was: Your next dose is: Take 40 mEq by mouth daily. 40 mEq REMERON 15 mg tablet Generic drug:  mirtazapine Your last dose was: Your next dose is: Take 15 mg by mouth nightly as needed for Other (sleep). 15 mg  
    
   
   
   
  
 torsemide 20 mg tablet Commonly known as:  DEMADEX Your last dose was: Your next dose is: Take 40 mg by mouth daily. 40 mg  
    
   
   
   
  
 VITAMIN D2 50,000 unit capsule Generic drug:  ergocalciferol Your last dose was: Your next dose is: Take 50,000 Units by mouth every seven (7) days. [address] Units

## 2018-05-18 ENCOUNTER — HOSPITAL ENCOUNTER (OUTPATIENT)
Dept: ULTRASOUND IMAGING | Age: 53
Discharge: HOME | End: 2018-05-18
Attending: INTERNAL MEDICINE
Payer: MEDICARE

## 2018-05-18 VITALS
DIASTOLIC BLOOD PRESSURE: 57 MMHG | SYSTOLIC BLOOD PRESSURE: 98 MMHG | HEART RATE: 74 BPM | RESPIRATION RATE: 16 BRPM | TEMPERATURE: 98 F | BODY MASS INDEX: 21.67 KG/M2 | OXYGEN SATURATION: 100 % | HEIGHT: 72 IN | WEIGHT: 160 LBS

## 2018-05-18 DIAGNOSIS — I89.8 OTHER SPECIFIED NONINFECTIVE DISORDERS OF LYMPHATIC VESSELS AND LYMPH NODES: ICD-10-CM

## 2018-05-18 LAB
INR BLD: 1.8 (ref 0.9–1.2)
PT BLD: 20.6 SECS (ref 9.6–11.6)

## 2018-05-18 PROCEDURE — 77030010507 US PARACENTESIS ABD W IMAGING

## 2018-05-18 PROCEDURE — 74011000250 HC RX REV CODE- 250: Performed by: PHYSICIAN ASSISTANT

## 2018-05-18 PROCEDURE — 85610 PROTHROMBIN TIME: CPT

## 2018-05-18 RX ORDER — LIDOCAINE HYDROCHLORIDE 20 MG/ML
1.5 INJECTION, SOLUTION EPIDURAL; INFILTRATION; INTRACAUDAL; PERINEURAL ONCE
Status: COMPLETED | OUTPATIENT
Start: 2018-05-18 | End: 2018-05-18

## 2018-05-18 RX ADMIN — SODIUM BICARBONATE 2 ML: 0.2 INJECTION, SOLUTION INTRAVENOUS at 12:55

## 2018-05-18 RX ADMIN — LIDOCAINE HYDROCHLORIDE 80 MG: 20 INJECTION, SOLUTION EPIDURAL; INFILTRATION; INTRACAUDAL; PERINEURAL at 12:55

## 2018-05-18 NOTE — DISCHARGE INSTRUCTIONS
[address]  Department of Interventional Radiology  [address] [place] [address] [address] [facility]  [phone] Office  [phone] Fax    PARACENTESIS DISCHARGE INSTRUCTIONS    General Information:  During this procedure, the doctor will insert a needle into the abdomen to drain fluid. After the procedure, you will be able to take a deep breath much easier. The site of the puncture may ooze the first day. This will decrease and eventually stop. Paracentesis (draining fluid from the abdomen) sometimes makes patients hypotensive (low blood pressure). Your doctor may order for you to receive fluids or albumin (a volume booster) during the procedure through an IV site. Home Care Instructions:  Keep the puncture site clean and dry. No tub baths or swimming until puncture site heals. Showering is acceptable. Resume your normal diet, and resume your normal activity slowly and as you tolerate. If you are short of breath, rest. If shortness of breath does not ease, please call your ordering doctor. Fluid can re-accumulate in the chest and/or in the abdomen. If this should occur, your doctor needs to know as you may need to have the procedure done again. Call If:     You should call your Physician and/or the Radiology Nurse if you notice any signs of infection, like pus draining, or if it is swollen or reddened. Also call if you have a fever, or if you are bleeding from the puncture site more than a small amount on the dressing. Call if the puncture site keeps draining fluid. Some oozing is to be expected, but should slow and then stop. Call if you feel like you have pressure in your abdomen. SEEK IMMEDIATE CARE OR CALL 911 IF YOU SUDDENLY HAVE TROUBLE BREATHING, OR IF YOUR LIPS TURN BLUE, OR IF YOU NOTICE BLOOD IN YOUR SPUTUM. Follow-Up Instructions: Please see your ordering doctor as he/she has requested. To Reach Us:   If you have any questions about your procedure, please call the Interventional Radiology department at [phone]. After business hours (5pm) and weekends, call the answering service at [phone] and ask for the Radiologist on call to be paged. Interventional Radiology General Nurse Discharge    After general anesthesia or intravenous sedation, for 24 hours or while taking prescription Narcotics:  · Limit your activities  · Do not drive and operate hazardous machinery  · Do not make important personal or business decisions  · Do  not drink alcoholic beverages  · If you have not urinated within 8 hours after discharge, please contact your surgeon on call. * Please give a list of your current medications to your Primary Care Provider. * Please update this list whenever your medications are discontinued, doses are     changed, or new medications (including over-the-counter products) are added. * Please carry medication information at all times in case of emergency situations. These are general instructions for a healthy lifestyle:    No smoking/ No tobacco products/ Avoid exposure to second hand smoke  Surgeon General's Warning:  Quitting smoking now greatly reduces serious risk to your health. Obesity, smoking, and sedentary lifestyle greatly increases your risk for illness  A healthy diet, regular physical exercise & weight monitoring are important for maintaining a healthy lifestyle    You may be retaining fluid if you have a history of heart failure or if you experience any of the following symptoms:  Weight gain of 3 pounds or more overnight or 5 pounds in a week, increased swelling in our hands or feet or shortness of breath while lying flat in bed. Please call your doctor as soon as you notice any of these symptoms; do not wait until your next office visit.     Recognize signs and symptoms of STROKE:  F-face looks uneven    A-arms unable to move or move unevenly    S-speech slurred or non-existent    T-time-call [phone] as soon as signs and symptoms begin-DO NOT go       Back to bed or wait to see if you get better-TIME IS BRAIN.         Date: 5/18/2018  Discharging Nurse: Olga Lidia Kramer RN

## 2018-05-18 NOTE — PROGRESS NOTES
Interventional Radiology Post Paracentesis/Thoracentesis Note    5/18/2018    Procedure(s): Ultrasound guided Therapeutic Paracentesis Performed with 8 Sinhala catheter total volume 4400 ml. Preliminary Findings: moderate yellow and cloudy. Complications: None    Plan:  Observation, check labs if drawn.           Chest X-Ray:  no    Full dictated report to follow    Signed By: Lindsay Lemus RN

## 2018-05-18 NOTE — IP AVS SNAPSHOT
[address] 
[phone] Patient: Thelbert Mcardle MRN: [id number] :1965 About your hospitalization You were admitted on:  May 18, 2018 You last received care in the:  [facility] You were discharged on:  May 18, 2018 Why you were hospitalized Your primary diagnosis was:  Not on File Follow-up Information None Discharge Orders None A check alberto indicates which time of day the medication should be taken. My Medications ASK your doctor about these medications Instructions Each Dose to Equal  
 Morning Noon Evening Bedtime aMILoride 5 mg tablet Commonly known as:  Enrico Mole Your last dose was: Your next dose is: Take 15 mg by mouth daily. 15 mg COUMADIN 5 mg tablet Generic drug:  warfarin Your last dose was: Your next dose is: Take 5 mg by mouth daily. 5 mg  
    
   
   
   
  
 hydrOXYzine HCl 50 mg tablet Commonly known as:  ATARAX Your last dose was: Your next dose is: Take 50 mg by mouth daily as needed for Itching. 50 mg  
    
   
   
   
  
 LOVENOX 80 mg/0.8 mL injection Generic drug:  enoxaparin Your last dose was: Your next dose is:    
   
   
 70 mg by SubCUTAneous route every twelve (12) hours. 70 mg  
    
   
   
   
  
 potassium chloride SR 20 mEq tablet Commonly known as:  K-TAB Your last dose was: Your next dose is: Take 40 mEq by mouth daily. 40 mEq REMERON 15 mg tablet Generic drug:  mirtazapine Your last dose was: Your next dose is: Take 15 mg by mouth nightly as needed for Other (sleep). 15 mg  
    
   
   
   
  
 torsemide 20 mg tablet Commonly known as:  DEMADEX Your last dose was: Your next dose is: Take 40 mg by mouth daily. 40 mg  
    
   
   
   
  
 VITAMIN D2 50,000 unit capsule Generic drug:  ergocalciferol Your last dose was: Your next dose is: Take 50,000 Units by mouth every seven (7) days. [address]31 Units Discharge Instructions [place] [address] Department of Interventional Radiology [address] [facility] 
[phone] Office 
[phone] Fax PARACENTESIS DISCHARGE INSTRUCTIONS General Information: 
During this procedure, the doctor will insert a needle into the abdomen to drain fluid. After the procedure, you will be able to take a deep breath much easier. The site of the puncture may ooze the first day. This will decrease and eventually stop. Paracentesis (draining fluid from the abdomen) sometimes makes patients hypotensive (low blood pressure). Your doctor may order for you to receive fluids or albumin (a volume booster) during the procedure through an IV site. Home Care Instructions: 
Keep the puncture site clean and dry. No tub baths or swimming until puncture site heals. Showering is acceptable. Resume your normal diet, and resume your normal activity slowly and as you tolerate. If you are short of breath, rest. If shortness of breath does not ease, please call your ordering doctor. Fluid can re-accumulate in the chest and/or in the abdomen. If this should occur, your doctor needs to know as you may need to have the procedure done again. Call If: 
   You should call your Physician and/or the Radiology Nurse if you notice any signs of infection, like pus draining, or if it is swollen or reddened. Also call if you have a fever, or if you are bleeding from the puncture site more than a small amount on the dressing. Call if the puncture site keeps draining fluid.  Some oozing is to be expected, but should slow and then stop. Call if you feel like you have pressure in your abdomen. SEEK IMMEDIATE CARE OR CALL 911 IF YOU SUDDENLY HAVE TROUBLE BREATHING, OR IF YOUR LIPS TURN BLUE, OR IF YOU NOTICE BLOOD IN YOUR SPUTUM. Follow-Up Instructions: Please see your ordering doctor as he/she has requested. To Reach Us: If you have any questions about your procedure, please call the Interventional Radiology department at [phone]. After business hours (5pm) and weekends, call the answering service at [phone] and ask for the Radiologist on call to be paged. Interventional Radiology General Nurse Discharge After general anesthesia or intravenous sedation, for 24 hours or while taking prescription Narcotics: · Limit your activities · Do not drive and operate hazardous machinery · Do not make important personal or business decisions · Do  not drink alcoholic beverages · If you have not urinated within 8 hours after discharge, please contact your surgeon on call. * Please give a list of your current medications to your Primary Care Provider. * Please update this list whenever your medications are discontinued, doses are 
   changed, or new medications (including over-the-counter products) are added. * Please carry medication information at all times in case of emergency situations. These are general instructions for a healthy lifestyle: No smoking/ No tobacco products/ Avoid exposure to second hand smoke Surgeon General's Warning:  Quitting smoking now greatly reduces serious risk to your health. Obesity, smoking, and sedentary lifestyle greatly increases your risk for illness A healthy diet, regular physical exercise & weight monitoring are important for maintaining a healthy lifestyle You may be retaining fluid if you have a history of heart failure or if you experience any of the following symptoms:  Weight gain of 3 pounds or more overnight or 5 pounds in a week, increased swelling in our hands or feet or shortness of breath while lying flat in bed. Please call your doctor as soon as you notice any of these symptoms; do not wait until your next office visit. Recognize signs and symptoms of STROKE: 
F-face looks uneven A-arms unable to move or move unevenly S-speech slurred or non-existent T-time-call 911 as soon as signs and symptoms begin-DO NOT go Back to bed or wait to see if you get better-TIME IS BRAIN. Date: 5/18/2018 Discharging Nurse: Rima Uribe RN Introducing [facility]! [facility] introduces [employer] patient portal. Now you can access parts of your medical record, email your doctor's office, and request medication refills online. 1. In your internet browser, go to [email or web address] 2. Click on the First Time User? Click Here link in the Sign In box. You will see the New Member Sign Up page. 3. Enter your [employer] Access Code exactly as it appears below. You will not need to use this code after youve completed the sign-up process. If you do not sign up before the expiration date, you must request a new code. · [employer] Access Code: [email or web address] Expires: 6/11/2018  5:23 AM 
 
4. Enter the last four digits of your Social Security Number (xxxx) and Date of Birth (mm/dd/yyyy) as indicated and click Submit. You will be taken to the next sign-up page. 5. Create a [employer] ID. This will be your [employer] login ID and cannot be changed, so think of one that is secure and easy to remember. 6. Create a [employer] password. You can change your password at any time. 7. Enter your Password Reset Question and Answer. This can be used at a later time if you forget your password. 8. Enter your e-mail address. You will receive e-mail notification when new information is available in [address] 9. Click Sign Up. You can now view and download portions of your medical record. 10. Click the Download Summary menu link to download a portable copy of your medical information. If you have questions, please visit the Frequently Asked Questions section of the [employer] website. Remember, [employer] is NOT to be used for urgent needs. For medical emergencies, dial 911. Now available from your iPhone and Android! Introducing Kevin Spear As a [facility] patient, I wanted to make you aware of our electronic visit tool called Kevin Spear. [employer] [facility] 24/7 allows you to connect within minutes with a medical provider 24 hours a day, seven days a week via a mobile device or tablet or logging into a secure website from your computer. You can access Kevin Spear from anywhere in the [place]. A virtual visit might be right for you when you have a simple condition and feel like you just dont want to get out of bed, or cant get away from work for an appointment, when your regular [facility] provider is not available (evenings, weekends or holidays), or when youre out of town and need minor care. Electronic visits cost only $49 and if the [facility] 24/7 provider determines a prescription is needed to treat your condition, one can be electronically transmitted to a nearby pharmacy*. Please take a moment to enroll today if you have not already done so. The enrollment process is free and takes just a few minutes. To enroll, please download the [employer] 24/7 av to your tablet or phone, or visit [email or web address] to enroll on your computer. And, as an [address] [place] [address] patient with a [employer] account, the results of your visits will be scanned into your electronic medical record and your primary care provider will be able to view the scanned results.    
We urge you to continue to see your regular [facility] provider for your ongoing medical care. And while your primary care provider may not be the one available when you seek a Kevin Spear virtual visit, the peace of mind you get from getting a real diagnosis real time can be priceless. For more information on Kevin Spear, view our Frequently Asked Questions (FAQs) at [email or web address]. Sincerely, 
 
Jaimee Estrada MD 
Chief Medical Officer Khadijah Bhatti *:  certain medications cannot be prescribed via Kevin Spear Providers Seen During Your Hospitalization Provider Specialty Primary office phone Wiliam Amaro MD Gastroenterology [phone] Your Primary Care Physician (PCP) Primary Care Physician Office Phone Office Fax RACHNA BAL ** None ** ** None ** You are allergic to the following Allergen Reactions Tramadol Nausea and Vomiting Pt. states Recent Documentation Height Weight BMI Smoking Status 1.829 m 72.6 kg 21.7 kg/m2 Current Every Day Smoker Emergency Contacts Name Discharge Info Relation Home Work Mobile . [employer]  Son [22] [phone] Christopher Krishnamurthy DISCHARGE CAREGIVER [3] Brother [24] [phone] Patient Belongings The following personal items are in your possession at time of discharge: 
                             
 
  
  
 Please provide this summary of care documentation to your next provider. Signatures-by signing, you are acknowledging that this After Visit Summary has been reviewed with you and you have received a copy. Patient Signature:  ____________________________________________________________ Date:  ____________________________________________________________  
  
Hermes Skaggs Provider Signature:  ____________________________________________________________ Date:  ____________________________________________________________

## 2018-05-29 ENCOUNTER — HOSPITAL ENCOUNTER (OUTPATIENT)
Dept: ULTRASOUND IMAGING | Age: 53
Discharge: HOME | End: 2018-05-29
Attending: INTERNAL MEDICINE
Payer: MEDICARE

## 2018-05-29 VITALS
SYSTOLIC BLOOD PRESSURE: 102 MMHG | OXYGEN SATURATION: 100 % | HEART RATE: 71 BPM | RESPIRATION RATE: 12 BRPM | DIASTOLIC BLOOD PRESSURE: 77 MMHG

## 2018-05-29 DIAGNOSIS — I89.8: ICD-10-CM

## 2018-05-29 DIAGNOSIS — R18.8 ASCITES: ICD-10-CM

## 2018-05-29 PROCEDURE — 74011000250 HC RX REV CODE- 250: Performed by: RADIOLOGY

## 2018-05-29 PROCEDURE — C1729 CATH, DRAINAGE: HCPCS

## 2018-05-29 RX ORDER — LIDOCAINE HYDROCHLORIDE 20 MG/ML
20-200 INJECTION, SOLUTION INFILTRATION; PERINEURAL ONCE
Status: COMPLETED | OUTPATIENT
Start: 2018-05-29 | End: 2018-05-29

## 2018-05-29 RX ORDER — ALBUMIN HUMAN 250 G/1000ML
12.5 SOLUTION INTRAVENOUS ONCE
Status: DISCONTINUED | OUTPATIENT
Start: 2018-05-29 | End: 2018-05-29

## 2018-05-29 RX ADMIN — SODIUM BICARBONATE 2 ML: 0.2 INJECTION, SOLUTION INTRAVENOUS at 10:38

## 2018-05-29 RX ADMIN — LIDOCAINE HYDROCHLORIDE 160 MG: 20 INJECTION, SOLUTION INFILTRATION; PERINEURAL at 10:38

## 2018-05-29 NOTE — DISCHARGE INSTRUCTIONS
[place] [address]  Department of Interventional Radiology  [facility]  [phone] Office  [phone] Fax    PARACENTESIS DISCHARGE INSTRUCTIONS    General Information:  During this procedure, the doctor will insert a needle into the abdomen to drain fluid. After the procedure, you will be able to take a deep breath much easier. The site of the puncture may ooze the first day. This will decrease and eventually stop. Paracentesis (draining fluid from the abdomen) sometimes makes patients hypotensive (low blood pressure). Your doctor may order for you to receive fluids or albumin (a volume booster) during the procedure through an IV site. Home Care Instructions:  Keep the puncture site clean and dry. No tub baths or swimming until puncture site heals. Showering is acceptable. Resume your normal diet, and resume your normal activity slowly and as you tolerate. If you are short of breath, rest. If shortness of breath does not ease, please call your ordering doctor. Fluid can re-accumulate in the chest and/or in the abdomen. If this should occur, your doctor needs to know as you may need to have the procedure done again. Call If:     You should call your Physician and/or the Radiology Nurse if you notice any signs of infection, like pus draining, or if it is swollen or reddened. Also call if you have a fever, or if you are bleeding from the puncture site more than a small amount on the dressing. Call if the puncture site keeps draining fluid. Some oozing is to be expected, but should slow and then stop. Call if you feel like you have pressure in your abdomen. SEEK IMMEDIATE CARE OR CALL 911 IF YOU SUDDENLY HAVE TROUBLE BREATHING, OR IF YOUR LIPS TURN BLUE, OR IF YOU NOTICE BLOOD IN YOUR SPUTUM. Follow-Up Instructions: Please see your ordering doctor as he/she has requested. To Reach Us:     If you have any questions about your procedure, please call the Interventional Radiology department at [phone]. After business hours (5pm) and weekends, call the answering service at [phone] and ask for the Radiologist on call to be paged. Interventional Radiology General Nurse Discharge    After general anesthesia or intravenous sedation, for 24 hours or while taking prescription Narcotics:  · Limit your activities  · Do not drive and operate hazardous machinery  · Do not make important personal or business decisions  · Do  not drink alcoholic beverages  · If you have not urinated within 8 hours after discharge, please contact your surgeon on call. * Please give a list of your current medications to your Primary Care Provider. * Please update this list whenever your medications are discontinued, doses are     changed, or new medications (including over-the-counter products) are added. * Please carry medication information at all times in case of emergency situations. These are general instructions for a healthy lifestyle:    No smoking/ No tobacco products/ Avoid exposure to second hand smoke  Surgeon General's Warning:  Quitting smoking now greatly reduces serious risk to your health. Obesity, smoking, and sedentary lifestyle greatly increases your risk for illness  A healthy diet, regular physical exercise & weight monitoring are important for maintaining a healthy lifestyle    You may be retaining fluid if you have a history of heart failure or if you experience any of the following symptoms:  Weight gain of 3 pounds or more overnight or 5 pounds in a week, increased swelling in our hands or feet or shortness of breath while lying flat in bed. Please call your doctor as soon as you notice any of these symptoms; do not wait until your next office visit.     Recognize signs and symptoms of STROKE:  F-face looks uneven    A-arms unable to move or move unevenly    S-speech slurred or non-existent    T-time-call 911 as soon as signs and symptoms begin-DO NOT go       Back to bed or wait to see if you get better-TIME IS BRAIN.       Date: 5/29/2018  Discharging Nurse: Willy Friedman

## 2018-05-29 NOTE — IP AVS SNAPSHOT
[address] [phone] Patient: Marilou Johnson MRN: [id number] :1965 About your hospitalization You were admitted on:  May 29, 2018 You last received care in the:  [facility] You were discharged on:  May 29, 2018 Why you were hospitalized Your primary diagnosis was:  Not on File Follow-up Information None Discharge Orders None A check alberto indicates which time of day the medication should be taken. My Medications ASK your doctor about these medications Instructions Each Dose to Equal  
 Morning Noon Evening Bedtime aMILoride 5 mg tablet Commonly known as:  Jeraldene Fragmin Your last dose was: Your next dose is: Take 15 mg by mouth daily. 15 mg COUMADIN 5 mg tablet Generic drug:  warfarin Your last dose was: Your next dose is: Take 5 mg by mouth daily. 5 mg  
    
   
   
   
  
 hydrOXYzine HCl 50 mg tablet Commonly known as:  ATARAX Your last dose was: Your next dose is: Take 50 mg by mouth daily as needed for Itching. 50 mg  
    
   
   
   
  
 LOVENOX 80 mg/0.8 mL injection Generic drug:  enoxaparin Your last dose was: Your next dose is:    
   
   
 70 mg by SubCUTAneous route every twelve (12) hours. 70 mg  
    
   
   
   
  
 potassium chloride SR 20 mEq tablet Commonly known as:  K-TAB Your last dose was: Your next dose is: Take 40 mEq by mouth daily. 40 mEq REMERON 15 mg tablet Generic drug:  mirtazapine Your last dose was: Your next dose is: Take 15 mg by mouth nightly as needed for Other (sleep). 15 mg  
    
   
   
   
  
 torsemide 20 mg tablet Commonly known as:  DEMADEX Your last dose was: Your next dose is: Take 40 mg by mouth daily. 40 mg  
    
   
   
   
  
 VITAMIN D2 50,000 unit capsule Generic drug:  ergocalciferol Your last dose was: Your next dose is: Take 50,000 Units by mouth every seven (7) days. 20511 Units Discharge Instructions Rickey [address] Department of Interventional Radiology [facility] 
[phone] Office 
[phone] Fax PARACENTESIS DISCHARGE INSTRUCTIONS General Information: 
During this procedure, the doctor will insert a needle into the abdomen to drain fluid. After the procedure, you will be able to take a deep breath much easier. The site of the puncture may ooze the first day. This will decrease and eventually stop. Paracentesis (draining fluid from the abdomen) sometimes makes patients hypotensive (low blood pressure). Your doctor may order for you to receive fluids or albumin (a volume booster) during the procedure through an IV site. Home Care Instructions: 
Keep the puncture site clean and dry. No tub baths or swimming until puncture site heals. Showering is acceptable. Resume your normal diet, and resume your normal activity slowly and as you tolerate. If you are short of breath, rest. If shortness of breath does not ease, please call your ordering doctor. Fluid can re-accumulate in the chest and/or in the abdomen. If this should occur, your doctor needs to know as you may need to have the procedure done again. Call If: 
   You should call your Physician and/or the Radiology Nurse if you notice any signs of infection, like pus draining, or if it is swollen or reddened. Also call if you have a fever, or if you are bleeding from the puncture site more than a small amount on the dressing. Call if the puncture site keeps draining fluid.  Some oozing is to be expected, but should slow and then stop. Call if you feel like you have pressure in your abdomen. SEEK IMMEDIATE CARE OR CALL 911 IF YOU SUDDENLY HAVE TROUBLE BREATHING, OR IF YOUR LIPS TURN BLUE, OR IF YOU NOTICE BLOOD IN YOUR SPUTUM. Follow-Up Instructions: Please see your ordering doctor as he/she has requested. To Reach Us: If you have any questions about your procedure, please call the Interventional Radiology department at [phone]. After business hours (5pm) and weekends, call the answering service at [phone] and ask for the Radiologist on call to be paged. Interventional Radiology General Nurse Discharge After general anesthesia or intravenous sedation, for 24 hours or while taking prescription Narcotics: · Limit your activities · Do not drive and operate hazardous machinery · Do not make important personal or business decisions · Do  not drink alcoholic beverages · If you have not urinated within 8 hours after discharge, please contact your surgeon on call. * Please give a list of your current medications to your Primary Care Provider. * Please update this list whenever your medications are discontinued, doses are 
   changed, or new medications (including over-the-counter products) are added. * Please carry medication information at all times in case of emergency situations. These are general instructions for a healthy lifestyle: No smoking/ No tobacco products/ Avoid exposure to second hand smoke Surgeon General's Warning:  Quitting smoking now greatly reduces serious risk to your health. Obesity, smoking, and sedentary lifestyle greatly increases your risk for illness A healthy diet, regular physical exercise & weight monitoring are important for maintaining a healthy lifestyle You may be retaining fluid if you have a history of heart failure or if you experience any of the following symptoms:  Weight gain of 3 pounds or more overnight or 5 pounds in a week, increased swelling in our hands or feet or shortness of breath while lying flat in bed. Please call your doctor as soon as you notice any of these symptoms; do not wait until your next office visit. Recognize signs and symptoms of STROKE: 
F-face looks uneven A-arms unable to move or move unevenly S-speech slurred or non-existent T-time-call 911 as soon as signs and symptoms begin-DO NOT go Back to bed or wait to see if you get better-TIME IS BRAIN. Date: 5/29/2018 Discharging Nurse: Fer Lott Introducing [facility]! Jacek Polyflorentino introduces [employer] patient portal. Now you can access parts of your medical record, email your doctor's office, and request medication refills online. 1. In your internet browser, go to [email or web address] 2. Click on the First Time User? Click Here link in the Sign In box. You will see the New Member Sign Up page. 3. Enter your [employer] Access Code exactly as it appears below. You will not need to use this code after youve completed the sign-up process. If you do not sign up before the expiration date, you must request a new code. · [employer] Access Code: [email or web address] Expires: 6/11/2018  5:23 AM 
 
4. Enter the last four digits of your Social Security Number (xxxx) and Date of Birth (mm/dd/yyyy) as indicated and click Submit. You will be taken to the next sign-up page. 5. Create a [employer] ID. This will be your [employer] login ID and cannot be changed, so think of one that is secure and easy to remember. 6. Create a [employer] password. You can change your password at any time. 7. Enter your Password Reset Question and Answer. This can be used at a later time if you forget your password. 8. Enter your e-mail address. You will receive e-mail notification when new information is available in [address] 9. Click Sign Up.  You can now view and download portions of your medical record. 10. Click the Download Summary menu link to download a portable copy of your medical information. If you have questions, please visit the Frequently Asked Questions section of the [employer] website. Remember, D[employer]ink is NOT to be used for urgent needs. For medical emergencies, dial 911. Now available from your iPhone and Android! Introducing Kevin Spear As a [employer] patient, I wanted to make you aware of our electronic visit tool called Kevin Spear. [employer] 24/7 allows you to connect within minutes with a medical provider 24 hours a day, seven days a week via a mobile device or tablet or logging into a secure website from your computer. You can access Kevin Spear from anywhere in the [place]. A virtual visit might be right for you when you have a simple condition and feel like you just dont want to get out of bed, or cant get away from work for an appointment, when your regular [employer] provider is not available (evenings, weekends or holidays), or when youre out of town and need minor care. Electronic visits cost only $49 and if the [employer] 24/7 provider determines a prescription is needed to treat your condition, one can be electronically transmitted to a nearby pharmacy*. Please take a moment to enroll today if you have not already done so. The enrollment process is free and takes just a few minutes. To enroll, please download the [employer] 24/7 av to your tablet or phone, or visit [email or web address] to enroll on your computer. And, as an [address] [place] [address] patient with a [employer] account, the results of your visits will be scanned into your electronic medical record and your primary care provider will be able to view the scanned results. We urge you to continue to see your regular [employer] provider for your ongoing medical care.   And while your primary care provider may not be the one available when you seek a Kevin Spear virtual visit, the peace of mind you get from getting a real diagnosis real time can be priceless. For more information on Kevin Spear, view our Frequently Asked Questions (FAQs) at [email or web address]. Sincerely, 
 
Maurice Castellanos MD 
Chief Medical Officer Khadijah Bhatti *:  certain medications cannot be prescribed via Kevin Spear Providers Seen During Your Hospitalization Provider Specialty Primary office phone Yonathan Becker MD Gastroenterology [phone] Your Primary Care Physician (PCP) Primary Care Physician Office Phone Office Fax RACHNA BAL ** None ** ** None ** You are allergic to the following Allergen Reactions Tramadol Nausea and Vomiting Pt. states Recent Documentation Smoking Status Current Every Day Smoker Emergency Contacts Name Discharge Info Relation Home Work Mobile . Chen "[employer]  Son [22] [phone] Christopher Krishnamurthy DISCHARGE CAREGIVER [3] Brother [24] [phone] Patient Belongings The following personal items are in your possession at time of discharge: 
                             
 
  
  
 Please provide this summary of care documentation to your next provider. Signatures-by signing, you are acknowledging that this After Visit Summary has been reviewed with you and you have received a copy. Patient Signature:  ____________________________________________________________ Date:  ____________________________________________________________  
  
Cindy Hong Provider Signature:  ____________________________________________________________ Date:  ____________________________________________________________

## 2018-05-29 NOTE — PROGRESS NOTES
Interventional Radiology Post Paracentesis/Thoracentesis Note    5/29/2018    Procedure(s): Ultrasound guided Therapeutic Paracentesis Performed with 8 [place] catheter total volume 5,350 ml. Preliminary Findings: moderate yellow and cloudy. Complications: None    Plan:  Observation, check labs if drawn.           Chest X-Ray:  no    Full dictated report to follow    Signed By: Juice Hansen

## 2018-05-29 NOTE — PROGRESS NOTES
Pt reports INR from \"last Friday\" was 1.4; Jeannette Noriega PA-C reviewed and said OK to proceed with procedure.

## 2018-05-29 NOTE — PROGRESS NOTES
Pt refusing albumin administration. \"We didn't take out much more than last time\" and \"I don't think I need it, I've been doing OK without it\". He reports that taking potassium supplements helps with the cramping he used to experience after a paracentesis, and he has no other symptoms. Actions of albumin reviewed with pt. He will monitor himself and take the albumin in the future if he feels that it will help him. Pt declined paper D/C instructions; \"I found the one from last time\".

## 2018-06-04 ENCOUNTER — HOSPITAL ENCOUNTER (OUTPATIENT)
Dept: ULTRASOUND IMAGING | Age: 53
Discharge: HOME | End: 2018-06-04
Attending: INTERNAL MEDICINE
Payer: MEDICARE

## 2018-06-04 VITALS
BODY MASS INDEX: 21.67 KG/M2 | SYSTOLIC BLOOD PRESSURE: 109 MMHG | HEIGHT: 72 IN | RESPIRATION RATE: 20 BRPM | OXYGEN SATURATION: 99 % | WEIGHT: 160 LBS | TEMPERATURE: 97.6 F | DIASTOLIC BLOOD PRESSURE: 58 MMHG | HEART RATE: 78 BPM

## 2018-06-04 DIAGNOSIS — I89.8: ICD-10-CM

## 2018-06-04 DIAGNOSIS — R18.8 ASCITES: ICD-10-CM

## 2018-06-04 PROCEDURE — C1729 CATH, DRAINAGE: HCPCS

## 2018-06-04 PROCEDURE — 74011000250 HC RX REV CODE- 250: Performed by: RADIOLOGY

## 2018-06-04 RX ORDER — LIDOCAINE HYDROCHLORIDE 20 MG/ML
20-200 INJECTION, SOLUTION INFILTRATION; PERINEURAL ONCE
Status: COMPLETED | OUTPATIENT
Start: 2018-06-04 | End: 2018-06-04

## 2018-06-04 RX ADMIN — SODIUM BICARBONATE 2 ML: 0.2 INJECTION, SOLUTION INTRAVENOUS at 11:57

## 2018-06-04 RX ADMIN — LIDOCAINE HYDROCHLORIDE 60 MG: 20 INJECTION, SOLUTION INFILTRATION; PERINEURAL at 11:57

## 2018-06-04 NOTE — DISCHARGE INSTRUCTIONS
[address]  Department of Interventional Radiology  [facility]  [phone] Office  [phone] Fax    PARACENTESIS DISCHARGE INSTRUCTIONS    General Information:  During this procedure, the doctor will insert a needle into the abdomen to drain fluid. After the procedure, you will be able to take a deep breath much easier. The site of the puncture may ooze the first day. This will decrease and eventually stop. Paracentesis (draining fluid from the abdomen) sometimes makes patients hypotensive (low blood pressure). Your doctor may order for you to receive fluids or albumin (a volume booster) during the procedure through an IV site. Home Care Instructions:  Keep the puncture site clean and dry. No tub baths or swimming until puncture site heals. Showering is acceptable. Resume your normal diet, and resume your normal activity slowly and as you tolerate. If you are short of breath, rest. If shortness of breath does not ease, please call your ordering doctor. Fluid can re-accumulate in the chest and/or in the abdomen. If this should occur, your doctor needs to know as you may need to have the procedure done again. Call If:     You should call your Physician and/or the Radiology Nurse if you notice any signs of infection, like pus draining, or if it is swollen or reddened. Also call if you have a fever, or if you are bleeding from the puncture site more than a small amount on the dressing. Call if the puncture site keeps draining fluid. Some oozing is to be expected, but should slow and then stop. Call if you feel like you have pressure in your abdomen. SEEK IMMEDIATE CARE OR CALL 911 IF YOU SUDDENLY HAVE TROUBLE BREATHING, OR IF YOUR LIPS TURN BLUE, OR IF YOU NOTICE BLOOD IN YOUR SPUTUM.   Interventional Radiology General Nurse Discharge    After general anesthesia or intravenous sedation, for 24 hours or while taking prescription Narcotics:  · Limit your activities  · Do not drive and operate hazardous machinery  · Do not make important personal or business decisions  · Do  not drink alcoholic beverages  · If you have not urinated within 8 hours after discharge, please contact your surgeon on call. * Please give a list of your current medications to your Primary Care Provider. * Please update this list whenever your medications are discontinued, doses are     changed, or new medications (including over-the-counter products) are added. * Please carry medication information at all times in case of emergency situations. These are general instructions for a healthy lifestyle:    No smoking/ No tobacco products/ Avoid exposure to second hand smoke  Surgeon General's Warning:  Quitting smoking now greatly reduces serious risk to your health. Obesity, smoking, and sedentary lifestyle greatly increases your risk for illness  A healthy diet, regular physical exercise & weight monitoring are important for maintaining a healthy lifestyle    You may be retaining fluid if you have a history of heart failure or if you experience any of the following symptoms:  Weight gain of 3 pounds or more overnight or 5 pounds in a week, increased swelling in our hands or feet or shortness of breath while lying flat in bed. Please call your doctor as soon as you notice any of these symptoms; do not wait until your next office visit. Recognize signs and symptoms of STROKE:  F-face looks uneven    A-arms unable to move or move unevenly    S-speech slurred or non-existent    T-time-call 911 as soon as signs and symptoms begin-DO NOT go       Back to bed or wait to see if you get better-TIME IS BRAIN. Follow-Up Instructions: Please see your ordering doctor as he/she has requested. To Reach Us: If you have any questions about your procedure, please call the Interventional Radiology department at [phone].  After business hours (5pm) and weekends, call the answering service at [phone] and ask for the Radiologist on call to be paged. Si tiene Preguntas acerca del procedimiento, por favor llame al departamento de Radiología Intervencional al [phone]. Después de horas de oficina (5 pm) y los fines de [place], llamar al [place] de llamadas al [phone] y pregunte por el Radiologo de [facility].           Date: 6/4/2018  Discharging Nurse: Kojo Martinez RN

## 2018-06-04 NOTE — PROGRESS NOTES
Interventional Radiology Post Paracentesis/Thoracentesis Note    6/4/2018    Procedure(s): Ultrasound guided Therapeutic Paracentesis Performed with 8 [place] catheter total volume 4000 ml. Preliminary Findings: moderate cloudy. Complications: None    Plan:  Observation, check labs if drawn.           Chest X-Ray:  no    Full dictated report to follow    Signed By: Iraida Gonzales RN

## 2018-06-04 NOTE — IP AVS SNAPSHOT
[address] 
[phone] Patient: Michelle Avilez MRN: [id number] :1965 A check alberto indicates which time of day the medication should be taken. My Medications ASK your doctor about these medications Instructions Each Dose to Equal  
 Morning Noon Evening Bedtime aMILoride 5 mg tablet Commonly known as:  [place] [employer] Your last dose was: Your next dose is: Take 15 mg by mouth daily. 15 mg COUMADIN 5 mg tablet Generic drug:  warfarin Your last dose was: Your next dose is: Take 5 mg by mouth daily. 5 mg  
    
   
   
   
  
 hydrOXYzine HCl 50 mg tablet Commonly known as:  ATARAX Your last dose was: Your next dose is: Take 50 mg by mouth daily as needed for Itching. 50 mg  
    
   
   
   
  
 LOVENOX 80 mg/0.8 mL injection Generic drug:  enoxaparin Your last dose was: Your next dose is:    
   
   
 70 mg by SubCUTAneous route every twelve (12) hours. 70 mg  
    
   
   
   
  
 potassium chloride SR 20 mEq tablet Commonly known as:  K-TAB Your last dose was: Your next dose is: Take 40 mEq by mouth daily. 40 mEq REMERON 15 mg tablet Generic drug:  mirtazapine Your last dose was: Your next dose is: Take 15 mg by mouth nightly as needed for Other (sleep). 15 mg  
    
   
   
   
  
 torsemide 20 mg tablet Commonly known as:  DEMADEX Your last dose was: Your next dose is: Take 40 mg by mouth daily. 40 mg  
    
   
   
   
  
 VITAMIN D2 50,000 unit capsule Generic drug:  ergocalciferol Your last dose was: Your next dose is: Take 50,000 Units by mouth every seven (7) days. [address]93 Units

## 2018-06-04 NOTE — PROGRESS NOTES
Recovery period without difficulty. Pt alert and oriented and denies pain. Dressing is clean, dry, and intact. Reviewed discharge instructions with patient , both verbalized understanding. Pt escorted to lobby discharge area via wheelchair. Vital signs  completed.

## 2018-06-04 NOTE — IP AVS SNAPSHOT
Camila Garnica 
 
 
 [address] [facility] [address] [facility] 
[phone] Patient: Marilou Johnson MRN: [id number] :1965 About your hospitalization You were admitted on:  2018 You last received care in the:  D RADIOLOGY ULTRASOUND You were discharged on:  2018 Why you were hospitalized Your primary diagnosis was:  Not on File Follow-up Information None Discharge Orders None A check alberto indicates which time of day the medication should be taken. My Medications ASK your doctor about these medications Instructions Each Dose to Equal  
 Morning Noon Evening Bedtime aMILoride 5 mg tablet Commonly known as:  Jeraldene Fragmin Your last dose was: Your next dose is: Take 15 mg by mouth daily. 15 mg COUMADIN 5 mg tablet Generic drug:  warfarin Your last dose was: Your next dose is: Take 5 mg by mouth daily. 5 mg  
    
   
   
   
  
 hydrOXYzine HCl 50 mg tablet Commonly known as:  ATARAX Your last dose was: Your next dose is: Take 50 mg by mouth daily as needed for Itching. 50 mg  
    
   
   
   
  
 LOVENOX 80 mg/0.8 mL injection Generic drug:  enoxaparin Your last dose was: Your next dose is:    
   
   
 70 mg by SubCUTAneous route every twelve (12) hours. 70 mg  
    
   
   
   
  
 potassium chloride SR 20 mEq tablet Commonly known as:  K-TAB Your last dose was: Your next dose is: Take 40 mEq by mouth daily. 40 mEq REMERON 15 mg tablet Generic drug:  mirtazapine Your last dose was: Your next dose is: Take 15 mg by mouth nightly as needed for Other (sleep). 15 mg  
    
   
   
   
  
 torsemide 20 mg tablet Commonly known as:  DEMADEX Your last dose was: Your next dose is: Take 40 mg by mouth daily. 40 mg  
    
   
   
   
  
 VITAMIN D2 50,000 unit capsule Generic drug:  ergocalciferol Your last dose was: Your next dose is: Take 50,000 Units by mouth every seven (7) days. 69914 Units Discharge Instructions Rickey [address] Department of Interventional Radiology [facility] 
[phone] Office 
[phone] Fax PARACENTESIS DISCHARGE INSTRUCTIONS General Information: 
During this procedure, the doctor will insert a needle into the abdomen to drain fluid. After the procedure, you will be able to take a deep breath much easier. The site of the puncture may ooze the first day. This will decrease and eventually stop. Paracentesis (draining fluid from the abdomen) sometimes makes patients hypotensive (low blood pressure). Your doctor may order for you to receive fluids or albumin (a volume booster) during the procedure through an IV site. Home Care Instructions: 
Keep the puncture site clean and dry. No tub baths or swimming until puncture site heals. Showering is acceptable. Resume your normal diet, and resume your normal activity slowly and as you tolerate. If you are short of breath, rest. If shortness of breath does not ease, please call your ordering doctor. Fluid can re-accumulate in the chest and/or in the abdomen. If this should occur, your doctor needs to know as you may need to have the procedure done again. Call If: 
   You should call your Physician and/or the Radiology Nurse if you notice any signs of infection, like pus draining, or if it is swollen or reddened. Also call if you have a fever, or if you are bleeding from the puncture site more than a small amount on the dressing. Call if the puncture site keeps draining fluid.  Some oozing is to be expected, but should slow and then stop. Call if you feel like you have pressure in your abdomen. SEEK IMMEDIATE CARE OR CALL 911 IF YOU SUDDENLY HAVE TROUBLE BREATHING, OR IF YOUR LIPS TURN BLUE, OR IF YOU NOTICE BLOOD IN YOUR SPUTUM. Interventional Radiology General Nurse Discharge After general anesthesia or intravenous sedation, for 24 hours or while taking prescription Narcotics: · Limit your activities · Do not drive and operate hazardous machinery · Do not make important personal or business decisions · Do  not drink alcoholic beverages · If you have not urinated within 8 hours after discharge, please contact your surgeon on call. * Please give a list of your current medications to your Primary Care Provider. * Please update this list whenever your medications are discontinued, doses are 
   changed, or new medications (including over-the-counter products) are added. * Please carry medication information at all times in case of emergency situations. These are general instructions for a healthy lifestyle: No smoking/ No tobacco products/ Avoid exposure to second hand smoke Surgeon General's Warning:  Quitting smoking now greatly reduces serious risk to your health. Obesity, smoking, and sedentary lifestyle greatly increases your risk for illness A healthy diet, regular physical exercise & weight monitoring are important for maintaining a healthy lifestyle You may be retaining fluid if you have a history of heart failure or if you experience any of the following symptoms:  Weight gain of 3 pounds or more overnight or 5 pounds in a week, increased swelling in our hands or feet or shortness of breath while lying flat in bed. Please call your doctor as soon as you notice any of these symptoms; do not wait until your next office visit. Recognize signs and symptoms of STROKE: 
F-face looks uneven A-arms unable to move or move unevenly S-speech slurred or non-existent T-time-call 911 as soon as signs and symptoms begin-DO NOT go Back to bed or wait to see if you get better-TIME IS BRAIN. Follow-Up Instructions: Please see your ordering doctor as he/she has requested. To Reach Us: If you have any questions about your procedure, please call the Interventional Radiology department at [phone]. After business hours (5pm) and weekends, call the answering service at [phone] and ask for the Radiologist on call to be paged. Si tiene Preguntas acerca del procedimiento, por favor llame al departamento de Radiología Intervencional al [phone]. Después de horas de oficina (5 pm) y los fines de Conesus, llamar al [place] de llamadas al [phone] y pregunte por el Radiologo de [facility] [facility]. Date: 6/4/2018 Discharging Nurse: William Grayson RN Introducing [facility]! Jacek Titus introduces AboutOne patient portal. Now you can access parts of your medical record, email your doctor's office, and request medication refills online. 1. In your internet browser, go to [email or web address] 2. Click on the First Time User? Click Here link in the Sign In box. You will see the New Member Sign Up page. 3. Enter your AboutOne Access Code exactly as it appears below. You will not need to use this code after youve completed the sign-up process. If you do not sign up before the expiration date, you must request a new code. · AboutOne Access Code: [email or web address] Expires: 6/11/2018  5:23 AM 
 
4. Enter the last four digits of your Social Security Number (xxxx) and Date of Birth (mm/dd/yyyy) as indicated and click Submit. You will be taken to the next sign-up page. 5. Create a AboutOne ID. This will be your AboutOne login ID and cannot be changed, so think of one that is secure and easy to remember. 6. Create a [employer] password. You can change your password at any time. 7. Enter your Password Reset Question and Answer. This can be used at a later time if you forget your password. 8. Enter your e-mail address. You will receive e-mail notification when new information is available in [address] 9. Click Sign Up. You can now view and download portions of your medical record. 10. Click the Download Summary menu link to download a portable copy of your medical information. If you have questions, please visit the Frequently Asked Questions section of the [employer] website. Remember, [employer] is NOT to be used for urgent needs. For medical emergencies, dial 911. Now available from your iPhone and Android! Introducing Kevin Spear As a [employer] patient, I wanted to make you aware of our electronic visit tool called Kevin Spear. [facility] 24/7 allows you to connect within minutes with a medical provider 24 hours a day, seven days a week via a mobile device or tablet or logging into a secure website from your computer. You can access Kevin Spear from anywhere in the [place]. A virtual visit might be right for you when you have a simple condition and feel like you just dont want to get out of bed, or cant get away from work for an appointment, when your regular [employer] provider is not available (evenings, weekends or holidays), or when youre out of town and need minor care. Electronic visits cost only $49 and if the [employer] 24/7 provider determines a prescription is needed to treat your condition, one can be electronically transmitted to a nearby pharmacy*. Please take a moment to enroll today if you have not already done so. The enrollment process is free and takes just a few minutes. To enroll, please download the [employer] 24/7 av to your tablet or phone, or visit [email or web address] to enroll on your computer.    
And, as an [address] [place] [address] patient with a [employer] account, the results of your visits will be scanned into your electronic medical record and your primary care provider will be able to view the scanned results. We urge you to continue to see your regular [employer] provider for your ongoing medical care. And while your primary care provider may not be the one available when you seek a Kevin Spear virtual visit, the peace of mind you get from getting a real diagnosis real time can be priceless. For more information on [employer], view our Frequently Asked Questions (FAQs) at [email or web address]. Sincerely, 
 
Hadley Still MD 
Chief Medical Officer [employer] *:  certain medications cannot be prescribed via [email or web address] Unresulted Labs-Please follow up with your PCP about these lab tests Order Current Status US GUIDE PARACENTESIS In process Providers Seen During Your Hospitalization Provider Specialty Primary office phone Loyde Hamman, MD Gastroenterology [phone] Your Primary Care Physician (PCP) Primary Care Physician Office Phone Office Fax RACHNA BAL ** None ** ** None ** You are allergic to the following Allergen Reactions Tramadol Nausea and Vomiting Pt. states Recent Documentation Height Weight BMI Smoking Status 1.829 m 72.6 kg 21.7 kg/m2 Current Every Day Smoker Emergency Contacts Name Discharge Info Relation Home Work Mobile . [place] Friend Traveler  Son [22] [phone] Christopher Krishnamurthy DISCHARGE CAREGIVER [3] Brother [24] [phone] Patient Belongings The following personal items are in your possession at time of discharge: 
     Visual Aid: None Please provide this summary of care documentation to your next provider.  
  
  
 
  
Signatures-by signing, you are acknowledging that this After Visit Summary has been reviewed with you and you have received a copy. Patient Signature:  ____________________________________________________________ Date:  ____________________________________________________________  
  
Sigmund [place] Provider Signature:  ____________________________________________________________ Date:  ____________________________________________________________

## 2018-06-13 ENCOUNTER — HOSPITAL ENCOUNTER (OUTPATIENT)
Dept: ULTRASOUND IMAGING | Age: 53
Discharge: HOME | End: 2018-06-13
Attending: INTERNAL MEDICINE
Payer: MEDICARE

## 2018-06-13 VITALS
TEMPERATURE: 98 F | HEART RATE: 71 BPM | RESPIRATION RATE: 16 BRPM | DIASTOLIC BLOOD PRESSURE: 59 MMHG | SYSTOLIC BLOOD PRESSURE: 107 MMHG | OXYGEN SATURATION: 100 %

## 2018-06-13 DIAGNOSIS — R18.8 ASCITES: ICD-10-CM

## 2018-06-13 DIAGNOSIS — I89.8: ICD-10-CM

## 2018-06-13 LAB
INR BLD: 1.3 (ref 0.9–1.2)
PT BLD: 15.4 SECS (ref 9.6–11.6)

## 2018-06-13 PROCEDURE — 85610 PROTHROMBIN TIME: CPT

## 2018-06-13 PROCEDURE — 74011000250 HC RX REV CODE- 250: Performed by: PHYSICIAN ASSISTANT

## 2018-06-13 PROCEDURE — C1729 CATH, DRAINAGE: HCPCS

## 2018-06-13 RX ORDER — LIDOCAINE HYDROCHLORIDE 10 MG/ML
1-20 INJECTION INFILTRATION; PERINEURAL ONCE
Status: COMPLETED | OUTPATIENT
Start: 2018-06-13 | End: 2018-06-13

## 2018-06-13 RX ADMIN — LIDOCAINE HYDROCHLORIDE 8 ML: 10 INJECTION, SOLUTION INFILTRATION; PERINEURAL at 14:25

## 2018-06-13 RX ADMIN — SODIUM BICARBONATE 2 ML: 0.2 INJECTION, SOLUTION INTRAVENOUS at 14:25

## 2018-06-13 NOTE — DISCHARGE INSTRUCTIONS
[address] [facility]  Department of Interventional Radiology  [facility]  [phone] Office  [phone] Fax    PARACENTESIS DISCHARGE INSTRUCTIONS    General Information:  During this procedure, the doctor will insert a needle into the abdomen to drain fluid. After the procedure, you will be able to take a deep breath much easier. The site of the puncture may ooze the first day. This will decrease and eventually stop. Paracentesis (draining fluid from the abdomen) sometimes makes patients hypotensive (low blood pressure). Your doctor may order for you to receive fluids or albumin (a volume booster) during the procedure through an IV site. Home Care Instructions:  Keep the puncture site clean and dry. No tub baths or swimming until puncture site heals. Showering is acceptable. Resume your normal diet, and resume your normal activity slowly and as you tolerate. If you are short of breath, rest. If shortness of breath does not ease, please call your ordering doctor. Fluid can re-accumulate in the chest and/or in the abdomen. If this should occur, your doctor needs to know as you may need to have the procedure done again. Call If:     You should call your Physician and/or the Radiology Nurse if you notice any signs of infection, like pus draining, or if it is swollen or reddened. Also call if you have a fever, or if you are bleeding from the puncture site more than a small amount on the dressing. Call if the puncture site keeps draining fluid. Some oozing is to be expected, but should slow and then stop. Call if you feel like you have pressure in your abdomen. SEEK IMMEDIATE CARE OR CALL 911 IF YOU SUDDENLY HAVE TROUBLE BREATHING, OR IF YOUR LIPS TURN BLUE, OR IF YOU NOTICE BLOOD IN YOUR SPUTUM. Follow-Up Instructions: Please see your ordering doctor as he/she has requested. To Reach Us:  If you have any questions about your procedure, please call the Interventional Radiology department at [phone]. After business hours (5pm) and weekends, call the answering service at [phone] and ask for the Radiologist on call to be paged. Interventional Radiology General Nurse Discharge    After general anesthesia or intravenous sedation, for 24 hours or while taking prescription Narcotics:  · Limit your activities  · Do not drive and operate hazardous machinery  · Do not make important personal or business decisions  · Do  not drink alcoholic beverages  · If you have not urinated within 8 hours after discharge, please contact your surgeon on call. * Please give a list of your current medications to your Primary Care Provider. * Please update this list whenever your medications are discontinued, doses are     changed, or new medications (including over-the-counter products) are added. * Please carry medication information at all times in case of emergency situations. These are general instructions for a healthy lifestyle:    No smoking/ No tobacco products/ Avoid exposure to second hand smoke  Surgeon General's Warning:  Quitting smoking now greatly reduces serious risk to your health. Obesity, smoking, and sedentary lifestyle greatly increases your risk for illness  A healthy diet, regular physical exercise & weight monitoring are important for maintaining a healthy lifestyle    You may be retaining fluid if you have a history of heart failure or if you experience any of the following symptoms:  Weight gain of 3 pounds or more overnight or 5 pounds in a week, increased swelling in our hands or feet or shortness of breath while lying flat in bed. Please call your doctor as soon as you notice any of these symptoms; do not wait until your next office visit.     Recognize signs and symptoms of STROKE:  F-face looks uneven    A-arms unable to move or move unevenly    S-speech slurred or non-existent    T-time-call 911 as soon as signs and symptoms begin-DO NOT go       Back to bed or wait to see if you get better-TIME IS BRAIN.           Date: 6/13/2018  Discharging Nurse: Zay Avila RN

## 2018-06-13 NOTE — PROGRESS NOTES
Interventional Radiology Post Paracentesis/Thoracentesis Note    6/13/2018    Procedure(s): Ultrasound guided Therapeutic Paracentesis Performed with 8 Greenlandic catheter total volume 4500 ml. Preliminary Findings: moderate cloudy, yellow. Complications: None    Plan:  Observation, check labs if drawn.           Chest X-Ray:  no    Full dictated report to follow    Signed By: Olga Lidia Kramer RN

## 2018-06-13 NOTE — IP AVS SNAPSHOT
[address] 
[phone] Patient: Abelino Livingston MRN: [id number] :1965 About your hospitalization You were admitted on:  2018 You last received care in the:  [facility] RADIOLOGY ULTRASOUND You were discharged on:  2018 Why you were hospitalized Your primary diagnosis was:  Not on File Follow-up Information None Discharge Orders None A check alberto indicates which time of day the medication should be taken. My Medications ASK your doctor about these medications Instructions Each Dose to Equal  
 Morning Noon Evening Bedtime aMILoride 5 mg tablet Commonly known as:  Perfecto Searing Your last dose was: Your next dose is: Take 15 mg by mouth daily. 15 mg COUMADIN 5 mg tablet Generic drug:  warfarin Your last dose was: Your next dose is: Take 5 mg by mouth daily. 5 mg  
    
   
   
   
  
 hydrOXYzine HCl 50 mg tablet Commonly known as:  ATARAX Your last dose was: Your next dose is: Take 50 mg by mouth daily as needed for Itching. 50 mg  
    
   
   
   
  
 LOVENOX 80 mg/0.8 mL injection Generic drug:  enoxaparin Your last dose was: Your next dose is:    
   
   
 70 mg by SubCUTAneous route every twelve (12) hours. 70 mg  
    
   
   
   
  
 potassium chloride SR 20 mEq tablet Commonly known as:  K-TAB Your last dose was: Your next dose is: Take 40 mEq by mouth daily. 40 mEq REMERON 15 mg tablet Generic drug:  mirtazapine Your last dose was: Your next dose is: Take 15 mg by mouth nightly as needed for Other (sleep). 15 mg  
    
   
   
   
  
 torsemide 20 mg tablet Commonly known as:  DEMADEX Your last dose was: Your next dose is: Take 40 mg by mouth daily. 40 mg  
    
   
   
   
  
 VITAMIN D2 50,000 unit capsule Generic drug:  ergocalciferol Your last dose was: Your next dose is: Take 50,000 Units by mouth every seven (7) days. 88133 Units Discharge Instructions Rickey [address] Department of Interventional Radiology [facility] 
[phone] Office 
[phone] Fax PARACENTESIS DISCHARGE INSTRUCTIONS General Information: 
During this procedure, the doctor will insert a needle into the abdomen to drain fluid. After the procedure, you will be able to take a deep breath much easier. The site of the puncture may ooze the first day. This will decrease and eventually stop. Paracentesis (draining fluid from the abdomen) sometimes makes patients hypotensive (low blood pressure). Your doctor may order for you to receive fluids or albumin (a volume booster) during the procedure through an IV site. Home Care Instructions: 
Keep the puncture site clean and dry. No tub baths or swimming until puncture site heals. Showering is acceptable. Resume your normal diet, and resume your normal activity slowly and as you tolerate. If you are short of breath, rest. If shortness of breath does not ease, please call your ordering doctor. Fluid can re-accumulate in the chest and/or in the abdomen. If this should occur, your doctor needs to know as you may need to have the procedure done again. Call If: 
   You should call your Physician and/or the Radiology Nurse if you notice any signs of infection, like pus draining, or if it is swollen or reddened. Also call if you have a fever, or if you are bleeding from the puncture site more than a small amount on the dressing. Call if the puncture site keeps draining fluid.  Some oozing is to be expected, but should slow and then stop. Call if you feel like you have pressure in your abdomen. SEEK IMMEDIATE CARE OR CALL 911 IF YOU SUDDENLY HAVE TROUBLE BREATHING, OR IF YOUR LIPS TURN BLUE, OR IF YOU NOTICE BLOOD IN YOUR SPUTUM. Follow-Up Instructions: Please see your ordering doctor as he/she has requested. To Reach Us: If you have any questions about your procedure, please call the Interventional Radiology department at [phone]. After business hours (5pm) and weekends, call the answering service at [phone] and ask for the Radiologist on call to be paged. Interventional Radiology General Nurse Discharge After general anesthesia or intravenous sedation, for 24 hours or while taking prescription Narcotics: · Limit your activities · Do not drive and operate hazardous machinery · Do not make important personal or business decisions · Do  not drink alcoholic beverages · If you have not urinated within 8 hours after discharge, please contact your surgeon on call. * Please give a list of your current medications to your Primary Care Provider. * Please update this list whenever your medications are discontinued, doses are 
   changed, or new medications (including over-the-counter products) are added. * Please carry medication information at all times in case of emergency situations. These are general instructions for a healthy lifestyle: No smoking/ No tobacco products/ Avoid exposure to second hand smoke Surgeon General's Warning:  Quitting smoking now greatly reduces serious risk to your health. Obesity, smoking, and sedentary lifestyle greatly increases your risk for illness A healthy diet, regular physical exercise & weight monitoring are important for maintaining a healthy lifestyle You may be retaining fluid if you have a history of heart failure or if you experience any of the following symptoms:  Weight gain of 3 pounds or more overnight or 5 pounds in a week, increased swelling in our hands or feet or shortness of breath while lying flat in bed. Please call your doctor as soon as you notice any of these symptoms; do not wait until your next office visit. Recognize signs and symptoms of STROKE: 
F-face looks uneven A-arms unable to move or move unevenly S-speech slurred or non-existent T-time-call 911 as soon as signs and symptoms begin-DO NOT go Back to bed or wait to see if you get better-TIME IS BRAIN. Date: 6/13/2018 Discharging Nurse: Evy Serrano RN Introducing hospitals & HEALTH SERVICES! Patrick Nguyễn introduces [employer] patient portal. Now you can access parts of your medical record, email your doctor's office, and request medication refills online. 1. In your internet browser, go to [email or web address] 2. Click on the First Time User? Click Here link in the Sign In box. You will see the New Member Sign Up page. 3. Enter your Gu[employer] Access Code exactly as it appears below. You will not need to use this code after youve completed the sign-up process. If you do not sign up before the expiration date, you must request a new code. · [employer] Access Code: [email or web address] Expires: 9/9/2018  1:32 PM 
 
4. Enter the last four digits of your Social Security Number (xxxx) and Date of Birth (mm/dd/yyyy) as indicated and click Submit. You will be taken to the next sign-up page. 5. Create a Gu[employer]illa [employer] ID. This will be your [employer] login ID and cannot be changed, so think of one that is secure and easy to remember. 6. Create a [employer] password. You can change your password at any time. 7. Enter your Password Reset Question and Answer. This can be used at a later time if you forget your password. 8. Enter your e-mail address. You will receive e-mail notification when new information is available in [address] 9. Click Sign Up. You can now view and download portions of your medical record. 10. Click the Download Summary menu link to download a portable copy of your medical information. If you have questions, please visit the Frequently Asked Questions section of the [employer] website. Remember, WISETIVI is NOT to be used for urgent needs. For medical emergencies, dial 911. Now available from your iPhone and Android! Introducing Kevin Spear As a [employer] patient, I wanted to make you aware of our electronic visit tool called Kevin Spear. [employer] 24/7 allows you to connect within minutes with a medical provider 24 hours a day, seven days a week via a mobile device or tablet or logging into a secure website from your computer. You can access Kevin Spear from anywhere in the [place]. A virtual visit might be right for you when you have a simple condition and feel like you just dont want to get out of bed, or cant get away from work for an appointment, when your regular [employer] provider is not available (evenings, weekends or holidays), or when youre out of town and need minor care. Electronic visits cost only $49 and if the [employer] 24/7 provider determines a prescription is needed to treat your condition, one can be electronically transmitted to a nearby pharmacy*. Please take a moment to enroll today if you have not already done so. The enrollment process is free and takes just a few minutes. To enroll, please download the [employer] 24/7 av to your tablet or phone, or visit [email or web address] to enroll on your computer. And, as an [address] [place] [address] patient with a [employer] account, the results of your visits will be scanned into your electronic medical record and your primary care provider will be able to view the scanned results.    
We urge you to continue to see your regular [employer] provider for your ongoing medical care. And while your primary care provider may not be the one available when you seek a Kevin Spear virtual visit, the peace of mind you get from getting a real diagnosis real time can be priceless. For more information on Kevin Spear, view our Frequently Asked Questions (FAQs) at [email or web address]. Sincerely, 
 
Harsh Robledo MD 
Chief Medical Officer Khadijah Bhatti *:  certain medications cannot be prescribed via Kevin Wittshirasonia Unresulted tests-please follow up with your PCP on these results Procedure/Test Authorizing Provider Reginaldo Augustin MD  
  
Providers Seen During Your Hospitalization Provider Specialty Primary office phone Maragux Krueger MD Gastroenterology [phone] Your Primary Care Physician (PCP) Primary Care Physician Office Phone Office Fax RACHNA BAL ** None ** ** None ** You are allergic to the following Allergen Reactions Tramadol Nausea and Vomiting Pt. states Recent Documentation Smoking Status Current Every Day Smoker Emergency Contacts Name Discharge Info Relation Home Work Mobile . Chen . [employer]  Son [22] [phone] Christopher Krishnamurthy DISCHARGE CAREGIVER [3] Brother [24] [phone] Patient Belongings The following personal items are in your possession at time of discharge: 
                             
 
  
  
 Please provide this summary of care documentation to your next provider. Signatures-by signing, you are acknowledging that this After Visit Summary has been reviewed with you and you have received a copy. Patient Signature:  ____________________________________________________________ Date:  ____________________________________________________________  
  
Sujata Ramos  Provider Signature: ____________________________________________________________ Date:  ____________________________________________________________

## 2018-06-13 NOTE — IP AVS SNAPSHOT
[address] 
[phone] Patient: Caterina Metz MRN: [id number] :1965 A check alberto indicates which time of day the medication should be taken. My Medications ASK your doctor about these medications Instructions Each Dose to Equal  
 Morning Noon Evening Bedtime aMILoride 5 mg tablet Commonly known as:  Sabrina Zapien Your last dose was: Your next dose is: Take 15 mg by mouth daily. 15 mg COUMADIN 5 mg tablet Generic drug:  warfarin Your last dose was: Your next dose is: Take 5 mg by mouth daily. 5 mg  
    
   
   
   
  
 hydrOXYzine HCl 50 mg tablet Commonly known as:  ATARAX Your last dose was: Your next dose is: Take 50 mg by mouth daily as needed for Itching. 50 mg  
    
   
   
   
  
 LOVENOX 80 mg/0.8 mL injection Generic drug:  enoxaparin Your last dose was: Your next dose is:    
   
   
 70 mg by SubCUTAneous route every twelve (12) hours. 70 mg  
    
   
   
   
  
 potassium chloride SR 20 mEq tablet Commonly known as:  K-TAB Your last dose was: Your next dose is: Take 40 mEq by mouth daily. 40 mEq REMERON 15 mg tablet Generic drug:  mirtazapine Your last dose was: Your next dose is: Take 15 mg by mouth nightly as needed for Other (sleep). 15 mg  
    
   
   
   
  
 torsemide 20 mg tablet Commonly known as:  DEMADEX Your last dose was: Your next dose is: Take 40 mg by mouth daily. 40 mg  
    
   
   
   
  
 VITAMIN D2 50,000 unit capsule Generic drug:  ergocalciferol Your last dose was: Your next dose is: Take 50,000 Units by mouth every seven (7) days. 52701 Units

## 2018-06-21 ENCOUNTER — HOSPITAL ENCOUNTER (OUTPATIENT)
Dept: ULTRASOUND IMAGING | Age: 53
Discharge: HOME | End: 2018-06-21
Attending: INTERNAL MEDICINE
Payer: MEDICARE

## 2018-06-21 VITALS
OXYGEN SATURATION: 99 % | SYSTOLIC BLOOD PRESSURE: 108 MMHG | HEART RATE: 67 BPM | DIASTOLIC BLOOD PRESSURE: 68 MMHG | TEMPERATURE: 97.9 F | RESPIRATION RATE: 18 BRPM

## 2018-06-21 DIAGNOSIS — I89.9 NONINFECTIVE DISORDER OF LYMPHATIC VESSELS AND LYMPH NODES, UNSPECIFIED: ICD-10-CM

## 2018-06-21 LAB
INR BLD: 1.3 (ref 0.9–1.2)
PT BLD: 15.6 SECS (ref 9.6–11.6)

## 2018-06-21 PROCEDURE — 77030037400 US GUIDE PARACENTESIS

## 2018-06-21 PROCEDURE — 85610 PROTHROMBIN TIME: CPT

## 2018-06-21 NOTE — PROGRESS NOTES
Interventional Radiology Post Paracentesis/Thoracentesis Note    6/21/2018    Procedure(s): Ultrasound guided Therapeutic Paracentesis Performed with 8 Slovenian catheter total volume 5400 ml. Samples sent to lab. Preliminary Findings: moderate clear and yellow.     Complications: None    Plan:  Observation          Chest X-Ray:  no    Full dictated report to follow    Signed By: Eric Greenfield RN

## 2018-06-29 ENCOUNTER — HOSPITAL ENCOUNTER (OUTPATIENT)
Dept: ULTRASOUND IMAGING | Age: 53
Discharge: HOME | End: 2018-06-29
Attending: INTERNAL MEDICINE
Payer: MEDICARE

## 2018-06-29 VITALS
SYSTOLIC BLOOD PRESSURE: 102 MMHG | DIASTOLIC BLOOD PRESSURE: 56 MMHG | OXYGEN SATURATION: 100 % | RESPIRATION RATE: 18 BRPM | HEART RATE: 64 BPM | TEMPERATURE: 98.4 F

## 2018-06-29 DIAGNOSIS — I89.9 NONINFECTIVE DISORDER OF LYMPHATIC VESSELS AND LYMPH NODES, UNSPECIFIED: ICD-10-CM

## 2018-06-29 LAB
INR BLD: 1.5 (ref 0.9–1.2)
PT BLD: 17.1 SECS (ref 9.6–11.6)

## 2018-06-29 PROCEDURE — 85610 PROTHROMBIN TIME: CPT

## 2018-06-29 PROCEDURE — 77030037400 US GUIDE PARACENTESIS

## 2018-06-29 PROCEDURE — 74011000250 HC RX REV CODE- 250: Performed by: PHYSICIAN ASSISTANT

## 2018-06-29 RX ORDER — LIDOCAINE HYDROCHLORIDE 10 MG/ML
20-200 INJECTION INFILTRATION; PERINEURAL ONCE
Status: COMPLETED | OUTPATIENT
Start: 2018-06-29 | End: 2018-06-29

## 2018-06-29 RX ADMIN — LIDOCAINE HYDROCHLORIDE 10 ML: 10 INJECTION, SOLUTION INFILTRATION; PERINEURAL at 08:54

## 2018-06-29 RX ADMIN — SODIUM BICARBONATE 1 ML: 0.2 INJECTION, SOLUTION INTRAVENOUS at 08:54

## 2018-06-29 NOTE — IP AVS SNAPSHOT
303 [facility] 
 
 
 [address] 
[phone] Patient: Guanako Walden MRN: [id number] :1965 About your hospitalization You were admitted on:  2018 You last received care in the:  [facility] RADIOLOGY ULTRASOUND You were discharged on:  2018 Why you were hospitalized Your primary diagnosis was:  Not on File Follow-up Information None Your Scheduled Appointments 2018  2:00 PM EDT  
[address] [facility] with S[facility] US LOGIC 7 UNIT 1, S[facility] NURSING, [facility] IR PA RESOURCE 2  
[facility] RADIOLOGY ULTRASOUND ([address] [place] [address]) [address]  
[phone] Interventional Radiology Procedure completed with ultrasound guidance. Referring Physicians: 1) Initial paracentesis patients required: H&P/recent office notes and lab work, no older than 30 days (CBC, BMP, PT/PTT) to Interventional Radiology to facilitate prompt scheduling. 2) Obtain clearance to hold blood thinners from prescribing Physician and give Patient instructions prior to arrival. 3) Patient may continue to eat or drink as normal prior to arrival. 4) Pt to arrive 15 minutes early. 5) Responsible adult  required to drive Patient home after recovery period. Recovery period can vary depending on sedation and patient condition. 6) Interventional Radiology Scheduling can be contacted at [phone] Zaria Lanier, [address] [place] [address]- 2nd floor of Outpatient Medical Office Building Discharge Orders None A check alberto indicates which time of day the medication should be taken. My Medications ASK your doctor about these medications Instructions Each Dose to Equal  
 Morning Noon Evening Bedtime aMILoride 5 mg tablet Commonly known as:  Yu Michaels Your last dose was: Your next dose is: Take 15 mg by mouth daily. 15 mg COUMADIN 5 mg tablet Generic drug:  warfarin Your last dose was: Your next dose is: Take 5 mg by mouth daily. 5 mg  
    
   
   
   
  
 hydrOXYzine HCl 50 mg tablet Commonly known as:  ATARAX Your last dose was: Your next dose is: Take 50 mg by mouth daily as needed for Itching. 50 mg  
    
   
   
   
  
 LOVENOX 80 mg/0.8 mL injection Generic drug:  enoxaparin Your last dose was: Your next dose is:    
   
   
 70 mg by SubCUTAneous route every twelve (12) hours. 70 mg  
    
   
   
   
  
 potassium chloride SR 20 mEq tablet Commonly known as:  K-TAB Your last dose was: Your next dose is: Take 40 mEq by mouth daily. 40 mEq REMERON 15 mg tablet Generic drug:  mirtazapine Your last dose was: Your next dose is: Take 15 mg by mouth nightly as needed for Other (sleep). 15 mg  
    
   
   
   
  
 torsemide 20 mg tablet Commonly known as:  DEMADEX Your last dose was: Your next dose is: Take 40 mg by mouth daily. 40 mg  
    
   
   
   
  
 VITAMIN D2 50,000 unit capsule Generic drug:  ergocalciferol Your last dose was: Your next dose is: Take 50,000 Units by mouth every seven (7) days. 52095 Units Discharge Instructions [facility] 34 [address] Department of Interventional Radiology [facility] 
[phone] Office 
[phone] Fax PARACENTESIS DISCHARGE INSTRUCTIONS General Information: 
During this procedure, the doctor will insert a needle into the abdomen to drain fluid. After the procedure, you will be able to take a deep breath much easier. The site of the puncture may ooze the first day.  This will decrease and eventually stop. Paracentesis (draining fluid from the abdomen) sometimes makes patients hypotensive (low blood pressure). Your doctor may order for you to receive fluids or albumin (a volume booster) during the procedure through an IV site. Home Care Instructions: 
Keep the puncture site clean and dry. No tub baths or swimming until puncture site heals. Showering is acceptable. Resume your normal diet, and resume your normal activity slowly and as you tolerate. If you are short of breath, rest. If shortness of breath does not ease, please call your ordering doctor. Fluid can re-accumulate in the chest and/or in the abdomen. If this should occur, your doctor needs to know as you may need to have the procedure done again. Call If: 
   You should call your Physician and/or the Radiology Nurse if you notice any signs of infection, like pus draining, or if it is swollen or reddened. Also call if you have a fever, or if you are bleeding from the puncture site more than a small amount on the dressing. Call if the puncture site keeps draining fluid. Some oozing is to be expected, but should slow and then stop. Call if you feel like you have pressure in your abdomen. SEEK IMMEDIATE CARE OR CALL [phone] IF YOU SUDDENLY HAVE TROUBLE BREATHING, OR IF YOUR LIPS TURN BLUE, OR IF YOU NOTICE BLOOD IN YOUR SPUTUM. Follow-Up Instructions: Please see your ordering doctor as he/she has requested. To Reach Us: If you have any questions about your procedure, please call the Interventional Radiology department at [phone]. After business hours (5pm) and weekends, call the answering service at [phone] and ask for the Radiologist on call to be paged. Si tiene Preguntas acerca del procedimiento, por favor llame al departamento de Radiología Intervencional al [phone].  Después de horas de oficina (5 pm) y los fines de [place], llamar al servicio de mary ellen cervantes [phone] y pregunte por el Radiologo de Sarah Aaron. Date: 6/29/2018 Discharging Nurse: Octavio Luke RN Introducing [facility] & HEALTH SERVICES! Jaswinderkeith Castroob introduces [employer] patient portal. Now you can access parts of your medical record, email your doctor's office, and request medication refills online. 1. In your internet browser, go to [email or web address] 2. Click on the First Time User? Click Here link in the Sign In box. You will see the New Member Sign Up page. 3. Enter your [employer] Access Code exactly as it appears below. You will not need to use this code after youve completed the sign-up process. If you do not sign up before the expiration date, you must request a new code. · [employer] Access Code: [email or web address] Expires: 9/9/2018  1:32 PM 
 
4. Enter the last four digits of your Social Security Number (xxxx) and Date of Birth (mm/dd/yyyy) as indicated and click Submit. You will be taken to the next sign-up page. 5. Create a [employer]One ID. This will be your [employer] login ID and cannot be changed, so think of one that is secure and easy to remember. 6. Create a [employer] password. You can change your password at any time. 7. Enter your Password Reset Question and Answer. This can be used at a later time if you forget your password. 8. Enter your e-mail address. You will receive e-mail notification when new information is available in [address] 9. Click Sign Up. You can now view and download portions of your medical record. 10. Click the Download Summary menu link to download a portable copy of your medical information. If you have questions, please visit the Frequently Asked Questions section of the [employer] website. Remember, [employer] is NOT to be used for urgent needs. For medical emergencies, dial 911. Now available from your iPhone and Android! Introducing Kevin Spear As a [facility] patient, I wanted to make you aware of our electronic visit tool called Kevin CarreraShoutlet. [employer]/DuckDuckGo allows you to connect within minutes with a medical provider 24 hours a day, seven days a week via a mobile device or tablet or logging into a secure website from your computer. You can access Kevin Carrerafin from anywhere in the [place]. A virtual visit might be right for you when you have a simple condition and feel like you just dont want to get out of bed, or cant get away from work for an appointment, when your regular [facility] provider is not available (evenings, weekends or holidays), or when youre out of town and need minor care. Electronic visits cost only $49 and if the [employer]/7 provider determines a prescription is needed to treat your condition, one can be electronically transmitted to a nearby pharmacy*. Please take a moment to enroll today if you have not already done so. The enrollment process is free and takes just a few minutes. To enroll, please download the [employer]/DuckDuckGo av to your tablet or phone, or visit [email or web address] to enroll on your computer. And, as an [address] [place] [address] patient with a [employer] account, the results of your visits will be scanned into your electronic medical record and your primary care provider will be able to view the scanned results. We urge you to continue to see your regular [facility] provider for your ongoing medical care. And while your primary care provider may not be the one available when you seek a Kevin Wittshirafin virtual visit, the peace of mind you get from getting a real diagnosis real time can be priceless. For more information on Kevin Wittshirafin, view our Frequently Asked Questions (FAQs) at [email or web address]. Sincerely, 
 
Naseem Carlos MD 
Chief Medical Officer Godfrey8 Teri Bhatti *:  certain medications cannot be prescribed via Kevin Spear Unresulted Labs-Please follow up with your PCP about these lab tests Order Current Status US GUIDE PARACENTESIS In process Providers Seen During Your Hospitalization Provider Specialty Primary office phone Ayanna Malik MD Gastroenterology [phone] Your Primary Care Physician (PCP) Primary Care Physician Office Phone Office Fax RACHNA BAL ** None ** ** None ** You are allergic to the following Allergen Reactions Tramadol Nausea and Vomiting Pt. states Recent Documentation Smoking Status Current Every Day Smoker Emergency Contacts Name Discharge Info Relation Home Work Mobile [employer]. Chen Carma  Son [22] [phone] Christopher Krihsnamurthy DISCHARGE CAREGIVER [3] Brother [24] [phone] Patient Belongings The following personal items are in your possession at time of discharge: 
     Visual Aid: None Please provide this summary of care documentation to your next provider. Signatures-by signing, you are acknowledging that this After Visit Summary has been reviewed with you and you have received a copy. Patient Signature:  ____________________________________________________________ Date:  ____________________________________________________________  
  
Josph Perfect Provider Signature:  ____________________________________________________________ Date:  ____________________________________________________________

## 2018-06-29 NOTE — PROGRESS NOTES
Patient to IR room 7 for procedure. Patient awake and alert, verbalized name, , and procedure to be performed. Patient moved to procedure table independently.

## 2018-07-09 ENCOUNTER — HOSPITAL ENCOUNTER (OUTPATIENT)
Dept: ULTRASOUND IMAGING | Age: 53
Discharge: HOME | End: 2018-07-09
Attending: INTERNAL MEDICINE
Payer: MEDICARE

## 2018-07-09 VITALS
OXYGEN SATURATION: 98 % | TEMPERATURE: 98 F | SYSTOLIC BLOOD PRESSURE: 110 MMHG | RESPIRATION RATE: 16 BRPM | DIASTOLIC BLOOD PRESSURE: 65 MMHG | HEART RATE: 66 BPM

## 2018-07-09 DIAGNOSIS — I89.9 NONINFECTIVE DISORDER OF LYMPHATIC VESSELS AND LYMPH NODES, UNSPECIFIED: ICD-10-CM

## 2018-07-09 LAB
INR BLD: 1.2 (ref 0.9–1.2)
PT BLD: 14.2 SECS (ref 9.6–11.6)

## 2018-07-09 PROCEDURE — 77030037400 US GUIDE PARACENTESIS

## 2018-07-09 PROCEDURE — 85610 PROTHROMBIN TIME: CPT

## 2018-07-09 PROCEDURE — 74011000250 HC RX REV CODE- 250: Performed by: PHYSICIAN ASSISTANT

## 2018-07-09 RX ORDER — LIDOCAINE HYDROCHLORIDE 20 MG/ML
20-300 INJECTION, SOLUTION INFILTRATION; PERINEURAL
Status: DISCONTINUED | OUTPATIENT
Start: 2018-07-09 | End: 2018-07-13 | Stop reason: HOSPADM

## 2018-07-09 RX ADMIN — LIDOCAINE HYDROCHLORIDE 100 MG: 20 INJECTION, SOLUTION INFILTRATION; PERINEURAL at 15:34

## 2018-07-09 NOTE — IP AVS SNAPSHOT
[address] 
[phone] Patient: Marco Antonio Reyes MRN: [id number] :1965 About your hospitalization You were admitted on:  2018 You last received care in the:  [facility] You were discharged on:  2018 Why you were hospitalized Your primary diagnosis was:  Not on File Follow-up Information None Discharge Orders None A check alberto indicates which time of day the medication should be taken. My Medications ASK your doctor about these medications Instructions Each Dose to Equal  
 Morning Noon Evening Bedtime aMILoride 5 mg tablet Commonly known as:  Елена [place] Your last dose was: Your next dose is: Take 15 mg by mouth daily. 15 mg COUMADIN 5 mg tablet Generic drug:  warfarin Your last dose was: Your next dose is: Take 5 mg by mouth daily. 5 mg  
    
   
   
   
  
 hydrOXYzine HCl 50 mg tablet Commonly known as:  ATARAX Your last dose was: Your next dose is: Take 50 mg by mouth daily as needed for Itching. 50 mg  
    
   
   
   
  
 LOVENOX 80 mg/0.8 mL injection Generic drug:  enoxaparin Your last dose was: Your next dose is:    
   
   
 70 mg by SubCUTAneous route every twelve (12) hours. 70 mg  
    
   
   
   
  
 potassium chloride SR 20 mEq tablet Commonly known as:  K-TAB Your last dose was: Your next dose is: Take 40 mEq by mouth daily. 40 mEq REMERON 15 mg tablet Generic drug:  mirtazapine Your last dose was: Your next dose is: Take 15 mg by mouth nightly as needed for Other (sleep). 15 mg  
    
   
   
   
  
 torsemide 20 mg tablet Commonly known as:  DEMADEX Your last dose was: Your next dose is: Take 40 mg by mouth daily. 40 mg  
    
   
   
   
  
 VITAMIN D2 50,000 unit capsule Generic drug:  ergocalciferol Your last dose was: Your next dose is: Take 50,000 Units by mouth every seven (7) days. 54347 Units Discharge Instructions Rickey [address] Department of Interventional Radiology [facility] 
[phone] Office 
[phone] Fax PARACENTESIS DISCHARGE INSTRUCTIONS General Information: 
During this procedure, the doctor will insert a needle into the abdomen to drain fluid. After the procedure, you will be able to take a deep breath much easier. The site of the puncture may ooze the first day. This will decrease and eventually stop. Paracentesis (draining fluid from the abdomen) sometimes makes patients hypotensive (low blood pressure). Your doctor may order for you to receive fluids or albumin (a volume booster) during the procedure through an IV site. Home Care Instructions: 
Keep the puncture site clean and dry. No tub baths or swimming until puncture site heals. Showering is acceptable. Resume your normal diet, and resume your normal activity slowly and as you tolerate. If you are short of breath, rest. If shortness of breath does not ease, please call your ordering doctor. Fluid can re-accumulate in the chest and/or in the abdomen. If this should occur, your doctor needs to know as you may need to have the procedure done again. Call If: 
   You should call your Physician and/or the Radiology Nurse if you notice any signs of infection, like pus draining, or if it is swollen or reddened. Also call if you have a fever, or if you are bleeding from the puncture site more than a small amount on the dressing. Call if the puncture site keeps draining fluid.  Some oozing is to be expected, but should slow and then stop. Call if you feel like you have pressure in your abdomen. SEEK IMMEDIATE CARE OR CALL 911 IF YOU SUDDENLY HAVE TROUBLE BREATHING, OR IF YOUR LIPS TURN BLUE, OR IF YOU NOTICE BLOOD IN YOUR SPUTUM. Follow-Up Instructions: Please see your ordering doctor as he/she has requested. To Reach Us: If you have any questions about your procedure, please call the Interventional Radiology department at [phone]. After business hours (5pm) and weekends, call the answering service at [phone] and ask for the Radiologist on call to be paged. Interventional Radiology General Nurse Discharge After general anesthesia or intravenous sedation, for 24 hours or while taking prescription Narcotics: · Limit your activities · Do not drive and operate hazardous machinery · Do not make important personal or business decisions · Do  not drink alcoholic beverages · If you have not urinated within 8 hours after discharge, please contact your surgeon on call. * Please give a list of your current medications to your Primary Care Provider. * Please update this list whenever your medications are discontinued, doses are 
   changed, or new medications (including over-the-counter products) are added. * Please carry medication information at all times in case of emergency situations. These are general instructions for a healthy lifestyle: No smoking/ No tobacco products/ Avoid exposure to second hand smoke Surgeon General's Warning:  Quitting smoking now greatly reduces serious risk to your health. Obesity, smoking, and sedentary lifestyle greatly increases your risk for illness A healthy diet, regular physical exercise & weight monitoring are important for maintaining a healthy lifestyle You may be retaining fluid if you have a history of heart failure or if you experience any of the following symptoms:  Weight gain of 3 pounds or more overnight or 5 pounds in a week, increased swelling in our hands or feet or shortness of breath while lying flat in bed. Please call your doctor as soon as you notice any of these symptoms; do not wait until your next office visit. Recognize signs and symptoms of STROKE: 
F-face looks uneven A-arms unable to move or move unevenly S-speech slurred or non-existent T-time-call 911 as soon as signs and symptoms begin-DO NOT go Back to bed or wait to see if you get better-TIME IS BRAIN. Date: 7/9/2018 Discharging Nurse: Luciana Soliman RN Introducing [facility]! [facility] introduces PharmAbcine patient portal. Now you can access parts of your medical record, email your doctor's office, and request medication refills online. 1. In your internet browser, go to [email or web address] 2. Click on the First Time User? Click Here link in the Sign In box. You will see the New Member Sign Up page. 3. Enter your PharmAbcine Access Code exactly as it appears below. You will not need to use this code after youve completed the sign-up process. If you do not sign up before the expiration date, you must request a new code. · Pharm[employer]cine Access Code: [email or web address] Expires: 9/9/2018  1:32 PM 
 
4. Enter the last four digits of your Social Security Number (xxxx) and Date of Birth (mm/dd/yyyy) as indicated and click Submit. You will be taken to the next sign-up page. 5. Create a Yieldext ID. This will be your PharmAbcine login ID and cannot be changed, so think of one that is secure and easy to remember. 6. Create a PharmAbcine password. You can change your password at any time. 7. Enter your Password Reset Question and Answer. This can be used at a later time if you forget your password. 8. Enter your e-mail address. You will receive e-mail notification when new information is available in [address] 9. Click Sign Up. You can now view and download portions of your medical record. 10. Click the Download Summary menu link to download a portable copy of your medical information. If you have questions, please visit the Frequently Asked Questions section of the [employer] website. Remember, BuscoTurno is NOT to be used for urgent needs. For medical emergencies, dial 911. Now available from your iPhone and Android! Introducing Kevin Spear As a [facility] patient, I wanted to make you aware of our electronic visit tool called Kevin Spear. [employer]Vir[employer]/Captricity allows you to connect within minutes with a medical provider 24 hours a day, seven days a week via a mobile device or tablet or logging into a secure website from your computer. You can access Kevin Carrerafin from anywhere in the [place]. A virtual visit might be right for you when you have a simple condition and feel like you just dont want to get out of bed, or cant get away from work for an appointment, when your regular [facility] provider is not available (evenings, weekends or holidays), or when youre out of town and need minor care. Electronic visits cost only $49 and if the [employer]Vir[employer]/Captricity provider determines a prescription is needed to treat your condition, one can be electronically transmitted to a nearby pharmacy*. Please take a moment to enroll today if you have not already done so. The enrollment process is free and takes just a few minutes. To enroll, please download the NanoViricides/Captricity av to your tablet or phone, or visit [email or web address] to enroll on your computer. And, as an [address] [place] [address] patient with a Good4U account, the results of your visits will be scanned into your electronic medical record and your primary care provider will be able to view the scanned results.    
We urge you to continue to see your regular [facility] provider for your ongoing medical care. And while your primary care provider may not be the one available when you seek a Kevin Wittshirafin virtual visit, the peace of mind you get from getting a real diagnosis real time can be priceless. For more information on Kevin Wittshirafin, view our Frequently Asked Questions (FAQs) at [email or web address]. Sincerely, 
 
Analy Graham MD 
Chief Medical Officer [employer] *:  certain medications cannot be prescribed via Kevin Wittyash Unresulted Labs-Please follow up with your PCP about these lab tests Order Current Status US GUIDE PARACENTESIS In process Providers Seen During Your Hospitalization Provider Specialty Primary office phone Josh Oakley MD Gastroenterology [phone] Your Primary Care Physician (PCP) Primary Care Physician Office Phone Office Fax RACHNA BAL ** None ** ** None ** You are allergic to the following Allergen Reactions Tramadol Nausea and Vomiting Pt. states Recent Documentation Smoking Status Current Every Day Smoker Emergency Contacts Name Discharge Info Relation Home Work Mobile . [employer]  Son [22] [phone] Christopher Krishnamurthy DISCHARGE CAREGIVER [3] Brother [24] [phone] Patient Belongings The following personal items are in your possession at time of discharge: 
     Visual Aid: None Please provide this summary of care documentation to your next provider. Signatures-by signing, you are acknowledging that this After Visit Summary has been reviewed with you and you have received a copy. Patient Signature:  ____________________________________________________________ Date:  ____________________________________________________________  
  
Kirk Mcneil  Provider Signature: ____________________________________________________________ Date:  ____________________________________________________________

## 2018-07-09 NOTE — PROGRESS NOTES
Interventional Radiology Post Paracentesis/Thoracentesis Note    7/9/2018    Procedure(s): Ultrasound guided Therapeutic Paracentesis Performed with 8 Setswana catheter total volume 5800 ml. Preliminary Findings: moderate yellow and cloudy. Complications: None    Plan:  Observation, check labs if drawn.           Chest X-Ray:  no    Full dictated report to follow    Signed By: Kim Marsh RN

## 2018-07-09 NOTE — DISCHARGE INSTRUCTIONS
[address]  Department of Interventional Radiology  [facility]  [phone] Office  [phone] Fax    PARACENTESIS DISCHARGE INSTRUCTIONS    General Information:  During this procedure, the doctor will insert a needle into the abdomen to drain fluid. After the procedure, you will be able to take a deep breath much easier. The site of the puncture may ooze the first day. This will decrease and eventually stop. Paracentesis (draining fluid from the abdomen) sometimes makes patients hypotensive (low blood pressure). Your doctor may order for you to receive fluids or albumin (a volume booster) during the procedure through an IV site. Home Care Instructions:  Keep the puncture site clean and dry. No tub baths or swimming until puncture site heals. Showering is acceptable. Resume your normal diet, and resume your normal activity slowly and as you tolerate. If you are short of breath, rest. If shortness of breath does not ease, please call your ordering doctor. Fluid can re-accumulate in the chest and/or in the abdomen. If this should occur, your doctor needs to know as you may need to have the procedure done again. Call If:     You should call your Physician and/or the Radiology Nurse if you notice any signs of infection, like pus draining, or if it is swollen or reddened. Also call if you have a fever, or if you are bleeding from the puncture site more than a small amount on the dressing. Call if the puncture site keeps draining fluid. Some oozing is to be expected, but should slow and then stop. Call if you feel like you have pressure in your abdomen. SEEK IMMEDIATE CARE OR CALL 911 IF YOU SUDDENLY HAVE TROUBLE BREATHING, OR IF YOUR LIPS TURN BLUE, OR IF YOU NOTICE BLOOD IN YOUR SPUTUM. Follow-Up Instructions: Please see your ordering doctor as he/she has requested. To Reach Us: If you have any questions about your procedure, please call the Interventional Radiology department at [phone]. After business hours (5pm) and weekends, call the answering service at [phone] and ask for the Radiologist on call to be paged. Interventional Radiology General Nurse Discharge    After general anesthesia or intravenous sedation, for 24 hours or while taking prescription Narcotics:  · Limit your activities  · Do not drive and operate hazardous machinery  · Do not make important personal or business decisions  · Do  not drink alcoholic beverages  · If you have not urinated within 8 hours after discharge, please contact your surgeon on call. * Please give a list of your current medications to your Primary Care Provider. * Please update this list whenever your medications are discontinued, doses are     changed, or new medications (including over-the-counter products) are added. * Please carry medication information at all times in case of emergency situations. These are general instructions for a healthy lifestyle:    No smoking/ No tobacco products/ Avoid exposure to second hand smoke  Surgeon General's Warning:  Quitting smoking now greatly reduces serious risk to your health. Obesity, smoking, and sedentary lifestyle greatly increases your risk for illness  A healthy diet, regular physical exercise & weight monitoring are important for maintaining a healthy lifestyle    You may be retaining fluid if you have a history of heart failure or if you experience any of the following symptoms:  Weight gain of 3 pounds or more overnight or 5 pounds in a week, increased swelling in our hands or feet or shortness of breath while lying flat in bed. Please call your doctor as soon as you notice any of these symptoms; do not wait until your next office visit.     Recognize signs and symptoms of STROKE:  F-face looks uneven    A-arms unable to move or move unevenly    S-speech slurred or non-existent    T-time-call 911 as soon as signs and symptoms begin-DO NOT go       Back to bed or wait to see if you get better-TIME IS BRAIN.             Date: 7/9/2018  Discharging Nurse: Jessica Schuler RN

## 2018-07-09 NOTE — IP AVS SNAPSHOT
[address] 
[phone] Patient: Pallavi Lebron MRN: [id number] :1965 A check alberto indicates which time of day the medication should be taken. My Medications ASK your doctor about these medications Instructions Each Dose to Equal  
 Morning Noon Evening Bedtime aMILoride 5 mg tablet Commonly known as:  [place] Your last dose was: Your next dose is: Take 15 mg by mouth daily. 15 mg COUMADIN 5 mg tablet Generic drug:  warfarin Your last dose was: Your next dose is: Take 5 mg by mouth daily. 5 mg  
    
   
   
   
  
 hydrOXYzine HCl 50 mg tablet Commonly known as:  ATARAX Your last dose was: Your next dose is: Take 50 mg by mouth daily as needed for Itching. 50 mg  
    
   
   
   
  
 LOVENOX 80 mg/0.8 mL injection Generic drug:  enoxaparin Your last dose was: Your next dose is:    
   
   
 70 mg by SubCUTAneous route every twelve (12) hours. 70 mg  
    
   
   
   
  
 potassium chloride SR 20 mEq tablet Commonly known as:  K-TAB Your last dose was: Your next dose is: Take 40 mEq by mouth daily. 40 mEq REMERON 15 mg tablet Generic drug:  mirtazapine Your last dose was: Your next dose is: Take 15 mg by mouth nightly as needed for Other (sleep). 15 mg  
    
   
   
   
  
 torsemide 20 mg tablet Commonly known as:  DEMADEX Your last dose was: Your next dose is: Take 40 mg by mouth daily. 40 mg  
    
   
   
   
  
 VITAMIN D2 50,000 unit capsule Generic drug:  ergocalciferol Your last dose was: Your next dose is: Take 50,000 Units by mouth every seven (7) days. 28898 Units

## 2018-07-17 ENCOUNTER — HOSPITAL ENCOUNTER (OUTPATIENT)
Dept: ULTRASOUND IMAGING | Age: 53
Discharge: HOME | End: 2018-07-17
Attending: INTERNAL MEDICINE
Payer: MEDICARE

## 2018-07-17 VITALS
WEIGHT: 170 LBS | SYSTOLIC BLOOD PRESSURE: 109 MMHG | BODY MASS INDEX: 23.06 KG/M2 | DIASTOLIC BLOOD PRESSURE: 64 MMHG | HEART RATE: 65 BPM | OXYGEN SATURATION: 99 % | RESPIRATION RATE: 16 BRPM | TEMPERATURE: 97.8 F

## 2018-07-17 DIAGNOSIS — I89.9 NONINFECTIVE DISORDER OF LYMPHATIC VESSELS AND LYMPH NODES, UNSPECIFIED: ICD-10-CM

## 2018-07-17 LAB
INR BLD: 1.2 (ref 0.9–1.2)
PT BLD: 14.5 SECS (ref 9.6–11.6)

## 2018-07-17 PROCEDURE — 74011000250 HC RX REV CODE- 250: Performed by: PHYSICIAN ASSISTANT

## 2018-07-17 PROCEDURE — C1729 CATH, DRAINAGE: HCPCS

## 2018-07-17 PROCEDURE — 85610 PROTHROMBIN TIME: CPT

## 2018-07-17 PROCEDURE — P9047 ALBUMIN (HUMAN), 25%, 50ML: HCPCS | Performed by: PHYSICIAN ASSISTANT

## 2018-07-17 PROCEDURE — 74011250636 HC RX REV CODE- 250/636: Performed by: PHYSICIAN ASSISTANT

## 2018-07-17 RX ORDER — LIDOCAINE HYDROCHLORIDE 10 MG/ML
1-20 INJECTION INFILTRATION; PERINEURAL
Status: DISCONTINUED | OUTPATIENT
Start: 2018-07-17 | End: 2018-07-21 | Stop reason: HOSPADM

## 2018-07-17 RX ORDER — ALBUMIN HUMAN 250 G/1000ML
12.5 SOLUTION INTRAVENOUS ONCE
Status: COMPLETED | OUTPATIENT
Start: 2018-07-17 | End: 2018-07-17

## 2018-07-17 RX ADMIN — ALBUMIN (HUMAN) 12.5 G: 0.25 INJECTION, SOLUTION INTRAVENOUS at 11:50

## 2018-07-17 RX ADMIN — SODIUM BICARBONATE 2 ML: 0.2 INJECTION, SOLUTION INTRAVENOUS at 11:34

## 2018-07-17 RX ADMIN — LIDOCAINE HYDROCHLORIDE 5 ML: 10 INJECTION, SOLUTION INFILTRATION; PERINEURAL at 11:34

## 2018-07-17 NOTE — DISCHARGE INSTRUCTIONS
Tiigi [address]  Department of Interventional Radiology  [facility]  [phone] Office  [phone] Fax    PARACENTESIS DISCHARGE INSTRUCTIONS    General Information:  During this procedure, the doctor will insert a needle into the abdomen to drain fluid. After the procedure, you will be able to take a deep breath much easier. The site of the puncture may ooze the first day. This will decrease and eventually stop. Paracentesis (draining fluid from the abdomen) sometimes makes patients hypotensive (low blood pressure). Your doctor may order for you to receive fluids or albumin (a volume booster) during the procedure through an IV site. Home Care Instructions:  Keep the puncture site clean and dry. No tub baths or swimming until puncture site heals. Showering is acceptable. Resume your normal diet, and resume your normal activity slowly and as you tolerate. If you are short of breath, rest. If shortness of breath does not ease, please call your ordering doctor. Fluid can re-accumulate in the chest and/or in the abdomen. If this should occur, your doctor needs to know as you may need to have the procedure done again. Call If:     You should call your Physician and/or the Radiology Nurse if you notice any signs of infection, like pus draining, or if it is swollen or reddened. Also call if you have a fever, or if you are bleeding from the puncture site more than a small amount on the dressing. Call if the puncture site keeps draining fluid. Some oozing is to be expected, but should slow and then stop. Call if you feel like you have pressure in your abdomen. SEEK IMMEDIATE CARE OR CALL 911 IF YOU SUDDENLY HAVE TROUBLE BREATHING, OR IF YOUR LIPS TURN BLUE, OR IF YOU NOTICE BLOOD IN YOUR SPUTUM. Follow-Up Instructions: Please see your ordering doctor as he/she has requested. To Reach Us:   If you have any questions about your procedure, please call the Interventional Radiology department at [phone]. After business hours (5pm) and weekends, call the answering service at [phone] and ask for the Radiologist on call to be paged. Interventional Radiology General Nurse Discharge      * Please give a list of your current medications to your Primary Care Provider. * Please update this list whenever your medications are discontinued, doses are     changed, or new medications (including over-the-counter products) are added. * Please carry medication information at all times in case of emergency situations. These are general instructions for a healthy lifestyle:    No smoking/ No tobacco products/ Avoid exposure to second hand smoke  Surgeon General's Warning:  Quitting smoking now greatly reduces serious risk to your health. Obesity, smoking, and sedentary lifestyle greatly increases your risk for illness  A healthy diet, regular physical exercise & weight monitoring are important for maintaining a healthy lifestyle    You may be retaining fluid if you have a history of heart failure or if you experience any of the following symptoms:  Weight gain of 3 pounds or more overnight or 5 pounds in a week, increased swelling in our hands or feet or shortness of breath while lying flat in bed. Please call your doctor as soon as you notice any of these symptoms; do not wait until your next office visit. Recognize signs and symptoms of STROKE:  F-face looks uneven    A-arms unable to move or move unevenly    S-speech slurred or non-existent    T-time-call 911 as soon as signs and symptoms begin-DO NOT go       Back to bed or wait to see if you get better-TIME IS BRAIN.           Date: 7/17/2018  Discharging Nurse: Lizzie Noyola RN

## 2018-07-25 ENCOUNTER — HOSPITAL ENCOUNTER (OUTPATIENT)
Dept: ULTRASOUND IMAGING | Age: 53
Discharge: HOME | End: 2018-07-25
Attending: INTERNAL MEDICINE
Payer: MEDICARE

## 2018-07-25 VITALS
HEART RATE: 70 BPM | OXYGEN SATURATION: 100 % | SYSTOLIC BLOOD PRESSURE: 108 MMHG | DIASTOLIC BLOOD PRESSURE: 59 MMHG | RESPIRATION RATE: 20 BRPM

## 2018-07-25 DIAGNOSIS — I89.9 NONINFECTIVE DISORDER OF LYMPHATIC VESSELS AND LYMPH NODES, UNSPECIFIED: ICD-10-CM

## 2018-07-25 LAB
INR BLD: 1.4 (ref 0.9–1.2)
PT BLD: 16.8 SECS (ref 9.6–11.6)

## 2018-07-25 PROCEDURE — 85610 PROTHROMBIN TIME: CPT

## 2018-07-25 PROCEDURE — 74011250636 HC RX REV CODE- 250/636: Performed by: PHYSICIAN ASSISTANT

## 2018-07-25 PROCEDURE — 74011000250 HC RX REV CODE- 250: Performed by: PHYSICIAN ASSISTANT

## 2018-07-25 PROCEDURE — 77030037400 US GUIDE PARACENTESIS

## 2018-07-25 PROCEDURE — P9047 ALBUMIN (HUMAN), 25%, 50ML: HCPCS | Performed by: PHYSICIAN ASSISTANT

## 2018-07-25 RX ORDER — LIDOCAINE HYDROCHLORIDE 10 MG/ML
20-200 INJECTION INFILTRATION; PERINEURAL ONCE
Status: COMPLETED | OUTPATIENT
Start: 2018-07-25 | End: 2018-07-25

## 2018-07-25 RX ORDER — ALBUMIN HUMAN 250 G/1000ML
12.5 SOLUTION INTRAVENOUS ONCE
Status: COMPLETED | OUTPATIENT
Start: 2018-07-25 | End: 2018-07-25

## 2018-07-25 RX ADMIN — LIDOCAINE HYDROCHLORIDE 6 ML: 10 INJECTION, SOLUTION INFILTRATION; PERINEURAL at 12:28

## 2018-07-25 RX ADMIN — SODIUM BICARBONATE 1 ML: 0.2 INJECTION, SOLUTION INTRAVENOUS at 12:28

## 2018-07-25 RX ADMIN — ALBUMIN (HUMAN) 12.5 G: 0.25 INJECTION, SOLUTION INTRAVENOUS at 12:39

## 2018-07-25 NOTE — DISCHARGE INSTRUCTIONS
Pauligi [address]  Department of Interventional Radiology  [facility]  [phone] Office  [phone] Fax    PARACENTESIS DISCHARGE INSTRUCTIONS    General Information:  During this procedure, the doctor will insert a needle into the abdomen to drain fluid. After the procedure, you will be able to take a deep breath much easier. The site of the puncture may ooze the first day. This will decrease and eventually stop. Paracentesis (draining fluid from the abdomen) sometimes makes patients hypotensive (low blood pressure). Your doctor may order for you to receive fluids or albumin (a volume booster) during the procedure through an IV site. Home Care Instructions:  Keep the puncture site clean and dry. No tub baths or swimming until puncture site heals. Showering is acceptable. Resume your normal diet, and resume your normal activity slowly and as you tolerate. If you are short of breath, rest. If shortness of breath does not ease, please call your ordering doctor. Fluid can re-accumulate in the chest and/or in the abdomen. If this should occur, your doctor needs to know as you may need to have the procedure done again. Call If:     You should call your Physician and/or the Radiology Nurse if you notice any signs of infection, like pus draining, or if it is swollen or reddened. Also call if you have a fever, or if you are bleeding from the puncture site more than a small amount on the dressing. Call if the puncture site keeps draining fluid. Some oozing is to be expected, but should slow and then stop. Call if you feel like you have pressure in your abdomen. SEEK IMMEDIATE CARE OR CALL 911 IF YOU SUDDENLY HAVE TROUBLE BREATHING, OR IF YOUR LIPS TURN BLUE, OR IF YOU NOTICE BLOOD IN YOUR SPUTUM. Follow-Up Instructions: Please see your ordering doctor as he/she has requested. To Reach Us:       If you have any questions about your procedure, please call the Interventional Radiology department at [phone]. After business hours (5pm) and weekends, call the answering service at [phone] and ask for the Radiologist on call to be paged. Si tiene Preguntas acerca del procedimiento, por favor llame al departamento de Radiología Intervencional al [phone]. Después de horas de oficina (5 pm) y los fines de [place], llamar al Imagene [place] de llamadas al [phone] y pregunte por el Radiologo de [facility].        Date: 7/25/2018  Discharging Nurse: Pan Baird RN

## 2018-07-25 NOTE — IP AVS SNAPSHOT
[address] 
[phone] Patient: Jennifer Stoll MRN: [id number] :1965 About your hospitalization You were admitted on:  2018 You last received care in the:  [facility] You were discharged on:  2018 Why you were hospitalized Your primary diagnosis was:  Not on File Follow-up Information None Discharge Orders None A check alberto indicates which time of day the medication should be taken. My Medications ASK your doctor about these medications Instructions Each Dose to Equal  
 Morning Noon Evening Bedtime aMILoride 5 mg tablet Commonly known as:  Mary Angola Your last dose was: Your next dose is: Take 15 mg by mouth daily. 15 mg COUMADIN 5 mg tablet Generic drug:  warfarin Your last dose was: Your next dose is: Take 5 mg by mouth daily. 5 mg  
    
   
   
   
  
 hydrOXYzine HCl 50 mg tablet Commonly known as:  ATARAX Your last dose was: Your next dose is: Take 50 mg by mouth daily as needed for Itching. 50 mg  
    
   
   
   
  
 LOVENOX 80 mg/0.8 mL injection Generic drug:  enoxaparin Your last dose was: Your next dose is:    
   
   
 70 mg by SubCUTAneous route every twelve (12) hours. 70 mg  
    
   
   
   
  
 potassium chloride SR 20 mEq tablet Commonly known as:  K-TAB Your last dose was: Your next dose is: Take 40 mEq by mouth daily. 40 mEq REMERON 15 mg tablet Generic drug:  mirtazapine Your last dose was: Your next dose is: Take 15 mg by mouth nightly as needed for Other (sleep). 15 mg  
    
   
   
   
  
 torsemide 20 mg tablet Commonly known as:  DEMADEX Your last dose was: Your next dose is: Take 40 mg by mouth daily. 40 mg  
    
   
   
   
  
 VITAMIN D2 50,000 unit capsule Generic drug:  ergocalciferol Your last dose was: Your next dose is: Take 50,000 Units by mouth every seven (7) days. 12705 Units Discharge Instructions Rickey [address] Department of Interventional Radiology [address] [facility] 
[phone] Office 
[phone] Fax PARACENTESIS DISCHARGE INSTRUCTIONS General Information: 
During this procedure, the doctor will insert a needle into the abdomen to drain fluid. After the procedure, you will be able to take a deep breath much easier. The site of the puncture may ooze the first day. This will decrease and eventually stop. Paracentesis (draining fluid from the abdomen) sometimes makes patients hypotensive (low blood pressure). Your doctor may order for you to receive fluids or albumin (a volume booster) during the procedure through an IV site. Home Care Instructions: 
Keep the puncture site clean and dry. No tub baths or swimming until puncture site heals. Showering is acceptable. Resume your normal diet, and resume your normal activity slowly and as you tolerate. If you are short of breath, rest. If shortness of breath does not ease, please call your ordering doctor. Fluid can re-accumulate in the chest and/or in the abdomen. If this should occur, your doctor needs to know as you may need to have the procedure done again. Call If: 
   You should call your Physician and/or the Radiology Nurse if you notice any signs of infection, like pus draining, or if it is swollen or reddened. Also call if you have a fever, or if you are bleeding from the puncture site more than a small amount on the dressing. Call if the puncture site keeps draining fluid.  Some oozing is to be expected, but should slow and then stop. Call if you feel like you have pressure in your abdomen. SEEK IMMEDIATE CARE OR CALL 911 IF YOU SUDDENLY HAVE TROUBLE BREATHING, OR IF YOUR LIPS TURN BLUE, OR IF YOU NOTICE BLOOD IN YOUR SPUTUM. Follow-Up Instructions: Please see your ordering doctor as he/she has requested. To Reach Us: If you have any questions about your procedure, please call the Interventional Radiology department at [phone]. After business hours (5pm) and weekends, call the answering service at [phone] and ask for the Radiologist on call to be paged. Si tiene Preguntas acerca del procedimiento, por favor llame al departamento de Radiología Intervencional al [phone]. Después de horas de oficina (5 pm) y los fines de [place], llamar al Zeyad Bathe de llamadas al [phone] y pregunte por el Radiologo de [facility]. Date: 7/25/2018 Discharging Nurse: Huma Johnson RN Introducing [facility]! [facility] introduces [employer] patient portal. Now you can access parts of your medical record, email your doctor's office, and request medication refills online. 1. In your internet browser, go to [email or web address] 2. Click on the First Time User? Click Here link in the Sign In box. You will see the New Member Sign Up page. 3. Enter your Listiki Access Code exactly as it appears below. You will not need to use this code after youve completed the sign-up process. If you do not sign up before the expiration date, you must request a new code. · Listiki Access Code: [email or web address] Expires: 9/9/2018  1:32 PM 
 
4. Enter the last four digits of your Social Security Number (xxxx) and Date of Birth (mm/dd/yyyy) as indicated and click Submit. You will be taken to the next sign-up page. 5. Create a Listiki ID. This will be your MyChart login ID and cannot be changed, so think of one that is secure and easy to remember. 6. Create a [employer] password. You can change your password at any time. 7. Enter your Password Reset Question and Answer. This can be used at a later time if you forget your password. 8. Enter your e-mail address. You will receive e-mail notification when new information is available in [address] 9. Click Sign Up. You can now view and download portions of your medical record. 10. Click the Download Summary menu link to download a portable copy of your medical information. If you have questions, please visit the Frequently Asked Questions section of the [employer] website. Remember, [employer] is NOT to be used for urgent needs. For medical emergencies, dial 911. Now available from your iPhone and Android! Introducing Kevin Spear As a [facility] patient, I wanted to make you aware of our electronic visit tool called Kevin Carrerafin. [facility] 24/7 allows you to connect within minutes with a medical provider 24 hours a day, seven days a week via a mobile device or tablet or logging into a secure website from your computer. You can access Kevin Spear from anywhere in the [place]. A virtual visit might be right for you when you have a simple condition and feel like you just dont want to get out of bed, or cant get away from work for an appointment, when your regular [facility] provider is not available (evenings, weekends or holidays), or when youre out of town and need minor care. Electronic visits cost only $49 and if the [facility] 24/7 provider determines a prescription is needed to treat your condition, one can be electronically transmitted to a nearby pharmacy*. Please take a moment to enroll today if you have not already done so. The enrollment process is free and takes just a few minutes. To enroll, please download the Ronit Tellwiki 24/7 av to your tablet or phone, or visit [email or web address] to enroll on your computer. And, as an [address] [place] [address] patient with a [employer] account, the results of your visits will be scanned into your electronic medical record and your primary care provider will be able to view the scanned results. We urge you to continue to see your regular [employer] provider for your ongoing medical care. And while your primary care provider may not be the one available when you seek a Kevin Nimble CRMshirafin virtual visit, the peace of mind you get from getting a real diagnosis real time can be priceless. For more information on [employer], view our Frequently Asked Questions (FAQs) at [email or web address]. Sincerely, 
 
Tamika Morales MD 
Chief Medical Officer [facility] Teri Bhatti *:  certain medications cannot be prescribed via [employer] Providers Seen During Your Hospitalization Provider Specialty Primary office phone Yuval Mchugh MD Gastroenterology [phone] Your Primary Care Physician (PCP) Primary Care Physician Office Phone Office Fax RACHNA BAL ** None ** ** None ** You are allergic to the following Allergen Reactions Tramadol Nausea and Vomiting Pt. states Recent Documentation Smoking Status Current Every Day Smoker Emergency Contacts Name Discharge Info Relation Home Work Mobile [employer]  Son [22] [phone] Christopher Krishnamurthy DISCHARGE CAREGIVER [3] Brother [24] [phone] Patient Belongings The following personal items are in your possession at time of discharge: 
                             
 
  
  
 Please provide this summary of care documentation to your next provider. Signatures-by signing, you are acknowledging that this After Visit Summary has been reviewed with you and you have received a copy.   
  
 
  
    
    
 Patient Signature: ____________________________________________________________ Date:  ____________________________________________________________  
  
Gwenyth Miles Provider Signature:  ____________________________________________________________ Date:  ____________________________________________________________

## 2018-07-25 NOTE — PROGRESS NOTES
Patient to Extended procedure area bay 1 room for procedure. Patient awake and alert, verbalized name, , and procedure to be performed. Patient moved to procedure table independently.

## 2018-08-02 ENCOUNTER — HOSPITAL ENCOUNTER (OUTPATIENT)
Dept: ULTRASOUND IMAGING | Age: 53
Discharge: HOME | End: 2018-08-02
Attending: INTERNAL MEDICINE
Payer: MEDICARE

## 2018-08-02 VITALS
HEART RATE: 74 BPM | RESPIRATION RATE: 20 BRPM | OXYGEN SATURATION: 99 % | DIASTOLIC BLOOD PRESSURE: 55 MMHG | TEMPERATURE: 98 F | SYSTOLIC BLOOD PRESSURE: 103 MMHG

## 2018-08-02 DIAGNOSIS — I89.9 NONINFECTIVE DISORDER OF LYMPHATIC VESSELS AND LYMPH NODES, UNSPECIFIED: ICD-10-CM

## 2018-08-02 LAB
INR BLD: 1.3 (ref 0.9–1.2)
PT BLD: 15.6 SECS (ref 9.6–11.6)

## 2018-08-02 PROCEDURE — 74011000250 HC RX REV CODE- 250: Performed by: PHYSICIAN ASSISTANT

## 2018-08-02 PROCEDURE — C1729 CATH, DRAINAGE: HCPCS

## 2018-08-02 PROCEDURE — 85610 PROTHROMBIN TIME: CPT

## 2018-08-02 PROCEDURE — 74011250636 HC RX REV CODE- 250/636: Performed by: PHYSICIAN ASSISTANT

## 2018-08-02 PROCEDURE — 49083 ABD PARACENTESIS W/IMAGING: CPT

## 2018-08-02 PROCEDURE — P9047 ALBUMIN (HUMAN), 25%, 50ML: HCPCS | Performed by: PHYSICIAN ASSISTANT

## 2018-08-02 RX ORDER — ALBUMIN HUMAN 250 G/1000ML
12.5 SOLUTION INTRAVENOUS ONCE
Status: COMPLETED | OUTPATIENT
Start: 2018-08-02 | End: 2018-08-02

## 2018-08-02 RX ORDER — LIDOCAINE HYDROCHLORIDE 10 MG/ML
20-200 INJECTION INFILTRATION; PERINEURAL ONCE
Status: COMPLETED | OUTPATIENT
Start: 2018-08-02 | End: 2018-08-02

## 2018-08-02 RX ADMIN — ALBUMIN (HUMAN) 12.5 G: 0.25 INJECTION, SOLUTION INTRAVENOUS at 12:10

## 2018-08-02 RX ADMIN — ALBUMIN (HUMAN) 12.5 G: 0.25 INJECTION, SOLUTION INTRAVENOUS at 12:11

## 2018-08-02 RX ADMIN — SODIUM BICARBONATE 1 ML: 0.2 INJECTION, SOLUTION INTRAVENOUS at 12:00

## 2018-08-02 RX ADMIN — LIDOCAINE HYDROCHLORIDE 6 ML: 10 INJECTION, SOLUTION INFILTRATION; PERINEURAL at 12:00

## 2018-08-02 NOTE — DISCHARGE INSTRUCTIONS
Jessiei [address]  Department of Interventional Radiology  [facility]  [phone] Office  [phone] Fax    PARACENTESIS DISCHARGE INSTRUCTIONS    General Information:  During this procedure, the doctor will insert a needle into the abdomen to drain fluid. After the procedure, you will be able to take a deep breath much easier. The site of the puncture may ooze the first day. This will decrease and eventually stop. Paracentesis (draining fluid from the abdomen) sometimes makes patients hypotensive (low blood pressure). Your doctor may order for you to receive fluids or albumin (a volume booster) during the procedure through an IV site. Home Care Instructions:  Keep the puncture site clean and dry. No tub baths or swimming until puncture site heals. Showering is acceptable. Resume your normal diet, and resume your normal activity slowly and as you tolerate. If you are short of breath, rest. If shortness of breath does not ease, please call your ordering doctor. Fluid can re-accumulate in the chest and/or in the abdomen. If this should occur, your doctor needs to know as you may need to have the procedure done again. Call If:     You should call your Physician and/or the Radiology Nurse if you notice any signs of infection, like pus draining, or if it is swollen or reddened. Also call if you have a fever, or if you are bleeding from the puncture site more than a small amount on the dressing. Call if the puncture site keeps draining fluid. Some oozing is to be expected, but should slow and then stop. Call if you feel like you have pressure in your abdomen. SEEK IMMEDIATE CARE OR CALL 911 IF YOU SUDDENLY HAVE TROUBLE BREATHING, OR IF YOUR LIPS TURN BLUE, OR IF YOU NOTICE BLOOD IN YOUR SPUTUM. Follow-Up Instructions: Please see your ordering doctor as he/she has requested. To Reach Us:     If you have any questions about your procedure, please call the Interventional Radiology department at [phone]. After business hours (5pm) and weekends, call the answering service at [phone] and ask for the Radiologist on call to be paged. Si tiene Preguntas acerca del procedimiento, por favor llame al departamento de Radiología Intervencional al [phone]. Después de horas de oficina (5 pm) y los fines de [place], llamar al Lisette  de llamadas al [phone] y pregunte por el Radiologo de [facility].        Date: 8/2/2018  Discharging Nurse: Yahir Butler RN

## 2018-08-10 ENCOUNTER — HOSPITAL ENCOUNTER (OUTPATIENT)
Dept: ULTRASOUND IMAGING | Age: 53
Discharge: HOME | End: 2018-08-10
Attending: INTERNAL MEDICINE
Payer: MEDICARE

## 2018-08-10 VITALS
OXYGEN SATURATION: 100 % | SYSTOLIC BLOOD PRESSURE: 102 MMHG | HEART RATE: 68 BPM | RESPIRATION RATE: 18 BRPM | DIASTOLIC BLOOD PRESSURE: 55 MMHG | TEMPERATURE: 97.9 F

## 2018-08-10 DIAGNOSIS — I89.9 NONINFECTIVE DISORDER OF LYMPHATIC VESSELS AND LYMPH NODES, UNSPECIFIED: ICD-10-CM

## 2018-08-10 LAB
INR BLD: 1.9 (ref 0.9–1.2)
PT BLD: 21.7 SECS (ref 9.6–11.6)

## 2018-08-10 PROCEDURE — 74011000250 HC RX REV CODE- 250: Performed by: RADIOLOGY

## 2018-08-10 PROCEDURE — P9047 ALBUMIN (HUMAN), 25%, 50ML: HCPCS | Performed by: RADIOLOGY

## 2018-08-10 PROCEDURE — 85610 PROTHROMBIN TIME: CPT

## 2018-08-10 PROCEDURE — C1729 CATH, DRAINAGE: HCPCS

## 2018-08-10 PROCEDURE — 74011250636 HC RX REV CODE- 250/636: Performed by: RADIOLOGY

## 2018-08-10 RX ORDER — LIDOCAINE HYDROCHLORIDE 20 MG/ML
40-120 INJECTION, SOLUTION INFILTRATION; PERINEURAL ONCE
Status: COMPLETED | OUTPATIENT
Start: 2018-08-10 | End: 2018-08-10

## 2018-08-10 RX ORDER — ALBUMIN HUMAN 250 G/1000ML
12.5 SOLUTION INTRAVENOUS ONCE
Status: COMPLETED | OUTPATIENT
Start: 2018-08-10 | End: 2018-08-10

## 2018-08-10 RX ADMIN — SODIUM BICARBONATE 1 ML: 0.2 INJECTION, SOLUTION INTRAVENOUS at 10:52

## 2018-08-10 RX ADMIN — ALBUMIN (HUMAN) 12.5 G: 0.25 INJECTION, SOLUTION INTRAVENOUS at 10:48

## 2018-08-10 RX ADMIN — LIDOCAINE HYDROCHLORIDE 80 MG: 20 INJECTION, SOLUTION INFILTRATION; PERINEURAL at 10:52

## 2018-08-10 NOTE — DISCHARGE INSTRUCTIONS
Tiigi [address]  Department of Interventional Radiology  [facility]  [phone] Office  [phone] Fax    PARACENTESIS DISCHARGE INSTRUCTIONS    General Information:  During this procedure, the doctor will insert a needle into the abdomen to drain fluid. After the procedure, you will be able to take a deep breath much easier. The site of the puncture may ooze the first day. This will decrease and eventually stop. Paracentesis (draining fluid from the abdomen) sometimes makes patients hypotensive (low blood pressure). Your doctor may order for you to receive fluids or albumin (a volume booster) during the procedure through an IV site. Home Care Instructions:  Keep the puncture site clean and dry. No tub baths or swimming until puncture site heals. Showering is acceptable. Resume your normal diet, and resume your normal activity slowly and as you tolerate. If you are short of breath, rest. If shortness of breath does not ease, please call your ordering doctor. Fluid can re-accumulate in the chest and/or in the abdomen. If this should occur, your doctor needs to know as you may need to have the procedure done again. Call If:     You should call your Physician and/or the Radiology Nurse if you notice any signs of infection, like pus draining, or if it is swollen or reddened. Also call if you have a fever, or if you are bleeding from the puncture site more than a small amount on the dressing. Call if the puncture site keeps draining fluid. Some oozing is to be expected, but should slow and then stop. Call if you feel like you have pressure in your abdomen. SEEK IMMEDIATE CARE OR CALL 911 IF YOU SUDDENLY HAVE TROUBLE BREATHING, OR IF YOUR LIPS TURN BLUE, OR IF YOU NOTICE BLOOD IN YOUR SPUTUM. Follow-Up Instructions: Please see your ordering doctor as he/she has requested. To Reach Us:     If you have any questions about your procedure, please call the Interventional Radiology department at [phone]. After business hours (5pm) and weekends, call the answering service at [phone] and ask for the Radiologist on call to be paged. Si tiene Preguntas acerca del procedimiento, por favor llame al departamento de Radiología Intervencional al [phone]. Después de horas de oficina (5 pm) y los fines de [place], llamar al Susan Santiago de llamadas al [phone] y pregunte por el Radiologo de [facility].        Date: 8/10/2018  Discharging Nurse: Solo Carmona RN

## 2018-08-20 ENCOUNTER — HOSPITAL ENCOUNTER (OUTPATIENT)
Dept: ULTRASOUND IMAGING | Age: 53
Discharge: HOME | End: 2018-08-20
Attending: INTERNAL MEDICINE
Payer: MEDICARE

## 2018-08-20 VITALS — SYSTOLIC BLOOD PRESSURE: 98 MMHG | DIASTOLIC BLOOD PRESSURE: 54 MMHG

## 2018-08-20 DIAGNOSIS — I89.9 NONINFECTIVE DISORDER OF LYMPHATIC VESSELS AND LYMPH NODES, UNSPECIFIED: ICD-10-CM

## 2018-08-20 LAB
INR BLD: 1.3 (ref 0.9–1.2)
PT BLD: 15.2 SECS (ref 9.6–11.6)

## 2018-08-20 PROCEDURE — 74011000250 HC RX REV CODE- 250: Performed by: PHYSICIAN ASSISTANT

## 2018-08-20 PROCEDURE — 85610 PROTHROMBIN TIME: CPT

## 2018-08-20 PROCEDURE — 77030037400 US GUIDE PARACENTESIS

## 2018-08-20 PROCEDURE — P9047 ALBUMIN (HUMAN), 25%, 50ML: HCPCS | Performed by: PHYSICIAN ASSISTANT

## 2018-08-20 PROCEDURE — 87205 SMEAR GRAM STAIN: CPT

## 2018-08-20 PROCEDURE — 74011250636 HC RX REV CODE- 250/636: Performed by: PHYSICIAN ASSISTANT

## 2018-08-20 RX ORDER — LIDOCAINE HYDROCHLORIDE 20 MG/ML
2-20 INJECTION, SOLUTION INFILTRATION; PERINEURAL ONCE
Status: ACTIVE | OUTPATIENT
Start: 2018-08-20 | End: 2018-08-21

## 2018-08-20 RX ORDER — ALBUMIN HUMAN 250 G/1000ML
12.5-5 SOLUTION INTRAVENOUS ONCE
Status: COMPLETED | OUTPATIENT
Start: 2018-08-20 | End: 2018-08-20

## 2018-08-20 RX ADMIN — SODIUM BICARBONATE 2 ML: 0.2 INJECTION, SOLUTION INTRAVENOUS at 13:37

## 2018-08-20 RX ADMIN — ALBUMIN (HUMAN) 25 G: 0.25 INJECTION, SOLUTION INTRAVENOUS at 13:09

## 2018-08-20 NOTE — PROGRESS NOTES
Interventional Radiology Post Paracentesis/Thoracentesis Note    8/20/2018    Procedure(s): Ultrasound guided Diagnostic Paracentesis Performed with 8 Portuguese catheter total volume 6800  ml. Samples sent to lab. Preliminary Findings: large yellow and cloudy. Complications: None    Plan:  Observation, check labs if drawn.           Chest X-Ray:  no    Full dictated report to follow    Signed By: Janes Molina

## 2018-08-20 NOTE — IP AVS SNAPSHOT
Roni Maya 
 
 
 [address] 
[phone] Patient: Lidia Padron MRN: [id number] :1965 About your hospitalization You were admitted on:  2018 You last received care in the:  [facility] RADIOLOGY ULTRASOUND You were discharged on:  2018 Why you were hospitalized Your primary diagnosis was:  Not on File Follow-up Information None Your Scheduled Appointments 2018 10:00 AM EDT  
US GUIDED PARACENTISIS INIT with [facility] US LOGIC 7 UNIT 1, D NURSING, D IR PA RESOURCE 2  
[facility] RADIOLOGY ULTRASOUND ([address] [place] [address]) [address]  
[phone] Interventional Radiology Procedure completed with ultrasound guidance. Referring Physicians: 1) Initial paracentesis patients required: H&P/recent office notes and lab work, no older than 30 days (CBC, BMP, PT/PTT) to Interventional Radiology to facilitate prompt scheduling. 2) Obtain clearance to hold blood thinners from prescribing Physician and give Patient instructions prior to arrival. 3) Patient may continue to eat or drink as normal prior to arrival. 4) Pt to arrive 15 minutes early. 5) Responsible adult  required to drive Patient home after recovery period. Recovery period can vary depending on sedation and patient condition. 6) Interventional Radiology Scheduling can be contacted at [phone] Zaria Lanier, [address] [place] [address]- 2nd floor of Outpatient Medical Office Building Discharge Orders None A check alberto indicates which time of day the medication should be taken. My Medications ASK your doctor about these medications Instructions Each Dose to Equal  
 Morning Noon Evening Bedtime aMILoride 5 mg tablet Commonly known as:  Jenni Jackson Your last dose was: Your next dose is: Take 15 mg by mouth daily. 15 mg COUMADIN 5 mg tablet Generic drug:  warfarin Your last dose was: Your next dose is: Take 5 mg by mouth daily. 5 mg  
    
   
   
   
  
 hydrOXYzine HCl 50 mg tablet Commonly known as:  ATARAX Your last dose was: Your next dose is: Take 50 mg by mouth daily as needed for Itching. 50 mg  
    
   
   
   
  
 LOVENOX 80 mg/0.8 mL injection Generic drug:  enoxaparin Your last dose was: Your next dose is:    
   
   
 70 mg by SubCUTAneous route every twelve (12) hours. 70 mg  
    
   
   
   
  
 potassium chloride SR 20 mEq tablet Commonly known as:  K-TAB Your last dose was: Your next dose is: Take 40 mEq by mouth daily. 40 mEq REMERON 15 mg tablet Generic drug:  mirtazapine Your last dose was: Your next dose is: Take 15 mg by mouth nightly as needed for Other (sleep). 15 mg  
    
   
   
   
  
 torsemide 20 mg tablet Commonly known as:  DEMADEX Your last dose was: Your next dose is: Take 40 mg by mouth daily. 40 mg  
    
   
   
   
  
 VITAMIN D2 50,000 unit capsule Generic drug:  ergocalciferol Your last dose was: Your next dose is: Take 50,000 Units by mouth every seven (7) days. 84069 Units Discharge Instructions igi 34 [address] Department of Interventional Radiology [facility] 
[phone] Office 
[phone] Fax PARACENTESIS DISCHARGE INSTRUCTIONS General Information: 
During this procedure, the doctor will insert a needle into the abdomen to drain fluid. After the procedure, you will be able to take a deep breath much easier. The site of the puncture may ooze the first day.  This will decrease and eventually stop. Paracentesis (draining fluid from the abdomen) sometimes makes patients hypotensive (low blood pressure). Your doctor may order for you to receive fluids or albumin (a volume booster) during the procedure through an IV site. Home Care Instructions: 
Keep the puncture site clean and dry. No tub baths or swimming until puncture site heals. Showering is acceptable. Resume your normal diet, and resume your normal activity slowly and as you tolerate. If you are short of breath, rest. If shortness of breath does not ease, please call your ordering doctor. Fluid can re-accumulate in the chest and/or in the abdomen. If this should occur, your doctor needs to know as you may need to have the procedure done again. Call If: 
   You should call your Physician and/or the Radiology Nurse if you notice any signs of infection, like pus draining, or if it is swollen or reddened. Also call if you have a fever, or if you are bleeding from the puncture site more than a small amount on the dressing. Call if the puncture site keeps draining fluid. Some oozing is to be expected, but should slow and then stop. Call if you feel like you have pressure in your abdomen. SEEK IMMEDIATE CARE OR CALL 911 IF YOU SUDDENLY HAVE TROUBLE BREATHING, OR IF YOUR LIPS TURN BLUE, OR IF YOU NOTICE BLOOD IN YOUR SPUTUM. Follow-Up Instructions: Please see your ordering doctor as he/she has requested. To Reach Us: If you have any questions about your procedure, please call the Interventional Radiology department at [phone]. After business hours (5pm) and weekends, call the answering service at [phone] and ask for the Radiologist on call to be paged. Interventional Radiology General Nurse Discharge After general anesthesia or intravenous sedation, for 24 hours or while taking prescription Narcotics: · Limit your activities · Do not drive and operate hazardous machinery · Do not make important personal or business decisions · Do  not drink alcoholic beverages · If you have not urinated within 8 hours after discharge, please contact your surgeon on call. * Please give a list of your current medications to your Primary Care Provider. * Please update this list whenever your medications are discontinued, doses are 
   changed, or new medications (including over-the-counter products) are added. * Please carry medication information at all times in case of emergency situations. These are general instructions for a healthy lifestyle: No smoking/ No tobacco products/ Avoid exposure to second hand smoke Surgeon General's Warning:  Quitting smoking now greatly reduces serious risk to your health. Obesity, smoking, and sedentary lifestyle greatly increases your risk for illness A healthy diet, regular physical exercise & weight monitoring are important for maintaining a healthy lifestyle You may be retaining fluid if you have a history of heart failure or if you experience any of the following symptoms:  Weight gain of 3 pounds or more overnight or 5 pounds in a week, increased swelling in our hands or feet or shortness of breath while lying flat in bed. Please call your doctor as soon as you notice any of these symptoms; do not wait until your next office visit. Recognize signs and symptoms of STROKE: 
F-face looks uneven A-arms unable to move or move unevenly S-speech slurred or non-existent T-time-call 911 as soon as signs and symptoms begin-DO NOT go Back to bed or wait to see if you get better-TIME IS BRAIN. Date: 8/20/2018 Discharging Nurse: Jimbo Dhaliwal RN Introducing [facility]! [facility] introduces [employer] patient portal. Now you can access parts of your medical record, email your doctor's office, and request medication refills online.    
 
1. In your internet browser, go to [email or web address] 2. Click on the First Time User? Click Here link in the Sign In box. You will see the New Member Sign Up page. 3. Enter your [employer] Access Code exactly as it appears below. You will not need to use this code after youve completed the sign-up process. If you do not sign up before the expiration date, you must request a new code. · [employer] Access Code: [email or web address] Expires: 9/9/2018  1:32 PM 
 
4. Enter the last four digits of your Social Security Number (xxxx) and Date of Birth (mm/dd/yyyy) as indicated and click Submit. You will be taken to the next sign-up page. 5. Create a Arts [employer] Analytic[employer] ID. This will be your [employer] login ID and cannot be changed, so think of one that is secure and easy to remember. 6. Create a [employer] password. You can change your password at any time. 7. Enter your Password Reset Question and Answer. This can be used at a later time if you forget your password. 8. Enter your e-mail address. You will receive e-mail notification when new information is available in [address] 9. Click Sign Up. You can now view and download portions of your medical record. 10. Click the Download Summary menu link to download a portable copy of your medical information. If you have questions, please visit the Frequently Asked Questions section of the [employer] website. Remember, [employer] is NOT to be used for urgent needs. For medical emergencies, dial 911. Now available from your iPhone and Android! Introducing Kevin Spear As a [facility] patient, I wanted to make you aware of our electronic visit tool called Kevin Spear. [facility] 24/7 allows you to connect within minutes with a medical provider 24 hours a day, seven days a week via a mobile device or tablet or logging into a secure website from your computer. You can access Kevin Spear from anywhere in the [place]. A virtual visit might be right for you when you have a simple condition and feel like you just dont want to get out of bed, or cant get away from work for an appointment, when your regular [facility] provider is not available (evenings, weekends or holidays), or when youre out of town and need minor care. Electronic visits cost only $49 and if the [employer] 24/Practice Management e-Tools provider determines a prescription is needed to treat your condition, one can be electronically transmitted to a nearby pharmacy*. Please take a moment to enroll today if you have not already done so. The enrollment process is free and takes just a few minutes. To enroll, please download the 51 [employer] av to your tablet or phone, or visit [email or web address] to enroll on your computer. And, as an [address] [place] [address] patient with a aDealio account, the results of your visits will be scanned into your electronic medical record and your primary care provider will be able to view the scanned results. We urge you to continue to see your regular [facility] provider for your ongoing medical care. And while your primary care provider may not be the one available when you seek a [facility] Ozy Mediashirafin virtual visit, the peace of mind you get from getting a real diagnosis real time can be priceless. For more information on Kevin Ozy Mediashirafin, view our Frequently Asked Questions (FAQs) at [email or web address]. Sincerely, 
 
Nella Lawrence MD 
Chief Medical Officer Godfrey Teri Bhatti *:  certain medications cannot be prescribed via Kevin Ozy Medianifin Unresulted Labs-Please follow up with your PCP about these lab tests Order Current Status US GUIDE PARACENTESIS In process Providers Seen During Your Hospitalization Provider Specialty Primary office phone Tatiana Richey MD Gastroenterology [phone] Your Primary Care Physician (PCP) Primary Care Physician Office Phone Office Fax RACHNA BAL ** None ** ** None ** You are allergic to the following Allergen Reactions Tramadol Nausea and Vomiting Pt. states Recent Documentation Smoking Status Current Every Day Smoker Emergency Contacts Name Discharge Info Relation Home Work [employer]  Son [22] [phone] Christopher Krishnamurthy DISCHARGE CAREGIVER [3] Brother [24] [phone] Patient Belongings The following personal items are in your possession at time of discharge: 
                             
 
  
  
 Please provide this summary of care documentation to your next provider. Signatures-by signing, you are acknowledging that this After Visit Summary has been reviewed with you and you have received a copy. Patient Signature:  ____________________________________________________________ Date:  ____________________________________________________________  
  
[employer] Provider Signature:  ____________________________________________________________ Date:  ____________________________________________________________

## 2018-08-20 NOTE — DISCHARGE INSTRUCTIONS
[address]  Department of Interventional Radiology  [facility]  [phone] Office  [phone] Fax    PARACENTESIS DISCHARGE INSTRUCTIONS    General Information:  During this procedure, the doctor will insert a needle into the abdomen to drain fluid. After the procedure, you will be able to take a deep breath much easier. The site of the puncture may ooze the first day. This will decrease and eventually stop. Paracentesis (draining fluid from the abdomen) sometimes makes patients hypotensive (low blood pressure). Your doctor may order for you to receive fluids or albumin (a volume booster) during the procedure through an IV site. Home Care Instructions:  Keep the puncture site clean and dry. No tub baths or swimming until puncture site heals. Showering is acceptable. Resume your normal diet, and resume your normal activity slowly and as you tolerate. If you are short of breath, rest. If shortness of breath does not ease, please call your ordering doctor. Fluid can re-accumulate in the chest and/or in the abdomen. If this should occur, your doctor needs to know as you may need to have the procedure done again. Call If:     You should call your Physician and/or the Radiology Nurse if you notice any signs of infection, like pus draining, or if it is swollen or reddened. Also call if you have a fever, or if you are bleeding from the puncture site more than a small amount on the dressing. Call if the puncture site keeps draining fluid. Some oozing is to be expected, but should slow and then stop. Call if you feel like you have pressure in your abdomen. SEEK IMMEDIATE CARE OR CALL 911 IF YOU SUDDENLY HAVE TROUBLE BREATHING, OR IF YOUR LIPS TURN BLUE, OR IF YOU NOTICE BLOOD IN YOUR SPUTUM. Follow-Up Instructions: Please see your ordering doctor as he/she has requested. To Reach Us:   If you have any questions about your procedure, please call the Interventional Radiology department at [phone]. After business hours (5pm) and weekends, call the answering service at [phone] and ask for the Radiologist on call to be paged. Interventional Radiology General Nurse Discharge    After general anesthesia or intravenous sedation, for 24 hours or while taking prescription Narcotics:  · Limit your activities  · Do not drive and operate hazardous machinery  · Do not make important personal or business decisions  · Do  not drink alcoholic beverages  · If you have not urinated within 8 hours after discharge, please contact your surgeon on call. * Please give a list of your current medications to your Primary Care Provider. * Please update this list whenever your medications are discontinued, doses are     changed, or new medications (including over-the-counter products) are added. * Please carry medication information at all times in case of emergency situations. These are general instructions for a healthy lifestyle:    No smoking/ No tobacco products/ Avoid exposure to second hand smoke  Surgeon General's Warning:  Quitting smoking now greatly reduces serious risk to your health. Obesity, smoking, and sedentary lifestyle greatly increases your risk for illness  A healthy diet, regular physical exercise & weight monitoring are important for maintaining a healthy lifestyle    You may be retaining fluid if you have a history of heart failure or if you experience any of the following symptoms:  Weight gain of 3 pounds or more overnight or 5 pounds in a week, increased swelling in our hands or feet or shortness of breath while lying flat in bed. Please call your doctor as soon as you notice any of these symptoms; do not wait until your next office visit.     Recognize signs and symptoms of STROKE:  F-face looks uneven    A-arms unable to move or move unevenly    S-speech slurred or non-existent    T-time-call 271 as soon as signs and symptoms begin-DO NOT go       Back to bed or wait to see if you get better-TIME IS BRAIN.       Date: 8/20/2018  Discharging Nurse: Fernando Spenecr RN

## 2018-08-20 NOTE — IP AVS SNAPSHOT
[address] 
[phone] Patient: Benita Blevins MRN: [id number] :1965 A check alberto indicates which time of day the medication should be taken. My Medications ASK your doctor about these medications Instructions Each Dose to Equal  
 Morning Noon Evening Bedtime aMILoride 5 mg tablet Commonly known as:  Devonda Mash Your last dose was: Your next dose is: Take 15 mg by mouth daily. 15 mg COUMADIN 5 mg tablet Generic drug:  warfarin Your last dose was: Your next dose is: Take 5 mg by mouth daily. 5 mg  
    
   
   
   
  
 hydrOXYzine HCl 50 mg tablet Commonly known as:  ATARAX Your last dose was: Your next dose is: Take 50 mg by mouth daily as needed for Itching. 50 mg  
    
   
   
   
  
 LOVENOX 80 mg/0.8 mL injection Generic drug:  enoxaparin Your last dose was: Your next dose is:    
   
   
 70 mg by SubCUTAneous route every twelve (12) hours. 70 mg  
    
   
   
   
  
 potassium chloride SR 20 mEq tablet Commonly known as:  K-TAB Your last dose was: Your next dose is: Take 40 mEq by mouth daily. 40 mEq REMERON 15 mg tablet Generic drug:  mirtazapine Your last dose was: Your next dose is: Take 15 mg by mouth nightly as needed for Other (sleep). 15 mg  
    
   
   
   
  
 torsemide 20 mg tablet Commonly known as:  DEMADEX Your last dose was: Your next dose is: Take 40 mg by mouth daily. 40 mg  
    
   
   
   
  
 VITAMIN D2 50,000 unit capsule Generic drug:  ergocalciferol Your last dose was: Your next dose is: Take 50,000 Units by mouth every seven (7) days. 07980 Units

## 2018-08-21 ENCOUNTER — HOSPITAL ENCOUNTER (EMERGENCY)
Age: 53
Discharge: ANOTHER HOSPITAL | End: 2018-08-21
Attending: EMERGENCY MEDICINE
Payer: MEDICARE

## 2018-08-21 ENCOUNTER — APPOINTMENT (OUTPATIENT)
Dept: CT IMAGING | Age: 53
End: 2018-08-21
Attending: EMERGENCY MEDICINE
Payer: MEDICARE

## 2018-08-21 VITALS
SYSTOLIC BLOOD PRESSURE: 107 MMHG | HEART RATE: 63 BPM | BODY MASS INDEX: 23.03 KG/M2 | OXYGEN SATURATION: 97 % | RESPIRATION RATE: 20 BRPM | DIASTOLIC BLOOD PRESSURE: 56 MMHG | TEMPERATURE: 97.4 F | HEIGHT: 72 IN | WEIGHT: 170 LBS

## 2018-08-21 DIAGNOSIS — K43.9 HERNIA OF ABDOMINAL WALL: Primary | ICD-10-CM

## 2018-08-21 DIAGNOSIS — K70.31 ALCOHOLIC CIRRHOSIS OF LIVER WITH ASCITES (HCC): ICD-10-CM

## 2018-08-21 LAB
ALBUMIN SERPL-MCNC: 2 G/DL (ref 3.5–5)
ALBUMIN/GLOB SERPL: 0.5 {RATIO} (ref 1.2–3.5)
ALP SERPL-CCNC: 89 U/L (ref 50–136)
ALT SERPL-CCNC: 25 U/L (ref 12–65)
ANION GAP SERPL CALC-SCNC: 10 MMOL/L (ref 7–16)
APTT PPP: 40 SEC (ref 23.2–35.3)
AST SERPL-CCNC: 39 U/L (ref 15–37)
BASOPHILS # BLD: 0 K/UL
BASOPHILS NFR BLD: 0 % (ref 0–2)
BILIRUB SERPL-MCNC: 1.3 MG/DL (ref 0.2–1.1)
BUN SERPL-MCNC: 11 MG/DL (ref 6–23)
CALCIUM SERPL-MCNC: 8.1 MG/DL (ref 8.3–10.4)
CHLORIDE SERPL-SCNC: 102 MMOL/L (ref 98–107)
CO2 SERPL-SCNC: 26 MMOL/L (ref 21–32)
CREAT SERPL-MCNC: 1.23 MG/DL (ref 0.8–1.5)
DIFFERENTIAL METHOD BLD: ABNORMAL
EOSINOPHIL # BLD: 0.1 K/UL
EOSINOPHIL NFR BLD: 2 % (ref 0.5–7.8)
ERYTHROCYTE [DISTWIDTH] IN BLOOD BY AUTOMATED COUNT: 15.1 %
GLOBULIN SER CALC-MCNC: 3.8 G/DL (ref 2.3–3.5)
GLUCOSE SERPL-MCNC: 121 MG/DL (ref 65–100)
HCT VFR BLD AUTO: 36.2 % (ref 41.1–50.3)
HGB BLD-MCNC: 12.4 G/DL (ref 13.6–17.2)
IMM GRANULOCYTES # BLD: 0 K/UL
IMM GRANULOCYTES NFR BLD AUTO: 0 % (ref 0–5)
INR PPP: 1.2
LIPASE SERPL-CCNC: 210 U/L (ref 73–393)
LYMPHOCYTES # BLD: 0.4 K/UL
LYMPHOCYTES NFR BLD: 12 % (ref 13–44)
MCH RBC QN AUTO: 32 PG (ref 26.1–32.9)
MCHC RBC AUTO-ENTMCNC: 34.3 G/DL (ref 31.4–35)
MCV RBC AUTO: 93.5 FL (ref 79.6–97.8)
MONOCYTES # BLD: 0.2 K/UL
MONOCYTES NFR BLD: 8 % (ref 4–12)
NEUTS SEG # BLD: 2.4 K/UL
NEUTS SEG NFR BLD: 79 % (ref 43–78)
NRBC # BLD: 0 K/UL (ref 0–0.2)
PLATELET # BLD AUTO: 132 K/UL (ref 150–450)
PMV BLD AUTO: 8.8 FL (ref 9.4–12.3)
POTASSIUM SERPL-SCNC: 3 MMOL/L (ref 3.5–5.1)
PROT SERPL-MCNC: 5.8 G/DL (ref 6.3–8.2)
PROTHROMBIN TIME: 15.7 SEC (ref 11.5–14.5)
RBC # BLD AUTO: 3.87 M/UL (ref 4.23–5.6)
SODIUM SERPL-SCNC: 138 MMOL/L (ref 136–145)
WBC # BLD AUTO: 3.1 K/UL (ref 4.3–11.1)

## 2018-08-21 PROCEDURE — 74011000258 HC RX REV CODE- 258: Performed by: EMERGENCY MEDICINE

## 2018-08-21 PROCEDURE — 80053 COMPREHEN METABOLIC PANEL: CPT

## 2018-08-21 PROCEDURE — 74177 CT ABD & PELVIS W/CONTRAST: CPT

## 2018-08-21 PROCEDURE — 85610 PROTHROMBIN TIME: CPT

## 2018-08-21 PROCEDURE — 96376 TX/PRO/DX INJ SAME DRUG ADON: CPT | Performed by: EMERGENCY MEDICINE

## 2018-08-21 PROCEDURE — 85730 THROMBOPLASTIN TIME PARTIAL: CPT

## 2018-08-21 PROCEDURE — 74011250636 HC RX REV CODE- 250/636: Performed by: EMERGENCY MEDICINE

## 2018-08-21 PROCEDURE — 99285 EMERGENCY DEPT VISIT HI MDM: CPT | Performed by: EMERGENCY MEDICINE

## 2018-08-21 PROCEDURE — 83690 ASSAY OF LIPASE: CPT

## 2018-08-21 PROCEDURE — 74011636320 HC RX REV CODE- 636/320: Performed by: EMERGENCY MEDICINE

## 2018-08-21 PROCEDURE — 85025 COMPLETE CBC W/AUTO DIFF WBC: CPT

## 2018-08-21 PROCEDURE — 96374 THER/PROPH/DIAG INJ IV PUSH: CPT | Performed by: EMERGENCY MEDICINE

## 2018-08-21 RX ORDER — HYDROMORPHONE HYDROCHLORIDE 2 MG/ML
1 INJECTION, SOLUTION INTRAMUSCULAR; INTRAVENOUS; SUBCUTANEOUS ONCE
Status: COMPLETED | OUTPATIENT
Start: 2018-08-21 | End: 2018-08-21

## 2018-08-21 RX ORDER — SODIUM CHLORIDE 9 MG/ML
150 INJECTION, SOLUTION INTRAVENOUS CONTINUOUS
Status: DISCONTINUED | OUTPATIENT
Start: 2018-08-21 | End: 2018-08-21 | Stop reason: HOSPADM

## 2018-08-21 RX ORDER — SODIUM CHLORIDE 0.9 % (FLUSH) 0.9 %
10 SYRINGE (ML) INJECTION
Status: COMPLETED | OUTPATIENT
Start: 2018-08-21 | End: 2018-08-21

## 2018-08-21 RX ADMIN — HYDROMORPHONE HYDROCHLORIDE 1 MG: 2 INJECTION, SOLUTION INTRAMUSCULAR; INTRAVENOUS; SUBCUTANEOUS at 15:07

## 2018-08-21 RX ADMIN — SODIUM CHLORIDE 100 ML: 900 INJECTION, SOLUTION INTRAVENOUS at 16:21

## 2018-08-21 RX ADMIN — DIATRIZOATE MEGLUMINE AND DIATRIZOATE SODIUM 15 ML: 660; 100 LIQUID ORAL; RECTAL at 15:12

## 2018-08-21 RX ADMIN — Medication 10 ML: at 16:21

## 2018-08-21 RX ADMIN — HYDROMORPHONE HYDROCHLORIDE 1 MG: 2 INJECTION, SOLUTION INTRAMUSCULAR; INTRAVENOUS; SUBCUTANEOUS at 20:17

## 2018-08-21 RX ADMIN — HYDROMORPHONE HYDROCHLORIDE 1 MG: 2 INJECTION, SOLUTION INTRAMUSCULAR; INTRAVENOUS; SUBCUTANEOUS at 16:45

## 2018-08-21 RX ADMIN — IOPAMIDOL 100 ML: 755 INJECTION, SOLUTION INTRAVENOUS at 16:21

## 2018-08-21 NOTE — ED NOTES
Report given to [facility] at [facility] BE[facility]AVIOR[facility] TH DIV ER. Natalie Tobias called for patient transport.

## 2018-08-21 NOTE — ED PROVIDER NOTES
HPI Comments: patient is a 59-year-old male with a history of cirrhosis who comes to the ER today complaining of a painful knot in his left lower abdomen which started several hours ago. He reports a history of a prior hernia  there with several prior surgeries. Hernia repair in April 2017 at [address] and revision in 4/2018 secondary to incarcerated hernia and bowel obstruction. He states he also gets weekly paracentesis procedures due to his ascites. That was last done yesterday and everything went fine. Patient is a 48 y.o. male presenting with abdominal pain. The history is provided by the patient. Abdominal Pain    This is a new problem. The current episode started 3 to 5 hours ago. The problem occurs constantly. The problem has not changed since onset. The pain is located in the LLQ and generalized abdominal region. The quality of the pain is sharp. The pain is severe. Pertinent negatives include no fever, no diarrhea, no melena, no vomiting, no constipation and no trauma. Nothing worsens the pain. Past workup includes CT scan, surgery. His past medical history is significant for small bowel obstruction. multiple hernia repairs       Past Medical History:   Diagnosis Date    Cirrhosis of liver ([address]     undeterminable cause    Multiple fractures of ribs of both sides     Multiple, bilateral w/o pneumo: 2013       Past Surgical History:   Procedure Laterality Date    HX HERNIA REPAIR      abd    HX KNEE ARTHROSCOPY  2005    HX OTHER SURGICAL      liver bx         Family History:   Problem Relation Age of Onset    No Known Problems Mother     No Known Problems Father        Social History     Social History    Marital status: SINGLE     Spouse name: N/A    Number of children: N/A    Years of education: N/A     Occupational History    Not on file.      Social History Main Topics    Smoking status: Current Every Day Smoker     Packs/day: 0.50     Years: 4.00     Types: Cigarettes     Start date: 9/10/2010    Smokeless tobacco: Never Used    Alcohol use No    Drug use: Yes     Special: Marijuana    Sexual activity: Not on file     Other Topics Concern    Not on file     Social History Narrative    The patient is . He has a  for a food . He has no known exposure to TB. He has always lived in this general area. ALLERGIES: Tramadol    Review of Systems   Constitutional: Negative for fever. HENT: Negative. Eyes: Negative. Respiratory: Negative. Cardiovascular: Negative. Gastrointestinal: Positive for abdominal pain. Negative for constipation, diarrhea, melena and vomiting. Endocrine: Negative. Genitourinary: Negative. Musculoskeletal: Negative. Neurological: Negative. Vitals:    08/21/18 1426   BP: 93/57   Pulse: 63   Resp: 20   Temp: 97.4 °F (36.3 °C)   SpO2: 99%   Weight: 77.1 kg (170 lb)   Height: 6' (1.829 m)            Physical Exam   Constitutional: He appears well-developed and well-nourished. Chronically ill-appearing,  Very uncomfortable   HENT:   Head: Normocephalic and atraumatic. Pulmonary/Chest: Effort normal and breath sounds normal.   Abdominal: Soft. There is tenderness. There is guarding. Tender mass in the left lower abdomen near a prior surgical incision site this may represent a hernia. Unable to reduce   Musculoskeletal: Normal range of motion. He exhibits no edema. Skin: No rash noted. jaundiced   Nursing note and vitals reviewed. MDM  Number of Diagnoses or Management Options  Alcoholic cirrhosis of liver with ascites [facility]):   Hernia of abdominal wall: established and worsening  Diagnosis management comments: 3:28 PM  Prior records were reviewed and did have 2 prior surgeries and an [facility] he is also [facility] [place] [facility].   After dose of Dilaudid for pain I tried to reduce the hernia again was still unsuccessful we will check basic screening labs and the CAT scan of the abdomen and pelvis to rule out obstruction. 4:20 PM  Feeling better after medications and abdomen is less tender. CAT scan of the abdomen and pelvis is still pending. Blood work is all at his baseline. 5:11 PM  Still unable to reduce the hernia concern for incarceration. Results of the CT are pending. Care is signed over to the next shift to follow up with the results and contact his surgeon at [address].  5:37 PM CT findings show a likely incarcerated hernia with a probable early obstruction. Discussed results with patient and family. I attempted to hold constant pressure for about 5 minutes with no reduction in the incarcerated hernia. He is requesting that I call his surgeon at [address] Dr. Selma Campbell has been paged  7:32 PM I spoke with Dr. Manan Diaz, reviewed case. He reviewed the CT scan and request that the patient be transferred to [facility]. The patient is requesting to get him US as well. I spoke with Dr. Jessa Villalobos, he has accepted the patient in transfer.        Amount and/or Complexity of Data Reviewed  Clinical lab tests: ordered and reviewed  Tests in the radiology section of CPT®: ordered and reviewed  Review and summarize past medical records: yes  Discuss the patient with other providers: yes  Independent visualization of images, tracings, or specimens: yes    Risk of Complications, Morbidity, and/or Mortality  Presenting problems: moderate  Diagnostic procedures: moderate  Management options: moderate    Patient Progress  Patient progress: stable        ED Course       Procedures

## 2018-08-22 LAB
BACTERIA SPEC CULT: NORMAL
GRAM STN SPEC: NORMAL
GRAM STN SPEC: NORMAL
SERVICE CMNT-IMP: NORMAL

## 2018-08-22 NOTE — ED NOTES
I have reviewed discharge instructions with the patient. The patient verbalized understanding. Patient left ED via Discharge Method: stretcher to [facility] [address] with Lorin Hernandez. Opportunity for questions and clarification provided. Patient given 0 scripts. To continue your aftercare when you leave the hospital, you may receive an automated call from our care team to check in on how you are doing. This is a free service and part of our promise to provide the best care and service to meet your aftercare needs.  If you have questions, or wish to unsubscribe from this service please call [phone]. Thank you for Choosing our [facility].

## 2018-08-28 ENCOUNTER — HOSPITAL ENCOUNTER (OUTPATIENT)
Dept: ULTRASOUND IMAGING | Age: 53
Discharge: HOME | End: 2018-08-28
Attending: INTERNAL MEDICINE

## 2018-09-02 ENCOUNTER — HOSPITAL ENCOUNTER (EMERGENCY)
Age: 53
Discharge: HOME | End: 2018-09-03
Attending: EMERGENCY MEDICINE
Payer: MEDICARE

## 2018-09-02 DIAGNOSIS — G89.18 POSTOPERATIVE ABDOMINAL PAIN: Primary | ICD-10-CM

## 2018-09-02 DIAGNOSIS — R10.9 POSTOPERATIVE ABDOMINAL PAIN: Primary | ICD-10-CM

## 2018-09-02 PROCEDURE — 99284 EMERGENCY DEPT VISIT MOD MDM: CPT | Performed by: EMERGENCY MEDICINE

## 2018-09-02 RX ORDER — HYDROCODONE BITARTRATE AND ACETAMINOPHEN 5; 325 MG/1; MG/1
TABLET ORAL
COMMUNITY
End: 2018-09-26

## 2018-09-02 RX ORDER — SENNOSIDES 8.6 MG/1
1 TABLET ORAL DAILY
COMMUNITY
End: 2019-04-29

## 2018-09-02 RX ORDER — LANOLIN ALCOHOL/MO/W.PET/CERES
400 CREAM (GRAM) TOPICAL 2 TIMES DAILY
COMMUNITY
End: 2019-04-29

## 2018-09-03 ENCOUNTER — APPOINTMENT (OUTPATIENT)
Dept: CT IMAGING | Age: 53
End: 2018-09-03
Attending: EMERGENCY MEDICINE
Payer: MEDICARE

## 2018-09-03 VITALS
RESPIRATION RATE: 16 BRPM | BODY MASS INDEX: 21.7 KG/M2 | TEMPERATURE: 98.9 F | WEIGHT: 160 LBS | SYSTOLIC BLOOD PRESSURE: 110 MMHG | DIASTOLIC BLOOD PRESSURE: 61 MMHG | HEART RATE: 80 BPM | OXYGEN SATURATION: 97 %

## 2018-09-03 LAB
ALBUMIN SERPL-MCNC: 1.4 G/DL (ref 3.5–5)
ALBUMIN/GLOB SERPL: 0.3 {RATIO} (ref 1.2–3.5)
ALP SERPL-CCNC: 254 U/L (ref 50–136)
ALT SERPL-CCNC: 29 U/L (ref 12–65)
ANION GAP SERPL CALC-SCNC: 5 MMOL/L (ref 7–16)
AST SERPL-CCNC: 64 U/L (ref 15–37)
BASOPHILS # BLD: 0 K/UL (ref 0–0.2)
BASOPHILS NFR BLD: 0 % (ref 0–2)
BILIRUB SERPL-MCNC: 0.7 MG/DL (ref 0.2–1.1)
BUN SERPL-MCNC: 17 MG/DL (ref 6–23)
CALCIUM SERPL-MCNC: 7.4 MG/DL (ref 8.3–10.4)
CHLORIDE SERPL-SCNC: 98 MMOL/L (ref 98–107)
CO2 SERPL-SCNC: 28 MMOL/L (ref 21–32)
CREAT SERPL-MCNC: 1.31 MG/DL (ref 0.8–1.5)
DIFFERENTIAL METHOD BLD: ABNORMAL
EOSINOPHIL # BLD: 0.1 K/UL (ref 0–0.8)
EOSINOPHIL NFR BLD: 1 % (ref 0.5–7.8)
ERYTHROCYTE [DISTWIDTH] IN BLOOD BY AUTOMATED COUNT: 16.3 %
GLOBULIN SER CALC-MCNC: 4.5 G/DL (ref 2.3–3.5)
GLUCOSE SERPL-MCNC: 110 MG/DL (ref 65–100)
HCT VFR BLD AUTO: 28.9 % (ref 41.1–50.3)
HGB BLD-MCNC: 9.6 G/DL (ref 13.6–17.2)
IMM GRANULOCYTES # BLD: 0 K/UL (ref 0–0.5)
IMM GRANULOCYTES NFR BLD AUTO: 0 % (ref 0–5)
INR PPP: 2.4
LYMPHOCYTES # BLD: 0.5 K/UL (ref 0.5–4.6)
LYMPHOCYTES NFR BLD: 7 % (ref 13–44)
MCH RBC QN AUTO: 32 PG (ref 26.1–32.9)
MCHC RBC AUTO-ENTMCNC: 33.2 G/DL (ref 31.4–35)
MCV RBC AUTO: 96.3 FL (ref 79.6–97.8)
MONOCYTES # BLD: 0.6 K/UL (ref 0.1–1.3)
MONOCYTES NFR BLD: 8 % (ref 4–12)
NEUTS SEG # BLD: 5.9 K/UL (ref 1.7–8.2)
NEUTS SEG NFR BLD: 83 % (ref 43–78)
NRBC # BLD: 0 K/UL (ref 0–0.2)
PLATELET # BLD AUTO: 174 K/UL (ref 150–450)
PMV BLD AUTO: 9.2 FL (ref 9.4–12.3)
POTASSIUM SERPL-SCNC: 3.6 MMOL/L (ref 3.5–5.1)
PROT SERPL-MCNC: 5.9 G/DL (ref 6.3–8.2)
PROTHROMBIN TIME: 26.9 SEC (ref 11.5–14.5)
RBC # BLD AUTO: 3 M/UL (ref 4.23–5.6)
SODIUM SERPL-SCNC: 131 MMOL/L (ref 136–145)
WBC # BLD AUTO: 7.1 K/UL (ref 4.3–11.1)

## 2018-09-03 PROCEDURE — 96361 HYDRATE IV INFUSION ADD-ON: CPT | Performed by: EMERGENCY MEDICINE

## 2018-09-03 PROCEDURE — 36415 COLL VENOUS BLD VENIPUNCTURE: CPT

## 2018-09-03 PROCEDURE — 74177 CT ABD & PELVIS W/CONTRAST: CPT

## 2018-09-03 PROCEDURE — 85025 COMPLETE CBC W/AUTO DIFF WBC: CPT

## 2018-09-03 PROCEDURE — 96376 TX/PRO/DX INJ SAME DRUG ADON: CPT | Performed by: EMERGENCY MEDICINE

## 2018-09-03 PROCEDURE — 96365 THER/PROPH/DIAG IV INF INIT: CPT | Performed by: EMERGENCY MEDICINE

## 2018-09-03 PROCEDURE — 74011250637 HC RX REV CODE- 250/637: Performed by: EMERGENCY MEDICINE

## 2018-09-03 PROCEDURE — 80053 COMPREHEN METABOLIC PANEL: CPT

## 2018-09-03 PROCEDURE — 74011250636 HC RX REV CODE- 250/636

## 2018-09-03 PROCEDURE — 96375 TX/PRO/DX INJ NEW DRUG ADDON: CPT | Performed by: EMERGENCY MEDICINE

## 2018-09-03 PROCEDURE — 74011636320 HC RX REV CODE- 636/320: Performed by: EMERGENCY MEDICINE

## 2018-09-03 PROCEDURE — 74011250636 HC RX REV CODE- 250/636: Performed by: EMERGENCY MEDICINE

## 2018-09-03 PROCEDURE — 85610 PROTHROMBIN TIME: CPT

## 2018-09-03 PROCEDURE — 74011000258 HC RX REV CODE- 258: Performed by: EMERGENCY MEDICINE

## 2018-09-03 RX ORDER — HYDROMORPHONE HYDROCHLORIDE 2 MG/ML
1 INJECTION, SOLUTION INTRAMUSCULAR; INTRAVENOUS; SUBCUTANEOUS
Status: COMPLETED | OUTPATIENT
Start: 2018-09-03 | End: 2018-09-03

## 2018-09-03 RX ORDER — HYDROMORPHONE HYDROCHLORIDE 2 MG/ML
0.5 INJECTION, SOLUTION INTRAMUSCULAR; INTRAVENOUS; SUBCUTANEOUS
Status: COMPLETED | OUTPATIENT
Start: 2018-09-03 | End: 2018-09-03

## 2018-09-03 RX ORDER — SODIUM CHLORIDE 0.9 % (FLUSH) 0.9 %
5-10 SYRINGE (ML) INJECTION EVERY 8 HOURS
Status: DISCONTINUED | OUTPATIENT
Start: 2018-09-03 | End: 2018-09-03 | Stop reason: HOSPADM

## 2018-09-03 RX ORDER — HYDROMORPHONE HYDROCHLORIDE 2 MG/ML
INJECTION, SOLUTION INTRAMUSCULAR; INTRAVENOUS; SUBCUTANEOUS
Status: DISCONTINUED
Start: 2018-09-03 | End: 2018-09-03 | Stop reason: HOSPADM

## 2018-09-03 RX ORDER — SODIUM CHLORIDE 0.9 % (FLUSH) 0.9 %
10 SYRINGE (ML) INJECTION
Status: COMPLETED | OUTPATIENT
Start: 2018-09-03 | End: 2018-09-03

## 2018-09-03 RX ORDER — CEPHALEXIN 500 MG/1
500 CAPSULE ORAL 4 TIMES DAILY
Qty: 28 CAP | Refills: 0 | Status: SHIPPED | OUTPATIENT
Start: 2018-09-03 | End: 2018-09-10

## 2018-09-03 RX ORDER — OXYCODONE HYDROCHLORIDE 5 MG/1
5 TABLET ORAL
Status: COMPLETED | OUTPATIENT
Start: 2018-09-03 | End: 2018-09-03

## 2018-09-03 RX ORDER — ONDANSETRON 2 MG/ML
4 INJECTION INTRAMUSCULAR; INTRAVENOUS
Status: COMPLETED | OUTPATIENT
Start: 2018-09-03 | End: 2018-09-03

## 2018-09-03 RX ORDER — SODIUM CHLORIDE 0.9 % (FLUSH) 0.9 %
5-10 SYRINGE (ML) INJECTION AS NEEDED
Status: DISCONTINUED | OUTPATIENT
Start: 2018-09-03 | End: 2018-09-03 | Stop reason: HOSPADM

## 2018-09-03 RX ORDER — OXYCODONE HYDROCHLORIDE 15 MG/1
15 TABLET ORAL
Qty: 10 TAB | Refills: 0 | Status: SHIPPED | OUTPATIENT
Start: 2018-09-03 | End: 2018-09-26

## 2018-09-03 RX ADMIN — SODIUM CHLORIDE 500 ML: 900 INJECTION, SOLUTION INTRAVENOUS at 02:37

## 2018-09-03 RX ADMIN — DIATRIZOATE MEGLUMINE AND DIATRIZOATE SODIUM 15 ML: 660; 100 LIQUID ORAL; RECTAL at 02:30

## 2018-09-03 RX ADMIN — OXYCODONE HYDROCHLORIDE 5 MG: 5 TABLET ORAL at 06:40

## 2018-09-03 RX ADMIN — IOPAMIDOL 100 ML: 755 INJECTION, SOLUTION INTRAVENOUS at 03:49

## 2018-09-03 RX ADMIN — CEFAZOLIN SODIUM 1 G: 1 INJECTION, POWDER, FOR SOLUTION INTRAMUSCULAR; INTRAVENOUS at 06:41

## 2018-09-03 RX ADMIN — HYDROMORPHONE HYDROCHLORIDE 1 MG: 2 INJECTION, SOLUTION INTRAMUSCULAR; INTRAVENOUS; SUBCUTANEOUS at 02:29

## 2018-09-03 RX ADMIN — SODIUM CHLORIDE 100 ML: 900 INJECTION, SOLUTION INTRAVENOUS at 03:49

## 2018-09-03 RX ADMIN — HYDROMORPHONE HYDROCHLORIDE 0.5 MG: 2 INJECTION, SOLUTION INTRAMUSCULAR; INTRAVENOUS; SUBCUTANEOUS at 01:08

## 2018-09-03 RX ADMIN — ONDANSETRON 4 MG: 2 INJECTION INTRAMUSCULAR; INTRAVENOUS at 01:08

## 2018-09-03 RX ADMIN — Medication 10 ML: at 03:49

## 2018-09-03 NOTE — ED TRIAGE NOTES
Pt had surgery 2 weeks ago at [address], today started having lots of blood in and around his JOSEPH drain.

## 2018-09-03 NOTE — ED PROVIDER NOTES
HPI Comments: Patient is 2 weeks status post ventral hernia repair, with JOSEPH drains on the right and left. He states the right JOSEPH drain primarily drains ascites, and the left JOSEPH drain was draining around the wound. States noted increased bleeding around the JOSEPH site as well as through the drain over the last 12 hours. Last INR 3 days ago was 1.8 per the patient Patient is a 48 y.o. male presenting with post-operative complications. The history is provided by the patient. Post OP Complication This is a new problem. The current episode started 6 to 12 hours ago. The problem occurs constantly. The problem has not changed since onset. Associated symptoms include abdominal pain (ppatient describes pain since the surgery, moderately intensified today not handled with his normal pain medicines). Pertinent negatives include no chest pain, no headaches and no shortness of breath. The symptoms are aggravated by bending and twisting (palpation). Nothing relieves the symptoms. He has tried rest (Norco 10) for the symptoms. The treatment provided no relief. Past Medical History:  
Diagnosis Date  Cirrhosis of liver (HCC)   
 undeterminable cause  Multiple fractures of ribs of both sides Multiple, bilateral w/o pneumo: 2013 Past Surgical History:  
Procedure Laterality Date  HX HERNIA REPAIR    
 abd  
 HX KNEE ARTHROSCOPY  2005  HX OTHER SURGICAL    
 liver bx Family History:  
Problem Relation Age of Onset  No Known Problems Mother  No Known Problems Father Social History Social History  Marital status: SINGLE Spouse name: N/A  
 Number of children: N/A  
 Years of education: N/A Occupational History  Not on file. Social History Main Topics  Smoking status: Current Every Day Smoker Packs/day: 0.50 Years: 4.00 Types: Cigarettes Start date: 9/10/2010  Smokeless tobacco: Never Used  Alcohol use No  
 Drug use:  Yes  
 Special: Marijuana  Sexual activity: Not on file Other Topics Concern  Not on file Social History Narrative The patient is . He has a  for a food . He has no known exposure to TB. He has always lived in this general area. ALLERGIES: Tramadol Review of Systems Constitutional: Negative for chills and fever. Respiratory: Negative for shortness of breath. Cardiovascular: Negative for chest pain. Gastrointestinal: Positive for abdominal pain (ppatient describes pain since the surgery, moderately intensified today not handled with his normal pain medicines). Neurological: Negative for headaches. Hematological: Bruises/bleeds easily (on Coumadin, noted increased bleeding from his left JOSEPH drain starting this afternoon about 5). All other systems reviewed and are negative. Vitals:  
 09/02/18 2344 BP: 103/63 Pulse: 83 Resp: 18 Temp: 97.9 °F (36.6 °C) SpO2: 100% Weight: 72.6 kg (160 lb) Physical Exam  
Constitutional: He is oriented to person, place, and time. He appears well-developed. He appears cachectic. He has a sickly appearance. No distress. HENT:  
Head: Normocephalic and atraumatic. Right Ear: Tympanic membrane and external ear normal.  
Left Ear: Tympanic membrane and external ear normal.  
Mouth/Throat: Oropharynx is clear and moist.  
Eyes: Conjunctivae and EOM are normal. Pupils are equal, round, and reactive to light. Neck: Normal range of motion. Neck supple. No tracheal deviation present. Cardiovascular: Normal rate, regular rhythm, normal heart sounds and intact distal pulses. Exam reveals no gallop and no friction rub. No murmur heard. Pulmonary/Chest: Effort normal and breath sounds normal. No respiratory distress. He has no wheezes. Abdominal: Soft.  Bowel sounds are normal. He exhibits no shifting dullness, no distension, no pulsatile liver, no fluid wave, no abdominal bruit, no pulsatile midline mass and no mass. There is no hepatosplenomegaly. There is tenderness in the left upper quadrant and left lower quadrant. There is no rebound, no guarding and no CVA tenderness. Musculoskeletal: Normal range of motion. He exhibits no edema. Lymphadenopathy:  
  He has no cervical adenopathy. Neurological: He is alert and oriented to person, place, and time. He displays normal reflexes. No cranial nerve deficit. Skin: Skin is warm and dry. No rash noted. He is not diaphoretic. No erythema. Psychiatric: He has a normal mood and affect. Nursing note and vitals reviewed. MDM Number of Diagnoses or Management Options Postoperative abdominal pain: new and requires workup Amount and/or Complexity of Data Reviewed Clinical lab tests: ordered and reviewed Tests in the radiology section of CPT®: ordered and reviewed Decide to obtain previous medical records or to obtain history from someone other than the patient: yes Review and summarize past medical records: yes (Laurita Nj MD - 08/27/2018 2:30 PM EDT Formatting of this note may be different from the original. 
Discharge Summary [facility] Patient Name: Terry Yoon Age: 48 y.o. Sex: male MRN: [id number] Date: 8/27/2018 Admission Date: 8/21/2018 Discharge Date: 8/27/2018 Attending Physician: Sam Warner Service: Transplant Surgery Admitting Diagnosis Recurrent incarcerated incisional Hernia Procedures During Hospitalization Incarcerated hernia repair with mesh. Small bowel resection Consultations During Hospitalizations 
none History of Present Illness Sowmya Palumbo is a 48 y.o. with a PMHx significant for EtOH cirrhosis was transferred from OSH for possible incarcerated ventral hernia. Pt had umbilical hernia repaired multiple times last being April 2018 with Dr. Uma Houston.  About 1-2 months ago he noticed the hernia recurred with progression in size. Today, he felt a pop after getting out of the shower and he could no longer reduce mass. It is associated with abdominal pain, nausea, and vomiting. Last PO meal was yesterday. Last BM was yesterday evening.  
  
CT scan done at outside hospital showed large ventral hernia with possible incarcerated small bowel. Pertinent Review of Systems Review of Systems Constitutional: Negative for chills, fever and malaise/fatigue. HENT: Negative for sore throat. Eyes: Negative for blurred vision. Respiratory: Negative for cough, shortness of breath, wheezing and stridor. Cardiovascular: Negative for chest pain, palpitations and leg swelling. Gastrointestinal: Positive for abdominal pain. Negative for constipation, diarrhea, nausea and vomiting. Genitourinary: Negative for dysuria and hematuria. Musculoskeletal: Negative for joint pain and myalgias. Neurological: Negative for tingling, focal weakness, weakness and headaches. Past Medical History Past Medical History:  
Diagnosis Date  Alcoholic cirrhosis  Ascites  S/P TIPS (transjugular intrahepatic portosystemic shunt) Surgical History Past Surgical History:  
Procedure Laterality Date  HERNIA REPAIR  
x2  
 KNEE SURGERY  
 ND EXPLORATORY OF ABDOMEN N/A 4/3/2018 Procedure: TXP EXPLORATORY LAPAROTOMY; Surgeon: Bethany Moreira MD; [place] [facility] [place] [facility]; Service: Transplant Surgery  ND MUSCLE-SKIN FLAP,TRUNK Bilateral 8/22/2018 Procedure: GEN COMPONENT SEPARATION; Surgeon: Mack Tijerina MD; [place] [facility] [place]; Service: Transplant Surgery  ND REPAIR RECURR INCIS HERNIA,CHAYO 8/22/2018 Procedure: TXP REPAIR, HERNIA, INCISIONAL; Surgeon: Mack Tijerina MD; [place] [facility] [place] [facility]; Service: Transplant Surgery  ND REPAIR UMBILICAL GHXI,7+W/C,BSOCW Unknown 4/26/2017 Procedure: TXP OPEN REPAIR UMBILICAL HERNIA; Surgeon: Cass Herron, MD; [place] [facility] [place]; Service: Transplant Surgery  OR RESECT SMALL INTESTMING MARICEL/ANAS 8/22/2018 Procedure: SMALL BOWEL RESECTION OPEN; Surgeon: Harleen Farah MD; Location: [facility]; Service: Transplant Surgery  TIPS PROCEDURE Admission Medications Prescriptions Prior to Admission Medication Sig Dispense Refill Last Dose  aMILoride (MIDAMOR) 5 mg tablet Take 15 mg by mouth 2 times daily. Taking  ergocalciferol (DRISDOL) 50,000 unit capsule Take 50,000 Units by mouth every 7 days. Taking  hydrOXYzine (ATARAX) 50 MG tablet Take 50 mg by mouth as needed for itching. Taking  torsemide (DEMADEX) 20 MG tablet TAKE 1 TABLETS IN THE MORNING AND 1 TABLETS IN THE EVENING 11 Taking Allergies Review of patient's allergies indicates: Allergen Reactions  Tramadol Nausea And Vomiting Family History Family History Problem Relation Age of Onset  Stroke Mother  No Known Problems Sister  No Known Problems Brother  No Known Problems Son  Cirrhosis Maternal Aunt  No Known Problems Brother  Anesthetic Reactions Neg Hx Social History Social History Social History  Marital status: Single Spouse name: N/A  
 Number of children: N/A  
 Years of education: N/A Occupational History  Not on file. Social History Main Topics  Smoking status: Former Smoker Packs/day: 1.00 Years: 15.00 Quit date: 3/31/2015  Smokeless tobacco: Never Used  Alcohol use No  
Comment: quit 6/2015  Drug use: No  
 Sexual activity: Not on file Other Topics Concern  Not on file Social History Narrative  No narrative on file Admission Physical Exam 
Physical Exam  
Constitutional: He is oriented to person, place, and time. He appears distressed. HENT:  
Head: Normocephalic and atraumatic. Mouth/Throat: Oropharynx is clear and moist. No oropharyngeal exudate. Eyes: Pupils are equal, round, and reactive to light. Conjunctivae and EOM are normal. No scleral icterus. Neck: Normal range of motion. Neck supple. Cardiovascular: Normal rate, regular rhythm and intact distal pulses. Exam reveals no gallop and no friction rub. No murmur heard. Pulmonary/Chest: Effort normal and breath sounds normal. No stridor. No respiratory distress. He has no wheezes. He exhibits no tenderness. Abdominal: Soft. Bowel sounds are normal. He exhibits mass (ventral hernia below umbilicus non reducible, with bowel sounds present within. No skin color changes). He exhibits no distension. There is tenderness. There is no guarding. Musculoskeletal: Normal range of motion. He exhibits no edema, tenderness or deformity. Neurological: He is alert and oriented to person, place, and time. He exhibits normal muscle tone. GCS score is 15. Skin: Skin is warm and dry. No rash noted. No erythema. Significant Diagnostic Tests CT Scan from OSH showed incarcerated small bowel in large ventral hernia Hospital Course Patient was admitted to the Transplant surgery service and taken to the operating room for ex-lap, repair of ventral hernia. He tolerated the procedure well, for full report please see dictated operative note. Postoperatively he was taken to the PACU and once criteria were met he was transferred to the floor. NGT remained post operatively until he began passing flatus. He was kept on IVF and managed supportively. He was kept on zosyn x48 hours perioperatively. He was started on lovenox on POD2 and coumadin, Lovenox discontinued when he was therapeutic. His diet was slowly advanced and he began having bowel function. On POD5, he is doing well. His physical exam is stable. His VSS. He is tolerating PO, ambulating, voiding, and having bowel function and pain is controlled.   
/85 (BP Location: Right arm, Patient Position: Lying)  Pulse 80 Temp 37 °C (98.6 °F) (Oral)  Resp 18  Ht 182.9 cm (6')  Wt 74.1 kg (163 lb 5.8 oz) Comment: standing weight  SpO2 100%  BMI 22.16 kg/m² He is deemed stable and appropriate for discharge. He will follow up on 9/4/2018. Patient was provided with detailed discharge instructions including medications, activity restrictions, follow-up appointments and a phone number to call with questions. Patient verbalized understanding. Discharge Medications Current Discharge Medication List  
 
START taking these medications Details  
magnesium oxide (MAG-OX) 400 mg tablet Take 1 tablet by mouth 2 times daily. Qty: 60 tablet, Refills: 0 Associated Diagnoses: Recurrent abdominal hernia without obstruction or gangrene, unspecified hernia type  
 
oxyCODONE IR (ROXICODONE) 5 mg immediate release tablet Take 1-2 tablets by mouth every 6 hours as needed (take 1 tablet for moderate and take 2 tablets for severe pain). Qty: 15 tablet, Refills: 0 Associated Diagnoses: Recurrent abdominal hernia without obstruction or gangrene, unspecified hernia type  
 
senna-docusate (PERICOLACE) 8.6-50 mg tablet Take 1 tablet by mouth daily for 7 days. Take with at least 8 oz of water. Qty: 7 tablet, Refills: 0 Associated Diagnoses: Recurrent abdominal hernia without obstruction or gangrene, unspecified hernia type CONTINUE these medications which have CHANGED Details  
enoxaparin (LOVENOX) 60 mg/0.6 mL subcutaneous syringe Inject 0.6 mL (60 mg total) under the skin every 12 hours for 10 days. Rotate injection sites. Qty: 20 Syringe, Refills: 0 Associated Diagnoses: Recurrent abdominal hernia without obstruction or gangrene, unspecified hernia type  
 
warfarin (COUMADIN) 5 mg tablet Take 0.5 tablets by mouth daily at the same time each day. Qty: 30 tablet, Refills: 0 Associated Diagnoses: Recurrent abdominal hernia without obstruction or gangrene, unspecified hernia type CONTINUE these medications which have NOT CHANGED Details aMILoride (MIDAMOR) 5 mg tablet Take 15 mg by mouth 2 times daily. ergocalciferol (DRISDOL) 50,000 unit capsule Take 50,000 Units by mouth every 7 days. hydrOXYzine (ATARAX) 50 MG tablet Take 50 mg by mouth as needed for itching. torsemide (DEMADEX) 20 MG tablet TAKE 1 TABLETS IN THE MORNING AND 1 TABLETS IN THE EVENING Refills: 11 Disposition Home or Self Care Condition on Discharge Good Activity As tolerated. Do not lift anything greater than 10 lbs. Avoid straining or valsalva. Diet Regular Upcoming Appointments Future Appointments Provider Kathe Ortez 9/4/2018 9:00 AM (Arrive by 8:30 AM) [facility]  
9/4/2018 10:00 AM (Arrive by 9:30 AM) Tiara Gould [facility] Transplant [facility] at [facility]   
) Discuss the patient with other providers: yes Independent visualization of images, tracings, or specimens: yes Risk of Complications, Morbidity, and/or Mortality Presenting problems: high Diagnostic procedures: moderate Management options: moderate Patient Progress Patient progress: stable ED Course Procedures The patient was observed in the ED. Discussed with Dr. Shelli Robins, on-call for surgery at [facility], who recommends placing the patient on Keflex, instructed the patient to hold Coumadin, and follow-up in clinic tomorrow. If he should have further problems he recommends the patient come down sooner. As the patient's white count is normal, he is remained afebrile, and his rate of oozing from the wound site is slow, I feel is the patient is safe for discharge at this time, but will be instructed to return for increasing bleeding or fever. Results Reviewed: 
 
 
Recent Results (from the past 24 hour(s)) CBC WITH AUTOMATED DIFF Collection Time: 09/03/18  1:07 AM  
Result Value Ref Range WBC 7.1 4.3 - 11.1 K/uL RBC 3.00 (L) 4.23 - 5.6 M/uL HGB 9.6 (L) 13.6 - 17.2 g/dL HCT 28.9 (L) 41.1 - 50.3 % MCV 96.3 79.6 - 97.8 FL  
 MCH 32.0 26.1 - 32.9 PG  
 MCHC 33.2 31.4 - 35.0 g/dL  
 RDW 16.3 % PLATELET 415 639 - 355 K/uL MPV 9.2 (L) 9.4 - 12.3 FL ABSOLUTE NRBC 0.00 0.0 - 0.2 K/uL  
 DF AUTOMATED NEUTROPHILS 83 (H) 43 - 78 % LYMPHOCYTES 7 (L) 13 - 44 % MONOCYTES 8 4.0 - 12.0 % EOSINOPHILS 1 0.5 - 7.8 % BASOPHILS 0 0.0 - 2.0 % IMMATURE GRANULOCYTES 0 0.0 - 5.0 %  
 ABS. NEUTROPHILS 5.9 1.7 - 8.2 K/UL  
 ABS. LYMPHOCYTES 0.5 0.5 - 4.6 K/UL  
 ABS. MONOCYTES 0.6 0.1 - 1.3 K/UL  
 ABS. EOSINOPHILS 0.1 0.0 - 0.8 K/UL  
 ABS. BASOPHILS 0.0 0.0 - 0.2 K/UL  
 ABS. IMM. GRANS. 0.0 0.0 - 0.5 K/UL METABOLIC PANEL, COMPREHENSIVE Collection Time: 09/03/18  1:07 AM  
Result Value Ref Range Sodium 131 (L) 136 - 145 mmol/L Potassium 3.6 3.5 - 5.1 mmol/L Chloride 98 98 - 107 mmol/L  
 CO2 28 21 - 32 mmol/L Anion gap 5 (L) 7 - 16 mmol/L Glucose 110 (H) 65 - 100 mg/dL BUN 17 6 - 23 MG/DL Creatinine 1.31 0.8 - 1.5 MG/DL  
 GFR est AA >60 >60 ml/min/1.73m2 GFR est non-AA >60 >60 ml/min/1.73m2 Calcium 7.4 (L) 8.3 - 10.4 MG/DL Bilirubin, total 0.7 0.2 - 1.1 MG/DL  
 ALT (SGPT) 29 12 - 65 U/L  
 AST (SGOT) 64 (H) 15 - 37 U/L Alk. phosphatase 254 (H) 50 - 136 U/L Protein, total 5.9 (L) 6.3 - 8.2 g/dL Albumin 1.4 (L) 3.5 - 5.0 g/dL Globulin 4.5 (H) 2.3 - 3.5 g/dL A-G Ratio 0.3 (L) 1.2 - 3.5 PROTHROMBIN TIME + INR Collection Time: 09/03/18  1:07 AM  
Result Value Ref Range Prothrombin time 26.9 (H) 11.5 - 14.5 sec INR 2.4 CT ABD PELV W CONT Final Result IMPRESSION:  
  
Superficial, subcutaneous abscess in the midline lower abdominal wall. Small right posterior rectus sheath hematoma. This communicates with the left Duke-Hart drain. Cirrhosis, ascites and varices. The spleen is normal in size. Status post TIPS procedure. Coronary artery disease. Linear bibasilar atelectasis. Date of Dictation: 9/3/2018 4:05 AM  
  
  
  
 
 
 
I discussed the results of all labs, procedures, radiographs, and treatments with the patient and available family. Treatment plan is agreed upon and the patient is ready for discharge. All voiced understanding of the discharge plan and medication instructions or changes as appropriate. Questions about treatment in the ED were answered. All were encouraged to return should symptoms worsen or new problems develop. Dictated using voice recognition software.  Proofread, but unrecognized errors may exist.

## 2018-09-26 ENCOUNTER — HOSPITAL ENCOUNTER (EMERGENCY)
Age: 53
Discharge: HOME | End: 2018-09-26
Attending: EMERGENCY MEDICINE
Payer: MEDICARE

## 2018-09-26 VITALS
HEART RATE: 80 BPM | HEIGHT: 72 IN | WEIGHT: 145 LBS | RESPIRATION RATE: 16 BRPM | TEMPERATURE: 98.2 F | BODY MASS INDEX: 19.64 KG/M2 | SYSTOLIC BLOOD PRESSURE: 110 MMHG | DIASTOLIC BLOOD PRESSURE: 60 MMHG | OXYGEN SATURATION: 100 %

## 2018-09-26 DIAGNOSIS — T81.49XA ABDOMINAL WALL ABSCESS AT SITE OF SURGICAL WOUND: Primary | ICD-10-CM

## 2018-09-26 LAB
ALBUMIN SERPL-MCNC: 1.6 G/DL (ref 3.5–5)
ALBUMIN/GLOB SERPL: 0.3 {RATIO}
ALP SERPL-CCNC: 146 U/L (ref 50–136)
ALT SERPL-CCNC: 12 U/L (ref 12–65)
ANION GAP SERPL CALC-SCNC: 11 MMOL/L
AST SERPL-CCNC: 23 U/L (ref 15–37)
BASOPHILS # BLD: 0 K/UL (ref 0–0.2)
BASOPHILS NFR BLD: 1 % (ref 0–2)
BILIRUB SERPL-MCNC: 0.6 MG/DL (ref 0.2–1.1)
BUN SERPL-MCNC: 13 MG/DL (ref 6–23)
CALCIUM SERPL-MCNC: 8 MG/DL (ref 8.3–10.4)
CHLORIDE SERPL-SCNC: 103 MMOL/L (ref 98–107)
CO2 SERPL-SCNC: 25 MMOL/L (ref 21–32)
CREAT SERPL-MCNC: 1.26 MG/DL (ref 0.8–1.5)
DIFFERENTIAL METHOD BLD: ABNORMAL
EOSINOPHIL # BLD: 0.1 K/UL (ref 0–0.8)
EOSINOPHIL NFR BLD: 2 % (ref 0.5–7.8)
ERYTHROCYTE [DISTWIDTH] IN BLOOD BY AUTOMATED COUNT: 16.7 %
GLOBULIN SER CALC-MCNC: 6.4 G/DL (ref 2.3–3.5)
GLUCOSE SERPL-MCNC: 155 MG/DL (ref 65–100)
HCT VFR BLD AUTO: 24.2 % (ref 41.1–50.3)
HGB BLD-MCNC: 7.7 G/DL (ref 13.6–17.2)
IMM GRANULOCYTES # BLD: 0 K/UL (ref 0–0.5)
IMM GRANULOCYTES NFR BLD AUTO: 0 % (ref 0–5)
INR PPP: 1.5
LYMPHOCYTES # BLD: 0.4 K/UL (ref 0.5–4.6)
LYMPHOCYTES NFR BLD: 13 % (ref 13–44)
MCH RBC QN AUTO: 30.6 PG (ref 26.1–32.9)
MCHC RBC AUTO-ENTMCNC: 31.8 G/DL (ref 31.4–35)
MCV RBC AUTO: 96 FL (ref 79.6–97.8)
MONOCYTES # BLD: 0.2 K/UL (ref 0.1–1.3)
MONOCYTES NFR BLD: 7 % (ref 4–12)
NEUTS SEG # BLD: 2.6 K/UL (ref 1.7–8.2)
NEUTS SEG NFR BLD: 78 % (ref 43–78)
NRBC # BLD: 0 K/UL (ref 0–0.2)
PLATELET # BLD AUTO: 159 K/UL (ref 150–450)
PMV BLD AUTO: 9.5 FL (ref 9.4–12.3)
POTASSIUM SERPL-SCNC: 2.6 MMOL/L (ref 3.5–5.1)
PROT SERPL-MCNC: 8 G/DL
PROTHROMBIN TIME: 18.2 SEC (ref 11.5–14.5)
RBC # BLD AUTO: 2.52 M/UL (ref 4.23–5.6)
SODIUM SERPL-SCNC: 139 MMOL/L (ref 136–145)
WBC # BLD AUTO: 3.4 K/UL (ref 4.3–11.1)

## 2018-09-26 PROCEDURE — 83605 ASSAY OF LACTIC ACID: CPT

## 2018-09-26 PROCEDURE — 87077 CULTURE AEROBIC IDENTIFY: CPT

## 2018-09-26 PROCEDURE — 85610 PROTHROMBIN TIME: CPT

## 2018-09-26 PROCEDURE — 87186 SC STD MICRODIL/AGAR DIL: CPT

## 2018-09-26 PROCEDURE — 80053 COMPREHEN METABOLIC PANEL: CPT

## 2018-09-26 PROCEDURE — 87205 SMEAR GRAM STAIN: CPT

## 2018-09-26 PROCEDURE — 74011250637 HC RX REV CODE- 250/637: Performed by: EMERGENCY MEDICINE

## 2018-09-26 PROCEDURE — 99283 EMERGENCY DEPT VISIT LOW MDM: CPT | Performed by: EMERGENCY MEDICINE

## 2018-09-26 PROCEDURE — 85025 COMPLETE CBC W/AUTO DIFF WBC: CPT

## 2018-09-26 RX ORDER — OXYCODONE HYDROCHLORIDE 5 MG/1
5 TABLET ORAL
Qty: 11 TAB | Refills: 0 | Status: SHIPPED | OUTPATIENT
Start: 2018-09-26 | End: 2019-04-29

## 2018-09-26 RX ORDER — POTASSIUM CHLORIDE 20MEQ/15ML
40 LIQUID (ML) ORAL
Status: COMPLETED | OUTPATIENT
Start: 2018-09-26 | End: 2018-09-26

## 2018-09-26 RX ORDER — POTASSIUM CHLORIDE 1500 MG/1
20 TABLET, FILM COATED, EXTENDED RELEASE ORAL DAILY
Qty: 7 TAB | Refills: 0 | Status: SHIPPED | OUTPATIENT
Start: 2018-09-26 | End: 2018-10-03

## 2018-09-26 RX ORDER — OXYCODONE HYDROCHLORIDE 5 MG/1
5 TABLET ORAL
Status: COMPLETED | OUTPATIENT
Start: 2018-09-26 | End: 2018-09-26

## 2018-09-26 RX ADMIN — OXYCODONE HYDROCHLORIDE 5 MG: 5 TABLET ORAL at 13:40

## 2018-09-26 RX ADMIN — POTASSIUM CHLORIDE 40 MEQ: 20 SOLUTION ORAL at 15:29

## 2018-09-26 NOTE — ED NOTES
I have reviewed discharge instructions with the patient. The patient verbalized understanding. Patient left ED via Discharge Method: ambulatory to Home with self. Opportunity for questions and clarification provided. Patient given 2 scripts. To continue your aftercare when you leave the hospital, you may receive an automated call from our care team to check in on how you are doing. This is a free service and part of our promise to provide the best care and service to meet your aftercare needs.  If you have questions, or wish to unsubscribe from this service please call [phone]. Thank you for Choosing our [facility].

## 2018-09-26 NOTE — ED TRIAGE NOTES
Pt has long complicated surgical history at [address] and procedures at [facility]. Pt states an incision has burst open and it is draining blood and pus. MD on call advised pt to come to ER to be checked.

## 2018-09-26 NOTE — ED PROVIDER NOTES
HPI Comments: 77-year-old gentleman with a history of multiple abdominal surgeries presents with concerns about drainage from one of the right lower drain sites. Patient had a hernia repair done approximately 3 weeks ago and then developed an abdominal wall abscess. That was drained and he had 2 drains placed. Both wounds drains of falling out and he is concerned because today it started to get some new drainage. He says he's had no fevers or chills. He says that he has had some pain in that area. Elements of this note were created using speech recognition software. As such, errors of speech recognition may be present. Patient is a 48 y.o. male presenting with post-operative complications. The history is provided by the patient. Post OP Complication Associated symptoms include abdominal pain. Pertinent negatives include no chest pain, no headaches and no shortness of breath. Past Medical History:  
Diagnosis Date  Cirrhosis of liver (HCC)   
 undeterminable cause  Multiple fractures of ribs of both sides Multiple, bilateral w/o pneumo: 2013 Past Surgical History:  
Procedure Laterality Date  HX HERNIA REPAIR    
 abd  
 HX KNEE ARTHROSCOPY  2005  HX OTHER SURGICAL    
 liver bx Family History:  
Problem Relation Age of Onset  No Known Problems Mother  No Known Problems Father Social History Social History  Marital status: SINGLE Spouse name: N/A  
 Number of children: N/A  
 Years of education: N/A Occupational History  Not on file. Social History Main Topics  Smoking status: Current Every Day Smoker Packs/day: 0.50 Years: 4.00 Types: Cigarettes Start date: 9/10/2010  Smokeless tobacco: Never Used  Alcohol use No  
 Drug use: Yes Special: Marijuana  Sexual activity: Not on file Other Topics Concern  Not on file Social History Narrative The patient is . He has a  for a food . He has no known exposure to TB. He has always lived in this general area. ALLERGIES: Tramadol Review of Systems Constitutional: Negative for chills, diaphoresis and fever. HENT: Negative for congestion, rhinorrhea and sore throat. Eyes: Negative for redness and visual disturbance. Respiratory: Negative for cough, chest tightness, shortness of breath and wheezing. Cardiovascular: Negative for chest pain and palpitations. Gastrointestinal: Positive for abdominal pain. Negative for blood in stool, diarrhea, nausea and vomiting. Endocrine: Negative for polydipsia and polyuria. Genitourinary: Negative for dysuria and hematuria. Musculoskeletal: Negative for arthralgias, myalgias and neck stiffness. Skin: Positive for wound. Negative for color change and rash. Allergic/Immunologic: Negative for environmental allergies and food allergies. Neurological: Negative for dizziness, weakness and headaches. Hematological: Negative for adenopathy. Does not bruise/bleed easily. Psychiatric/Behavioral: Negative for confusion and sleep disturbance. The patient is not nervous/anxious. Vitals:  
 09/26/18 1311 BP: 102/64 Pulse: 83 Resp: 17 Temp: 98.2 °F (36.8 °C) SpO2: 100% Weight: 65.8 kg (145 lb) Height: 6' (1.829 m) Physical Exam  
Constitutional: He is oriented to person, place, and time. He appears well-developed and well-nourished. Cardiovascular: Normal rate, regular rhythm and normal heart sounds. Pulmonary/Chest: Effort normal and breath sounds normal.  
Musculoskeletal: He exhibits no tenderness or deformity. Neurological: He is alert and oriented to person, place, and time. Skin:  
Small amount of pus drainage from the previous drain site in his right lower abdomen. I do not feel any large abscess. Nursing note and vitals reviewed.  
  
 
MDM 
 Number of Diagnoses or Management Options Diagnosis management comments: I was able to review his recent outpatient. It looks like they're trying to switch him from Bactrim to doxycycline but there were some issues getting paid for. I will give him a dose of clindamycin here in the ER and give him a prescription for that to see if it is more affordable. Patient's potassium was low today. He is also mildly anemic. I will encourage him to continue to follow-up with his liver transplant specialist about his anemia. I will give him a dose of by mouth potassium here in the emergency department. I discussed the case with Bayamonjay Shell from the transplant service at [address]. At this time I will not do the clindamycin as it does need dosage adjustment in liver disease. His hemoglobin of 7.7 is stable from his previous hemoglobin. I will send him home with a prescription for some potassium and some pain medicine and encouraged him to keep his follow-up appointment. ED Course Procedures

## 2018-09-26 NOTE — DISCHARGE INSTRUCTIONS
Return with any fevers, vomiting, increased swelling, worsening symptoms, or distal concerns. Follow-up with your transplant doctor as planned.     Charlene Emerson, [address] [facility] [address] [facility], [facility]   [phone]

## 2018-09-28 LAB — LACTATE BLD-SCNC: 3 MMOL/L (ref 0.5–1.9)

## 2018-09-29 LAB
BACTERIA SPEC CULT: ABNORMAL
GRAM STN SPEC: ABNORMAL
GRAM STN SPEC: ABNORMAL
SERVICE CMNT-IMP: ABNORMAL

## 2018-10-24 PROCEDURE — 96376 TX/PRO/DX INJ SAME DRUG ADON: CPT | Performed by: EMERGENCY MEDICINE

## 2018-10-24 PROCEDURE — 96375 TX/PRO/DX INJ NEW DRUG ADDON: CPT | Performed by: EMERGENCY MEDICINE

## 2018-10-24 PROCEDURE — 96367 TX/PROPH/DG ADDL SEQ IV INF: CPT | Performed by: EMERGENCY MEDICINE

## 2018-10-24 PROCEDURE — 96365 THER/PROPH/DIAG IV INF INIT: CPT | Performed by: EMERGENCY MEDICINE

## 2018-10-24 PROCEDURE — 96368 THER/DIAG CONCURRENT INF: CPT | Performed by: EMERGENCY MEDICINE

## 2018-10-24 PROCEDURE — 96366 THER/PROPH/DIAG IV INF ADDON: CPT | Performed by: EMERGENCY MEDICINE

## 2018-10-24 PROCEDURE — 99285 EMERGENCY DEPT VISIT HI MDM: CPT | Performed by: EMERGENCY MEDICINE

## 2018-10-24 PROCEDURE — 96361 HYDRATE IV INFUSION ADD-ON: CPT | Performed by: EMERGENCY MEDICINE

## 2018-10-25 ENCOUNTER — APPOINTMENT (OUTPATIENT)
Dept: CT IMAGING | Age: 53
End: 2018-10-25
Attending: EMERGENCY MEDICINE
Payer: MEDICARE

## 2018-10-25 ENCOUNTER — HOSPITAL ENCOUNTER (EMERGENCY)
Age: 53
Discharge: ANOTHER HOSPITAL | End: 2018-10-25
Attending: EMERGENCY MEDICINE
Payer: MEDICARE

## 2018-10-25 VITALS
SYSTOLIC BLOOD PRESSURE: 105 MMHG | HEART RATE: 67 BPM | WEIGHT: 140 LBS | BODY MASS INDEX: 18.96 KG/M2 | TEMPERATURE: 98.8 F | OXYGEN SATURATION: 97 % | HEIGHT: 72 IN | RESPIRATION RATE: 16 BRPM | DIASTOLIC BLOOD PRESSURE: 51 MMHG

## 2018-10-25 DIAGNOSIS — L02.211 ABSCESS OF ABDOMINAL WALL: Primary | ICD-10-CM

## 2018-10-25 LAB
ALBUMIN SERPL-MCNC: 2.4 G/DL (ref 3.5–5)
ALBUMIN/GLOB SERPL: 0.4 {RATIO}
ALP SERPL-CCNC: 132 U/L (ref 50–136)
ALT SERPL-CCNC: 28 U/L (ref 12–65)
ANION GAP SERPL CALC-SCNC: 10 MMOL/L
AST SERPL-CCNC: 37 U/L (ref 15–37)
BASOPHILS # BLD: 0 K/UL (ref 0–0.2)
BASOPHILS NFR BLD: 0 % (ref 0–2)
BILIRUB SERPL-MCNC: 1.2 MG/DL (ref 0.2–1.1)
BUN SERPL-MCNC: 15 MG/DL (ref 6–23)
CALCIUM SERPL-MCNC: 8.2 MG/DL (ref 8.3–10.4)
CHLORIDE SERPL-SCNC: 97 MMOL/L (ref 98–107)
CO2 SERPL-SCNC: 27 MMOL/L (ref 21–32)
CREAT SERPL-MCNC: 1.05 MG/DL (ref 0.8–1.5)
DIFFERENTIAL METHOD BLD: ABNORMAL
EOSINOPHIL # BLD: 0.1 K/UL (ref 0–0.8)
EOSINOPHIL NFR BLD: 2 % (ref 0.5–7.8)
ERYTHROCYTE [DISTWIDTH] IN BLOOD BY AUTOMATED COUNT: 17 %
GLOBULIN SER CALC-MCNC: 5.5 G/DL (ref 2.3–3.5)
GLUCOSE SERPL-MCNC: 103 MG/DL (ref 65–100)
HCT VFR BLD AUTO: 27.7 % (ref 41.1–50.3)
HGB BLD-MCNC: 9 G/DL (ref 13.6–17.2)
IMM GRANULOCYTES # BLD: 0 K/UL (ref 0–0.5)
IMM GRANULOCYTES NFR BLD AUTO: 0 % (ref 0–5)
INR PPP: 1.3
LACTATE BLD-SCNC: 1.2 MMOL/L (ref 0.5–1.9)
LYMPHOCYTES # BLD: 0.7 K/UL (ref 0.5–4.6)
LYMPHOCYTES NFR BLD: 12 % (ref 13–44)
MCH RBC QN AUTO: 31.3 PG (ref 26.1–32.9)
MCHC RBC AUTO-ENTMCNC: 32.5 G/DL (ref 31.4–35)
MCV RBC AUTO: 96.2 FL (ref 79.6–97.8)
MONOCYTES # BLD: 0.5 K/UL (ref 0.1–1.3)
MONOCYTES NFR BLD: 7 % (ref 4–12)
NEUTS SEG # BLD: 4.9 K/UL (ref 1.7–8.2)
NEUTS SEG NFR BLD: 79 % (ref 43–78)
NRBC # BLD: 0 K/UL (ref 0–0.2)
PLATELET # BLD AUTO: 143 K/UL (ref 150–450)
PMV BLD AUTO: 9.7 FL (ref 9.4–12.3)
POTASSIUM SERPL-SCNC: 2.7 MMOL/L (ref 3.5–5.1)
PROT SERPL-MCNC: 7.9 G/DL
PROTHROMBIN TIME: 16.5 SEC (ref 11.5–14.5)
RBC # BLD AUTO: 2.88 M/UL (ref 4.23–5.6)
SODIUM SERPL-SCNC: 134 MMOL/L (ref 136–145)
WBC # BLD AUTO: 6.3 K/UL (ref 4.3–11.1)

## 2018-10-25 PROCEDURE — 96366 THER/PROPH/DIAG IV INF ADDON: CPT | Performed by: EMERGENCY MEDICINE

## 2018-10-25 PROCEDURE — 85610 PROTHROMBIN TIME: CPT

## 2018-10-25 PROCEDURE — 74011250636 HC RX REV CODE- 250/636: Performed by: EMERGENCY MEDICINE

## 2018-10-25 PROCEDURE — 74011250636 HC RX REV CODE- 250/636

## 2018-10-25 PROCEDURE — 85025 COMPLETE CBC W/AUTO DIFF WBC: CPT

## 2018-10-25 PROCEDURE — 96367 TX/PROPH/DG ADDL SEQ IV INF: CPT | Performed by: EMERGENCY MEDICINE

## 2018-10-25 PROCEDURE — 96368 THER/DIAG CONCURRENT INF: CPT | Performed by: EMERGENCY MEDICINE

## 2018-10-25 PROCEDURE — 87040 BLOOD CULTURE FOR BACTERIA: CPT

## 2018-10-25 PROCEDURE — 83605 ASSAY OF LACTIC ACID: CPT

## 2018-10-25 PROCEDURE — 96375 TX/PRO/DX INJ NEW DRUG ADDON: CPT | Performed by: EMERGENCY MEDICINE

## 2018-10-25 PROCEDURE — 74177 CT ABD & PELVIS W/CONTRAST: CPT

## 2018-10-25 PROCEDURE — 74011636320 HC RX REV CODE- 636/320: Performed by: EMERGENCY MEDICINE

## 2018-10-25 PROCEDURE — 96365 THER/PROPH/DIAG IV INF INIT: CPT | Performed by: EMERGENCY MEDICINE

## 2018-10-25 PROCEDURE — 96376 TX/PRO/DX INJ SAME DRUG ADON: CPT | Performed by: EMERGENCY MEDICINE

## 2018-10-25 PROCEDURE — 80053 COMPREHEN METABOLIC PANEL: CPT

## 2018-10-25 PROCEDURE — 74011000258 HC RX REV CODE- 258: Performed by: EMERGENCY MEDICINE

## 2018-10-25 RX ORDER — SODIUM CHLORIDE 0.9 % (FLUSH) 0.9 %
10 SYRINGE (ML) INJECTION
Status: COMPLETED | OUTPATIENT
Start: 2018-10-25 | End: 2018-10-25

## 2018-10-25 RX ORDER — HYDROMORPHONE HYDROCHLORIDE 2 MG/ML
1 INJECTION, SOLUTION INTRAMUSCULAR; INTRAVENOUS; SUBCUTANEOUS ONCE
Status: COMPLETED | OUTPATIENT
Start: 2018-10-25 | End: 2018-10-25

## 2018-10-25 RX ORDER — ONDANSETRON 2 MG/ML
4 INJECTION INTRAMUSCULAR; INTRAVENOUS
Status: COMPLETED | OUTPATIENT
Start: 2018-10-25 | End: 2018-10-25

## 2018-10-25 RX ORDER — POTASSIUM CHLORIDE AND SODIUM CHLORIDE 900; 300 MG/100ML; MG/100ML
INJECTION, SOLUTION INTRAVENOUS CONTINUOUS
Status: DISCONTINUED | OUTPATIENT
Start: 2018-10-25 | End: 2018-10-25 | Stop reason: RX

## 2018-10-25 RX ORDER — POTASSIUM CHLORIDE AND SODIUM CHLORIDE 900; 300 MG/100ML; MG/100ML
INJECTION, SOLUTION INTRAVENOUS CONTINUOUS
Status: DISCONTINUED | OUTPATIENT
Start: 2018-10-25 | End: 2018-10-25 | Stop reason: HOSPADM

## 2018-10-25 RX ORDER — MORPHINE SULFATE 2 MG/ML
4 INJECTION, SOLUTION INTRAMUSCULAR; INTRAVENOUS
Status: DISCONTINUED | OUTPATIENT
Start: 2018-10-25 | End: 2018-10-25 | Stop reason: HOSPADM

## 2018-10-25 RX ORDER — MORPHINE SULFATE 2 MG/ML
INJECTION, SOLUTION INTRAMUSCULAR; INTRAVENOUS
Status: DISCONTINUED
Start: 2018-10-25 | End: 2018-10-25 | Stop reason: HOSPADM

## 2018-10-25 RX ORDER — HYDROMORPHONE HYDROCHLORIDE 2 MG/ML
1 INJECTION, SOLUTION INTRAMUSCULAR; INTRAVENOUS; SUBCUTANEOUS ONCE
Status: DISCONTINUED | OUTPATIENT
Start: 2018-10-25 | End: 2018-10-25

## 2018-10-25 RX ORDER — ONDANSETRON 2 MG/ML
INJECTION INTRAMUSCULAR; INTRAVENOUS
Status: DISCONTINUED
Start: 2018-10-25 | End: 2018-10-25 | Stop reason: HOSPADM

## 2018-10-25 RX ADMIN — HYDROMORPHONE HYDROCHLORIDE 1 MG: 2 INJECTION, SOLUTION INTRAMUSCULAR; INTRAVENOUS; SUBCUTANEOUS at 07:52

## 2018-10-25 RX ADMIN — MORPHINE SULFATE 4 MG: 2 INJECTION, SOLUTION INTRAMUSCULAR; INTRAVENOUS at 01:36

## 2018-10-25 RX ADMIN — PIPERACILLIN SODIUM,TAZOBACTAM SODIUM 3.38 G: 3; .375 INJECTION, POWDER, FOR SOLUTION INTRAVENOUS at 04:47

## 2018-10-25 RX ADMIN — Medication 10 ML: at 02:42

## 2018-10-25 RX ADMIN — SODIUM CHLORIDE 100 ML: 900 INJECTION, SOLUTION INTRAVENOUS at 02:42

## 2018-10-25 RX ADMIN — HYDROMORPHONE HYDROCHLORIDE 1 MG: 2 INJECTION, SOLUTION INTRAMUSCULAR; INTRAVENOUS; SUBCUTANEOUS at 03:27

## 2018-10-25 RX ADMIN — VANCOMYCIN HYDROCHLORIDE 1000 MG: 1 INJECTION, POWDER, LYOPHILIZED, FOR SOLUTION INTRAVENOUS at 05:41

## 2018-10-25 RX ADMIN — ONDANSETRON HYDROCHLORIDE 4 MG: 2 INJECTION INTRAMUSCULAR; INTRAVENOUS at 01:38

## 2018-10-25 RX ADMIN — DIATRIZOATE MEGLUMINE AND DIATRIZOATE SODIUM 15 ML: 660; 100 LIQUID ORAL; RECTAL at 01:44

## 2018-10-25 RX ADMIN — IOPAMIDOL 100 ML: 755 INJECTION, SOLUTION INTRAVENOUS at 02:42

## 2018-10-25 RX ADMIN — POTASSIUM CHLORIDE AND SODIUM CHLORIDE 0.5 ML: 900; 300 INJECTION, SOLUTION INTRAVENOUS at 04:45

## 2018-10-25 RX ADMIN — HYDROMORPHONE HYDROCHLORIDE 1 MG: 2 INJECTION, SOLUTION INTRAMUSCULAR; INTRAVENOUS; SUBCUTANEOUS at 05:41

## 2018-10-25 NOTE — ED TRIAGE NOTES
Pt c/o abdominal incision opening up. Pt states he had fever at home. Pt states he doesn't feel well at all, he is concerned about sepsis. Pt states he is in terrible pain and needs some pain pills or something.

## 2018-10-25 NOTE — ED PROVIDER NOTES
HPI: 
48 males who complained of concern for abscess and drainage from his abdominal wound. Has had infection of the hematoma and abscess in his anterior abdomen in the past. Wound is from a incarcerated hernia repair. Has required drainage and drain placement. Over the past week he has noticed firmness, drainage and pain from the wound. There is purulent discharge. Stated he does not feel well and feels similar to the last time he had an infection. He is concerned he may be septic. ROS Constitutional: No fever, no chills Skin: no rash Eye: No vision changes ENMT:  
Respiratory: No shortness of breath, no cough Cardiovascular: No chest pain, no palpitations Gastrointestinal: No vomiting, + nausea, no diarrhea, + abdominal pain : No dysuria MSK: No back pain, no muscle pain, no joint pain Neuro: No headache, no change in mental status, no numbness, no tingling, no weakness All other review of systems positive per history of present illness and the above otherwise negative or noncontributory. Visit Vitals /60 (BP 1 Location: Left arm, BP Patient Position: At rest) Pulse 94 Temp 99 °F (37.2 °C) Resp 17 Ht 6' (1.829 m) Wt 63.5 kg (140 lb) SpO2 97% BMI 18.99 kg/m² Past Medical History:  
Diagnosis Date  Cirrhosis of liver (HCC)   
 undeterminable cause  Multiple fractures of ribs of both sides Multiple, bilateral w/o pneumo:  Past Surgical History:  
Procedure Laterality Date  HX HERNIA REPAIR    
 abd  
 HX KNEE ARTHROSCOPY    HX OTHER SURGICAL    
 liver bx  
 
Prior to Admission Medications Prescriptions Last Dose Informant Patient Reported? Taking? aMILoride (MIDAMOR) 5 mg tablet   Yes No  
Sig: Take 15 mg by mouth daily. enoxaparin (LOVENOX) 80 mg/0.8 mL injection   Yes No  
Si mg by SubCUTAneous route every twelve (12) hours.   
ergocalciferol (VITAMIN D2) 50,000 unit capsule   Yes No  
 Sig: Take 50,000 Units by mouth every seven (7) days. hydrOXYzine HCl (ATARAX) 50 mg tablet   Yes No  
Sig: Take 50 mg by mouth daily as needed for Itching.  
magnesium oxide (MAG-OX) 400 mg tablet   Yes No  
Sig: Take 400 mg by mouth two (2) times a day. mirtazapine (REMERON) 15 mg tablet   Yes No  
Sig: Take 15 mg by mouth nightly as needed for Other (sleep). oxyCODONE IR (ROXICODONE) 5 mg immediate release tablet   No No  
Sig: Take 1 Tab by mouth every six (6) hours as needed for Pain. Max Daily Amount: 20 mg.  
senna (SENEXON) 8.6 mg tablet   Yes No  
Sig: Take 1 Tab by mouth daily. torsemide (DEMADEX) 20 mg tablet   Yes No  
Sig: Take 40 mg by mouth daily. warfarin (COUMADIN) 5 mg tablet   Yes No  
Sig: Take 5 mg by mouth daily. Facility-Administered Medications: None Adult Exam  
General: alert, appeared uncomfortable. Head: normocephalic, atraumatic ENT: moist mucous membranes Neck: supple, non-tender; full range of motion Cardiovascular: regular rate and rhythm, normal peripheral perfusion, no edema Respiratory:  normal respirations; no wheezing, rales or rhonchi Gastrointestinal: soft, anterior abdomen over the umbilicus with firm area measuring approximately 3x5 cm with purulent discharge on palpation; no rebound or guarding, no peritoneal signs, no distension Back: non-tender, full range of motion Musculoskeletal: normal range of motion, normal strength, no gross deformities Neurological: alert and oriented x 4, no gross focal deficits; normal speech Psychiatric: cooperative; appropriate mood and affect MDM: 
According to his previous medical records he has required drainage of infected hematoma in the past. 
History of alcoholic cirrhosis currently anticoagulated with ascites status post TIPS We'll obtain CT scan for assessment. He has hypokalemic at 2.7 which I will replete. 3:54 AM 
Large rectus hematoma vs abscess 9x3 cm. [place] [facility] [place] Has been seen and had surgical management in the past at [address] for the same process. Spoke with Dr. Remberto Fuentes from [address] Transplant team. Would be happy to accept the patient. Would like Vanco and Zosyn. NPO No Inpatient bed. Will transfer ED to ED. Spoke with Dr. Mayuri Patel (ED). Accepted transfer. Patient is stable for ground transport to [address] CT Results  (Last 48 hours) 10/25/18 0301  CT ABD PELV W CONT Final result Impression:  IMPRESSION:    
1. Enlarging 8.9 x 2.8 cm rectus hematoma/seroma or abscess. 2. Resolved anterior subcutaneous tissue abscess. 3. No acute intra-abdominal process. Previously seen ascites has significantly  
decreased, in this patient with cirrhosis and a TIPS stent. Narrative:  EXAM:  CT abdomen and pelvis with IV contrast.  
   
DATE:  October 25, 2018. INDICATION:  Abdominal pain. COMPARISON: Prior CT abdomen and pelvis on September 3, 2018. TECHNIQUE: Axial CT images of abdomen and pelvis were obtained after the  
intravenous injection of 100 mL Isovue-370 CT contrast.  
   
Radiation dose reduction techniques were used for this study. Our CT scanners  
use one or all of the following: Automated exposure control, adjustment of the  
mA and/or kV according to patient size, iterative reconstruction. FINDINGS:  Previously seen pelvic drains have been removed, with resolution of  
the subcutaneous tissue abscess. There is an enlarging 8.9 x 2.8 cm fluid  
collection along the undersurface of the midline rectus muscle at the level of  
the umbilicus (previously measuring 2.1 x 1.4 cm), which could represent a  
hematoma/seroma or abscess. No gas is seen within the fluid collection. Again noted is a cirrhotic liver with an indwelling TIPS stent, which appears to  
be patent.  The gallbladder, pancreas, spleen, adrenal glands, kidneys, abdominal  
 aorta and urinary bladder are within normal limits. Previously seen ascites has  
significantly decreased, with only trace residual. There is colonic  
diverticulosis. No bowel obstruction or free air is identified. The lung bases  
are clear. No results found. Recent Results (from the past 24 hour(s)) PROTHROMBIN TIME + INR Collection Time: 10/25/18  1:20 AM  
Result Value Ref Range Prothrombin time 16.5 (H) 11.5 - 14.5 sec INR 1.3    
CBC WITH AUTOMATED DIFF Collection Time: 10/25/18  1:20 AM  
Result Value Ref Range WBC 6.3 4.3 - 11.1 K/uL  
 RBC 2.88 (L) 4.23 - 5.6 M/uL HGB 9.0 (L) 13.6 - 17.2 g/dL HCT 27.7 (L) 41.1 - 50.3 % MCV 96.2 79.6 - 97.8 FL  
 MCH 31.3 26.1 - 32.9 PG  
 MCHC 32.5 31.4 - 35.0 g/dL  
 RDW 17.0 % PLATELET 083 (L) 199 - 450 K/uL MPV 9.7 9.4 - 12.3 FL ABSOLUTE NRBC 0.00 0.0 - 0.2 K/uL  
 DF AUTOMATED NEUTROPHILS 79 (H) 43 - 78 % LYMPHOCYTES 12 (L) 13 - 44 % MONOCYTES 7 4.0 - 12.0 % EOSINOPHILS 2 0.5 - 7.8 % BASOPHILS 0 0.0 - 2.0 % IMMATURE GRANULOCYTES 0 0.0 - 5.0 %  
 ABS. NEUTROPHILS 4.9 1.7 - 8.2 K/UL  
 ABS. LYMPHOCYTES 0.7 0.5 - 4.6 K/UL  
 ABS. MONOCYTES 0.5 0.1 - 1.3 K/UL  
 ABS. EOSINOPHILS 0.1 0.0 - 0.8 K/UL  
 ABS. BASOPHILS 0.0 0.0 - 0.2 K/UL  
 ABS. IMM. GRANS. 0.0 0.0 - 0.5 K/UL METABOLIC PANEL, COMPREHENSIVE Collection Time: 10/25/18  1:20 AM  
Result Value Ref Range Sodium 134 (L) 136 - 145 mmol/L Potassium 2.7 (LL) 3.5 - 5.1 mmol/L Chloride 97 (L) 98 - 107 mmol/L  
 CO2 27 21 - 32 mmol/L Anion gap 10 mmol/L Glucose 103 (H) 65 - 100 mg/dL BUN 15 6 - 23 MG/DL Creatinine 1.05 0.8 - 1.5 MG/DL  
 GFR est AA >60 >60 ml/min/1.73m2 GFR est non-AA >60 ml/min/1.73m2 Calcium 8.2 (L) 8.3 - 10.4 MG/DL Bilirubin, total 1.2 (H) 0.2 - 1.1 MG/DL  
 ALT (SGPT) 28 12 - 65 U/L  
 AST (SGOT) 37 15 - 37 U/L Alk. phosphatase 132 50 - 136 U/L Protein, total 7.9 g/dL Albumin 2.4 (L) 3.5 - 5.0 g/dL Globulin 5.5 (H) 2.3 - 3.5 g/dL A-G Ratio 0.4 POC LACTIC ACID Collection Time: 10/25/18  1:34 AM  
Result Value Ref Range Lactic Acid (POC) 1.2 0.5 - 1.9 mmol/L Dragon voice recognition software was used to create this note. Although the note has been reviewed and corrected where necessary, additional errors may have been overlooked and remain in the text.

## 2018-10-25 NOTE — ED NOTES
[facility] called to transport to [facility] after 7am, pt and MD aware

## 2018-10-25 NOTE — ED NOTES
TRANSFER - OUT REPORT: 
 
Verbal report given to Lucio Salinas RN on Brock Favorite  being transferred to [address] ED for urgent transfer Report consisted of patients Situation, Background, Assessment and  
Recommendations(SBAR). Information from the following report(s) ED Summary, MAR and Recent Results was reviewed with the receiving nurse. Lines:  
Peripheral IV 10/25/18 Left Hand (Active) Site Assessment Clean, dry, & intact 10/25/2018  3:52 AM  
Phlebitis Assessment 0 10/25/2018  3:52 AM  
Infiltration Assessment 0 10/25/2018  3:52 AM  
Dressing Status Clean, dry, & intact 10/25/2018  3:52 AM  
  
 
Opportunity for questions and clarification was provided. Patient transported with: 
 Monitor via Corinne Espinoza

## 2018-10-29 ENCOUNTER — HOME HEALTH ADMISSION (OUTPATIENT)
Dept: HOME HEALTH SERVICES | Facility: HOME HEALTH | Age: 53
End: 2018-10-29

## 2018-10-30 LAB
BACTERIA SPEC CULT: NORMAL
BACTERIA SPEC CULT: NORMAL
SERVICE CMNT-IMP: NORMAL
SERVICE CMNT-IMP: NORMAL

## 2019-01-15 ENCOUNTER — HOSPITAL ENCOUNTER (OUTPATIENT)
Dept: PHYSICAL THERAPY | Age: 54
Discharge: HOME | End: 2019-01-15
Payer: MEDICARE

## 2019-01-15 PROCEDURE — 97161 PT EVAL LOW COMPLEX 20 MIN: CPT

## 2019-01-15 PROCEDURE — 97110 THERAPEUTIC EXERCISES: CPT

## 2019-01-15 NOTE — THERAPY EVALUATION
Mary Carmen Thompson : 1965 Primary: [employer]* Secondary:  Therapy Center at [address] [phone]   [phone] OUTPATIENT PHYSICAL THERAPY:Initial Assessment 1/15/2019 ICD-10: Treatment Diagnosis: Difficulty in walking, not elsewhere classified (R26.2) Muscle weakness (generalized) (M62.81) Precautions/Allergies:  
Tramadol MD Orders: needs strengthening, eval and treat MEDICAL/REFERRING DIAGNOSIS: 
Muscle wasting and atrophy, not elsewhere classified, unspecified site [M62.50] DATE OF ONSET: progressive weakness over 3 years REFERRING PHYSICIAN: Godwin Aguiar MD 
RETURN PHYSICIAN APPOINTMENT: unknown ASSESSMENT (Date: 1/15/19):  Mr. Darren Meyers presents to physical therapy with a history of liver failure, multiple surgeries resulting in an abdominal wound vac, increased immobility, and decreased endurance. His complicated history has resulted in generalized muscle weakness, poor muscle endurance, decreased functional mobility, and decreased dynamic balance. He would benefit from skilled PT to address the problem list and goals below. Of note, patient will be attend 1 more PT session and then be out of town for 2-3 weeks to help is brother in [place]. We will restart PT upon patients return. PROBLEM LIST (Impacting functional limitations): 1. Decreased Strength 2. Decreased ADL/Functional Activities 3. Decreased Ambulation Ability/Technique 4. Decreased Balance 5. Decreased Activity Tolerance 6. Decreased [place] with Home Exercise Program INTERVENTIONS PLANNED: 
1. Balance Exercise 2. Cold 3. Home Exercise Program (HEP) 4. Manual Therapy 5. Therapeutic Activites 6. Therapeutic Exercise/Strengthening 7. Transfer Training TREATMENT PLAN: 
Effective Dates: 1/15/2019 TO 2019 (90 days). Frequency/Duration: 2 times a week for 90 Days GOALS: (Goals have been discussed and agreed upon with patient.) Discharge Goals: Time Frame: 90 days 1. Patient will be independent with HEP. 2. Patient will have an increase on the LEFS to 40/80 indicating improved ability to complete ADLs and IADLs. 3. Patient will report being able to ambulate for 30 minutes for exercises with no rest breaks in order to improve his endurance. 4. Patient will demonstrate tandem stance x 30 seconds bilaterally indicating improved standing balance. Rehabilitation Potential For Stated Goals: Good Regarding Benny Krishnamurthy's therapy, I certify that the treatment plan above will be carried out by a therapist or under their direction. Thank you for this referral, 
Good Rae DPT, NCS Referring Physician Signature: Jumana Andersen MD            Date HISTORY:  
Patient Stated Goal: 
      Get my muscles going again History of Present Injury/Illness (Reason for Referral): Dx with end stage liver disease in 2015. Got ascities, a hernia, fluid retention. April 2018 hernia became incarcerated and they had to do emergency surgery. It got infected and he had to do 4-5 surgeries in the last last.  Now, his fluid is going. Liver is bouncing back. But, over the last 3 years, he has been on the couch and he has lost a lot of muscles. He reports he needs to get his muscles going again. If he sits for awhile, he has pain. Currently not doing anything for exercise. Last surgery was sept and he had a wound vac. Nothing scheduled back at [address] currently. He may try and get on the transplant list.  He has to be in [place] for the next 2-3 weeks so he will need to delay the start of therapy. Past Medical History/Comorbidities:  
Mr. Marine Matias  has a past medical history of Cirrhosis of liver ([address] and Multiple fractures of ribs of both sides.   Mr. Marine Matias  has a past surgical history that includes hx knee arthroscopy (2005); hx hernia repair; and hx other surgical. 
 
Per medical history questionnaire/therapist interview: Chronic Fatigue, Difficulty Sleeping, Joint Pain and Weakness Social History/Living Environment:  
  lives alone, 1 story home with 5 steps to enter. Drives. Prior Level of Function/Work/Activity: Was working in sales but hasn't work since 2015. Current Medications:   
Current Outpatient Medications:  
  oxyCODONE IR (ROXICODONE) 5 mg immediate release tablet, Take 1 Tab by mouth every six (6) hours as needed for Pain. Max Daily Amount: 20 mg., Disp: 11 Tab, Rfl: 0 
  magnesium oxide (MAG-OX) 400 mg tablet, Take 400 mg by mouth two (2) times a day., Disp: , Rfl:  
  senna (SENEXON) 8.6 mg tablet, Take 1 Tab by mouth daily. , Disp: , Rfl:  
  aMILoride (MIDAMOR) 5 mg tablet, Take 15 mg by mouth daily. , Disp: , Rfl:  
  ergocalciferol (VITAMIN D2) 50,000 unit capsule, Take 50,000 Units by mouth every seven (7) days. , Disp: , Rfl:  
  warfarin (COUMADIN) 5 mg tablet, Take 5 mg by mouth daily. , Disp: , Rfl:  
  hydrOXYzine HCl (ATARAX) 50 mg tablet, Take 50 mg by mouth daily as needed for Itching., Disp: , Rfl:  
  mirtazapine (REMERON) 15 mg tablet, Take 15 mg by mouth nightly as needed for Other (sleep). , Disp: , Rfl:  
  enoxaparin (LOVENOX) 80 mg/0.8 mL injection, 70 mg by SubCUTAneous route every twelve (12) hours. , Disp: , Rfl:  
  torsemide (DEMADEX) 20 mg tablet, Take 40 mg by mouth daily. , Disp: , Rfl:   
 
Date Last Reviewed:  1/15/2019 Ambulatory/Rehab Services H2 Model Falls Risk Assessment Risk Factors: 
     (1)  Gender [Male] Ability to Rise from Chair: 
     (1)  Pushes up, successful in one attempt Falls Prevention Plan: No modifications necessary Total: (5 or greater = High Risk): 2  
 ©2010 [facility]. [facility] Patent #[id number].  Federal Law prohibits the replication, distribution or use without written permission from [facility] Number of Personal Factors/Comorbidities that affect the Plan of Care: 1-2: MODERATE COMPLEXITY EXAMINATION:  
Observation/Orthostatic Postural Assessment:   
      Rounded shoulders, some muscle atrophy noted in all extremities Mental Status: WFL Palpation:   
      Normal tone Sensation:  
      Light tough: WFL; Proprioception: [facility] Skin Integrity:   
      Grossly intact except for a healing wound in his abdomen that is being followed by MD 
Vision:   
      functional 
Balance:   
      Good static balance, decreased dynamic balance Lower Extremity: 
 Strength PROM Action R L R  L Hip Flexion 4 4 Hip Extension 4 4 Hip Abduction 4 4 Hip Adduction Knee Flexion Knee Extension 4+ 4+ Dorsi Flexion 4+ 4+ Plantar Flexion 4 4 Inversion Eversion Upper Extremity:  
      Grossly 4/5 B UE Functional Mobility:  
      Gait/Ambulation:  Ambulates with decreased gait speed, decreased endurance, symmetrical step length Transfers: Mod I, push to stand though is able to perform without hands when cued Bed Mobility:  independent Stairs: Mod I, holding to rail, step over step Wheelchair:  NT Body Structures Involved: 1. Heart 2. Lungs 3. Metabolic 4. Muscles Body Functions Affected: 1. Neuromusculoskeletal 
2. Movement Related Activities and Participation Affected: 1. Mobility 2. Domestic Life 3. Interpersonal Interactions and Relationships Number of elements that affect the Plan of Care: 4+: HIGH COMPLEXITY CLINICAL PRESENTATION:  
Presentation: Stable and uncomplicated: LOW COMPLEXITY CLINICAL DECISION MAKING:  
Outcome Measure: Tool Used: Lower Extremity Functional Scale (LEFS) Score:  Initial: 24/80 Most Recent: X/80 (Date: -- ) Interpretation of Score: 20 questions each scored on a 5 point scale with 0 representing \"extreme difficulty or unable to perform\" and 4 representing \"no difficulty\". The lower the score, the greater the functional disability. 80/80 represents no disability. Minimal detectable change is 9 points. Medical Necessity:  
· Patient is expected to demonstrate progress in strength, balance and functional technique to improve safety during ADLs and IADLs. · Patient demonstrates good rehab potential due to higher previous functional level. Reason for Services/Other Comments: 
· Patient continues to require modification of therapeutic interventions to increase complexity of exercises. Use of outcome tool(s) and clinical judgement create a POC that gives a: Clear prediction of patient's progress: LOW COMPLEXITY  
TREATMENT:  
(In addition to Assessment/Re-Assessment sessions the following treatments were rendered) Pre Treatment Symptoms: see above Evaluation:[id number] Therapeutic Exercise: ( 10 minutes):  Exercises per grid below to improve mobility, strength, balance and muscle endurance. Required moderate visual, verbal and tactile cues to promote proper body posture and promote proper body mechanics. Progressed complexity of movement as indicated. Date: 
1/15/19 Date: 
 Date: Activity/Exercise Parameters Parameters Parameters  
rows X 10 reps red band Shoulder extension X 10 reps red band Horizontal shoulder abduction X 10 reps yellow band Standing heel raises X 10 reps Standing hip abduction X 10 reps Standing hip extension X 10 reps Sit to stand X 5 reps Treatment/Session Assessment:  See assessment above. · Pain/ Symptoms: Initial:   0/10 Post Session:  0/10 · Compliance with Program/Exercises: Will assess as treatment progresses. · Recommendations/Intent for next treatment session: \"Next visit will focus on advancements to more challenging activities and reduction in assistance provided\". Total Treatment Duration: PT Patient Time In/Time Out Time In: 3232 Time Out: 1148 Afsaneh England DPT

## 2019-01-22 ENCOUNTER — APPOINTMENT (OUTPATIENT)
Dept: PHYSICAL THERAPY | Age: 54
End: 2019-01-22
Payer: MEDICARE

## 2019-01-24 ENCOUNTER — APPOINTMENT (OUTPATIENT)
Dept: PHYSICAL THERAPY | Age: 54
End: 2019-01-24
Payer: MEDICARE

## 2019-02-05 ENCOUNTER — HOSPITAL ENCOUNTER (OUTPATIENT)
Dept: PHYSICAL THERAPY | Age: 54
Discharge: HOME | End: 2019-02-05
Payer: MEDICARE

## 2019-02-05 PROCEDURE — 97110 THERAPEUTIC EXERCISES: CPT

## 2019-02-05 NOTE — PROGRESS NOTES
Micky Holman : 1965 Primary: [employer]* Secondary:  Therapy Center at [address] [phone]   [phone] OUTPATIENT PHYSICAL THERAPY:Daily Note 2019 ICD-10: Treatment Diagnosis: Difficulty in walking, not elsewhere classified (R26.2) Muscle weakness (generalized) (M62.81) Precautions/Allergies:  
Tramadol MD Orders: needs strengthening, eval and treat MEDICAL/REFERRING DIAGNOSIS: 
Muscle wasting and atrophy, not elsewhere classified, unspecified site [M62.50] DATE OF ONSET: progressive weakness over 3 years REFERRING PHYSICIAN: Mj Darden MD 
RETURN PHYSICIAN APPOINTMENT: unknown ASSESSMENT (Date: 1/15/19):  Mr. Kaz Meadows presents to physical therapy with a history of liver failure, multiple surgeries resulting in an abdominal wound vac, increased immobility, and decreased endurance. His complicated history has resulted in generalized muscle weakness, poor muscle endurance, decreased functional mobility, and decreased dynamic balance. He would benefit from skilled PT to address the problem list and goals below. Of note, patient will be attend 1 more PT session and then be out of town for 2-3 weeks to help is brother in [place]. We will restart PT upon patients return. PROBLEM LIST (Impacting functional limitations): 1. Decreased Strength 2. Decreased ADL/Functional Activities 3. Decreased Ambulation Ability/Technique 4. Decreased Balance 5. Decreased Activity Tolerance 6. Decreased Osteen with Home Exercise Program INTERVENTIONS PLANNED: 
1. Balance Exercise 2. Cold 3. Home Exercise Program (HEP) 4. Manual Therapy 5. Therapeutic Activites 6. Therapeutic Exercise/Strengthening 7. Transfer Training TREATMENT PLAN: 
Effective Dates: 1/15/2019 TO 2019 (90 days). Frequency/Duration: 2 times a week for 90 Days GOALS: (Goals have been discussed and agreed upon with patient.) Discharge Goals: Time Frame: 90 days 1. Patient will be independent with HEP. 2. Patient will have an increase on the LEFS to 40/80 indicating improved ability to complete ADLs and IADLs. 3. Patient will report being able to ambulate for 30 minutes for exercises with no rest breaks in order to improve his endurance. 4. Patient will demonstrate tandem stance x 30 seconds bilaterally indicating improved standing balance. Rehabilitation Potential For Stated Goals: Good Regarding Yarelis Krishnamurthy's therapy, I certify that the treatment plan above will be carried out by a therapist or under their direction. Thank you for this referral, 
Pippa Skaggs, DPT, NCS    
  
    
 
 
HISTORY:  
Patient Stated Goal: 
      Get my muscles going again History of Present Injury/Illness (Reason for Referral): Dx with end stage liver disease in 2015. Got ascities, a hernia, fluid retention. April 2018 hernia became incarcerated and they had to do emergency surgery. It got infected and he had to do 4-5 surgeries in the last last.  Now, his fluid is going. Liver is bouncing back. But, over the last 3 years, he has been on the couch and he has lost a lot of muscles. He reports he needs to get his muscles going again. If he sits for awhile, he has pain. Currently not doing anything for exercise. Last surgery was sept and he had a wound vac. Nothing scheduled back at [address] currently. He may try and get on the transplant list.  He has to be in [place] for the next 2-3 weeks so he will need to delay the start of therapy. Past Medical History/Comorbidities:  
Mr. Dianna Barron  has a past medical history of Cirrhosis of liver ([facility] [address] and Multiple fractures of ribs of both sides.   Mr. Dianna Barron  has a past surgical history that includes hx knee arthroscopy (2005); hx hernia repair; and hx other surgical. 
 
 Per medical history questionnaire/therapist interview: Chronic Fatigue, Difficulty Sleeping, Joint Pain and Weakness Social History/Living Environment:  
  lives alone, 1 story home with 5 steps to enter. Drives. Prior Level of Function/Work/Activity: Was working in sales but hasn't work since 2015. Current Medications:   
Current Outpatient Medications:  
  oxyCODONE IR (ROXICODONE) 5 mg immediate release tablet, Take 1 Tab by mouth every six (6) hours as needed for Pain. Max Daily Amount: 20 mg., Disp: 11 Tab, Rfl: 0 
  magnesium oxide (MAG-OX) 400 mg tablet, Take 400 mg by mouth two (2) times a day., Disp: , Rfl:  
  senna (SENEXON) 8.6 mg tablet, Take 1 Tab by mouth daily. , Disp: , Rfl:  
  aMILoride (MIDAMOR) 5 mg tablet, Take 15 mg by mouth daily. , Disp: , Rfl:  
  ergocalciferol (VITAMIN D2) 50,000 unit capsule, Take 50,000 Units by mouth every seven (7) days. , Disp: , Rfl:  
  warfarin (COUMADIN) 5 mg tablet, Take 5 mg by mouth daily. , Disp: , Rfl:  
  hydrOXYzine HCl (ATARAX) 50 mg tablet, Take 50 mg by mouth daily as needed for Itching., Disp: , Rfl:  
  mirtazapine (REMERON) 15 mg tablet, Take 15 mg by mouth nightly as needed for Other (sleep). , Disp: , Rfl:  
  enoxaparin (LOVENOX) 80 mg/0.8 mL injection, 70 mg by SubCUTAneous route every twelve (12) hours. , Disp: , Rfl:  
  torsemide (DEMADEX) 20 mg tablet, Take 40 mg by mouth daily. , Disp: , Rfl:   
 
Date Last Reviewed:  2/5/2019 Ambulatory/Rehab Services H2 Model Falls Risk Assessment Risk Factors: 
     (1)  Gender [Male] Ability to Rise from Chair: 
     (1)  Pushes up, successful in one attempt Falls Prevention Plan: No modifications necessary Total: (5 or greater = High Risk): 2  
 ©2010 [facility] [employer] . [facility] States Patent #[id number]. Federal Law prohibits the replication, distribution or use without written permission from [facility] [employer] Number of Personal Factors/Comorbidities that affect the Plan of Care: 1-2: MODERATE COMPLEXITY EXAMINATION:  
Observation/Orthostatic Postural Assessment:   
      Rounded shoulders, some muscle atrophy noted in all extremities Mental Status: WFL Palpation:   
      Normal tone Sensation:  
      Light tough: WFL; Proprioception: [facility] Skin Integrity:   
      Grossly intact except for a healing wound in his abdomen that is being followed by MD 
Vision:   
      functional 
Balance:   
      Good static balance, decreased dynamic balance Lower Extremity: 
 Strength PROM Action R L R  L Hip Flexion 4 4 Hip Extension 4 4 Hip Abduction 4 4 Hip Adduction Knee Flexion Knee Extension 4+ 4+ Dorsi Flexion 4+ 4+ Plantar Flexion 4 4 Inversion Eversion Upper Extremity:  
      Grossly 4/5 B UE Functional Mobility:  
      Gait/Ambulation:  Ambulates with decreased gait speed, decreased endurance, symmetrical step length Transfers: Mod I, push to stand though is able to perform without hands when cued Bed Mobility:  independent Stairs: Mod I, holding to rail, step over step Wheelchair:  NT Body Structures Involved: 1. Heart 2. Lungs 3. Metabolic 4. Muscles Body Functions Affected: 1. Neuromusculoskeletal 
2. Movement Related Activities and Participation Affected: 1. Mobility 2. Domestic Life 3. Interpersonal Interactions and Relationships Number of elements that affect the Plan of Care: 4+: HIGH COMPLEXITY CLINICAL PRESENTATION:  
Presentation: Stable and uncomplicated: LOW COMPLEXITY CLINICAL DECISION MAKING:  
Outcome Measure: Tool Used: Lower Extremity Functional Scale (LEFS) Score:  Initial: 24/80 Most Recent: X/80 (Date: -- ) Interpretation of Score: 20 questions each scored on a 5 point scale with 0 representing \"extreme difficulty or unable to perform\" and 4 representing \"no difficulty\". The lower the score, the greater the functional disability. 80/80 represents no disability. Minimal detectable change is 9 points. Medical Necessity:  
· Patient is expected to demonstrate progress in strength, balance and functional technique to improve safety during ADLs and IADLs. · Patient demonstrates good rehab potential due to higher previous functional level. Reason for Services/Other Comments: 
· Patient continues to require modification of therapeutic interventions to increase complexity of exercises. Use of outcome tool(s) and clinical judgement create a POC that gives a: Clear prediction of patient's progress: LOW COMPLEXITY  
TREATMENT:  
(In addition to Assessment/Re-Assessment sessions the following treatments were rendered) Pre Treatment Symptoms: patient reports he hasn't done many of his home exercises. Feels like his energy is slowly coming back Therapeutic Exercise: ( 55 minutes):  Exercises per grid below to improve mobility, strength, balance and muscle endurance. Required moderate visual, verbal and tactile cues to promote proper body posture and promote proper body mechanics. Progressed complexity of movement as indicated. Date: 
1/15/19 Date: 
2/5/19 Date: Activity/Exercise Parameters Parameters Parameters Bike for muscle endurance  X 8 minutes   
rows X 10 reps red band 3 x 10 reps green band Shoulder extension X 10 reps red band 3 x 10 reps orange band Horizontal shoulder abduction X 10 reps yellow band Standing heel raises X 10 reps 3 x 10 reps Standing hip abduction X 10 reps 3 x 10 reps Standing hip flexion   3 x 10 reps Calf stretch on incline board  3 x 30 sec Standing hip extension X 10 reps 3 x 10 reps Sit to stand X 5 reps X 10 reps Step ups  2 x 10 reps 6\" step Tandem stance on blue foam  2 x 30 sec B   
bridges  3 x 10 reps Treatment/Session Assessment:  Patient was fatigued by the end of the session but did not have any pain. He had to cancel his other appointment for this week due to having to go help his brother again. He was educated on the importance of doing his HEP. He verbalized understanding. He continues to benefit from skilled PT to improve functional ability. · Pain/ Symptoms: Initial:   0/10 Post Session:  0/10 · Compliance with Program/Exercises: Will assess as treatment progresses. · Recommendations/Intent for next treatment session: \"Next visit will focus on advancements to more challenging activities and reduction in assistance provided\". Total Treatment Duration: PT Patient Time In/Time Out Time In: 1055 Time Out: 1150 Ulices Noe DPT

## 2019-02-07 ENCOUNTER — APPOINTMENT (OUTPATIENT)
Dept: PHYSICAL THERAPY | Age: 54
End: 2019-02-07
Payer: MEDICARE

## 2019-02-12 ENCOUNTER — HOSPITAL ENCOUNTER (OUTPATIENT)
Dept: PHYSICAL THERAPY | Age: 54
Discharge: HOME | End: 2019-02-12
Payer: MEDICARE

## 2019-02-12 PROCEDURE — 97110 THERAPEUTIC EXERCISES: CPT

## 2019-02-12 NOTE — PROGRESS NOTES
García Salinas : 1965 Primary: [employer]* Secondary:  Therapy Center at [address], [address] [place] [address] [phone]   [phone] OUTPATIENT PHYSICAL THERAPY:Daily Note 2019 ICD-10: Treatment Diagnosis: Difficulty in walking, not elsewhere classified (R26.2) Muscle weakness (generalized) (M62.81) Precautions/Allergies:  
Tramadol MD Orders: needs strengthening, eval and treat MEDICAL/REFERRING DIAGNOSIS: 
Muscle wasting and atrophy, not elsewhere classified, unspecified site [M62.50] DATE OF ONSET: progressive weakness over 3 years REFERRING PHYSICIAN: Shanda Mcconnell MD 
RETURN PHYSICIAN APPOINTMENT: unknown ASSESSMENT (Date: 1/15/19):  Mr. Ross Powell presents to physical therapy with a history of liver failure, multiple surgeries resulting in an abdominal wound vac, increased immobility, and decreased endurance. His complicated history has resulted in generalized muscle weakness, poor muscle endurance, decreased functional mobility, and decreased dynamic balance. He would benefit from skilled PT to address the problem list and goals below. Of note, patient will be attend 1 more PT session and then be out of town for 2-3 weeks to help is brother in [facility]. We will restart PT upon patients return. PROBLEM LIST (Impacting functional limitations): 1. Decreased Strength 2. Decreased ADL/Functional Activities 3. Decreased Ambulation Ability/Technique 4. Decreased Balance 5. Decreased Activity Tolerance 6. Decreased Avery with Home Exercise Program INTERVENTIONS PLANNED: 
1. Balance Exercise 2. Cold 3. Home Exercise Program (HEP) 4. Manual Therapy 5. Therapeutic Activites 6. Therapeutic Exercise/Strengthening 7. Transfer Training TREATMENT PLAN: 
Effective Dates: 1/15/2019 TO 2019 (90 days). Frequency/Duration: 2 times a week for 90 Days GOALS: (Goals have been discussed and agreed upon with patient.) Discharge Goals: Time Frame: 90 days 1. Patient will be independent with HEP. 2. Patient will have an increase on the LEFS to 40/80 indicating improved ability to complete ADLs and IADLs. 3. Patient will report being able to ambulate for 30 minutes for exercises with no rest breaks in order to improve his endurance. 4. Patient will demonstrate tandem stance x 30 seconds bilaterally indicating improved standing balance. Rehabilitation Potential For Stated Goals: Good Regarding Raz Krishnamurthy's therapy, I certify that the treatment plan above will be carried out by a therapist or under their direction. Thank you for this referral, 
JAREK RatliffT, NCS    
  
    
 
 
HISTORY:  
Patient Stated Goal: 
      Get my muscles going again History of Present Injury/Illness (Reason for Referral): Dx with end stage liver disease in 2015. Got ascities, a hernia, fluid retention. April 2018 hernia became incarcerated and they had to do emergency surgery. It got infected and he had to do 4-5 surgeries in the last last.  Now, his fluid is going. Liver is bouncing back. But, over the last 3 years, he has been on the couch and he has lost a lot of muscles. He reports he needs to get his muscles going again. If he sits for awhile, he has pain. Currently not doing anything for exercise. Last surgery was sept and he had a wound vac. Nothing scheduled back at [address] currently. He may try and get on the transplant list.  He has to be in [place] for the next 2-3 weeks so he will need to delay the start of therapy. Past Medical History/Comorbidities:  
Mr. Yosi Peters  has a past medical history of Cirrhosis of liver ([facility] [address] and Multiple fractures of ribs of both sides.   Mr. Yosi Peters  has a past surgical history that includes hx knee arthroscopy (2005); hx hernia repair; and hx other surgical. 
 
 Per medical history questionnaire/therapist interview: Chronic Fatigue, Difficulty Sleeping, Joint Pain and Weakness Social History/Living Environment:  
  lives alone, 1 story home with 5 steps to enter. Drives. Prior Level of Function/Work/Activity: Was working in sales but hasn't work since 2015. Current Medications:   
Current Outpatient Medications:  
  oxyCODONE IR (ROXICODONE) 5 mg immediate release tablet, Take 1 Tab by mouth every six (6) hours as needed for Pain. Max Daily Amount: 20 mg., Disp: 11 Tab, Rfl: 0 
  magnesium oxide (MAG-OX) 400 mg tablet, Take 400 mg by mouth two (2) times a day., Disp: , Rfl:  
  senna (SENEXON) 8.6 mg tablet, Take 1 Tab by mouth daily. , Disp: , Rfl:  
  aMILoride (MIDAMOR) 5 mg tablet, Take 15 mg by mouth daily. , Disp: , Rfl:  
  ergocalciferol (VITAMIN D2) 50,000 unit capsule, Take 50,000 Units by mouth every seven (7) days. , Disp: , Rfl:  
  warfarin (COUMADIN) 5 mg tablet, Take 5 mg by mouth daily. , Disp: , Rfl:  
  hydrOXYzine HCl (ATARAX) 50 mg tablet, Take 50 mg by mouth daily as needed for Itching., Disp: , Rfl:  
  mirtazapine (REMERON) 15 mg tablet, Take 15 mg by mouth nightly as needed for Other (sleep). , Disp: , Rfl:  
  enoxaparin (LOVENOX) 80 mg/0.8 mL injection, 70 mg by SubCUTAneous route every twelve (12) hours. , Disp: , Rfl:  
  torsemide (DEMADEX) 20 mg tablet, Take 40 mg by mouth daily. , Disp: , Rfl:   
 
Date Last Reviewed:  2/12/2019 Ambulatory/Rehab Services H2 Model Falls Risk Assessment Risk Factors: 
     (1)  Gender [Male] Ability to Rise from Chair: 
     (1)  Pushes up, successful in one attempt Falls Prevention Plan: No modifications necessary Total: (5 or greater = High Risk): 2  
 ©2010 [facility] [employer] [address] [place] [address] States Patent #[id number]. Federal Law prohibits the replication, distribution or use without written permission from [facility] [employer] Number of Personal Factors/Comorbidities that affect the Plan of Care: 1-2: MODERATE COMPLEXITY EXAMINATION:  
Observation/Orthostatic Postural Assessment:   
      Rounded shoulders, some muscle atrophy noted in all extremities Mental Status: WFL Palpation:   
      Normal tone Sensation:  
      Light tough: WFL; Proprioception: [facility] Skin Integrity:   
      Grossly intact except for a healing wound in his abdomen that is being followed by MD 
Vision:   
      functional 
Balance:   
      Good static balance, decreased dynamic balance Lower Extremity: 
 Strength PROM Action R L R  L Hip Flexion 4 4 Hip Extension 4 4 Hip Abduction 4 4 Hip Adduction Knee Flexion Knee Extension 4+ 4+ Dorsi Flexion 4+ 4+ Plantar Flexion 4 4 Inversion Eversion Upper Extremity:  
      Grossly 4/5 B UE Functional Mobility:  
      Gait/Ambulation:  Ambulates with decreased gait speed, decreased endurance, symmetrical step length Transfers: Mod I, push to stand though is able to perform without hands when cued Bed Mobility:  independent Stairs: Mod I, holding to rail, step over step Wheelchair:  NT Body Structures Involved: 1. Heart 2. Lungs 3. Metabolic 4. Muscles Body Functions Affected: 1. Neuromusculoskeletal 
2. Movement Related Activities and Participation Affected: 1. Mobility 2. Domestic Life 3. Interpersonal Interactions and Relationships Number of elements that affect the Plan of Care: 4+: HIGH COMPLEXITY CLINICAL PRESENTATION:  
Presentation: Stable and uncomplicated: LOW COMPLEXITY CLINICAL DECISION MAKING:  
Outcome Measure: Tool Used: Lower Extremity Functional Scale (LEFS) Score:  Initial: 24/80 Most Recent: X/80 (Date: -- ) Interpretation of Score: 20 questions each scored on a 5 point scale with 0 representing \"extreme difficulty or unable to perform\" and 4 representing \"no difficulty\". The lower the score, the greater the functional disability. 80/80 represents no disability. Minimal detectable change is 9 points. Medical Necessity:  
· Patient is expected to demonstrate progress in strength, balance and functional technique to improve safety during ADLs and IADLs. · Patient demonstrates good rehab potential due to higher previous functional level. Reason for Services/Other Comments: 
· Patient continues to require modification of therapeutic interventions to increase complexity of exercises. Use of outcome tool(s) and clinical judgement create a POC that gives a: Clear prediction of patient's progress: LOW COMPLEXITY  
TREATMENT:  
(In addition to Assessment/Re-Assessment sessions the following treatments were rendered) Pre Treatment Symptoms: patient reports doing well. Says he is going to wait another 6 months before deciding if he wants a liver transplant. Therapeutic Exercise: ( 53 minutes):  Exercises per grid below to improve mobility, strength, balance and muscle endurance. Required moderate visual, verbal and tactile cues to promote proper body posture and promote proper body mechanics. Progressed complexity of movement as indicated. Date: 
1/15/19 Date: 
2/5/19 Date: 
2/11/19 Activity/Exercise Parameters Parameters Parameters Bike for muscle endurance  X 8 minutes X 8 minutes  
rows X 10 reps red band 3 x 10 reps green band 2 x 15 reps green band Shoulder extension X 10 reps red band 3 x 10 reps orange band 2 x 15 reps green band D1 and D2 PNF UE extension    X 15 reps #3 weight stack Horizontal shoulder abduction X 10 reps yellow band Standing heel raises X 10 reps 3 x 10 reps X 30 reps Standing hip abduction X 10 reps 3 x 10 reps X 30 reps B Standing hip flexion   3 x 10 reps X 30 reps B Calf stretch on incline board  3 x 30 sec  3 x 30 sec Standing hip extension X 10 reps 3 x 10 reps X 30 reps B  
 Sit to stand X 5 reps X 10 reps Step ups  2 x 10 reps 6\" step 8\" step 2 x 15 reps B Tandem stance on blue foam  2 x 30 sec B   
bridges  3 x 10 reps   
marching   X 30' With hip abduction in march x 30' Moving yellow ball side to side x 30 feet Treatment/Session Assessment:  Patient was fatigued by the end of the session. Decreased rest breaks today and more exercises requiring core activation. He had to cancel his Thursday appointment due to going out of town. He was educated on the importance of attending regular therapy to demonstrate improvements in functional mobility. He verbalized understanding. · Pain/ Symptoms: Initial:   0/10 Post Session:  0/10 · Compliance with Program/Exercises: Will assess as treatment progresses. · Recommendations/Intent for next treatment session: \"Next visit will focus on advancements to more challenging activities and reduction in assistance provided\". Total Treatment Duration: PT Patient Time In/Time Out Time In: 4387 Time Out: 1152 Natali Thomas DPT

## 2019-02-14 ENCOUNTER — APPOINTMENT (OUTPATIENT)
Dept: PHYSICAL THERAPY | Age: 54
End: 2019-02-14
Payer: MEDICARE

## 2019-02-19 ENCOUNTER — APPOINTMENT (OUTPATIENT)
Dept: PHYSICAL THERAPY | Age: 54
End: 2019-02-19
Payer: MEDICARE

## 2019-02-21 ENCOUNTER — APPOINTMENT (OUTPATIENT)
Dept: PHYSICAL THERAPY | Age: 54
End: 2019-02-21
Payer: MEDICARE

## 2019-02-26 ENCOUNTER — HOSPITAL ENCOUNTER (OUTPATIENT)
Dept: PHYSICAL THERAPY | Age: 54
Discharge: HOME | End: 2019-02-26
Payer: MEDICARE

## 2019-02-26 PROCEDURE — 97110 THERAPEUTIC EXERCISES: CPT

## 2019-02-26 NOTE — PROGRESS NOTES
Anand Livingston : 1965 Primary: [employer]* Secondary:  Therapy Center at [address] [phone]   [phone] OUTPATIENT PHYSICAL THERAPY:Daily Note 2019 ICD-10: Treatment Diagnosis: Difficulty in walking, not elsewhere classified (R26.2) Muscle weakness (generalized) (M62.81) Precautions/Allergies:  
Tramadol MD Orders: needs strengthening, eval and treat MEDICAL/REFERRING DIAGNOSIS: 
Muscle wasting and atrophy, not elsewhere classified, unspecified site [M62.50] DATE OF ONSET: progressive weakness over 3 years REFERRING PHYSICIAN: Bryce Richey MD 
RETURN PHYSICIAN APPOINTMENT: unknown ASSESSMENT (Date: 1/15/19):  Mr. Isiah Ulloa presents to physical therapy with a history of liver failure, multiple surgeries resulting in an abdominal wound vac, increased immobility, and decreased endurance. His complicated history has resulted in generalized muscle weakness, poor muscle endurance, decreased functional mobility, and decreased dynamic balance. He would benefit from skilled PT to address the problem list and goals below. Of note, patient will be attend 1 more PT session and then be out of town for 2-3 weeks to help is brother in [facility]. We will restart PT upon patients return. PROBLEM LIST (Impacting functional limitations): 1. Decreased Strength 2. Decreased ADL/Functional Activities 3. Decreased Ambulation Ability/Technique 4. Decreased Balance 5. Decreased Activity Tolerance 6. Decreased [place] with Home Exercise Program INTERVENTIONS PLANNED: 
1. Balance Exercise 2. Cold 3. Home Exercise Program (HEP) 4. Manual Therapy 5. Therapeutic Activites 6. Therapeutic Exercise/Strengthening 7. Transfer Training TREATMENT PLAN: 
Effective Dates: 1/15/2019 TO 2019 (90 days). Frequency/Duration: 2 times a week for 90 Days GOALS: (Goals have been discussed and agreed upon with patient.) Discharge Goals: Time Frame: 90 days 1. Patient will be independent with HEP. 2. Patient will have an increase on the LEFS to 40/80 indicating improved ability to complete ADLs and IADLs. 3. Patient will report being able to ambulate for 30 minutes for exercises with no rest breaks in order to improve his endurance. 4. Patient will demonstrate tandem stance x 30 seconds bilaterally indicating improved standing balance. Rehabilitation Potential For Stated Goals: Good Regarding Rehan Krishnamurthy's therapy, I certify that the treatment plan above will be carried out by a therapist or under their direction. Thank you for this referral, 
JAREK CastroT, NCS    
  
    
 
 
HISTORY:  
Patient Stated Goal: 
      Get my muscles going again History of Present Injury/Illness (Reason for Referral): Dx with end stage liver disease in 2015. Got ascities, a hernia, fluid retention. April 2018 hernia became incarcerated and they had to do emergency surgery. It got infected and he had to do 4-5 surgeries in the last last.  Now, his fluid is going. Liver is bouncing back. But, over the last 3 years, he has been on the couch and he has lost a lot of muscles. He reports he needs to get his muscles going again. If he sits for awhile, he has pain. Currently not doing anything for exercise. Last surgery was sept and he had a wound vac. Nothing scheduled back at [address] currently. He may try and get on the transplant list.  He has to be in [place] for the next 2-3 weeks so he will need to delay the start of therapy. Past Medical History/Comorbidities:  
Mr. Moise Anne  has a past medical history of Cirrhosis of liver ([facility] [address] and Multiple fractures of ribs of both sides.   Mr. Moise Anne  has a past surgical history that includes hx knee arthroscopy (2005); hx hernia repair; and hx other surgical. 
 
 Per medical history questionnaire/therapist interview: Chronic Fatigue, Difficulty Sleeping, Joint Pain and Weakness Social History/Living Environment:  
  lives alone, 1 story home with 5 steps to enter. Drives. Prior Level of Function/Work/Activity: Was working in sales but hasn't work since 2015. Current Medications:   
Current Outpatient Medications:  
  oxyCODONE IR (ROXICODONE) 5 mg immediate release tablet, Take 1 Tab by mouth every six (6) hours as needed for Pain. Max Daily Amount: 20 mg., Disp: 11 Tab, Rfl: 0 
  magnesium oxide (MAG-OX) 400 mg tablet, Take 400 mg by mouth two (2) times a day., Disp: , Rfl:  
  senna (SENEXON) 8.6 mg tablet, Take 1 Tab by mouth daily. , Disp: , Rfl:  
  aMILoride (MIDAMOR) 5 mg tablet, Take 15 mg by mouth daily. , Disp: , Rfl:  
  ergocalciferol (VITAMIN D2) 50,000 unit capsule, Take 50,000 Units by mouth every seven (7) days. , Disp: , Rfl:  
  warfarin (COUMADIN) 5 mg tablet, Take 5 mg by mouth daily. , Disp: , Rfl:  
  hydrOXYzine HCl (ATARAX) 50 mg tablet, Take 50 mg by mouth daily as needed for Itching., Disp: , Rfl:  
  mirtazapine (REMERON) 15 mg tablet, Take 15 mg by mouth nightly as needed for Other (sleep). , Disp: , Rfl:  
  enoxaparin (LOVENOX) 80 mg/0.8 mL injection, 70 mg by SubCUTAneous route every twelve (12) hours. , Disp: , Rfl:  
  torsemide (DEMADEX) 20 mg tablet, Take 40 mg by mouth daily. , Disp: , Rfl:   
 
Date Last Reviewed:  2/26/2019 Ambulatory/Rehab Services H2 Model Falls Risk Assessment Risk Factors: 
     (1)  Gender [Male] Ability to Rise from Chair: 
     (1)  Pushes up, successful in one attempt Falls Prevention Plan: No modifications necessary Total: (5 or greater = High Risk): 2  
 ©2010 [facility] [employer] [address] [place] [address] States Patent #[id number]. Federal Law prohibits the replication, distribution or use without written permission from [facility] [employer] Number of Personal Factors/Comorbidities that affect the Plan of Care: 1-2: MODERATE COMPLEXITY EXAMINATION:  
Observation/Orthostatic Postural Assessment:   
      Rounded shoulders, some muscle atrophy noted in all extremities Mental Status: WFL Palpation:   
      Normal tone Sensation:  
      Light tough: WFL; Proprioception: [facility] Skin Integrity:   
      Grossly intact except for a healing wound in his abdomen that is being followed by MD 
Vision:   
      functional 
Balance:   
      Good static balance, decreased dynamic balance Lower Extremity: 
 Strength PROM Action R L R  L Hip Flexion 4 4 Hip Extension 4 4 Hip Abduction 4 4 Hip Adduction Knee Flexion Knee Extension 4+ 4+ Dorsi Flexion 4+ 4+ Plantar Flexion 4 4 Inversion Eversion Upper Extremity:  
      Grossly 4/5 B UE Functional Mobility:  
      Gait/Ambulation:  Ambulates with decreased gait speed, decreased endurance, symmetrical step length Transfers: Mod I, push to stand though is able to perform without hands when cued Bed Mobility:  independent Stairs: Mod I, holding to rail, step over step Wheelchair:  NT Body Structures Involved: 1. Heart 2. Lungs 3. Metabolic 4. Muscles Body Functions Affected: 1. Neuromusculoskeletal 
2. Movement Related Activities and Participation Affected: 1. Mobility 2. Domestic Life 3. Interpersonal Interactions and Relationships Number of elements that affect the Plan of Care: 4+: HIGH COMPLEXITY CLINICAL PRESENTATION:  
Presentation: Stable and uncomplicated: LOW COMPLEXITY CLINICAL DECISION MAKING:  
Outcome Measure: Tool Used: Lower Extremity Functional Scale (LEFS) Score:  Initial: 24/80 Most Recent: X/80 (Date: -- ) Interpretation of Score: 20 questions each scored on a 5 point scale with 0 representing \"extreme difficulty or unable to perform\" and 4 representing \"no difficulty\". The lower the score, the greater the functional disability. 80/80 represents no disability. Minimal detectable change is 9 points. Medical Necessity:  
· Patient is expected to demonstrate progress in strength, balance and functional technique to improve safety during ADLs and IADLs. · Patient demonstrates good rehab potential due to higher previous functional level. Reason for Services/Other Comments: 
· Patient continues to require modification of therapeutic interventions to increase complexity of exercises. Use of outcome tool(s) and clinical judgement create a POC that gives a: Clear prediction of patient's progress: LOW COMPLEXITY  
TREATMENT:  
(In addition to Assessment/Re-Assessment sessions the following treatments were rendered) Pre Treatment Symptoms: patient reports his thyroid level are off. No pain today. Therapeutic Exercise: ( 53 minutes):  Exercises per grid below to improve mobility, strength, balance and muscle endurance. Required moderate visual, verbal and tactile cues to promote proper body posture and promote proper body mechanics. Progressed complexity of movement as indicated. Date: 
1/15/19 Date: 
2/5/19 Date: 
2/11/19 Date: 
2/26/19 Activity/Exercise Parameters Parameters Parameters Parameters Bike for muscle endurance  X 8 minutes X 8 minutes X 8 minutes level 2. 5  
rows X 10 reps red band 3 x 10 reps green band 2 x 15 reps green band Shoulder extension X 10 reps red band 3 x 10 reps orange band 2 x 15 reps green band D1 and D2 PNF UE extension    X 15 reps #3 weight stack Horizontal shoulder abduction X 10 reps yellow band Standing heel raises X 10 reps 3 x 10 reps X 30 reps 2 x 15 reps with 2# cuff weight Standing hip abduction X 10 reps 3 x 10 reps X 30 reps B 2 x 15 reps with 2# cuff weight Standing hip flexion   3 x 10 reps X 30 reps B 2 x 15 reps with 2# cuff weight Calf stretch on incline board  3 x 30 sec  3 x 30 sec  2 x 30 sec Standing hip extension X 10 reps 3 x 10 reps X 30 reps B 2 x 15 reps with 2# cuff weight Sit to stand X 5 reps X 10 reps  2 x 10 reps Step ups  2 x 10 reps 6\" step 8\" step 2 x 15 reps B 6\" step x 15 reps B with 2# cuff weight Tandem stance on blue foam  2 x 30 sec B  2 x 30 sec B on blue foam with head turns  
bridges  3 x 10 reps    
marching   X 30' With hip abduction in march x 30' Moving yellow ball side to side x 30 feet 2 x 30' moving yellow ball size to side Treatment/Session Assessment:  Patient was very fatigued by the end of the session. He was educated on the importance of attending PT regularly. He verbalized understanding. He continues to benefit from skilled PT to improve functional mobility. · Pain/ Symptoms: Initial:   0/10 Post Session:  0/10 · Compliance with Program/Exercises: Will assess as treatment progresses. · Recommendations/Intent for next treatment session: \"Next visit will focus on advancements to more challenging activities and reduction in assistance provided\". Total Treatment Duration: PT Patient Time In/Time Out Time In: 8915 Time Out: 1153 Tamara Hurt DPT

## 2019-02-28 ENCOUNTER — HOSPITAL ENCOUNTER (OUTPATIENT)
Dept: PHYSICAL THERAPY | Age: 54
Discharge: HOME | End: 2019-02-28
Payer: MEDICARE

## 2019-02-28 PROCEDURE — 97110 THERAPEUTIC EXERCISES: CPT

## 2019-02-28 NOTE — PROGRESS NOTES
Rodrigo Franklin : 1965 Primary: [employer]* Secondary:  Therapy Center at [address], [address] [place] [address] [phone]   [phone] OUTPATIENT PHYSICAL THERAPY:Daily Note 2019 ICD-10: Treatment Diagnosis: Difficulty in walking, not elsewhere classified (R26.2) Muscle weakness (generalized) (M62.81) Precautions/Allergies:  
Tramadol MD Orders: needs strengthening, eval and treat MEDICAL/REFERRING DIAGNOSIS: 
Muscle wasting and atrophy, not elsewhere classified, unspecified site [M62.50] DATE OF ONSET: progressive weakness over 3 years REFERRING PHYSICIAN: Carmenza Altman MD 
RETURN PHYSICIAN APPOINTMENT: unknown ASSESSMENT (Date: 1/15/19):  Mr. Tereza Hook presents to physical therapy with a history of liver failure, multiple surgeries resulting in an abdominal wound vac, increased immobility, and decreased endurance. His complicated history has resulted in generalized muscle weakness, poor muscle endurance, decreased functional mobility, and decreased dynamic balance. He would benefit from skilled PT to address the problem list and goals below. Of note, patient will be attend 1 more PT session and then be out of town for 2-3 weeks to help is brother in [facility]. We will restart PT upon patients return. PROBLEM LIST (Impacting functional limitations): 1. Decreased Strength 2. Decreased ADL/Functional Activities 3. Decreased Ambulation Ability/Technique 4. Decreased Balance 5. Decreased Activity Tolerance 6. Decreased [place] with Home Exercise Program INTERVENTIONS PLANNED: 
1. Balance Exercise 2. Cold 3. Home Exercise Program (HEP) 4. Manual Therapy 5. Therapeutic Activites 6. Therapeutic Exercise/Strengthening 7. Transfer Training TREATMENT PLAN: 
Effective Dates: 1/15/2019 TO 2019 (90 days). Frequency/Duration: 2 times a week for 90 Days GOALS: (Goals have been discussed and agreed upon with patient.) Discharge Goals: Time Frame: 90 days 1. Patient will be independent with HEP. 2. Patient will have an increase on the LEFS to 40/80 indicating improved ability to complete ADLs and IADLs. 3. Patient will report being able to ambulate for 30 minutes for exercises with no rest breaks in order to improve his endurance. 4. Patient will demonstrate tandem stance x 30 seconds bilaterally indicating improved standing balance. Rehabilitation Potential For Stated Goals: Good Regarding Griselda Krishnamurthy's therapy, I certify that the treatment plan above will be carried out by a therapist or under their direction. Thank you for this referral, 
Remedios Sheldon, DPT, NCS    
  
    
 
 
HISTORY:  
Patient Stated Goal: 
      Get my muscles going again History of Present Injury/Illness (Reason for Referral): Dx with end stage liver disease in 2015. Got ascities, a hernia, fluid retention. April 2018 hernia became incarcerated and they had to do emergency surgery. It got infected and he had to do 4-5 surgeries in the last last.  Now, his fluid is going. Liver is bouncing back. But, over the last 3 years, he has been on the couch and he has lost a lot of muscles. He reports he needs to get his muscles going again. If he sits for awhile, he has pain. Currently not doing anything for exercise. Last surgery was sept and he had a wound vac. Nothing scheduled back at [address] currently. He may try and get on the transplant list.  He has to be in [place] for the next 2-3 weeks so he will need to delay the start of therapy. Past Medical History/Comorbidities:  
Mr. Tereza Hook  has a past medical history of Cirrhosis of liver ([facility] [address] and Multiple fractures of ribs of both sides.   Mr. Tereza Hook  has a past surgical history that includes hx knee arthroscopy (2005); hx hernia repair; and hx other surgical. 
 
 Per medical history questionnaire/therapist interview: Chronic Fatigue, Difficulty Sleeping, Joint Pain and Weakness Social History/Living Environment:  
  lives alone, 1 story home with 5 steps to enter. Drives. Prior Level of Function/Work/Activity: Was working in sales but hasn't work since 2015. Current Medications:   
Current Outpatient Medications:  
  oxyCODONE IR (ROXICODONE) 5 mg immediate release tablet, Take 1 Tab by mouth every six (6) hours as needed for Pain. Max Daily Amount: 20 mg., Disp: 11 Tab, Rfl: 0 
  magnesium oxide (MAG-OX) 400 mg tablet, Take 400 mg by mouth two (2) times a day., Disp: , Rfl:  
  senna (SENEXON) 8.6 mg tablet, Take 1 Tab by mouth daily. , Disp: , Rfl:  
  aMILoride (MIDAMOR) 5 mg tablet, Take 15 mg by mouth daily. , Disp: , Rfl:  
  ergocalciferol (VITAMIN D2) 50,000 unit capsule, Take 50,000 Units by mouth every seven (7) days. , Disp: , Rfl:  
  warfarin (COUMADIN) 5 mg tablet, Take 5 mg by mouth daily. , Disp: , Rfl:  
  hydrOXYzine HCl (ATARAX) 50 mg tablet, Take 50 mg by mouth daily as needed for Itching., Disp: , Rfl:  
  mirtazapine (REMERON) 15 mg tablet, Take 15 mg by mouth nightly as needed for Other (sleep). , Disp: , Rfl:  
  enoxaparin (LOVENOX) 80 mg/0.8 mL injection, 70 mg by SubCUTAneous route every twelve (12) hours. , Disp: , Rfl:  
  torsemide (DEMADEX) 20 mg tablet, Take 40 mg by mouth daily. , Disp: , Rfl:   
 
Date Last Reviewed:  2/28/2019 Ambulatory/Rehab Services H2 Model Falls Risk Assessment Risk Factors: 
     (1)  Gender [Male] Ability to Rise from Chair: 
     (1)  Pushes up, successful in one attempt Falls Prevention Plan: No modifications necessary Total: (5 or greater = High Risk): 2  
 ©2010 [facility] [employer] [address] [place] [address] States Patent #[id number]. Federal Law prohibits the replication, distribution or use without written permission from [facility] [employer] Number of Personal Factors/Comorbidities that affect the Plan of Care: 1-2: MODERATE COMPLEXITY EXAMINATION:  
Observation/Orthostatic Postural Assessment:   
      Rounded shoulders, some muscle atrophy noted in all extremities Mental Status: WFL Palpation:   
      Normal tone Sensation:  
      Light tough: WFL; Proprioception: [facility] Skin Integrity:   
      Grossly intact except for a healing wound in his abdomen that is being followed by MD 
Vision:   
      functional 
Balance:   
      Good static balance, decreased dynamic balance Lower Extremity: 
 Strength PROM Action R L R  L Hip Flexion 4 4 Hip Extension 4 4 Hip Abduction 4 4 Hip Adduction Knee Flexion Knee Extension 4+ 4+ Dorsi Flexion 4+ 4+ Plantar Flexion 4 4 Inversion Eversion Upper Extremity:  
      Grossly 4/5 B UE Functional Mobility:  
      Gait/Ambulation:  Ambulates with decreased gait speed, decreased endurance, symmetrical step length Transfers: Mod I, push to stand though is able to perform without hands when cued Bed Mobility:  independent Stairs: Mod I, holding to rail, step over step Wheelchair:  NT Body Structures Involved: 1. Heart 2. Lungs 3. Metabolic 4. Muscles Body Functions Affected: 1. Neuromusculoskeletal 
2. Movement Related Activities and Participation Affected: 1. Mobility 2. Domestic Life 3. Interpersonal Interactions and Relationships Number of elements that affect the Plan of Care: 4+: HIGH COMPLEXITY CLINICAL PRESENTATION:  
Presentation: Stable and uncomplicated: LOW COMPLEXITY CLINICAL DECISION MAKING:  
Outcome Measure: Tool Used: Lower Extremity Functional Scale (LEFS) Score:  Initial: 24/80 Most Recent: X/80 (Date: -- ) Interpretation of Score: 20 questions each scored on a 5 point scale with 0 representing \"extreme difficulty or unable to perform\" and 4 representing \"no difficulty\". The lower the score, the greater the functional disability. 80/80 represents no disability. Minimal detectable change is 9 points. Medical Necessity:  
· Patient is expected to demonstrate progress in strength, balance and functional technique to improve safety during ADLs and IADLs. · Patient demonstrates good rehab potential due to higher previous functional level. Reason for Services/Other Comments: 
· Patient continues to require modification of therapeutic interventions to increase complexity of exercises. Use of outcome tool(s) and clinical judgement create a POC that gives a: Clear prediction of patient's progress: LOW COMPLEXITY  
TREATMENT:  
(In addition to Assessment/Re-Assessment sessions the following treatments were rendered) Pre Treatment Symptoms: patient reports his thyroid level are off. No pain today. Therapeutic Exercise: ( 53 minutes):  Exercises per grid below to improve mobility, strength, balance and muscle endurance. Required moderate visual, verbal and tactile cues to promote proper body posture and promote proper body mechanics. Progressed complexity of movement as indicated. Date: 
2/5/19 Date: 
2/11/19 Date: 
2/26/19 Date: 
2/28/19 Activity/Exercise Parameters Parameters Parameters Parameters Bike for muscle endurance X 8 minutes X 8 minutes X 8 minutes level 2. 5 X 8 minutes level 3  
rows 3 x 10 reps green band 2 x 15 reps green band Shoulder extension 3 x 10 reps orange band 2 x 15 reps green band D1 and D2 PNF UE extension   X 15 reps #3 weight stack  X 15 reps B #3 on weight stack Horizontal shoulder abduction Standing heel raises 3 x 10 reps X 30 reps 2 x 15 reps with 2# cuff weight  X 30 reps with 2# weight Standing hip abduction 3 x 10 reps X 30 reps B 2 x 15 reps with 2# cuff weight 2 x 15 reps with 2# cuff weight on blue foam  
Standing hip flexion  3 x 10 reps X 30 reps B 2 x 15 reps with 2# cuff weight 2 x 15 reps with 2# cuff weight on blue foam  
Calf stretch on incline board 3 x 30 sec  3 x 30 sec  2 x 30 sec Standing hip extension 3 x 10 reps X 30 reps B 2 x 15 reps with 2# cuff weight 2 x 15 reps with 2# cuff weight on blue foam  
Sit to stand X 10 reps  2 x 10 reps Step ups 2 x 10 reps 6\" step 8\" step 2 x 15 reps B 6\" step x 15 reps B with 2# cuff weight 6\" step x 15 reps B Tandem stance on blue foam 2 x 30 sec B  2 x 30 sec B on blue foam with head turns   
bridges 3 x 10 reps     
marching  X 30' With hip abduction in march x 30' Moving yellow ball side to side x 30 feet 2 x 30' moving yellow ball size to side 2 x 30' with hip abduction 2 x 30' with yellow ball side to side Treatment/Session Assessment:  Patient doing well with exercises. Needs some cuing to perform exercises correctly. Patient is going to call next week to schedule follow up appointments. He has an appointment at [facility] next week for his liver failure. · Pain/ Symptoms: Initial:   0/10 Post Session:  0/10 · Compliance with Program/Exercises: Will assess as treatment progresses. · Recommendations/Intent for next treatment session: \"Next visit will focus on advancements to more challenging activities and reduction in assistance provided\". Total Treatment Duration: PT Patient Time In/Time Out Time In: 1100 Time Out: 1155 Hutchison Fails, DPT

## 2019-03-07 ENCOUNTER — HOSPITAL ENCOUNTER (OUTPATIENT)
Dept: PHYSICAL THERAPY | Age: 54
Discharge: HOME | End: 2019-03-07
Payer: MEDICARE

## 2019-03-07 PROCEDURE — 97110 THERAPEUTIC EXERCISES: CPT

## 2019-03-07 NOTE — PROGRESS NOTES
Marilou Johnson  : 1965  Primary: [address] Medic*  Secondary:  [address] at [place] [address] [address]  [phone]   [phone]      OUTPATIENT PHYSICAL THERAPY: Daily Treatment Note 3/7/2019  Pre-treatment Symptoms/Complaints:  Patient reports doing well today. No pain. Has decided not go to [facility] anymore so he will be able to attend his appointments more regularly.    Pain: Initial:   0/10 Post Session:  0/10   Medications Last Reviewed:  3/7/2019  Updated Objective Findings:  None Today   TREATMENT:     Therapeutic Exercise: ( 40 minutes):  Exercises per grid below to improve mobility, strength, balance and muscle endurance.  Required moderate visual, verbal and tactile cues to promote proper body posture and promote proper body mechanics.  Progressed complexity of movement as indicated.    Date:  19 Date:  19 Date:  3/7/18   Activity/Exercise Parameters Parameters Parameters   Bike for muscle endurance X 8 minutes level 2. 5 X 8 minutes level 3 X 8 minutes level 5   rows        Shoulder extension        D1 and D2 PNF UE extension    X 15 reps B #3 on weight stack    Horizontal shoulder abduction        Standing heel raises 2 x 15 reps with 2# cuff weight  X 30 reps with 2# weight X 30 reps with 3# weight   Standing hip abduction 2 x 15 reps with 2# cuff weight 2 x 15 reps with 2# cuff weight on blue foam X 30 reps with 3# weight   Standing hip flexion  2 x 15 reps with 2# cuff weight 2 x 15 reps with 2# cuff weight on blue foam    Calf stretch on incline board 2 x 30 sec    2 x 30 sec hold B   Standing hip extension 2 x 15 reps with 2# cuff weight 2 x 15 reps with 2# cuff weight on blue foam X 30 reps with 3# weight   Sit to stand 2 x 10 reps       Step ups 6\" step x 15 reps B with 2# cuff weight 6\" step x 15 reps B     Tandem stance on blue foam 2 x 30 sec B on blue foam with head turns   2 x 30 sec B on blue foam   bridges        marching 2 x 30' moving yellow ball size to side 2 x 30' with hip abduction  2 x 30' with yellow ball side to side    Static with 3# weight x 30 reps B  2 x 30' with red ball side to side   shuttle   2 x 15 reps with 4 band resistance- single leg               [facility] Portal  Treatment/Session Summary:    · Response to Treatment:  still requires frequent rest breaks due to poor endurance. hopefully will improve now that he is committed to attending PT regularly. .  · Communication/Consultation:  None today  · Equipment provided today:  None today  · Recommendations/Intent for next treatment session: Next visit will focus on continued strength and endurance.   Treatment Plan of Care Effective Dates:  1/15/19 to 4/17/19  Total Treatment Billable Duration:  40 minutes  PT Patient Time In/Time Out  Time In: 1400  Time Out: 199 [facility]    Future Appointments   Date Time Provider Kathe Ortez   3/12/2019 11:00 AM AMANDA Vazquez   3/14/2019 11:00 AM AMANDA Vazquez   3/19/2019 11:00 AM AMANDA Vazquez   3/21/2019 11:00 AM AMANDA Vazquez   3/26/2019 11:00 AM AMANDA VazquezENNIUM   3/28/2019 11:00 AM [facility] Segundo [facility]

## 2019-03-12 ENCOUNTER — HOSPITAL ENCOUNTER (OUTPATIENT)
Dept: PHYSICAL THERAPY | Age: 54
Discharge: HOME | End: 2019-03-12
Payer: MEDICARE

## 2019-03-12 PROCEDURE — 97110 THERAPEUTIC EXERCISES: CPT

## 2019-03-12 NOTE — PROGRESS NOTES
Michelle Avilez  : 1965  Primary: [address] Medic*  Secondary:  [address] at [facility]  [address], [address] [place] [address]  [phone]   [phone]      OUTPATIENT PHYSICAL THERAPY: Daily Treatment Note 3/12/2019  Pre-treatment Symptoms/Complaints:  Patient reports doing well today. No pain other than his normal issues.     Pain: Initial:   0/10 Post Session:  0/10   Medications Last Reviewed:  3/12/2019  Updated Objective Findings:  None Today   TREATMENT:     Therapeutic Exercise: ( 53 minutes):  Exercises per grid below to improve mobility, strength, balance and muscle endurance.  Required moderate visual, verbal and tactile cues to promote proper body posture and promote proper body mechanics.  Progressed complexity of movement as indicated.    Date:  19 Date:  3/7/18 Date:  3/12/19   Activity/Exercise Parameters Parameters Parameters   Bike for muscle endurance X 8 minutes level 3 X 8 minutes level 5 X 8  Minutes level 6   rows       Shoulder extension       D1 and D2 PNF UE extension  X 15 reps B #3 on weight stack     Horizontal shoulder abduction       Standing heel raises X 30 reps with 2# weight X 30 reps with 3# weight X 30 reps with 3# on blue foam   Standing hip abduction 2 x 15 reps with 2# cuff weight on blue foam X 30 reps with 3# weight X 30 reps with 3# on blue foam   Standing hip flexion  2 x 15 reps with 2# cuff weight on blue foam  X 30 reps with 3# on blue foam   Calf stretch on incline board   2 x 30 sec hold B 2 x 30 sec hold   Standing hip extension 2 x 15 reps with 2# cuff weight on blue foam X 30 reps with 3# weight X 30 reps with 3# on blue foam   Sit to stand       Step ups 6\" step x 15 reps B   8\" step x 15 reps with 3# B   Tandem stance on blue foam   2 x 30 sec B on blue foam 2 x 30 sec B on blue foam    bridges       marching 2 x 30' with hip abduction  2 x 30' with yellow ball side to side    Static with 3# weight x 30 reps B  2 x 30' with red ball side to side 2 x 30' with red ball side to side  2 x 30' with red ball \"farrukh brown\"   shuttle  2 x 15 reps with 4 band resistance- single leg 2 x 15 reps with 4 bands   Resisted walking   10# on weight stack x 5 reps forward and lateral         [facility] Portal  Treatment/Session Summary:    · Response to Treatment:  patient did well with exercises, requiring less rest today and able to tolerate incrased intensity. he continues to benefit from skilled PT to improve endurance. .  · Communication/Consultation:  None today  · Equipment provided today:  None today  · Recommendations/Intent for next treatment session: Next visit will focus on continued strength and endurance.   Treatment Plan of Care Effective Dates:  1/15/19 to 4/17/19  Total Treatment Billable Duration:  53 minutes  PT Patient Time In/Time Out  Time In: 1103  Time Out: [address], DPT    Future Appointments   Date Time Provider Kathe Ortez   3/14/2019 11:00 AM [facility]   3/19/2019 11:00 AM Irma Meyers DPT [facility]   3/21/2019 11:00 AM Irma Meyers DPT [facility]   3/26/2019 11:00 AM Irma Meyers DPT [facility]   3/28/2019 11:00 AM Ludmila Mock [facility]

## 2019-03-14 ENCOUNTER — HOSPITAL ENCOUNTER (OUTPATIENT)
Dept: PHYSICAL THERAPY | Age: 54
Discharge: HOME | End: 2019-03-14
Payer: MEDICARE

## 2019-03-14 PROCEDURE — 97110 THERAPEUTIC EXERCISES: CPT

## 2019-03-14 NOTE — THERAPY EVALUATION
Joanna Justin  : 1965  Primary: [address] [facility]*  Secondary:  [address] at [address], [address] [place] [address]  [phone]   [phone]         OUTPATIENT PHYSICAL THERAPY:Progress Report 3/14/2019    ICD-10: Treatment Diagnosis: Difficulty in walking, not elsewhere classified (R26.2)  Muscle weakness (generalized) (M62.81)  Precautions/Allergies:   Tramadol   MD Orders: needs strengthening, eval and treat MEDICAL/REFERRING DIAGNOSIS:  Muscle wasting and atrophy, not elsewhere classified, unspecified site [M62.50]   DATE OF ONSET: progressive weakness over 3 years  REFERRING PHYSICIAN: Wiliam Mar MD  RETURN PHYSICIAN APPOINTMENT: unknown      ASSESSMENT (Date: 3/14/19):  Mr. Shawn Acosta has been seen in physical therapy for 8 visits since 1/15/19. He has currently not met any of his long term goals but is demonstrating great progress towards all goal.  He had en 8 point improvement on the LEFS indicating improvement in functional mobility. He he demonstrating improved muscle endurance as he is able to tolerate more exercises with less rest breaks. He continues to benefit from skilled PT to improve functional mobility, muscle strength and endurance, and decrease his risk of falls. PROBLEM LIST (Impacting functional limitations):  1. Decreased Strength  2. Decreased ADL/Functional Activities  3. Decreased Ambulation Ability/Technique  4. Decreased Balance  5. Decreased Activity Tolerance  6. Decreased [place] with Home Exercise Program INTERVENTIONS PLANNED:  1. Balance Exercise  2. Cold  3. Home Exercise Program (HEP)  4. Manual Therapy  5. Therapeutic Activites  6. Therapeutic Exercise/Strengthening  7. Transfer Training   TREATMENT PLAN:  Effective Dates: 1/15/2019 TO 2019 (90 days).  Frequency/Duration: 2 times a week for 90 Days  GOALS: (Goals have been discussed and agreed upon with patient.)  Discharge Goals: Time Frame: 90 days  1. Patient will be independent with HEP.  NOT MET  2. Patient will have an increase on the LEFS to 40/80 indicating improved ability to complete ADLs and IADLs. NOT MET  3. Patient will report being able to ambulate for 30 minutes for exercises with no rest breaks in order to improve his endurance. NOT MET  4. Patient will demonstrate tandem stance x 30 seconds bilaterally indicating improved standing balance. Partially MET      Outcome Measure: Tool Used: Lower Extremity Functional Scale (LEFS)  Score:  Initial: 24/80 Most Recent: 32/80 (Date: 3/14/19 )   Interpretation of Score: 20 questions each scored on a 5 point scale with 0 representing \"extreme difficulty or unable to perform\" and 4 representing \"no difficulty\". The lower the score, the greater the functional disability. 80/80 represents no disability. Minimal detectable change is 9 points. UPDATED OBJECTIVE FINDINGS:    · Improved gait speed and gait endurance  · Able to tandem stance for 30 sec with L foot forward but not R  · Able to standing without UE support    Medical Necessity:   · Patient is expected to demonstrate progress in strength, balance and functional technique to improve safety during ADLs and IADLs. · Patient demonstrates good rehab potential due to higher previous functional level. Reason for Services/Other Comments:  · Patient continues to require modification of therapeutic interventions to increase complexity of exercises. Total Treatment Duration:  PT Patient Time In/Time Out  Time In: 1100  Time Out: 1156       Rehabilitation Potential For Stated Goals: Good  Regarding Honey Krishnamurthy's therapy, I certify that the treatment plan above will be carried out by a therapist or under their direction.   Thank you for this referral,  Chato Lopez, JAREKT, NCS

## 2019-03-14 NOTE — PROGRESS NOTES
Thelbert Mcardle  : 1965  Primary: [address] [facility]*  Secondary:  [address] at [facility], [address] [facility]  [phone]   [phone]      OUTPATIENT PHYSICAL THERAPY: Daily Treatment Note 3/14/2019  Pre-treatment Symptoms/Complaints:  Patient reports some muscle soreness but no pain.      Pain: Initial:   0/10 Post Session:  0/10   Medications Last Reviewed:  3/14/2019  Updated Objective Findings:  See progress note   TREATMENT:     Therapeutic Exercise: ( 53 minutes):  Exercises per grid below to improve mobility, strength, balance and muscle endurance.  Required moderate visual, verbal and tactile cues to promote proper body posture and promote proper body mechanics.  Progressed complexity of movement as indicated.    Date:  3/7/18 Date:  3/12/19 Date:  3/14/19   Activity/Exercise Parameters Parameters Parameters   Bike for muscle endurance X 8 minutes level 5 X 8  Minutes level 6 X 8 minutes level 6.5    rows      Shoulder extension      D1 and D2 PNF UE extension       Horizontal shoulder abduction      Standing heel raises X 30 reps with 3# weight X 30 reps with 3# on blue foam X 30 reps with 3# on blue foam   Standing hip abduction X 30 reps with 3# weight X 30 reps with 3# on blue foam X 30 reps with 3# on blue foam   Standing hip flexion   X 30 reps with 3# on blue foam X 30 reps with 3# on blue foam   Calf stretch on incline board 2 x 30 sec hold B 2 x 30 sec hold 2 x 30 sec hold   Standing hip extension X 30 reps with 3# weight X 30 reps with 3# on blue foam X 30 reps with 3# on blue foam   Sit to stand      Step ups  8\" step x 15 reps with 3# B 8\" step 2 x 15 reps B   Tandem stance on blue foam 2 x 30 sec B on blue foam 2 x 30 sec B on blue foam  2 x 30 sec B on foam    bridges      marching Static with 3# weight x 30 reps B  2 x 30' with red ball side to side 2 x 30' with red ball side to side  2 x 30' with red ball \"farrukh brown\" On blue foam with 3# weight x 30 reps    shuttle 2 x 15 reps with 4 band resistance- single leg 2 x 15 reps with 4 bands 2 x 15 reps with 5 bands   Resisted walking  10# on weight stack x 5 reps forward and lateral    Lateral gait   2 x 30' with red band resistance    rockerboard   Controlled rocking and static hold x 60 sec each direction         [facility] Portal  Treatment/Session Summary:    · Response to Treatment:  doing well with advancing exercises. see Progress note from today. · Communication/Consultation:  progress note done today  · Equipment provided today:  None today  · Recommendations/Intent for next treatment session: Next visit will focus on continued strength and endurance.   Treatment Plan of Care Effective Dates:  1/15/19 to 4/17/19  Total Treatment Billable Duration:  53 minutes     Brando Lo DPT    Future Appointments   Date Time Provider Kathe Ortez   3/14/2019 11:00 AM Sharyle Sale, DPT [facility]   3/19/2019 11:00 AM Sharyle Sale, DPT [facility]   3/21/2019 11:00 AM Sharyle Sale, DPT [facility]   3/26/2019 11:00 AM Sharyle Sale, DPT [facility]   3/28/2019 11:00 AM Far[facility]

## 2019-03-19 ENCOUNTER — HOSPITAL ENCOUNTER (OUTPATIENT)
Dept: PHYSICAL THERAPY | Age: 54
Discharge: HOME | End: 2019-03-19
Payer: MEDICARE

## 2019-03-19 PROCEDURE — 97110 THERAPEUTIC EXERCISES: CPT

## 2019-03-19 NOTE — PROGRESS NOTES
Nils Giuseppe  : 1965  Primary: [address] [facility]*  Secondary:  [address] at [place] [address] [address], [address] [place] [address]  [phone]   [phone]      OUTPATIENT PHYSICAL THERAPY: Daily Treatment Note 3/19/2019  Pre-treatment Symptoms/Complaints:  Patient reports pain in his stomach where the incision is trying to heal.  No muscle pain  Pain: Initial:   0/10 related to PT issue Post Session:  0/10   Medications Last Reviewed:  3/19/2019  Updated Objective Findings:  None Today   TREATMENT:     Therapeutic Exercise: ( 53 minutes):  Exercises per grid below to improve mobility, strength, balance and muscle endurance.  Required moderate visual, verbal and tactile cues to promote proper body posture and promote proper body mechanics.  Progressed complexity of movement as indicated.    Date:  3/12/19 Date:  3/14/19 Date:  3/18/19   Activity/Exercise Parameters Parameters Parameters   Bike for muscle endurance X 8  Minutes level 6 X 8 minutes level 6.5  X 8 minutes level 6   Standing heel raises X 30 reps with 3# on blue foam X 30 reps with 3# on blue foam    Standing hip abduction X 30 reps with 3# on blue foam X 30 reps with 3# on blue foam    Standing hip flexion  X 30 reps with 3# on blue foam X 30 reps with 3# on blue foam    Calf stretch on incline board 2 x 30 sec hold 2 x 30 sec hold 2 x 30 sec hold   Supine SLR   3 x 10 reps B with 3#   Sidelying SLR   3 x 10 reps B with 3#   Seated HS stretch   2 x 30 sec B   Standing hip extension X 30 reps with 3# on blue foam X 30 reps with 3# on blue foam X 30 reps B with 3# on blue foam   Sit to stand   X 10 reps from low mat   Step ups 8\" step x 15 reps with 3# B 8\" step 2 x 15 reps B    Tandem stance on blue foam 2 x 30 sec B on blue foam  2 x 30 sec B on foam     Lateral dips off step   2 x 15 reps B 6\" step   marching 2 x 30' with red ball side to side  2 x 30' with red ball \"farrukh brown\" On blue foam with 3# weight x 30 reps  With horizontal hip abduction 2 x 30'    shuttle 2 x 15 reps with 4 bands 2 x 15 reps with 5 bands    Resisted walking 10# on weight stack x 5 reps forward and lateral     Lateral gait  2 x 30' with red band resistance  2 x 30' B with orange band resistance   rockerboard  Controlled rocking and static hold x 60 sec each direction Static hold 2 x 1 min hold each way         [facility] Portal  Treatment/Session Summary:    · Response to Treatment:  patient was very fatigued with increased exercise intensity today. he completed all exercises without any abdominal pain. he continues to benefit from skilled PT to improve functional mobility. .  · Communication/Consultation:  progress note done today  · Equipment provided today:  None today  · Recommendations/Intent for next treatment session: Next visit will focus on continued strength and endurance.   Treatment Plan of Care Effective Dates:  1/15/19 to 4/17/19  Total Treatment Billable Duration:  53 minutes  PT Patient Time In/Time Out  Time In: 1100  Time Out: [facility], DPT    Future Appointments   Date Time Provider Kathe Ortez   3/21/2019 11:00 AM AMANDA Barrera [facility]   3/26/2019 11:00 AM AMANDA Barrera   3/28/2019 11:00 AM Carmel Hale DPT [facility]

## 2019-03-21 ENCOUNTER — HOSPITAL ENCOUNTER (OUTPATIENT)
Dept: PHYSICAL THERAPY | Age: 54
Discharge: HOME | End: 2019-03-21
Payer: MEDICARE

## 2019-03-21 PROCEDURE — 97110 THERAPEUTIC EXERCISES: CPT

## 2019-03-21 NOTE — PROGRESS NOTES
Astrid Grayson : 1965 Primary: [employer]* Secondary:  Therapy Center at [address], [address] [phone]   [phone] OUTPATIENT PHYSICAL THERAPY: Daily Treatment Note 3/21/2019 Pre-treatment Symptoms/Complaints:  Patient reports he is going to his MD this afternoon about his stomach. Pain: Initial:   0/10  Post Session:  0/10 Medications Last Reviewed:  3/21/2019 Updated Objective Findings:  poor wound incision healing in abdomen. MD following TREATMENT:  
 
Therapeutic Exercise: ( 53 minutes):  Exercises per grid below to improve mobility, strength, balance and muscle endurance.  Required moderate visual, verbal and tactile cues to promote proper body posture and promote proper body mechanics.  Progressed complexity of movement as indicated. 
  Date: 
3/14/19 Date: 
3/18/19 Date: 
3/21/19 Activity/Exercise Parameters Parameters Parameters Bike for muscle endurance X 8 minutes level 6.5  X 8 minutes level 6 X 8 minutes level 6 Standing heel raises X 30 reps with 3# on blue foam  X 30 reps Standing hip abduction X 30 reps with 3# on blue foam    
Standing hip flexion  X 30 reps with 3# on blue foam    
Calf stretch on incline board 2 x 30 sec hold 2 x 30 sec hold 2 x 30 sec hold Supine SLR  3 x 10 reps B with  2 x 15 reps B Sidelying SLR  3 x 10 reps B with  2 x 15 reps B  
briges   2 x 15 reps Bird dogs   2 x 10 reps with 3 sec hold Seated HS stretch  2 x 30 sec B Standing hip extension X 30 reps with 3# on blue foam X 30 reps B with 3# on blue foam X 30 reps B with 3# Sit to stand  X 10 reps from low mat Step ups 8\" step 2 x 15 reps B  8\" step x 15 reps B with 3# Tandem stance on blue foam 2 x 30 sec B on foam   2 x 30 sec B on blue foam with head turns Lateral dips off step  2 x 15 reps B 6\" step   
marching On blue foam with 3# weight x 30 reps  With horizontal hip abduction 2 x 30'  With horizontal hip abduction 2 x 30'   
shuttle 2 x 15 reps with 5 bands  2 x 15 reps single leg with 5 bands Resisted walking Lateral gait 2 x 30' with red band resistance  2 x 30' B with orange band resistance   
rockerboard Controlled rocking and static hold x 60 sec each direction Static hold 2 x 1 min hold each way Static hold 2 x 1 min hold each way  
  
  
[facility] Portal 
Treatment/Session Summary: · Response to Treatment:  patient did well with new exercises today but continues to have fatigue. he continues to benefit from skilled PT to improve functional mobility. Dannie Damon · Communication/Consultation:  progress note done today · Equipment provided today:  None today · Recommendations/Intent for next treatment session: Next visit will focus on continued strength and endurance. Treatment Plan of Care Effective Dates:  1/15/19 to 4/17/19 Total Treatment Billable Duration:  53 minutes PT Patient Time In/Time Out Time In: 1100 Time Out: 1155 Sagar Hernandez DPT Future Appointments Date Time Provider Kathe Ortez 3/26/2019 11:00 AM Nitza Fragoso DPT [facility]  
3/28/2019 11:00 AM Nitza Fragoso DPT [facility]  
4/2/2019 11:00 AM Nitza Fragoso DPT [facility]  
4/4/2019  3:00 PM Nitza Fragoso DPT [facility]  
4/11/2019  1:00 PM Nitza Fragoso DPT [facility]  
4/16/2019 11:00 AM Pinky Johnson DPT [facility]

## 2019-03-26 ENCOUNTER — HOSPITAL ENCOUNTER (OUTPATIENT)
Dept: PHYSICAL THERAPY | Age: 54
Discharge: HOME | End: 2019-03-26
Payer: MEDICARE

## 2019-03-26 PROCEDURE — 97110 THERAPEUTIC EXERCISES: CPT

## 2019-03-26 NOTE — PROGRESS NOTES
Sonia Villagomez : 1965 Primary: [employer]* Secondary:  Therapy Center at [address] [phone]   [phone] OUTPATIENT PHYSICAL THERAPY: Daily Treatment Note 3/26/2019 Pre-treatment Symptoms/Complaints:  Patient says he wants to do more arm exercises today. Feels like his shoulders are weak. Pain: Initial:   0/10  Post Session:  0/10 Medications Last Reviewed:  3/26/2019 Updated Objective Findings:  None Today TREATMENT:  
 
Therapeutic Exercise: ( 53 minutes):  Exercises per grid below to improve mobility, strength, balance and muscle endurance.  Required moderate visual, verbal and tactile cues to promote proper body posture and promote proper body mechanics.  Progressed complexity of movement as indicated. 
  Date: 
3/18/19 Date: 
3/21/19 Date: 
3/26/18 Activity/Exercise Parameters Parameters Parameters Bike for muscle endurance X 8 minutes level 6 X 8 minutes level 6 X 8 minutes level 6 Standing heel raises  X 30 reps Standing hip abduction Standing hip flexion Calf stretch on incline board 2 x 30 sec hold 2 x 30 sec hold 2 x 30 sec hold Supine SLR 3 x 10 reps B with  2 x 15 reps B 2 x 15 reps B Sidelying SLR 3 x 10 reps B with  2 x 15 reps B 2 x 15 reps B  
briges  2 x 15 reps 2 x 15 reps Bird dogs  2 x 10 reps with 3 sec hold 2 x 10 reps with 3 sec hold Seated HS stretch 2 x 30 sec B Standing hip extension X 30 reps B with 3# on blue foam X 30 reps B with 3# Sit to stand X 10 reps from low mat Step ups  8\" step x 15 reps B with 3# Tandem stance on blue foam  2 x 30 sec B on blue foam with head turns Lateral dips off step 2 x 15 reps B 6\" step    
marching With horizontal hip abduction 2 x 30'  With horizontal hip abduction 2 x 30'    
shuttle  2 x 15 reps single leg with 5 bands 2 x 15 reps single leg with 5 bands Resisted walking Lateral gait 2 x 30' B with orange band resistance  2 x 30' with orange band  
rockerboard Static hold 2 x 1 min hold each way Static hold 2 x 1 min hold each way Static hold x 60 sec each way With squats x 10 reps each way Supine shoulder flexion with wand   2 x 10 reps with 4# Sidelying shoulder ER   2 x 10 reps with 2#  
rows   2 x 10 reps with red band  
  
  
[facility] Portal 
Treatment/Session Summary: · Response to Treatment:  patient was educated that he needs to start looking into a home program.  he verbalized understanding but needs continued encouragment. . 
· Communication/Consultation:  progress note done today · Equipment provided today:  None today · Recommendations/Intent for next treatment session: Next visit will focus on continued strength and endurance. Treatment Plan of Care Effective Dates:  1/15/19 to 4/17/19 Total Treatment Billable Duration:  53 minutes PT Patient Time In/Time Out Time In: 1100 Time Out: 1155 Uri Sales DPT Future Appointments Date Time Provider Kathe Ortez 3/28/2019 11:00 AM Zev Boggs DPT [facility]  
4/1/2019  9:30 AM Kesha Gay RN [facility]  
4/2/2019 11:00 AM Erik Eduardo DPT [facility]  
4/4/2019  3:00 PM AMANDA Doyle [facility]  
4/11/2019  1:00 PM AMANDA Doyle [facility]  
4/16/2019 11:00 AM Erik Eduardo DPT [facility]

## 2019-03-28 ENCOUNTER — HOSPITAL ENCOUNTER (OUTPATIENT)
Dept: PHYSICAL THERAPY | Age: 54
Discharge: HOME | End: 2019-03-28
Payer: MEDICARE

## 2019-03-28 NOTE — PROGRESS NOTES
Tereza Mariscal : 1965 Primary: [employer]* Secondary:  Therapy Center at [facility] [address], [address] [phone]   [phone] OUTPATIENT DAILY NOTE 
 
NAME/AGE/GENDER: Tereza Mariscal is a 48 y.o. male. DATE: 3/28/2019 Patient cancelled for appointment today due to having to deal with his abdominal wound. Will plan to follow up on next scheduled visit.  
 
Kamille Sanchez DPT

## 2019-04-01 ENCOUNTER — HOSPITAL ENCOUNTER (OUTPATIENT)
Dept: WOUND CARE | Age: 54
Discharge: HOME | End: 2019-04-01
Attending: SURGERY

## 2019-04-01 VITALS
RESPIRATION RATE: 18 BRPM | DIASTOLIC BLOOD PRESSURE: 64 MMHG | TEMPERATURE: 97.9 F | HEART RATE: 65 BPM | SYSTOLIC BLOOD PRESSURE: 122 MMHG | HEIGHT: 72 IN | WEIGHT: 177.6 LBS | BODY MASS INDEX: 24.06 KG/M2 | OXYGEN SATURATION: 99 %

## 2019-04-01 NOTE — WOUND CARE
Patient in [address] [facility],[address] for nonhealing surgical wound. Surgery was done at [address] in October. Green sutures noted to be protruding from umbilical wound. Dr. Latoya Meyer recommend patient follow up with surgeon for at [address] for further wound care/surgical follow up.

## 2019-04-01 NOTE — WOUND CARE
Luis is a 45-year-old with cirrhosis and ascites. he is followed by 2 transplant centers for alcoholic hepatitis and has not yet qualified for transplant. He has had several operations on an umbilical hernia the last being in October or November 2018 when some infected mesh was removed and the wound was closed with 0 Dacron. He now has an incision which exhibits multiple suture granulomas. We were told that he was being followed by [place] transplant unit in [facility] of [facility] but after I talked with Dr. Viviane Andrade, I found out that he is not not being followed at this point for a transplant andthey would like for us to care for his wound until it is healed. I discussed this with theunit manager Ms. Steffani Litten and we will call him back in an care for this wound and a routine fashion. He'll be seen again after I get back from a trip in 3 weeks.

## 2019-04-02 ENCOUNTER — HOSPITAL ENCOUNTER (OUTPATIENT)
Dept: PHYSICAL THERAPY | Age: 54
Discharge: HOME | End: 2019-04-02
Payer: MEDICARE

## 2019-04-02 PROCEDURE — 97110 THERAPEUTIC EXERCISES: CPT

## 2019-04-02 NOTE — PROGRESS NOTES
Marco Antonio Reyes : 1965 Primary: [employer]* Secondary:  Therapy Center at [address] [phone]   [phone] OUTPATIENT PHYSICAL THERAPY: Daily Treatment Note 2019 Pre-treatment Symptoms/Complaints:  Patient reports abdominal pain and generalized body aches. Says he can't move the way he use to move. He wants to avoid any exercise that he feels contraction of his abdominals Pain: Initial:   0/10  Post Session:  0/10 Medications Last Reviewed:  2019 Updated Objective Findings:  None Today TREATMENT:  
 
Therapeutic Exercise: ( 53 minutes):  Exercises per grid below to improve mobility, strength, balance and muscle endurance.  Required moderate visual, verbal and tactile cues to promote proper body posture and promote proper body mechanics.  Progressed complexity of movement as indicated. 
  Date: 
3/21/19 Date: 
3/26/18 Date: 
19 Activity/Exercise Parameters Parameters Parameters Bike for muscle endurance X 8 minutes level 6 X 8 minutes level 6 X 8 minutes level 6 Standing heel raises X 30 reps   X 30 reps Standing hip abduction Standing hip flexion Calf stretch on incline board 2 x 30 sec hold 2 x 30 sec hold 2 x 30 sec hold Supine SLR 2 x 15 reps B 2 x 15 reps B 2 x 15 reps B Sidelying SLR 2 x 15 reps B 2 x 15 reps B 2 x 15 reps B Bridges  2 x 15 reps 2 x 15 reps 2 x 15 reps B Bird dogs 2 x 10 reps with 3 sec hold 2 x 10 reps with 3 sec hold Seated HS stretch Standing hip extension X 30 reps B with 3# Sit to stand Step ups 8\" step x 15 reps B with 3# Tandem stance on blue foam 2 x 30 sec B on blue foam with head turns Lateral dips off step     
marching With horizontal hip abduction 2 x 30'     
shuttle 2 x 15 reps single leg with 5 bands 2 x 15 reps single leg with 5 bands 2 x 15 reps single leg with 5 bands Resisted walking Lateral gait  2 x 30' with orange band 2 x 30' with orange band  
rockerboard Static hold 2 x 1 min hold each way Static hold x 60 sec each way With squats x 10 reps each way Static hold x 60 sec each way With squats x 10 reps each way Supine shoulder flexion with wand  2 x 10 reps with 4# Sidelying shoulder ER  2 x 10 reps with 2# 2 x 15 reps with 3#  
rows  2 x 10 reps with red band 2 x 15 reps with green band Supine punches   2 x 15 reps B with 3# Shoulder flexion in sitting   2 x 15 reps to 90 ° with 3#   
  
  
[facility] Portal 
Treatment/Session Summary: · Response to Treatment:  exercises were modified today to decrease abdominal activation. patient was told that we could hold therapy until his abdominal wound was improving in order to maximize his rehab potential.  he is going to talk with his MD and decide. . 
· Communication/Consultation:  progress note done today · Equipment provided today:  None today · Recommendations/Intent for next treatment session: Next visit will focus on continued strength and endurance. Treatment Plan of Care Effective Dates:  1/15/19 to 4/17/19 Total Treatment Billable Duration:  53 minutes PT Patient Time In/Time Out Time In: 1100 Time Out: 1155 Joselyn Tuttle DPT Future Appointments Date Time Provider Kathe Ortez 4/4/2019  3:00 PM Osvaldo Hernandez DPT [facility]  
4/11/2019  1:00 PM Osvaldo Hernandez DPT [facility]  
4/16/2019 11:00 AM Nils Easley DPT [facility]

## 2019-04-04 ENCOUNTER — HOSPITAL ENCOUNTER (OUTPATIENT)
Dept: PHYSICAL THERAPY | Age: 54
Discharge: HOME | End: 2019-04-04
Payer: MEDICARE

## 2019-04-04 PROCEDURE — 97110 THERAPEUTIC EXERCISES: CPT

## 2019-04-04 NOTE — PROGRESS NOTES
Thalia Valle : 1965 Primary: [employer]* Secondary:  Therapy Center at [address] [phone]   [phone] OUTPATIENT PHYSICAL THERAPY: Daily Treatment Note 2019 Pre-treatment Symptoms/Complaints: Patient reports doing well over all. Still soreness with R shoulder abduction/ER. Pain: Initial:   0/10  Post Session:  0/10 Medications Last Reviewed:  2019 Updated Objective Findings:  None Today TREATMENT:  
 
Therapeutic Exercise: ( 53 minutes):  Exercises per grid below to improve mobility, strength, balance and muscle endurance.  Required moderate visual, verbal and tactile cues to promote proper body posture and promote proper body mechanics.  Progressed complexity of movement as indicated. 
  Date: 
3/26/18 Date: 
19 Date 19 Activity/Exercise Parameters Parameters Parameters Bike for muscle endurance X 8 minutes level 6 X 8 minutes level 6 X 8 minutes level 6 Standing heel raises  X 30 reps Single leg 2 x 15 reps Standing hip abduction Standing hip flexion Calf stretch on incline board 2 x 30 sec hold 2 x 30 sec hold 2 x 30 sec hold Supine SLR 2 x 15 reps B 2 x 15 reps B 2 x 15 reps B Sidelying SLR 2 x 15 reps B 2 x 15 reps B 2 x 15 reps B Bridges  2 x 15 reps 2 x 15 reps B  2 x 15 reps Bird dogs 2 x 10 reps with 3 sec hold Seated HS stretch Standing hip extension Sit to stand Step ups Tandem stance on blue foam     
Lateral dips off step     
marching     
shuttle 2 x 15 reps single leg with 5 bands 2 x 15 reps single leg with 5 bands 2 x 15 reps single leg 5 bands Resisted walking Lateral gait 2 x 30' with orange band 2 x 30' with orange band 4 x 20' with orange band  
rockerboard Static hold x 60 sec each way With squats x 10 reps each way Static hold x 60 sec each way With squats x 10 reps each way Static hold 2 x 60 sec each way Supine shoulder flexion with wand 2 x 10 reps with 4# Sidelying shoulder ER 2 x 10 reps with 2# 2 x 15 reps with 3# 2 x 15 reps B with 3#  
rows 2 x 10 reps with red band 2 x 15 reps with green band 2 x 15 reps with red band Supine punches  2 x 15 reps B with 3# 2 x 15 reps B with 3# Shoulder flexion in sitting  2 x 15 reps to 90 ° with 3#  2 x 15 reps to 90 ° with 3#  
  
  
[facility] Portal 
Treatment/Session Summary: · Response to Treatment:  still limited exercsies due to abdominal discomfort. demonstaing slow progress towards goals. · Communication/Consultation:  progress note done today · Equipment provided today:  None today · Recommendations/Intent for next treatment session: Next visit will focus on continued strength and endurance. Treatment Plan of Care Effective Dates:  1/15/19 to 4/17/19 Total Treatment Billable Duration:  53 minutes PT Patient Time In/Time Out Time In: 1400 Time Out: 4012 Shashi Zapata DPT Future Appointments Date Time Provider Kathe Ortez 4/11/2019  1:00 PM Britt Jackson DPT [facility]  
4/16/2019 11:00 AM Mindi Conner DPT [facility]

## 2019-04-09 ENCOUNTER — HOSPITAL ENCOUNTER (OUTPATIENT)
Dept: PHYSICAL THERAPY | Age: 54
Discharge: HOME | End: 2019-04-09
Payer: MEDICARE

## 2019-04-09 PROCEDURE — 97110 THERAPEUTIC EXERCISES: CPT

## 2019-04-09 NOTE — PROGRESS NOTES
Taina Mejia : 1965 Primary: [employer]* Secondary:  Therapy Center at [address] [phone]   [phone] OUTPATIENT PHYSICAL THERAPY: Daily Treatment Note 2019 Pre-treatment Symptoms/Complaints: Patient reports no pain at rest but significant R shoulder pain when reaching behind his head. Tries to recreate this pain today but is unable to. Pain: Initial:   0/10  Post Session:  0/10 Medications Last Reviewed:  2019 Updated Objective Findings:  pain with internal rotation of the R shoulder TREATMENT:  
 
Therapeutic Exercise: ( 53 minutes):  Exercises per grid below to improve mobility, strength, balance and muscle endurance.  Required moderate visual, verbal and tactile cues to promote proper body posture and promote proper body mechanics.  Progressed complexity of movement as indicated. 
  Date: 
19 Date 19 Date: 
19 Activity/Exercise Parameters Parameters Parameters Bike for muscle endurance X 8 minutes level 6 X 8 minutes level 6  X 8 minutes level 6 Standing heel raises X 30 reps Single leg 2 x 15 reps Single leg 2 x 15 reps B Standing hip abduction Standing hip flexion Calf stretch on incline board 2 x 30 sec hold 2 x 30 sec hold 2 x 30 sec hold Supine SLR 2 x 15 reps B 2 x 15 reps B 2 x 15 reps B Sidelying SLR 2 x 15 reps B 2 x 15 reps B 2 x 15 reps B Bridges  2 x 15 reps B  2 x 15 reps  2 x 15 reps Bird dogs Seated HS stretch Standing hip extension Sit to stand Step ups Tandem stance on blue foam     
Lateral dips off step     
marching     
shuttle 2 x 15 reps single leg with 5 bands 2 x 15 reps single leg 5 bands 2 x 15 reps single leg 5 bands Resisted walking for shoulder isometrics    X 10 reps with green band 4 ways Lateral gait 2 x 30' with orange band 4 x 20' with orange band 2 x 40' with orange band rockerboard Static hold x 60 sec each way With squats x 10 reps each way Static hold 2 x 60 sec each way Static hold 2 x 60' each direction Supine shoulder flexion with wand Sidelying shoulder ER 2 x 15 reps with 3# 2 x 15 reps B with 3# 2 x 15 reps B with 3#  
rows 2 x 15 reps with green band 2 x 15 reps with red band Supine punches 2 x 15 reps B with 3# 2 x 15 reps B with 3# 2 x 15 reps B with 3# Shoulder flexion in sitting 2 x 15 reps to 90 ° with 3#  2 x 15 reps to 90 ° with 3# 2 x 15 reps to 90 ° with 3#  
  
  
[facility] Portal 
Treatment/Session Summary: · Response to Treatment:  patient reports feeling like he had a good workout today. he verbalized the importance of joining a gym. he continues to benefit from skilled PT to improve functional mobility. Danyell Truong · Communication/Consultation:  None today · Equipment provided today:  None today · Recommendations/Intent for next treatment session: Next visit will focus on continued strength and endurance. Treatment Plan of Care Effective Dates:  1/15/19 to 4/17/19 Total Treatment Billable Duration:  53 minutes PT Patient Time In/Time Out Time In: 9824 Time Out: 1453 Chriss Ivan DPT Future Appointments Date Time Provider Kathe Ortez 4/11/2019  1:00 PM Abilio Feliz DPT [facility]  
4/16/2019 11:00 AM Andrea Conner DPT [facility]

## 2019-04-11 ENCOUNTER — HOSPITAL ENCOUNTER (OUTPATIENT)
Dept: PHYSICAL THERAPY | Age: 54
Discharge: HOME | End: 2019-04-11
Payer: MEDICARE

## 2019-04-11 PROCEDURE — 97110 THERAPEUTIC EXERCISES: CPT

## 2019-04-11 NOTE — PROGRESS NOTES
Mary Lundberg : 1965 Primary: [employer]* Secondary:  Therapy Center at [address] [phone]   [phone] OUTPATIENT PHYSICAL THERAPY: Daily Treatment Note 2019 Pre-treatment Symptoms/Complaints: Patient reports no pain today. Says his stomach was bleeding while he was showering but it has stopped and is closed now. Pain: Initial:   0/10  Post Session:  0/10 Medications Last Reviewed:  2019 Updated Objective Findings:  None Today TREATMENT:  
 
Therapeutic Exercise: ( 53 minutes):  Exercises per grid below to improve mobility, strength, balance and muscle endurance.  Required moderate visual, verbal and tactile cues to promote proper body posture and promote proper body mechanics.  Progressed complexity of movement as indicated. 
  Date 19 Date: 
19 Date: 
19 Activity/Exercise Parameters Parameters Parameters Bike for muscle endurance X 8 minutes level 6  X 8 minutes level 6 X 8 minutes level 6.5 Standing heel raises Single leg 2 x 15 reps Single leg 2 x 15 reps B Single leg 2 x 15 reps B Standing hip abduction Standing hip flexion Calf stretch on incline board 2 x 30 sec hold 2 x 30 sec hold 2 x 60 sec hold Supine SLR 2 x 15 reps B 2 x 15 reps B 2 x 15 reps B Sidelying SLR 2 x 15 reps B 2 x 15 reps B 2 x 15 reps B Bridges  2 x 15 reps  2 x 15 reps  2 x 15 reps Bird dogs Seated HS stretch Standing hip extension Sit to stand Step ups Tandem stance on blue foam     
Lateral dips off step     
marching     
shuttle 2 x 15 reps single leg 5 bands 2 x 15 reps single leg 5 bands 2 x 15 reps single leg, 5 bands Resisted walking for shoulder isometrics   X 10 reps with green band 4 ways X 10 reps with green band 4 ways Lateral gait 4 x 20' with orange band 2 x 40' with orange band 2 x 40' with orange band rockerboard Static hold 2 x 60 sec each way Static hold 2 x 60' each direction Supine shoulder flexion with wand Sidelying shoulder ER 2 x 15 reps B with 3# 2 x 15 reps B with 3# 2 x 15 reps B with 3#  
rows 2 x 15 reps with red band Supine punches 2 x 15 reps B with 3# 2 x 15 reps B with 3# 2 x 15 reps B with 3# Shoulder flexion in sitting 2 x 15 reps to 90 ° with 3# 2 x 15 reps to 90 ° with 3# 2 x 15 reps  To 90 ° with 3#  
  
  
[facility] Portal 
Treatment/Session Summary: · Response to Treatment:  patient with no increased abdominal discomfort during therapy. still fatigued with current exercises. will assess at next visit for possible DC with NITHYA. Myron Dyer · Communication/Consultation:  None today · Equipment provided today:  None today · Recommendations/Intent for next treatment session: Next visit will focus on continued strength and endurance. Treatment Plan of Care Effective Dates:  1/15/19 to 4/17/19 Total Treatment Billable Duration:  53 minutes PT Patient Time In/Time Out Time In: 2843 Time Out: 1356 Kwesi Muñoz DPT Future Appointments Date Time Provider Kathe Ortez 4/16/2019 11:00 AM AMANDA Douglas  
4/24/2019 10:00 AM NAY PHONE APPOINTMENT JAVIER TEE OR PRE A

## 2019-04-16 ENCOUNTER — HOSPITAL ENCOUNTER (OUTPATIENT)
Dept: PHYSICAL THERAPY | Age: 54
Discharge: HOME | End: 2019-04-16
Payer: MEDICARE

## 2019-04-16 NOTE — PROGRESS NOTES
Isrrael Schultz : 1965 Primary: [employer]* Secondary:  Therapy Center at [address] [phone]   [phone] OUTPATIENT DAILY NOTE 
 
NAME/AGE/GENDER: Isrrael Schultz is a 48 y.o. male. DATE: 2019 Patient cancelled for appointment today due to not feeling well. Patient to call back about future appointments. Will plan to follow up on next scheduled visit.  
 
Baltazar Werner DPT

## 2019-04-30 ENCOUNTER — ANESTHESIA EVENT (OUTPATIENT)
Dept: ENDOSCOPY | Age: 54
End: 2019-04-30
Payer: MEDICARE

## 2019-05-01 ENCOUNTER — HOSPITAL ENCOUNTER (OUTPATIENT)
Age: 54
Setting detail: OUTPATIENT SURGERY
Discharge: HOME | End: 2019-05-01
Attending: INTERNAL MEDICINE | Admitting: INTERNAL MEDICINE
Payer: MEDICARE

## 2019-05-01 ENCOUNTER — ANESTHESIA (OUTPATIENT)
Dept: ENDOSCOPY | Age: 54
End: 2019-05-01
Payer: MEDICARE

## 2019-05-01 VITALS
WEIGHT: 170 LBS | BODY MASS INDEX: 23.03 KG/M2 | DIASTOLIC BLOOD PRESSURE: 51 MMHG | HEIGHT: 72 IN | OXYGEN SATURATION: 100 % | SYSTOLIC BLOOD PRESSURE: 100 MMHG | RESPIRATION RATE: 16 BRPM | HEART RATE: 73 BPM | TEMPERATURE: 98.6 F

## 2019-05-01 PROCEDURE — 74011250636 HC RX REV CODE- 250/636: Performed by: ANESTHESIOLOGY

## 2019-05-01 PROCEDURE — 88305 TISSUE EXAM BY PATHOLOGIST: CPT

## 2019-05-01 PROCEDURE — 74011250636 HC RX REV CODE- 250/636

## 2019-05-01 PROCEDURE — 77030021593 HC FCPS BIOP ENDOSC BSC -A: Performed by: INTERNAL MEDICINE

## 2019-05-01 PROCEDURE — 76060000031 HC ANESTHESIA FIRST 0.5 HR: Performed by: INTERNAL MEDICINE

## 2019-05-01 PROCEDURE — 76040000025: Performed by: INTERNAL MEDICINE

## 2019-05-01 PROCEDURE — 88312 SPECIAL STAINS GROUP 1: CPT

## 2019-05-01 RX ORDER — LIDOCAINE HYDROCHLORIDE 20 MG/ML
INJECTION, SOLUTION EPIDURAL; INFILTRATION; INTRACAUDAL; PERINEURAL AS NEEDED
Status: DISCONTINUED | OUTPATIENT
Start: 2019-05-01 | End: 2019-05-01 | Stop reason: HOSPADM

## 2019-05-01 RX ORDER — SODIUM CHLORIDE, SODIUM LACTATE, POTASSIUM CHLORIDE, CALCIUM CHLORIDE 600; 310; 30; 20 MG/100ML; MG/100ML; MG/100ML; MG/100ML
75 INJECTION, SOLUTION INTRAVENOUS CONTINUOUS
Status: DISCONTINUED | OUTPATIENT
Start: 2019-05-01 | End: 2019-05-01 | Stop reason: HOSPADM

## 2019-05-01 RX ORDER — PROPOFOL 10 MG/ML
INJECTION, EMULSION INTRAVENOUS AS NEEDED
Status: DISCONTINUED | OUTPATIENT
Start: 2019-05-01 | End: 2019-05-01 | Stop reason: HOSPADM

## 2019-05-01 RX ORDER — DEXTROMETHORPHAN/PSEUDOEPHED 2.5-7.5/.8
20 DROPS ORAL
Status: DISCONTINUED | OUTPATIENT
Start: 2019-05-01 | End: 2019-05-01 | Stop reason: HOSPADM

## 2019-05-01 RX ADMIN — PROPOFOL 30 MG: 10 INJECTION, EMULSION INTRAVENOUS at 12:06

## 2019-05-01 RX ADMIN — PROPOFOL 80 MG: 10 INJECTION, EMULSION INTRAVENOUS at 12:04

## 2019-05-01 RX ADMIN — SODIUM CHLORIDE, SODIUM LACTATE, POTASSIUM CHLORIDE, AND CALCIUM CHLORIDE 75 ML/HR: 600; 310; 30; 20 INJECTION, SOLUTION INTRAVENOUS at 11:17

## 2019-05-01 RX ADMIN — PROPOFOL 40 MG: 10 INJECTION, EMULSION INTRAVENOUS at 12:08

## 2019-05-01 RX ADMIN — LIDOCAINE HYDROCHLORIDE 50 MG: 20 INJECTION, SOLUTION EPIDURAL; INFILTRATION; INTRACAUDAL; PERINEURAL at 12:04

## 2019-05-01 NOTE — DISCHARGE INSTRUCTIONS
Gastrointestinal Esophagogastroduodenoscopy (EGD)/ Endoscopic Ultrasound(EUS)- Upper Exam Discharge Instructions    1. Call Dr. Carl Watson  for any problems or questions. 2. Contact the doctor's office for follow up appointment as directed. 3. Medication may cause drowsiness for several hours, therefore, do not drive or operate machinery for remainder of the day. 4. No alcohol today. 5. Do not make any important decisions such as signing legal paperwork. 6. Ordinarily, you may resume regular diet and activity after exam unless otherwise specified by your physician. 7. For mild soreness in your throat you may use Cepacol throat lozenges or warm  salt-water gargles as needed. Any additional instructions:   Dr. Reilly Leal office will call you with the results of the pathology within the week. If you do not hear from the office within a week, call the office and ask about your results. Avoid NSAID drugs such as ibuprofen, aleve, naproxen, advil, ect. Repeat EGD in 2 years. Instructions given to Andrew Conteh and other family members.

## 2019-05-01 NOTE — PROCEDURES
PROCEDURE: Esophagogastroduodenoscopy with biopsy    ENDOSCOPIST: Dominique Jackson M.D. PREOPERATIVE DIAGNOSIS: Portal hypertension    POSTOPERATIVE DIAGNOSIS: duodenal ulcers    INSTRUMENTS: [id number]    SEDATION: MAC    DESCRIPTION: After informed consent was obtained, the patient was taken to the endoscopy suite and placed in the left lateral decubitus position. Intravenous sedation was administered as above and posterior pharynx was anesthetized with local anesthetic spray. After adequate sedation had been achieved the endoscope was inserted over the tongue and through the posterior pharynx under direct visualization down the esophagus to the stomach and into the second portion of the duodenum. The endoscopic was withdrawn from that point, performing a careful survey of the mucosa. Retroflexion was performed in the gastric fundus. FINDINGS:  Esophagus: Normal.  No varices    Stomach:  Normal.  Biopsies from antrum and body for H.pylori    Duodenum: Two tiny ulcers of bulb, clean-based. D2 and D3 are normal    Estimated blood loss:  0 cc-minimal    IMPRESSION: No varices or PHG    PLAN: Avoid NSAIDs. Start PPI.   EGD 2 years for variceal screening if not transplanted first.      Patrick Cheadle, MD

## 2019-05-01 NOTE — H&P
Gastroenterology Outpatient History and Physical 
 
Patient: Josselin Alcaraz Physician: Dominique Jackson MD 
 
Vital Signs: Blood pressure 107/56, pulse (!) 56, temperature 98 °F (36.7 °C), resp. rate 15, height 6' (1.829 m), weight 77.1 kg (170 lb), SpO2 98 %. Allergies: Allergies Allergen Reactions  Tramadol Nausea and Vomiting Pt. states Chief Complaint: portal hypertension History of Present Illness: Known cirrhosis Justification for Procedure: Evaluate for varices History: 
Past Medical History:  
Diagnosis Date  Cirrhosis of liver (HCC)   
 undeterminable cause  Multiple fractures of ribs of both sides Multiple, bilateral w/o pneumo:  Past Surgical History:  
Procedure Laterality Date  HX HERNIA REPAIR    
 abd  
 HX KNEE ARTHROSCOPY    HX OTHER SURGICAL    
 liver bx Social History Socioeconomic History  Marital status: SINGLE Spouse name: Not on file  Number of children: Not on file  Years of education: Not on file  Highest education level: Not on file Tobacco Use  Smoking status: Former Smoker Packs/day: 0.50 Years: 4.00 Pack years: 2.00 Types: Cigarettes Start date: 9/10/2010 Last attempt to quit: 2018 Years since quittin.0  Smokeless tobacco: Never Used Substance and Sexual Activity  Alcohol use: No  
 Drug use: Yes Types: Marijuana Social History Narrative The patient is . He has a  for a food . He has no known exposure to TB. He has always lived in this general area. Family History Problem Relation Age of Onset  No Known Problems Mother  No Known Problems Father Medications:  
Prior to Admission medications Medication Sig Start Date End Date Taking? Authorizing Provider  
potassium chloride SR (KLOR-CON 10) 10 mEq tablet Take 20 mEq by mouth two (2) times a day.    Yes Provider, Historical  
 aMILoride (MIDAMOR) 5 mg tablet Take 30 mg by mouth daily. Yes Provider, Historical  
torsemide (DEMADEX) 20 mg tablet Take 40 mg by mouth daily. Yes Provider, Historical  
 
 
Physical Exam:  
General: alert, no distress HEENT: Head: Normocephalic, no lesions, without obvious abnormality. Heart: regular rate and rhythm, S1, S2 normal, no murmur, click, rub or gallop Lungs: chest clear, no wheezing, rales, normal symmetric air entry Abdominal: Bowel sounds are normal, liver is not enlarged, spleen is not enlarged Neurological: Grossly normal  
Extremities: extremities normal, atraumatic, no cyanosis or edema Findings/Diagnosis: Portal HTN Plan of Care/Planned Procedure: EGD

## 2019-05-01 NOTE — ANESTHESIA PREPROCEDURE EVALUATION
Relevant Problems No relevant active problems Anesthetic History No history of anesthetic complications Review of Systems / Medical History Patient summary reviewed and pertinent labs reviewed Pulmonary Within defined limits Neuro/Psych Within defined limits Cardiovascular Exercise tolerance: >4 METS 
  
GI/Hepatic/Renal 
  
 
 
 
Liver disease Comments: ETOH cirrhoses; portal htn, s/p portacaval shunt 2018? Endo/Other Other Findings Physical Exam 
 
Airway Mallampati: I 
TM Distance: > 6 cm Neck ROM: normal range of motion Mouth opening: Normal 
 
 Cardiovascular Rhythm: regular Dental 
No notable dental hx Pulmonary Abdominal 
GI exam deferred Other Findings Anesthetic Plan ASA: 3 Anesthesia type: total IV anesthesia Induction: Intravenous Anesthetic plan and risks discussed with: Patient

## 2019-05-01 NOTE — ROUTINE PROCESS
Discharge instructions reviewed with pt and family who verbalized understanding. Pt. Discharged to car with family. Vital signs stable. Able to tolerate PO fluids. Passing gas.  Seen by MD.

## 2019-05-01 NOTE — ANESTHESIA POSTPROCEDURE EVALUATION
Procedure(s): ESOPHAGOGASTRODUODENOSCOPY (EGD)/ 24 
ESOPHAGOGASTRODUODENAL (EGD) BIOPSY. total IV anesthesia Anesthesia Post Evaluation Multimodal analgesia: multimodal analgesia not used between 6 hours prior to anesthesia start to PACU discharge Patient location during evaluation: PACU Patient participation: complete - patient participated Level of consciousness: awake Pain management: adequate Airway patency: patent Anesthetic complications: no 
Cardiovascular status: acceptable Respiratory status: acceptable Hydration status: acceptable Post anesthesia nausea and vomiting:  none Vitals Value Taken Time /58 5/1/2019 12:44 PM  
Temp Pulse 73 5/1/2019 12:48 PM  
Resp 17 5/1/2019 12:37 PM  
SpO2 99 % 5/1/2019 12:48 PM  
Vitals shown include unvalidated device data.

## 2019-05-14 NOTE — PROGRESS NOTES
I am accessing Mr. Singh Meter chart as a part of our department's internal chart auditing process. I certify that Mr. Mary Kay Nuñez is, or was, a patient in our department. Thank you, Roni Foley, PT 
5/14/2019

## 2019-06-04 NOTE — THERAPY EVALUATION
Lidia Padron  : 1965  Primary: [address] [facility]*  Secondary:  [address] at [address] [place] [address], [address] [place] [address]  [phone]   [phone]         OUTPATIENT PHYSICAL THERAPY:Discontinuation Summary 2019    ICD-10: Treatment Diagnosis: Difficulty in walking, not elsewhere classified (R26.2)  Muscle weakness (generalized) (M62.81)  Precautions/Allergies:   Tramadol   MD Orders: needs strengthening, eval and treat MEDICAL/REFERRING DIAGNOSIS:  Muscle wasting and atrophy, not elsewhere classified, unspecified site [M62.50]   DATE OF ONSET: progressive weakness over 3 years  REFERRING PHYSICIAN: Ascencion Alicia MD  RETURN PHYSICIAN APPOINTMENT: unknown      ASSESSMENT (Date: 3/14/19):  Lidia Padron has been seen in physical therapy from 1/15/19 to 19 for 14 visits. Treatment has been discontinued at this time due to patient not returning for his last scheduled visit and his POC expiring. .  The below goals were met prior to discontinuation. Some goals were not met due to not being reassessed prior to DC. Thank you for this referral.            PROBLEM LIST (Impacting functional limitations):  1. Decreased Strength  2. Decreased ADL/Functional Activities  3. Decreased Ambulation Ability/Technique  4. Decreased Balance  5. Decreased Activity Tolerance  6. Decreased [place] with Home Exercise Program INTERVENTIONS PLANNED:  1. Balance Exercise  2. Cold  3. Home Exercise Program (HEP)  4. Manual Therapy  5. Therapeutic Activites  6. Therapeutic Exercise/Strengthening  7. Transfer Training   TREATMENT PLAN:  Effective Dates: 1/15/2019 TO 2019 (90 days). Frequency/Duration: 2 times a week for 90 Days  GOALS: (Goals have been discussed and agreed upon with patient.)  Discharge Goals: Time Frame: 90 days  1. Patient will be independent with HEP.  NOT MET  2.  Patient will have an increase on the LEFS to 40/80 indicating improved ability to complete ADLs and IADLs. NOT MET  3. Patient will report being able to ambulate for 30 minutes for exercises with no rest breaks in order to improve his endurance. NOT MET  4. Patient will demonstrate tandem stance x 30 seconds bilaterally indicating improved standing balance. Partially MET      Outcome Measure: Tool Used: Lower Extremity Functional Scale (LEFS)  Score:  Initial: 24/80 Most Recent: 32/80 (Date: 3/14/19 )   Interpretation of Score: 20 questions each scored on a 5 point scale with 0 representing \"extreme difficulty or unable to perform\" and 4 representing \"no difficulty\". The lower the score, the greater the functional disability. 80/80 represents no disability. Minimal detectable change is 9 points. UPDATED OBJECTIVE FINDINGS:    · Improved gait speed and gait endurance  · Able to tandem stance for 30 sec with L foot forward but not R  · Able to standing without UE support    ·       Total Treatment Duration:  PT Patient Time In/Time Out  Time In: 1355  Time Out: 1451       Rehabilitation Potential For Stated Goals: Good  Regarding Elyse Krishnamurthy's therapy, I certify that the treatment plan above will be carried out by a therapist or under their direction.   Thank you for this referral,  Andrew Ballard DPT

## 2019-11-06 PROBLEM — K74.69 OTHER CIRRHOSIS OF LIVER (HCC): Status: ACTIVE | Noted: 2019-11-06

## 2019-11-06 PROBLEM — E05.90 HYPERTHYROIDISM: Status: ACTIVE | Noted: 2019-11-06

## 2019-11-06 PROBLEM — E04.1 THYROID NODULE: Status: ACTIVE | Noted: 2019-11-06

## 2020-01-03 PROBLEM — K70.30 ALCOHOLIC CIRRHOSIS (HCC): Status: ACTIVE | Noted: 2019-11-06

## 2020-01-03 PROBLEM — E05.00 GRAVES' DISEASE: Status: ACTIVE | Noted: 2019-11-06

## 2020-03-03 PROBLEM — E05.00 GRAVES' OPHTHALMOPATHY: Status: ACTIVE | Noted: 2020-03-03

## 2020-03-12 ENCOUNTER — APPOINTMENT (OUTPATIENT)
Dept: GENERAL RADIOLOGY | Age: 55
End: 2020-03-12
Attending: EMERGENCY MEDICINE
Payer: MEDICARE

## 2020-03-12 ENCOUNTER — HOSPITAL ENCOUNTER (EMERGENCY)
Age: 55
Discharge: HOME | End: 2020-03-12
Attending: EMERGENCY MEDICINE
Payer: MEDICARE

## 2020-03-12 VITALS
DIASTOLIC BLOOD PRESSURE: 77 MMHG | BODY MASS INDEX: 24.79 KG/M2 | HEART RATE: 83 BPM | RESPIRATION RATE: 16 BRPM | WEIGHT: 183 LBS | SYSTOLIC BLOOD PRESSURE: 145 MMHG | TEMPERATURE: 99.3 F | HEIGHT: 72 IN | OXYGEN SATURATION: 97 %

## 2020-03-12 DIAGNOSIS — B34.9 VIRAL ILLNESS: Primary | ICD-10-CM

## 2020-03-12 LAB
ALBUMIN SERPL-MCNC: 2.8 G/DL (ref 3.5–5)
ALBUMIN/GLOB SERPL: 0.7 {RATIO} (ref 1.2–3.5)
ALP SERPL-CCNC: 99 U/L (ref 50–136)
ALT SERPL-CCNC: 34 U/L (ref 12–65)
ANION GAP SERPL CALC-SCNC: 6 MMOL/L (ref 7–16)
AST SERPL-CCNC: 42 U/L (ref 15–37)
BASOPHILS # BLD: 0 K/UL (ref 0–0.2)
BASOPHILS NFR BLD: 0 % (ref 0–2)
BILIRUB SERPL-MCNC: 4.3 MG/DL (ref 0.2–1.1)
BUN SERPL-MCNC: 20 MG/DL (ref 6–23)
CALCIUM SERPL-MCNC: 8.3 MG/DL (ref 8.3–10.4)
CHLORIDE SERPL-SCNC: 104 MMOL/L (ref 98–107)
CO2 SERPL-SCNC: 24 MMOL/L (ref 21–32)
CREAT SERPL-MCNC: 1.31 MG/DL (ref 0.8–1.5)
DIFFERENTIAL METHOD BLD: ABNORMAL
EOSINOPHIL # BLD: 0.1 K/UL (ref 0–0.8)
EOSINOPHIL NFR BLD: 1 % (ref 0.5–7.8)
ERYTHROCYTE [DISTWIDTH] IN BLOOD BY AUTOMATED COUNT: 15.6 % (ref 11.9–14.6)
FLUAV AG NPH QL IA: NEGATIVE
FLUBV AG NPH QL IA: NEGATIVE
GLOBULIN SER CALC-MCNC: 4.3 G/DL (ref 2.3–3.5)
GLUCOSE SERPL-MCNC: 115 MG/DL (ref 65–100)
HCT VFR BLD AUTO: 34.7 % (ref 41.1–50.3)
HGB BLD-MCNC: 12.6 G/DL (ref 13.6–17.2)
IMM GRANULOCYTES # BLD AUTO: 0 K/UL (ref 0–0.5)
IMM GRANULOCYTES NFR BLD AUTO: 0 % (ref 0–5)
LYMPHOCYTES # BLD: 0.6 K/UL (ref 0.5–4.6)
LYMPHOCYTES NFR BLD: 12 % (ref 13–44)
MCH RBC QN AUTO: 35.7 PG (ref 26.1–32.9)
MCHC RBC AUTO-ENTMCNC: 36.3 G/DL (ref 31.4–35)
MCV RBC AUTO: 98.3 FL (ref 79.6–97.8)
MONOCYTES # BLD: 0.4 K/UL (ref 0.1–1.3)
MONOCYTES NFR BLD: 8 % (ref 4–12)
NEUTS SEG # BLD: 3.7 K/UL (ref 1.7–8.2)
NEUTS SEG NFR BLD: 78 % (ref 43–78)
NRBC # BLD: 0 K/UL (ref 0–0.2)
PLATELET # BLD AUTO: 71 K/UL (ref 150–450)
PMV BLD AUTO: 9.9 FL (ref 9.4–12.3)
POTASSIUM SERPL-SCNC: 3.4 MMOL/L (ref 3.5–5.1)
PROT SERPL-MCNC: 7.1 G/DL (ref 6.3–8.2)
RBC # BLD AUTO: 3.53 M/UL (ref 4.23–5.6)
SODIUM SERPL-SCNC: 134 MMOL/L (ref 136–145)
SPECIMEN SOURCE: NORMAL
WBC # BLD AUTO: 4.7 K/UL (ref 4.3–11.1)

## 2020-03-12 PROCEDURE — 99282 EMERGENCY DEPT VISIT SF MDM: CPT

## 2020-03-12 PROCEDURE — 87804 INFLUENZA ASSAY W/OPTIC: CPT

## 2020-03-12 PROCEDURE — 85025 COMPLETE CBC W/AUTO DIFF WBC: CPT

## 2020-03-12 PROCEDURE — 80053 COMPREHEN METABOLIC PANEL: CPT

## 2020-03-12 PROCEDURE — 71046 X-RAY EXAM CHEST 2 VIEWS: CPT

## 2020-03-12 NOTE — ED PROVIDER NOTES
Patient started yesterday with cough and fever. Spoke with his primary care doctor who sent patient here to be tested for the flu and corona. No note of this in patient's chart. Patient has not had any recent travel. Temp at home was 101. Denies any sputum production. No chest pain or shortness of breath. Mild headache yesterday gone now. No blurry vision. The history is provided by the patient. No  was used. Fever    This is a new problem. The current episode started yesterday. The problem occurs constantly. The problem has not changed since onset. The maximum temperature noted was 101 - 101.9 F. Associated symptoms include headaches (improved now.) and cough. Pertinent negatives include no chest pain, no fussiness, no diarrhea, no vomiting, no congestion, no sore throat, no tugging at ear, no muscle aches, no shortness of breath, no mental status change, no neck pain and no rash. He has tried nothing for the symptoms. Cough   Associated symptoms include headaches (improved now. ). Pertinent negatives include no chest pain, no chills, no eye redness, no rhinorrhea, no sore throat, no shortness of breath, no wheezing, no nausea and no vomiting.         Past Medical History:   Diagnosis Date    Cirrhosis of liver ([address]     Dr. Cooper Orellana ([facility]) and Dr. Norbert Lopez [facility])     Depression     Hypocalcemia     Hypokalemia     Iron deficiency anemia     Multiple fractures of ribs of both sides     Portal hypertension ([address]     Vitamin D deficiency        Past Surgical History:   Procedure Laterality Date    EGD  5/1/2019         HX HERNIA REPAIR  04/2017,4/2018,6/2018,and 9/2018    abd x4    HX KNEE ARTHROSCOPY  2005    HX OTHER SURGICAL  2019    liver bx         Family History:   Problem Relation Age of Onset    No Known Problems Mother     No Known Problems Father        Social History     Socioeconomic History    Marital status:      Spouse name: Not on file    Number of children: Not on file    Years of education: Not on file    Highest education level: Not on file   Occupational History    Not on file   Social Needs    Financial resource strain: Not on file    Food insecurity     Worry: Not on file     Inability: Not on file    Transportation needs     Medical: Not on file     Non-medical: Not on file   Tobacco Use    Smoking status: Former Smoker     Packs/day: 0.50     Years: 4.00     Pack years: 2.00     Types: Cigarettes     Start date: 9/10/2010     Last attempt to quit: 2018     Years since quittin.8    Smokeless tobacco: Never Used   Substance and Sexual Activity    Alcohol use: No    Drug use: Yes     Types: Marijuana    Sexual activity: Not on file   Lifestyle    Physical activity     Days per week: Not on file     Minutes per session: Not on file    Stress: Not on file   Relationships    Social connections     Talks on phone: Not on file     Gets together: Not on file     Attends Sabianism service: Not on file     Active member of club or organization: Not on file     Attends meetings of clubs or organizations: Not on file     Relationship status: Not on file    Intimate partner violence     Fear of current or ex partner: Not on file     Emotionally abused: Not on file     Physically abused: Not on file     Forced sexual activity: Not on file   Other Topics Concern    Not on file   Social History Narrative    The patient is . He has a  for a food . He has no known exposure to TB. He has always lived in this general area. ALLERGIES: Tramadol    Review of Systems   Constitutional: Positive for fever. Negative for chills. HENT: Negative for congestion, rhinorrhea and sore throat. Eyes: Negative for pain and redness. Respiratory: Positive for cough. Negative for chest tightness, shortness of breath and wheezing. Cardiovascular: Negative for chest pain and leg swelling.    Gastrointestinal: Negative for abdominal pain, diarrhea, nausea and vomiting. Genitourinary: Negative for dysuria and hematuria. Musculoskeletal: Negative for back pain, gait problem, neck pain and neck stiffness. Skin: Negative for color change and rash. Neurological: Positive for headaches (improved now. ). Negative for weakness and numbness. Vitals:    03/12/20 1512   BP: 145/77   Pulse: 83   Resp: 16   Temp: 99.3 °F (37.4 °C)   SpO2: 97%   Weight: 83 kg (183 lb)   Height: 6' (1.829 m)            Physical Exam  Constitutional:       Appearance: Normal appearance. He is well-developed. HENT:      Head: Normocephalic and atraumatic. Mouth/Throat:      Mouth: Mucous membranes are moist.      Pharynx: No posterior oropharyngeal erythema. Eyes:      Extraocular Movements: Extraocular movements intact. Pupils: Pupils are equal, round, and reactive to light. Neck:      Musculoskeletal: Normal range of motion and neck supple. Cardiovascular:      Rate and Rhythm: Normal rate and regular rhythm. Pulmonary:      Effort: Pulmonary effort is normal. No respiratory distress. Breath sounds: Normal breath sounds. No wheezing. Abdominal:      General: Bowel sounds are normal.      Palpations: Abdomen is soft. Tenderness: There is no abdominal tenderness. Musculoskeletal: Normal range of motion. General: No swelling. Lymphadenopathy:      Cervical: No cervical adenopathy. Skin:     General: Skin is warm and dry. Neurological:      General: No focal deficit present. Mental Status: He is alert and oriented to person, place, and time. MDM  Number of Diagnoses or Management Options  Diagnosis management comments: Negative and no acute on chest x-ray. Will discharge with viral illness.        Amount and/or Complexity of Data Reviewed  Clinical lab tests: ordered and reviewed  Tests in the radiology section of CPT®: ordered and reviewed    Patient Progress  Patient progress: stable Procedures          XR CHEST PA LAT (Final result)   Result time 03/12/20 15:58:52   Final result by Elmira Mcgill DO (03/12/20 15:58:52)                Impression:    Impression: No active disease in the chest.                Narrative:    Two view chest    History: Fever, nonproductive cough x1 day. Comparison: None. Findings: The heart and mediastinal silhouette are normal in size and  configuration. The lungs and pleural spaces are clear. The pulmonary vascularity  is within normal limits. The visualized osseous structures are unremarkable.                  Results Include:    Recent Results (from the past 24 hour(s))   INFLUENZA A & B AG (RAPID TEST)    Collection Time: 03/12/20  3:13 PM   Result Value Ref Range    Influenza A Ag NEGATIVE  NEG      Influenza B Ag NEGATIVE  NEG      Source Nasopharyngeal     CBC WITH AUTOMATED DIFF    Collection Time: 03/12/20  3:28 PM   Result Value Ref Range    WBC 4.7 4.3 - 11.1 K/uL    RBC 3.53 (L) 4.23 - 5.6 M/uL    HGB 12.6 (L) 13.6 - 17.2 g/dL    HCT 34.7 (L) 41.1 - 50.3 %    MCV 98.3 (H) 79.6 - 97.8 FL    MCH 35.7 (H) 26.1 - 32.9 PG    MCHC 36.3 (H) 31.4 - 35.0 g/dL    RDW 15.6 (H) 11.9 - 14.6 %    PLATELET 71 (L) 190 - 450 K/uL    MPV 9.9 9.4 - 12.3 FL    ABSOLUTE NRBC 0.00 0.0 - 0.2 K/uL    DF AUTOMATED      NEUTROPHILS 78 43 - 78 %    LYMPHOCYTES 12 (L) 13 - 44 %    MONOCYTES 8 4.0 - 12.0 %    EOSINOPHILS 1 0.5 - 7.8 %    BASOPHILS 0 0.0 - 2.0 %    IMMATURE GRANULOCYTES 0 0.0 - 5.0 %    ABS. NEUTROPHILS 3.7 1.7 - 8.2 K/UL    ABS. LYMPHOCYTES 0.6 0.5 - 4.6 K/UL    ABS. MONOCYTES 0.4 0.1 - 1.3 K/UL    ABS. EOSINOPHILS 0.1 0.0 - 0.8 K/UL    ABS. BASOPHILS 0.0 0.0 - 0.2 K/UL    ABS. IMM.  GRANS. 0.0 0.0 - 0.5 K/UL   METABOLIC PANEL, COMPREHENSIVE    Collection Time: 03/12/20  3:28 PM   Result Value Ref Range    Sodium 134 (L) 136 - 145 mmol/L    Potassium 3.4 (L) 3.5 - 5.1 mmol/L    Chloride 104 98 - 107 mmol/L    CO2 24 21 - 32 mmol/L    Anion gap 6 (L) 7 - 16 mmol/L    Glucose 115 (H) 65 - 100 mg/dL    BUN 20 6 - 23 MG/DL    Creatinine 1.31 0.8 - 1.5 MG/DL    GFR est AA >60 >60 ml/min/1.73m2    GFR est non-AA >60 >60 ml/min/1.73m2    Calcium 8.3 8.3 - 10.4 MG/DL    Bilirubin, total 4.3 (H) 0.2 - 1.1 MG/DL    ALT (SGPT) 34 12 - 65 U/L    AST (SGOT) 42 (H) 15 - 37 U/L    Alk.  phosphatase 99 50 - 136 U/L    Protein, total 7.1 6.3 - 8.2 g/dL    Albumin 2.8 (L) 3.5 - 5.0 g/dL    Globulin 4.3 (H) 2.3 - 3.5 g/dL    A-G Ratio 0.7 (L) 1.2 - 3.5

## 2020-03-12 NOTE — DISCHARGE INSTRUCTIONS
Patient Education        Viral Infections: Care Instructions  Your Care Instructions    You don't feel well, but it's not clear what's causing it. You may have a viral infection. Viruses cause many illnesses, such as the common cold, influenza, fever, rashes, and the diarrhea, nausea, and vomiting that are often called \"stomach flu. \" You may wonder if antibiotic medicines could make you feel better. But antibiotics only treat infections caused by bacteria. They don't work on viruses. The good news is that viral infections usually aren't serious. Most will go away in a few days without medical treatment. In the meantime, there are a few things you can do to make yourself more comfortable. Follow-up care is a key part of your treatment and safety. Be sure to make and go to all appointments, and call your doctor if you are having problems. It's also a good idea to know your test results and keep a list of the medicines you take. How can you care for yourself at home? · Get plenty of rest if you feel tired. · Take an over-the-counter pain medicine if needed, such as acetaminophen (Tylenol), ibuprofen (Advil, Motrin), or naproxen (Aleve). Read and follow all instructions on the label. · Be careful when taking over-the-counter cold or flu medicines and Tylenol at the same time. Many of these medicines have acetaminophen, which is Tylenol. Read the labels to make sure that you are not taking more than the recommended dose. Too much acetaminophen (Tylenol) can be harmful. · Drink plenty of fluids, enough so that your urine is light yellow or clear like water. If you have kidney, heart, or liver disease and have to limit fluids, talk with your doctor before you increase the amount of fluids you drink. · Stay home from work, school, and other public places while you have a fever. When should you call for help? Call 911 anytime you think you may need emergency care.  For example, call if:    · You have severe trouble breathing.     · You passed out (lost consciousness).    Call your doctor now or seek immediate medical care if:    · You seem to be getting much sicker.     · You have a new or higher fever.     · You have blood in your stools.     · You have new belly pain, or your pain gets worse.     · You have a new rash.    Watch closely for changes in your health, and be sure to contact your doctor if:    · You start to get better and then get worse.     · You do not get better as expected. Where can you learn more? Go to http://elvia-danielle.info/  Enter L906 in the search box to learn more about \"Viral Infections: Care Instructions. \"  Current as of: January 26, 2020Content Version: 12.4  © 4432-6516 [employer], [employer]. Care instructions adapted under license by [employer] (which disclaims liability or warranty for this information). If you have questions about a medical condition or this instruction, always ask your healthcare professional. [address] any warranty or liability for your use of this information.

## 2020-03-12 NOTE — ED NOTES
I have reviewed discharge instructions with the patient. The patient verbalized understanding. Patient left ED via Discharge Method: ambulatory to Home by self. Opportunity for questions and clarification provided. Patient given 0 scripts. To continue your aftercare when you leave the hospital, you may receive an automated call from our care team to check in on how you are doing. This is a free service and part of our promise to provide the best care and service to meet your aftercare needs.  If you have questions, or wish to unsubscribe from this service please call [phone]. Thank you for Choosing our [facility] Emergency Department.

## 2020-11-03 ENCOUNTER — HOSPITAL ENCOUNTER (OUTPATIENT)
Dept: LAB | Age: 55
Discharge: HOME | End: 2020-11-03
Attending: INTERNAL MEDICINE
Payer: MEDICARE

## 2020-11-03 LAB
ALBUMIN SERPL-MCNC: 3.1 G/DL (ref 3.5–5)
ALBUMIN/GLOB SERPL: 0.8 {RATIO} (ref 1.2–3.5)
ALP SERPL-CCNC: 87 U/L (ref 50–136)
ALT SERPL-CCNC: 19 U/L (ref 12–65)
ANION GAP SERPL CALC-SCNC: 5 MMOL/L (ref 7–16)
AST SERPL-CCNC: 31 U/L (ref 15–37)
BASOPHILS # BLD: 0 K/UL (ref 0–0.2)
BASOPHILS NFR BLD: 0 % (ref 0–2)
BILIRUB SERPL-MCNC: 2.1 MG/DL (ref 0.2–1.1)
BUN SERPL-MCNC: 10 MG/DL (ref 6–23)
CALCIUM SERPL-MCNC: 8.5 MG/DL (ref 8.3–10.4)
CHLORIDE SERPL-SCNC: 111 MMOL/L (ref 98–107)
CO2 SERPL-SCNC: 27 MMOL/L (ref 21–32)
CREAT SERPL-MCNC: 1.02 MG/DL (ref 0.8–1.5)
DIFFERENTIAL METHOD BLD: ABNORMAL
EOSINOPHIL # BLD: 0.2 K/UL (ref 0–0.8)
EOSINOPHIL NFR BLD: 6 % (ref 0.5–7.8)
ERYTHROCYTE [DISTWIDTH] IN BLOOD BY AUTOMATED COUNT: 13.8 % (ref 11.9–14.6)
GLOBULIN SER CALC-MCNC: 3.8 G/DL (ref 2.3–3.5)
GLUCOSE SERPL-MCNC: 60 MG/DL (ref 65–100)
HCT VFR BLD AUTO: 39 % (ref 41.1–50.3)
HGB BLD-MCNC: 13.4 G/DL (ref 13.6–17.2)
IMM GRANULOCYTES # BLD AUTO: 0 K/UL (ref 0–0.5)
IMM GRANULOCYTES NFR BLD AUTO: 0 % (ref 0–5)
INR PPP: 1.3
LYMPHOCYTES # BLD: 0.9 K/UL (ref 0.5–4.6)
LYMPHOCYTES NFR BLD: 25 % (ref 13–44)
MCH RBC QN AUTO: 35.8 PG (ref 26.1–32.9)
MCHC RBC AUTO-ENTMCNC: 34.4 G/DL (ref 31.4–35)
MCV RBC AUTO: 104.3 FL (ref 79.6–97.8)
MONOCYTES # BLD: 0.3 K/UL (ref 0.1–1.3)
MONOCYTES NFR BLD: 7 % (ref 4–12)
NEUTS SEG # BLD: 2.3 K/UL (ref 1.7–8.2)
NEUTS SEG NFR BLD: 62 % (ref 43–78)
NRBC # BLD: 0 K/UL (ref 0–0.2)
PLATELET # BLD AUTO: 75 K/UL (ref 150–450)
PMV BLD AUTO: 10.1 FL (ref 9.4–12.3)
POTASSIUM SERPL-SCNC: 3.4 MMOL/L (ref 3.5–5.1)
PROT SERPL-MCNC: 6.9 G/DL (ref 6.3–8.2)
PROTHROMBIN TIME: 16.8 SEC (ref 12.5–14.7)
RBC # BLD AUTO: 3.74 M/UL (ref 4.23–5.6)
SODIUM SERPL-SCNC: 143 MMOL/L (ref 136–145)
WBC # BLD AUTO: 3.7 K/UL (ref 4.3–11.1)

## 2020-11-03 PROCEDURE — 80053 COMPREHEN METABOLIC PANEL: CPT

## 2020-11-03 PROCEDURE — 36415 COLL VENOUS BLD VENIPUNCTURE: CPT

## 2020-11-03 PROCEDURE — 85610 PROTHROMBIN TIME: CPT

## 2020-11-03 PROCEDURE — 85025 COMPLETE CBC W/AUTO DIFF WBC: CPT

## 2022-03-18 PROBLEM — E05.00 GRAVES' OPHTHALMOPATHY: Status: ACTIVE | Noted: 2020-03-03

## 2022-03-18 PROBLEM — K70.30 ALCOHOLIC CIRRHOSIS (HCC): Status: ACTIVE | Noted: 2019-11-06

## 2022-03-19 PROBLEM — E05.00 GRAVES' DISEASE: Status: ACTIVE | Noted: 2019-11-06

## 2022-03-19 PROBLEM — E04.1 THYROID NODULE: Status: ACTIVE | Noted: 2019-11-06

## 2022-03-20 PROBLEM — K70.31 ALCOHOLIC CIRRHOSIS OF LIVER WITH ASCITES (HCC): Status: ACTIVE | Noted: 2017-09-19

## 2022-05-27 ENCOUNTER — OFFICE VISIT (OUTPATIENT)
Dept: ENDOCRINOLOGY | Age: 57
End: 2022-05-27
Payer: MEDICARE

## 2022-05-27 VITALS
DIASTOLIC BLOOD PRESSURE: 68 MMHG | HEART RATE: 41 BPM | HEIGHT: 72 IN | BODY MASS INDEX: 20.86 KG/M2 | WEIGHT: 154 LBS | SYSTOLIC BLOOD PRESSURE: 118 MMHG | OXYGEN SATURATION: 98 %

## 2022-05-27 DIAGNOSIS — E07.9 THYROID EYE DISEASE: ICD-10-CM

## 2022-05-27 DIAGNOSIS — H57.89 THYROID EYE DISEASE: ICD-10-CM

## 2022-05-27 DIAGNOSIS — E89.0 POSTSURGICAL HYPOTHYROIDISM: Primary | ICD-10-CM

## 2022-05-27 DIAGNOSIS — E89.0 POSTSURGICAL HYPOTHYROIDISM: ICD-10-CM

## 2022-05-27 PROBLEM — E04.1 THYROID NODULE: Status: RESOLVED | Noted: 2019-11-06 | Resolved: 2022-05-27

## 2022-05-27 LAB
T4 FREE SERPL-MCNC: 2.4 NG/DL (ref 0.9–1.8)
TSH W FREE THYROID IF ABNORMAL: 0.01 UIU/ML (ref 0.36–3.74)
TSH, 3RD GENERATION: 0.01 UIU/ML (ref 0.36–3.74)

## 2022-05-27 PROCEDURE — G8420 CALC BMI NORM PARAMETERS: HCPCS | Performed by: INTERNAL MEDICINE

## 2022-05-27 PROCEDURE — 4004F PT TOBACCO SCREEN RCVD TLK: CPT | Performed by: INTERNAL MEDICINE

## 2022-05-27 PROCEDURE — 3017F COLORECTAL CA SCREEN DOC REV: CPT | Performed by: INTERNAL MEDICINE

## 2022-05-27 PROCEDURE — 99214 OFFICE O/P EST MOD 30 MIN: CPT | Performed by: INTERNAL MEDICINE

## 2022-05-27 PROCEDURE — G8427 DOCREV CUR MEDS BY ELIG CLIN: HCPCS | Performed by: INTERNAL MEDICINE

## 2022-05-27 RX ORDER — OXYCODONE HYDROCHLORIDE 5 MG/1
TABLET ORAL
COMMUNITY
Start: 2022-04-29 | End: 2022-05-27

## 2022-05-27 RX ORDER — LEVOTHYROXINE SODIUM 125 MCG
125 TABLET ORAL DAILY
Qty: 30 TABLET | Refills: 11 | Status: SHIPPED | OUTPATIENT
Start: 2022-05-27 | End: 2022-05-31 | Stop reason: DRUGHIGH

## 2022-05-27 RX ORDER — RIFAXIMIN 550 MG/1
TABLET ORAL
COMMUNITY
Start: 2022-04-25

## 2022-05-27 RX ORDER — LEVOTHYROXINE SODIUM 125 MCG
TABLET ORAL
COMMUNITY
Start: 2022-04-29 | End: 2022-05-27 | Stop reason: SDUPTHER

## 2022-05-27 RX ORDER — ALBUTEROL SULFATE 90 UG/1
2 AEROSOL, METERED RESPIRATORY (INHALATION) EVERY 4 HOURS PRN
COMMUNITY
Start: 2020-12-28 | End: 2022-05-27

## 2022-05-27 ASSESSMENT — ENCOUNTER SYMPTOMS
NAUSEA: 0
DIARRHEA: 0
CONSTIPATION: 0
VOMITING: 0

## 2022-05-27 NOTE — PROGRESS NOTES
Ricki Hercules MD, [facility]  [address], [address] hospitals  Esme Higginbotham, [address]  Phone [phone]  Facsimile [phone]          Paloma Dominguez is a 64 y.o. male seen 5/27/2022 for follow up of Graves' disease        ASSESSMENT AND PLAN:    1. Postsurgical hypothyroidism  I will check his thyroid function tests today and let him know if his dose needs to be adjusted. Follow up in 3 months.    - SYNTHROID 125 MCG tablet; Take 1 tablet by mouth Daily  Dispense: 30 tablet; Refill: 11    2. Thyroid eye disease  Followed by Dr. Johnston Edu have encouraged him to stop smoking. Follow-up and Dispositions    · Return in about 3 months (around 8/27/2022), or Fridy afternoon is okay. HISTORY OF PRESENT ILLNESS:    THYROID DISEASE     Presentation/Diagnosis: Graves' disease status post total thyroidectomy 4/28/2022.     Symptoms: See review of systems.       Treatment: Takes name brand in AM correctly. Imaging:  CT orbits 3/6/2020: Findings consistent with thyroid orbitopathy, more pronounced on the right compared to the left. There is bilateral extraocular muscle enlargement.  Within the right orbit, there is increased size of the superior rectus, medial rectus and inferior rectus muscles as well as superior oblique muscle.  There is associated mild proptosis.  Within the left orbit there is also extraocular muscle enlargement in a similar distribution.  Fat is maintained around the optic nerve without obvious compression.     Labs:  2/17/2022: TSH 0.010, free T4 0.81, free T3 3.2. Review of Systems   Constitutional: Positive for fatigue. Negative for diaphoresis. Weight decreased 11 pounds since last visit. Eyes:        Denies proptosis.  His tearing and periorbital edema have improved. Denies eye pain. His vision is stable.  He denies diplopia.  He smokes 4-5 cigarettes per day. Cardiovascular: Negative for palpitations. Gastrointestinal: Negative for constipation, diarrhea, nausea and vomiting. He reports a poor appetite. Endocrine: Negative for cold intolerance and heat intolerance. Neurological: Negative for tremors. Psychiatric/Behavioral: Positive for dysphoric mood. Negative for sleep disturbance. Vital Signs:  /68   Pulse (!) 41   Ht 6' (1.829 m)   Wt 154 lb (69.9 kg)   SpO2 98%   BMI 20.89 kg/m²     Wt Readings from Last 3 Encounters:   05/27/22 154 lb (69.9 kg)   02/17/22 165 lb (74.8 kg)   05/27/21 162 lb (73.5 kg)       Physical Exam  Constitutional:       General: He is not in acute distress. Eyes:      Comments: No proptosis. Periorbital edema improved. Subtle left upper lid ptosis. Neck:      Comments: Thyroidectomy scar. Cardiovascular:      Rate and Rhythm: Normal rate and regular rhythm. Lymphadenopathy:      Cervical: No cervical adenopathy. Neurological:      Motor: No tremor. Orders Placed This Encounter   Procedures    TSH     Standing Status:   Future     Standing Expiration Date:   5/27/2023    T4, Free     Standing Status:   Future     Standing Expiration Date:   5/27/2023    TSH with Reflex     Standing Status:   Future     Standing Expiration Date:   5/27/2023       Current Outpatient Medications   Medication Sig Dispense Refill    XIFAXAN 550 MG tablet       SYNTHROID 125 MCG tablet Take 1 tablet by mouth Daily 30 tablet 11    sildenafil (VIAGRA) 100 MG tablet TAKE 1 TABLET BY MOUTH DAILY AS NEEDED       No current facility-administered medications for this visit.

## 2022-05-31 ENCOUNTER — TELEPHONE (OUTPATIENT)
Dept: ENDOCRINOLOGY | Age: 57
End: 2022-05-31

## 2022-05-31 DIAGNOSIS — E89.0 POSTSURGICAL HYPOTHYROIDISM: Primary | ICD-10-CM

## 2022-05-31 RX ORDER — LEVOTHYROXINE SODIUM 100 MCG
100 TABLET ORAL DAILY
Qty: 30 TABLET | Refills: 11 | Status: SHIPPED | OUTPATIENT
Start: 2022-05-31 | End: 2022-09-02 | Stop reason: SDUPTHER

## 2022-06-01 NOTE — TELEPHONE ENCOUNTER
Patient informed of lab results and to decrease Synthroid to 100mcg daily. Patient expressed understanding.

## 2022-09-02 ENCOUNTER — TELEPHONE (OUTPATIENT)
Dept: ENDOCRINOLOGY | Age: 57
End: 2022-09-02

## 2022-09-02 ENCOUNTER — OFFICE VISIT (OUTPATIENT)
Dept: ENDOCRINOLOGY | Age: 57
End: 2022-09-02
Payer: MEDICARE

## 2022-09-02 VITALS
BODY MASS INDEX: 22.51 KG/M2 | DIASTOLIC BLOOD PRESSURE: 56 MMHG | OXYGEN SATURATION: 97 % | SYSTOLIC BLOOD PRESSURE: 96 MMHG | WEIGHT: 166 LBS | HEART RATE: 48 BPM

## 2022-09-02 DIAGNOSIS — E89.0 POSTSURGICAL HYPOTHYROIDISM: Primary | ICD-10-CM

## 2022-09-02 DIAGNOSIS — E07.9 THYROID EYE DISEASE: ICD-10-CM

## 2022-09-02 DIAGNOSIS — E89.0 POSTSURGICAL HYPOTHYROIDISM: ICD-10-CM

## 2022-09-02 DIAGNOSIS — H57.89 THYROID EYE DISEASE: ICD-10-CM

## 2022-09-02 LAB
T4 FREE SERPL-MCNC: 2.4 NG/DL (ref 0.78–1.46)
TSH W FREE THYROID IF ABNORMAL: 0.01 UIU/ML (ref 0.36–3.74)

## 2022-09-02 PROCEDURE — 99214 OFFICE O/P EST MOD 30 MIN: CPT | Performed by: INTERNAL MEDICINE

## 2022-09-02 PROCEDURE — 4004F PT TOBACCO SCREEN RCVD TLK: CPT | Performed by: INTERNAL MEDICINE

## 2022-09-02 PROCEDURE — 3017F COLORECTAL CA SCREEN DOC REV: CPT | Performed by: INTERNAL MEDICINE

## 2022-09-02 PROCEDURE — G8420 CALC BMI NORM PARAMETERS: HCPCS | Performed by: INTERNAL MEDICINE

## 2022-09-02 PROCEDURE — G8428 CUR MEDS NOT DOCUMENT: HCPCS | Performed by: INTERNAL MEDICINE

## 2022-09-02 RX ORDER — LEVOTHYROXINE SODIUM 75 MCG
75 TABLET ORAL DAILY
Qty: 30 TABLET | Refills: 11 | Status: SHIPPED | OUTPATIENT
Start: 2022-09-02

## 2022-09-02 RX ORDER — LEVOTHYROXINE SODIUM 100 MCG
100 TABLET ORAL DAILY
Qty: 30 TABLET | Refills: 11 | Status: SHIPPED | OUTPATIENT
Start: 2022-09-02 | End: 2022-09-02 | Stop reason: DRUGHIGH

## 2022-09-02 ASSESSMENT — ENCOUNTER SYMPTOMS
DIARRHEA: 0
NAUSEA: 0
VOMITING: 0
CONSTIPATION: 0
ROS SKIN COMMENTS: DENIES HAIR LOSS, DRY SKIN.

## 2022-09-02 NOTE — PROGRESS NOTES
Ricki Snyder MD, [facility]  [address], [address] [facility], [address] [place] [address]  Phone [phone]  Facsimile [phone]          Juana Kohler is a 62 y.o. male seen 9/2/2022 for follow up of hypothyroidism        ASSESSMENT AND PLAN:    1. Postsurgical hypothyroidism  I will check his thyroid function tests today and let him know if his dose needs to be adjusted. Follow up in 4 months.    - SYNTHROID 100 MCG tablet; Take 1 tablet by mouth Daily  Dispense: 30 tablet; Refill: 11    2. Thyroid eye disease  Followed by Dr. Alecia Pompa. I have encouraged him to stop smoking. I instructed him to start using artificial tears. We also discussed the data regarding selenium supplementation.    - Selenium 200 MCG CAPS; Take 1 capsule by mouth daily  Dispense: 1 capsule; Refill: 0      Follow-up and Dispositions    Return in about 4 months (around 1/2/2023). HISTORY OF PRESENT ILLNESS:    THYROID DISEASE     Presentation/Diagnosis: Graves' disease status post total thyroidectomy 4/28/2022. Symptoms: See review of systems. Treatment: Takes name brand in AM correctly. Imaging:  CT orbits 3/6/2020: Findings consistent with thyroid orbitopathy, more pronounced on the right compared to the left. There is bilateral extraocular muscle enlargement. Within the right orbit, there is increased size of the superior rectus, medial rectus and inferior rectus muscles as well as superior oblique muscle. There is associated mild proptosis. Within the left orbit there is also extraocular muscle enlargement in a similar distribution. Fat is maintained around the optic nerve without obvious compression. Labs:  2/17/2022: TSH 0.010, free T4 0.81, free T3 3.2.  5/27/2022: TSH 0.01, free T4 2.4. Review of Systems   Constitutional:  Positive for fatigue. Negative for diaphoresis. Weight increased 12 pounds since last visit.    Eyes: Denies proptosis. His periorbital edema has improved. He reports redness and tearing, worse at night. His vision is stable. He denies diplopia. He smokes 1/2 pack per day. Cardiovascular:  Negative for palpitations. Gastrointestinal:  Negative for constipation, diarrhea, nausea and vomiting. Endocrine: Negative for cold intolerance and heat intolerance. Skin:         Denies hair loss, dry skin. Neurological:  Negative for tremors. Psychiatric/Behavioral:  Negative for sleep disturbance. Vital Signs:  BP (!) 96/56 (Site: Left Upper Arm, Position: Sitting)   Pulse (!) 48   Wt 166 lb (75.3 kg)   SpO2 97%   BMI 22.51 kg/m²     Wt Readings from Last 3 Encounters:   09/02/22 166 lb (75.3 kg)   05/27/22 154 lb (69.9 kg)   02/17/22 165 lb (74.8 kg)       Physical Exam  Constitutional:       General: He is not in acute distress. Eyes:      Comments: No proptosis. Periorbital edema improved. Subtle left upper lid ptosis. Neck:      Comments: Thyroidectomy scar. Cardiovascular:      Rate and Rhythm: Normal rate and regular rhythm. Lymphadenopathy:      Cervical: No cervical adenopathy. Neurological:      Motor: No tremor. Orders Placed This Encounter   Procedures    TSH with Reflex     Standing Status:   Future     Standing Expiration Date:   9/2/2023    TSH with Reflex     Standing Status:   Future     Standing Expiration Date:   9/2/2023         Current Outpatient Medications   Medication Sig Dispense Refill    SYNTHROID 100 MCG tablet Take 1 tablet by mouth Daily 30 tablet 11    XIFAXAN 550 MG tablet       sildenafil (VIAGRA) 100 MG tablet TAKE 1 TABLET BY MOUTH DAILY AS NEEDED       No current facility-administered medications for this visit.

## 2022-11-30 NOTE — DISCHARGE INSTRUCTIONS
[address] [facility]  Department of Interventional Radiology  [facility]  [phone] Office  [phone] Fax    PARACENTESIS DISCHARGE INSTRUCTIONS    General Information:  During this procedure, the doctor will insert a needle into the abdomen to drain fluid. After the procedure, you will be able to take a deep breath much easier. The site of the puncture may ooze the first day. This will decrease and eventually stop. Paracentesis (draining fluid from the abdomen) sometimes makes patients hypotensive (low blood pressure). Your doctor may order for you to receive fluids or albumin (a volume booster) during the procedure through an IV site. Home Care Instructions:  Keep the puncture site clean and dry. No tub baths or swimming until puncture site heals. Showering is acceptable. Resume your normal diet, and resume your normal activity slowly and as you tolerate. If you are short of breath, rest. If shortness of breath does not ease, please call your ordering doctor. Fluid can re-accumulate in the chest and/or in the abdomen. If this should occur, your doctor needs to know as you may need to have the procedure done again. Call If:     You should call your Physician and/or the Radiology Nurse if you notice any signs of infection, like pus draining, or if it is swollen or reddened. Also call if you have a fever, or if you are bleeding from the puncture site more than a small amount on the dressing. Call if the puncture site keeps draining fluid. Some oozing is to be expected, but should slow and then stop. Call if you feel like you have pressure in your abdomen. SEEK IMMEDIATE CARE OR CALL 911 IF YOU SUDDENLY HAVE TROUBLE BREATHING, OR IF YOUR LIPS TURN BLUE, OR IF YOU NOTICE BLOOD IN YOUR SPUTUM. Follow-Up Instructions: Please see your ordering doctor as he/she has requested. To Reach Us:     If you have any questions about your procedure, please call the Interventional Radiology department at [phone]. After business hours (5pm) and weekends, call the answering service at [phone] and ask for the Radiologist on call to be paged. Si tiene Preguntas acerca del procedimiento, por favor llame al departamento de Radiología Intervencional al [phone]. Después de horas de oficina (5 pm) y los fines de [place], llamar al Billye Ket de llamadas al [phone] y pregunte por el Radiologo de Sarah Aaron. Interventional Radiology General Nurse Discharge    After general anesthesia or intravenous sedation, for 24 hours or while taking prescription Narcotics:  · Limit your activities  · Do not drive and operate hazardous machinery  · Do not make important personal or business decisions  · Do  not drink alcoholic beverages  · If you have not urinated within 8 hours after discharge, please contact your surgeon on call. * Please give a list of your current medications to your Primary Care Provider. * Please update this list whenever your medications are discontinued, doses are     changed, or new medications (including over-the-counter products) are added. * Please carry medication information at all times in case of emergency situations. These are general instructions for a healthy lifestyle:    No smoking/ No tobacco products/ Avoid exposure to second hand smoke  Surgeon General's Warning:  Quitting smoking now greatly reduces serious risk to your health. Obesity, smoking, and sedentary lifestyle greatly increases your risk for illness  A healthy diet, regular physical exercise & weight monitoring are important for maintaining a healthy lifestyle    You may be retaining fluid if you have a history of heart failure or if you experience any of the following symptoms:  Weight gain of 3 pounds or more overnight or 5 pounds in a week, increased swelling in our hands or feet or shortness of breath while lying flat in bed.   Please call your doctor as soon as you notice any of these symptoms; do not wait until your next office visit. Recognize signs and symptoms of STROKE:  F-face looks uneven    A-arms unable to move or move unevenly    S-speech slurred or non-existent    T-time-call 911 as soon as signs and symptoms begin-DO NOT go       Back to bed or wait to see if you get better-TIME IS BRAIN.       Date: 3/30/2018  Discharging Nurse: Dolores Gillette RN Island Pedicle Flap With Canthal Suspension Text: The defect edges were debeveled with a #15 scalpel blade.  Given the location of the defect, shape of the defect and the proximity to free margins an island pedicle advancement flap was deemed most appropriate.  Using a sterile surgical marker, an appropriate advancement flap was drawn incorporating the defect, outlining the appropriate donor tissue and placing the expected incisions within the relaxed skin tension lines where possible. The area thus outlined was incised deep to adipose tissue with a #15 scalpel blade.  The skin margins were undermined to an appropriate distance in all directions around the primary defect and laterally outward around the island pedicle utilizing iris scissors.  There was minimal undermining beneath the pedicle flap. A suspension suture was placed in the canthal tendon to prevent tension and prevent ectropion.

## 2022-12-01 ENCOUNTER — OFFICE VISIT (OUTPATIENT)
Dept: ENDOCRINOLOGY | Age: 57
End: 2022-12-01
Payer: MEDICARE

## 2022-12-01 VITALS
BODY MASS INDEX: 22.75 KG/M2 | DIASTOLIC BLOOD PRESSURE: 64 MMHG | RESPIRATION RATE: 16 BRPM | SYSTOLIC BLOOD PRESSURE: 121 MMHG | HEART RATE: 54 BPM | HEIGHT: 72 IN | WEIGHT: 168 LBS

## 2022-12-01 DIAGNOSIS — E07.9 THYROID EYE DISEASE: ICD-10-CM

## 2022-12-01 DIAGNOSIS — E89.0 POSTSURGICAL HYPOTHYROIDISM: Primary | ICD-10-CM

## 2022-12-01 DIAGNOSIS — E89.0 POSTSURGICAL HYPOTHYROIDISM: ICD-10-CM

## 2022-12-01 DIAGNOSIS — H57.89 THYROID EYE DISEASE: ICD-10-CM

## 2022-12-01 LAB
T4 FREE SERPL-MCNC: 1.9 NG/DL (ref 0.78–1.46)
TSH W FREE THYROID IF ABNORMAL: 0.03 UIU/ML (ref 0.36–3.74)

## 2022-12-01 PROCEDURE — 99214 OFFICE O/P EST MOD 30 MIN: CPT | Performed by: INTERNAL MEDICINE

## 2022-12-01 PROCEDURE — G8420 CALC BMI NORM PARAMETERS: HCPCS | Performed by: INTERNAL MEDICINE

## 2022-12-01 PROCEDURE — 1036F TOBACCO NON-USER: CPT | Performed by: INTERNAL MEDICINE

## 2022-12-01 PROCEDURE — G8427 DOCREV CUR MEDS BY ELIG CLIN: HCPCS | Performed by: INTERNAL MEDICINE

## 2022-12-01 PROCEDURE — 3017F COLORECTAL CA SCREEN DOC REV: CPT | Performed by: INTERNAL MEDICINE

## 2022-12-01 PROCEDURE — G8484 FLU IMMUNIZE NO ADMIN: HCPCS | Performed by: INTERNAL MEDICINE

## 2022-12-01 RX ORDER — LEVOTHYROXINE SODIUM 75 MCG
75 TABLET ORAL DAILY
Qty: 30 TABLET | Refills: 11 | Status: SHIPPED | OUTPATIENT
Start: 2022-12-01 | End: 2022-12-02 | Stop reason: DRUGHIGH

## 2022-12-01 ASSESSMENT — ENCOUNTER SYMPTOMS
DIARRHEA: 0
ROS SKIN COMMENTS: DENIES HAIR LOSS, DRY SKIN.
CONSTIPATION: 0
NAUSEA: 0
VOMITING: 0

## 2022-12-01 NOTE — PROGRESS NOTES
Ricki Durán MD, [facility]  [address], [address] [facility], [address] [place] [address]  Phone [phone]  Facsimile [phone]          Oli Ritter is a 62 y.o. male seen 12/1/2022 for follow up of hypothyroidism        ASSESSMENT AND PLAN:    1. Postsurgical hypothyroidism  I will check his thyroid function tests today and let him know if his dose needs to be adjusted. Follow up in 3 months.    - SYNTHROID 100 MCG tablet; Take 1 tablet by mouth Daily  Dispense: 30 tablet; Refill: 11    2. Thyroid eye disease  Followed by Dr. Reggie Fothergill. I again encouraged him to stop smoking. I instructed him to start using artificial tears. We again discussed the data regarding selenium supplementation.    - Selenium 200 MCG CAPS; Take 1 capsule by mouth daily  Dispense: 1 capsule; Refill: 0      Follow-up and Dispositions    Return in about 3 months (around 3/1/2023), or Friday afternoon is okay. HISTORY OF PRESENT ILLNESS:    THYROID DISEASE     Presentation/Diagnosis: Graves' disease status post total thyroidectomy 4/28/2022. Symptoms: See review of systems. Treatment: Takes name brand in AM correctly. Imaging:  CT orbits 3/6/2020: Findings consistent with thyroid orbitopathy, more pronounced on the right compared to the left. There is bilateral extraocular muscle enlargement. Within the right orbit, there is increased size of the superior rectus, medial rectus and inferior rectus muscles as well as superior oblique muscle. There is associated mild proptosis. Within the left orbit there is also extraocular muscle enlargement in a similar distribution. Fat is maintained around the optic nerve without obvious compression. Labs:  2/17/2022: TSH 0.010, free T4 0.81, free T3 3.2.  5/27/2022: TSH 0.01, free T4 2.4.  9/2/2022: TSH 0.01, free T4 2.4. Review of Systems   Constitutional:  Positive for fatigue.  Negative for diaphoresis and unexpected weight change. Eyes:         Denies proptosis. His periorbital edema has improved. He reports irritation, redness and tearing, worse at night and when he wakes in the mornings. His vision is stable. He denies diplopia. He smokes 1/2 pack per day. Cardiovascular:  Negative for palpitations. Gastrointestinal:  Negative for constipation, diarrhea, nausea and vomiting. Endocrine: Negative for cold intolerance and heat intolerance. Skin:         Denies hair loss, dry skin. Neurological:  Negative for tremors. Psychiatric/Behavioral:  Negative for sleep disturbance. Vital Signs:  /64   Pulse 54   Resp 16   Ht 6' (1.829 m)   Wt 168 lb (76.2 kg)   BMI 22.78 kg/m²     Wt Readings from Last 3 Encounters:   12/01/22 168 lb (76.2 kg)   09/02/22 166 lb (75.3 kg)   05/27/22 154 lb (69.9 kg)       Physical Exam  Constitutional:       General: He is not in acute distress. Eyes:      Comments: No proptosis. Periorbital edema improved. Subtle left upper lid ptosis. Neck:      Comments: Thyroidectomy scar. Cardiovascular:      Rate and Rhythm: Normal rate and regular rhythm. Lymphadenopathy:      Cervical: No cervical adenopathy. Neurological:      Motor: No tremor. Orders Placed This Encounter   Procedures    TSH with Reflex     Standing Status:   Future     Standing Expiration Date:   12/1/2023    TSH with Reflex     Standing Status:   Future     Standing Expiration Date:   12/1/2023           Current Outpatient Medications   Medication Sig Dispense Refill    SYNTHROID 75 MCG tablet Take 1 tablet by mouth Daily 30 tablet 11    Selenium 200 MCG CAPS Take 1 capsule by mouth daily 1 capsule 0    XIFAXAN 550 MG tablet       sildenafil (VIAGRA) 100 MG tablet TAKE 1 TABLET BY MOUTH DAILY AS NEEDED       No current facility-administered medications for this visit.

## 2022-12-02 ENCOUNTER — TELEPHONE (OUTPATIENT)
Dept: ENDOCRINOLOGY | Age: 57
End: 2022-12-02

## 2022-12-02 DIAGNOSIS — E89.0 POSTSURGICAL HYPOTHYROIDISM: Primary | ICD-10-CM

## 2022-12-02 RX ORDER — LEVOTHYROXINE SODIUM 50 MCG
50 TABLET ORAL DAILY
Qty: 30 TABLET | Refills: 11 | Status: SHIPPED | OUTPATIENT
Start: 2022-12-02

## 2023-01-25 LAB — PROSTATE SPECIFIC ANTIGEN: 0.21 NG/ML

## 2023-01-31 ENCOUNTER — TELEPHONE (OUTPATIENT)
Dept: ENDOCRINOLOGY | Age: 58
End: 2023-01-31

## 2023-01-31 DIAGNOSIS — E89.0 POSTSURGICAL HYPOTHYROIDISM: ICD-10-CM

## 2023-01-31 RX ORDER — LEVOTHYROXINE SODIUM 0.05 MG/1
50 TABLET ORAL DAILY
Qty: 30 TABLET | Refills: 11 | Status: SHIPPED | OUTPATIENT
Start: 2023-01-31

## 2023-01-31 NOTE — TELEPHONE ENCOUNTER
Patient called and stated that he is out of his Synthroid and can't afford the brand name. He would like the generic sent to Pharmacy on file.

## 2023-02-02 ENCOUNTER — TELEPHONE (OUTPATIENT)
Dept: ENDOCRINOLOGY | Age: 58
End: 2023-02-02

## 2023-02-02 NOTE — TELEPHONE ENCOUNTER
Patient called yesterday afternoon leaving a voicemail. He ws confused why the pharmacy had two prescriptions and didn't want to be taking the brand name synthroid we wanted to take the generic. And he didn't know what dose he was supposed to take. So I called the pharmacy and asked if we sent in the generic name since I see that we did send generic, pharmacy states that they do have the prescription and it is ready for him. And it is the right dose of 50 mcg. So I called the patient back to inform him the right prescription was sent in and that the new dose for him is the 50 mcg. Patient was still confused as to why there was two prescriptions at the pharmacy, I told him that the 76 is the old prescription and that the new prescription is the 48 and he expressed understanding and I told him the generic brand was sent and its ready for him at his pharmacy, patient said okay and hung up.

## 2023-10-20 ENCOUNTER — OFFICE VISIT (OUTPATIENT)
Dept: ENDOCRINOLOGY | Age: 58
End: 2023-10-20
Payer: MEDICARE

## 2023-10-20 VITALS
WEIGHT: 170 LBS | DIASTOLIC BLOOD PRESSURE: 70 MMHG | HEART RATE: 45 BPM | SYSTOLIC BLOOD PRESSURE: 100 MMHG | OXYGEN SATURATION: 97 % | BODY MASS INDEX: 23.06 KG/M2

## 2023-10-20 DIAGNOSIS — E07.9 THYROID EYE DISEASE: ICD-10-CM

## 2023-10-20 DIAGNOSIS — E89.0 POSTSURGICAL HYPOTHYROIDISM: Primary | ICD-10-CM

## 2023-10-20 DIAGNOSIS — H57.89 THYROID EYE DISEASE: ICD-10-CM

## 2023-10-20 DIAGNOSIS — R68.82 DECREASED LIBIDO: ICD-10-CM

## 2023-10-20 PROCEDURE — 3017F COLORECTAL CA SCREEN DOC REV: CPT | Performed by: INTERNAL MEDICINE

## 2023-10-20 PROCEDURE — G8420 CALC BMI NORM PARAMETERS: HCPCS | Performed by: INTERNAL MEDICINE

## 2023-10-20 PROCEDURE — G8484 FLU IMMUNIZE NO ADMIN: HCPCS | Performed by: INTERNAL MEDICINE

## 2023-10-20 PROCEDURE — 1036F TOBACCO NON-USER: CPT | Performed by: INTERNAL MEDICINE

## 2023-10-20 PROCEDURE — G8427 DOCREV CUR MEDS BY ELIG CLIN: HCPCS | Performed by: INTERNAL MEDICINE

## 2023-10-20 PROCEDURE — 99214 OFFICE O/P EST MOD 30 MIN: CPT | Performed by: INTERNAL MEDICINE

## 2023-10-20 RX ORDER — FLUOXETINE 10 MG/1
CAPSULE ORAL
COMMUNITY
Start: 2023-09-14

## 2023-10-20 RX ORDER — LEVOTHYROXINE SODIUM 0.05 MG/1
50 TABLET ORAL DAILY
Qty: 30 TABLET | Refills: 11 | Status: SHIPPED | OUTPATIENT
Start: 2023-10-20

## 2023-10-20 ASSESSMENT — ENCOUNTER SYMPTOMS
ROS SKIN COMMENTS: DENIES HAIR LOSS, DRY SKIN.
DIARRHEA: 0
CONSTIPATION: 0

## 2023-10-20 NOTE — PROGRESS NOTES
Ricki Murray MD, [facility]  [address] [facility], [address] [place] [address], [address]  Phone [phone]  Facsimile [phone]          Cathy Rosas is a 62 y.o. male seen 10/20/2023 for follow up of hypothyroidism        ASSESSMENT AND PLAN:    1. Postsurgical hypothyroidism  He has been biochemically euthyroid on low dose levothyroxine. This suggests that he still has residual functioning thyroid tissue. I will check his thyroid function tests and let him know if his dose needs to be adjusted. Follow up in 6 months. - levothyroxine (SYNTHROID) 50 MCG tablet; Take 1 tablet by mouth Daily  Dispense: 30 tablet; Refill: 11    2. Thyroid eye disease  Followed by Dr. Leidy Morton. I again encouraged him to stop smoking. I instructed him to start using artificial tears. We again discussed the data regarding selenium supplementation.    - Selenium 200 MCG CAPS; Take 1 capsule by mouth daily  Dispense: 1 capsule; Refill: 0    3. Decreased libido  I will check a fasting total and free testosterone with serum gonadotropins. Follow-up and Dispositions    Return in about 6 months (around 4/20/2024). HISTORY OF PRESENT ILLNESS:    THYROID DISEASE     Presentation/Diagnosis: Graves' disease status post total thyroidectomy 4/28/2022. Symptoms: See review of systems. Treatment: Takes name brand in AM correctly. Imaging:  CT orbits 3/6/2020: Findings consistent with thyroid orbitopathy, more pronounced on the right compared to the left. There is bilateral extraocular muscle enlargement. Within the right orbit, there is increased size of the superior rectus, medial rectus and inferior rectus muscles as well as superior oblique muscle. There is associated mild proptosis. Within the left orbit there is also extraocular muscle enlargement in a similar distribution. Fat is maintained around the optic nerve without obvious compression.

## 2024-03-26 ENCOUNTER — HOSPITAL ENCOUNTER (INPATIENT)
Age: 59
LOS: 4 days | Discharge: HOME | End: 2024-03-30
Attending: EMERGENCY MEDICINE | Admitting: STUDENT IN AN ORGANIZED HEALTH CARE EDUCATION/TRAINING PROGRAM
Payer: MEDICARE

## 2024-03-26 ENCOUNTER — APPOINTMENT (OUTPATIENT)
Dept: CT IMAGING | Age: 59
End: 2024-03-26
Payer: MEDICARE

## 2024-03-26 DIAGNOSIS — K56.609 SBO (SMALL BOWEL OBSTRUCTION) (HCC): Primary | ICD-10-CM

## 2024-03-26 DIAGNOSIS — R10.84 GENERALIZED ABDOMINAL PAIN: ICD-10-CM

## 2024-03-26 DIAGNOSIS — D69.6 THROMBOCYTOPENIA (HCC): ICD-10-CM

## 2024-03-26 PROBLEM — E87.6 HYPOKALEMIA: Status: ACTIVE | Noted: 2024-03-26

## 2024-03-26 PROBLEM — F39 MOOD DISORDER (HCC): Status: ACTIVE | Noted: 2024-03-26

## 2024-03-26 LAB
ALBUMIN SERPL-MCNC: 3.1 G/DL (ref 3.5–5)
ALBUMIN/GLOB SERPL: 0.8 (ref 0.4–1.6)
ALP SERPL-CCNC: 72 U/L (ref 50–136)
ALT SERPL-CCNC: 24 U/L (ref 12–65)
AMMONIA PLAS-SCNC: 13 UMOL/L (ref 24.9–68)
ANION GAP SERPL CALC-SCNC: 6 MMOL/L (ref 2–11)
APTT PPP: 28.8 SEC (ref 23.3–37.4)
AST SERPL-CCNC: 33 U/L (ref 15–37)
BACTERIA URNS QL MICRO: NEGATIVE /HPF
BASOPHILS # BLD: 0 K/UL (ref 0–0.2)
BASOPHILS NFR BLD: 0 % (ref 0–2)
BILIRUB SERPL-MCNC: 4.6 MG/DL (ref 0.2–1.1)
BILIRUB UR QL: NEGATIVE
BUN SERPL-MCNC: 13 MG/DL (ref 6–23)
CALCIUM SERPL-MCNC: 9.1 MG/DL (ref 8.3–10.4)
CASTS URNS QL MICRO: NORMAL /LPF
CHLORIDE SERPL-SCNC: 110 MMOL/L (ref 103–113)
CO2 SERPL-SCNC: 23 MMOL/L (ref 21–32)
CREAT SERPL-MCNC: 1.09 MG/DL (ref 0.8–1.5)
DIFFERENTIAL METHOD BLD: ABNORMAL
EOSINOPHIL # BLD: 0 K/UL (ref 0–0.8)
EOSINOPHIL NFR BLD: 1 % (ref 0.5–7.8)
EPI CELLS #/AREA URNS HPF: NORMAL /HPF
ERYTHROCYTE [DISTWIDTH] IN BLOOD BY AUTOMATED COUNT: 14 % (ref 11.9–14.6)
GLOBULIN SER CALC-MCNC: 3.7 G/DL (ref 2.8–4.5)
GLUCOSE SERPL-MCNC: 106 MG/DL (ref 65–100)
GLUCOSE UR QL STRIP.AUTO: NEGATIVE MG/DL
HCT VFR BLD AUTO: 37.3 % (ref 41.1–50.3)
HGB BLD-MCNC: 13.7 G/DL (ref 13.6–17.2)
IMM GRANULOCYTES # BLD AUTO: 0 K/UL (ref 0–0.5)
IMM GRANULOCYTES NFR BLD AUTO: 0 % (ref 0–5)
INR PPP: 1.4
KETONES UR-MCNC: ABNORMAL MG/DL
LACTATE SERPL-SCNC: 1.8 MMOL/L (ref 0.4–2)
LEUKOCYTE ESTERASE UR QL STRIP: NEGATIVE
LIPASE SERPL-CCNC: 69 U/L (ref 73–393)
LYMPHOCYTES # BLD: 0.6 K/UL (ref 0.5–4.6)
LYMPHOCYTES NFR BLD: 14 % (ref 13–44)
MAGNESIUM SERPL-MCNC: 1.6 MG/DL (ref 1.8–2.4)
MCH RBC QN AUTO: 35.6 PG (ref 26.1–32.9)
MCHC RBC AUTO-ENTMCNC: 36.7 G/DL (ref 31.4–35)
MCV RBC AUTO: 96.9 FL (ref 82–102)
MONOCYTES # BLD: 0.5 K/UL (ref 0.1–1.3)
MONOCYTES NFR BLD: 11 % (ref 4–12)
NEUTS SEG # BLD: 3.1 K/UL (ref 1.7–8.2)
NEUTS SEG NFR BLD: 74 % (ref 43–78)
NITRITE UR QL: NEGATIVE
NRBC # BLD: 0 K/UL (ref 0–0.2)
PH UR: 7 (ref 5–9)
PLATELET # BLD AUTO: 69 K/UL (ref 150–450)
PMV BLD AUTO: 10.2 FL (ref 9.4–12.3)
POTASSIUM SERPL-SCNC: 3.2 MMOL/L (ref 3.5–5.1)
PROT SERPL-MCNC: 6.8 G/DL (ref 6.3–8.2)
PROT UR QL: NEGATIVE MG/DL
PROTHROMBIN TIME: 16.6 SEC (ref 11.3–14.9)
RBC # BLD AUTO: 3.85 M/UL (ref 4.23–5.6)
RBC # UR STRIP: ABNORMAL
RBC #/AREA URNS HPF: NORMAL /HPF
SERVICE CMNT-IMP: ABNORMAL
SODIUM SERPL-SCNC: 139 MMOL/L (ref 136–146)
SP GR UR: 1.02 (ref 1–1.02)
UROBILINOGEN UR QL: 0.2 EU/DL (ref 0.2–1)
WBC # BLD AUTO: 4.2 K/UL (ref 4.3–11.1)
WBC URNS QL MICRO: NORMAL /HPF

## 2024-03-26 PROCEDURE — 2500000003 HC RX 250 WO HCPCS: Performed by: PHYSICIAN ASSISTANT

## 2024-03-26 PROCEDURE — 1100000000 HC RM PRIVATE

## 2024-03-26 PROCEDURE — 6360000004 HC RX CONTRAST MEDICATION: Performed by: PHYSICIAN ASSISTANT

## 2024-03-26 PROCEDURE — 83690 ASSAY OF LIPASE: CPT

## 2024-03-26 PROCEDURE — 99285 EMERGENCY DEPT VISIT HI MDM: CPT

## 2024-03-26 PROCEDURE — 96375 TX/PRO/DX INJ NEW DRUG ADDON: CPT

## 2024-03-26 PROCEDURE — 2580000003 HC RX 258: Performed by: STUDENT IN AN ORGANIZED HEALTH CARE EDUCATION/TRAINING PROGRAM

## 2024-03-26 PROCEDURE — 85025 COMPLETE CBC W/AUTO DIFF WBC: CPT

## 2024-03-26 PROCEDURE — 83735 ASSAY OF MAGNESIUM: CPT

## 2024-03-26 PROCEDURE — 82140 ASSAY OF AMMONIA: CPT

## 2024-03-26 PROCEDURE — 85610 PROTHROMBIN TIME: CPT

## 2024-03-26 PROCEDURE — 2580000003 HC RX 258: Performed by: PHYSICIAN ASSISTANT

## 2024-03-26 PROCEDURE — 80053 COMPREHEN METABOLIC PANEL: CPT

## 2024-03-26 PROCEDURE — 96374 THER/PROPH/DIAG INJ IV PUSH: CPT

## 2024-03-26 PROCEDURE — 83605 ASSAY OF LACTIC ACID: CPT

## 2024-03-26 PROCEDURE — 6370000000 HC RX 637 (ALT 250 FOR IP): Performed by: STUDENT IN AN ORGANIZED HEALTH CARE EDUCATION/TRAINING PROGRAM

## 2024-03-26 PROCEDURE — 74177 CT ABD & PELVIS W/CONTRAST: CPT

## 2024-03-26 PROCEDURE — 81015 MICROSCOPIC EXAM OF URINE: CPT

## 2024-03-26 PROCEDURE — 6360000002 HC RX W HCPCS: Performed by: PHYSICIAN ASSISTANT

## 2024-03-26 PROCEDURE — 85730 THROMBOPLASTIN TIME PARTIAL: CPT

## 2024-03-26 PROCEDURE — 81003 URINALYSIS AUTO W/O SCOPE: CPT

## 2024-03-26 PROCEDURE — 6360000002 HC RX W HCPCS: Performed by: STUDENT IN AN ORGANIZED HEALTH CARE EDUCATION/TRAINING PROGRAM

## 2024-03-26 PROCEDURE — 6370000000 HC RX 637 (ALT 250 FOR IP): Performed by: PHYSICIAN ASSISTANT

## 2024-03-26 RX ORDER — MORPHINE SULFATE 4 MG/ML
4 INJECTION, SOLUTION INTRAMUSCULAR; INTRAVENOUS
Status: COMPLETED | OUTPATIENT
Start: 2024-03-26 | End: 2024-03-26

## 2024-03-26 RX ORDER — HYDROMORPHONE HYDROCHLORIDE 1 MG/ML
0.5 INJECTION, SOLUTION INTRAMUSCULAR; INTRAVENOUS; SUBCUTANEOUS
Status: COMPLETED | OUTPATIENT
Start: 2024-03-26 | End: 2024-03-26

## 2024-03-26 RX ORDER — 0.9 % SODIUM CHLORIDE 0.9 %
1000 INTRAVENOUS SOLUTION INTRAVENOUS ONCE
Status: COMPLETED | OUTPATIENT
Start: 2024-03-26 | End: 2024-03-26

## 2024-03-26 RX ORDER — FLUOXETINE 10 MG/1
10 CAPSULE ORAL DAILY
Status: DISCONTINUED | OUTPATIENT
Start: 2024-03-27 | End: 2024-03-30 | Stop reason: HOSPADM

## 2024-03-26 RX ORDER — ONDANSETRON 4 MG/1
4 TABLET, ORALLY DISINTEGRATING ORAL EVERY 8 HOURS PRN
Status: DISCONTINUED | OUTPATIENT
Start: 2024-03-26 | End: 2024-03-30 | Stop reason: HOSPADM

## 2024-03-26 RX ORDER — ACETAMINOPHEN 650 MG/1
650 SUPPOSITORY RECTAL EVERY 6 HOURS PRN
Status: DISCONTINUED | OUTPATIENT
Start: 2024-03-26 | End: 2024-03-30 | Stop reason: HOSPADM

## 2024-03-26 RX ORDER — MORPHINE SULFATE 2 MG/ML
2 INJECTION, SOLUTION INTRAMUSCULAR; INTRAVENOUS
Status: DISCONTINUED | OUTPATIENT
Start: 2024-03-26 | End: 2024-03-27

## 2024-03-26 RX ORDER — ONDANSETRON 2 MG/ML
4 INJECTION INTRAMUSCULAR; INTRAVENOUS EVERY 6 HOURS PRN
Status: DISCONTINUED | OUTPATIENT
Start: 2024-03-26 | End: 2024-03-30 | Stop reason: HOSPADM

## 2024-03-26 RX ORDER — ACETAMINOPHEN 325 MG/1
650 TABLET ORAL EVERY 6 HOURS PRN
Status: DISCONTINUED | OUTPATIENT
Start: 2024-03-26 | End: 2024-03-30 | Stop reason: HOSPADM

## 2024-03-26 RX ORDER — MORPHINE SULFATE 4 MG/ML
4 INJECTION INTRAVENOUS
Status: DISCONTINUED | OUTPATIENT
Start: 2024-03-26 | End: 2024-03-27

## 2024-03-26 RX ORDER — SODIUM CHLORIDE 9 MG/ML
INJECTION, SOLUTION INTRAVENOUS CONTINUOUS
Status: ACTIVE | OUTPATIENT
Start: 2024-03-26 | End: 2024-03-28

## 2024-03-26 RX ORDER — SODIUM CHLORIDE 0.9 % (FLUSH) 0.9 %
5-40 SYRINGE (ML) INJECTION PRN
Status: DISCONTINUED | OUTPATIENT
Start: 2024-03-26 | End: 2024-03-30 | Stop reason: HOSPADM

## 2024-03-26 RX ORDER — POLYETHYLENE GLYCOL 3350 17 G/17G
17 POWDER, FOR SOLUTION ORAL DAILY PRN
Status: DISCONTINUED | OUTPATIENT
Start: 2024-03-26 | End: 2024-03-28

## 2024-03-26 RX ORDER — SODIUM CHLORIDE 9 MG/ML
INJECTION, SOLUTION INTRAVENOUS PRN
Status: DISCONTINUED | OUTPATIENT
Start: 2024-03-26 | End: 2024-03-30 | Stop reason: HOSPADM

## 2024-03-26 RX ORDER — MORPHINE SULFATE 4 MG/ML
4 INJECTION, SOLUTION INTRAMUSCULAR; INTRAVENOUS
Status: DISCONTINUED | OUTPATIENT
Start: 2024-03-26 | End: 2024-03-27

## 2024-03-26 RX ORDER — MAGNESIUM SULFATE IN WATER 40 MG/ML
2000 INJECTION, SOLUTION INTRAVENOUS
Status: COMPLETED | OUTPATIENT
Start: 2024-03-26 | End: 2024-03-26

## 2024-03-26 RX ORDER — SODIUM CHLORIDE 0.9 % (FLUSH) 0.9 %
5-40 SYRINGE (ML) INJECTION EVERY 12 HOURS SCHEDULED
Status: DISCONTINUED | OUTPATIENT
Start: 2024-03-26 | End: 2024-03-30 | Stop reason: HOSPADM

## 2024-03-26 RX ORDER — ONDANSETRON 2 MG/ML
4 INJECTION INTRAMUSCULAR; INTRAVENOUS ONCE
Status: COMPLETED | OUTPATIENT
Start: 2024-03-26 | End: 2024-03-26

## 2024-03-26 RX ORDER — LEVOTHYROXINE SODIUM 0.05 MG/1
50 TABLET ORAL DAILY
Status: DISCONTINUED | OUTPATIENT
Start: 2024-03-27 | End: 2024-03-30 | Stop reason: HOSPADM

## 2024-03-26 RX ADMIN — MORPHINE SULFATE 4 MG: 4 INJECTION, SOLUTION INTRAMUSCULAR; INTRAVENOUS at 18:28

## 2024-03-26 RX ADMIN — SODIUM CHLORIDE 1000 ML: 9 INJECTION, SOLUTION INTRAVENOUS at 13:59

## 2024-03-26 RX ADMIN — HYDROMORPHONE HYDROCHLORIDE 0.5 MG: 1 INJECTION, SOLUTION INTRAMUSCULAR; INTRAVENOUS; SUBCUTANEOUS at 14:00

## 2024-03-26 RX ADMIN — ONDANSETRON 4 MG: 2 INJECTION INTRAMUSCULAR; INTRAVENOUS at 14:00

## 2024-03-26 RX ADMIN — SODIUM CHLORIDE: 9 INJECTION, SOLUTION INTRAVENOUS at 18:34

## 2024-03-26 RX ADMIN — MAGNESIUM SULFATE HEPTAHYDRATE 2000 MG: 40 INJECTION, SOLUTION INTRAVENOUS at 16:33

## 2024-03-26 RX ADMIN — POTASSIUM BICARBONATE 40 MEQ: 782 TABLET, EFFERVESCENT ORAL at 14:27

## 2024-03-26 RX ADMIN — MORPHINE SULFATE 4 MG: 4 INJECTION, SOLUTION INTRAMUSCULAR; INTRAVENOUS at 22:02

## 2024-03-26 RX ADMIN — IOPAMIDOL 100 ML: 755 INJECTION, SOLUTION INTRAVENOUS at 14:20

## 2024-03-26 RX ADMIN — MORPHINE SULFATE 4 MG: 4 INJECTION INTRAVENOUS at 14:57

## 2024-03-26 RX ADMIN — ACETAMINOPHEN 650 MG: 325 TABLET, FILM COATED ORAL at 23:41

## 2024-03-26 ASSESSMENT — PAIN DESCRIPTION - DESCRIPTORS: DESCRIPTORS: ACHING

## 2024-03-26 ASSESSMENT — PAIN DESCRIPTION - LOCATION
LOCATION: ABDOMEN
LOCATION: HEAD
LOCATION: ABDOMEN

## 2024-03-26 ASSESSMENT — ENCOUNTER SYMPTOMS
CONSTIPATION: 1
ABDOMINAL PAIN: 1
RESPIRATORY NEGATIVE: 1
NAUSEA: 1

## 2024-03-26 ASSESSMENT — PAIN SCALES - GENERAL
PAINLEVEL_OUTOF10: 10
PAINLEVEL_OUTOF10: 3
PAINLEVEL_OUTOF10: 10
PAINLEVEL_OUTOF10: 9

## 2024-03-26 ASSESSMENT — PAIN - FUNCTIONAL ASSESSMENT: PAIN_FUNCTIONAL_ASSESSMENT: 0-10

## 2024-03-26 NOTE — ED NOTES
TRANSFER - OUT REPORT:    Verbal report given to VINICIO Cota on Coty Ocampo  being transferred to [facility]  for routine progression of patient care       Report consisted of patient's Situation, Background, Assessment and   Recommendations(SBAR).     Information from the following report(s) Nurse Handoff Report was reviewed with the receiving nurse.    Adrian Fall Assessment:    Presents to emergency department  because of falls (Syncope, seizure, or loss of consciousness): No  Age > 70: No  Altered Mental Status, Intoxication with alcohol or substance confusion (Disorientation, impaired judgment, poor safety awaremess, or inability to follow instructions): No  Impaired Mobility: Ambulates or transfers with assistive devices or assistance; Unable to ambulate or transer.: No  Nursing Judgement: No          Lines:   Peripheral IV 03/26/24 Left Antecubital (Active)        Opportunity for questions and clarification was provided.      Patient transported with:  Tech

## 2024-03-26 NOTE — H&P
Hospitalist History and Physical   Admit Date:  3/26/2024  1:13 PM   Name:  Coty Ocampo   Age:  58 y.o.  Sex:  male  :  1965   MRN:  [id number]   Room:  [place] [address]    Presenting/Chief Complaint: Abdominal Pain     Reason(s) for Admission: SBO    History of Present Illness:   Coty Ocampo is a 58 y.o. male with past medical history of cirrhosis who presented to the ED on 3/26/2024 with cc of nausea and abdominal pain x 2 weeks that has worsened over the last 2-3 days.     In the ED, labs showed potassium 3.2, creatinine 1.09, ammonia 13, WBC 4.2, hemoglobin 13.7, and platelets 69.  CT showed partial mechanical bowel obstruction and constipation.  She received 1L NS, potassium, morphine, Dilaudid, and Zofran.  General surgery was consulted and she was admitted for further care.    Assessment & Plan:     Partial SBO  -N.p.o.  -Consult general surgery  -Place NG tube if she begins vomiting or abdominal pain worsens  -Reports occasional flatus but no BM  -Await return of bowel function    Cirrhosis  -Rifaximin    Hypokalemia  -Monitor and replace electrolytes as needed  -Repeat BMP in the a.m.    Hypothyroidism  -Synthroid    Mood disorder  -Fluoxetine    Thrombocytopenia  -Chronic; hold Lovenox    Dispo: Anticipated length of stay greater than 2 midnights.  PT/OT evals and PPD needed/ordered?  No  Diet: NPO  VTE prophylaxis: SCDs  Code status: Full    Non-peripheral Lines and Tubes (if present):     none      Hospital Problems:  Principal Problem:    SBO (small bowel obstruction) (HCC)  Active Problems:    Alcoholic cirrhosis (HCC)    Hx of tobacco use, presenting hazards to health    Postsurgical hypothyroidism    Alcoholic cirrhosis of liver with ascites (HCC)    Hypokalemia    Mood disorder (HCC)    Thrombocytopenia (HCC)  Resolved Problems:    * No resolved hospital problems. *      Past History:     Past Medical History:   Diagnosis Date    Cirrhosis of liver (HCC)     Dr. Dawn  significant retroperitoneal, mesenteric, or pelvic adenopathy. - BONES: No fracture or significant bone lesion. - VASCULATURE: Dilated portal venous collaterals such as in the right mid abdomen axial series 2 image 30. Normal diameter aorta and IVC. - OTHER: No ascites.     Homogeneous liver cirrhosis, portosystemic shunt, dilated portal venous collaterals, dilatation of the portal venous system, and mild homogeneous splenomegaly. Partial mechanical small bowel obstruction developing since prior study 10/25/2018 and constipation. The level of partial obstruction is in the mid abdomen, but the cause of obstruction is not visible. There is no mass in this area although there are surgical clips.. Fluid collections in the abdominal wall noted on prior exam have resolved since 10/25/2018. If there are any questions about this report, I can be reached on [employer] or at [phone]         Signed:  Arun Banuelos, DO

## 2024-03-26 NOTE — ED PROVIDER NOTES
Emergency Department Provider Note       PCP: Roslyn Parada, JAMI - CORY   Age: 58 y.o.   Sex: male     DISPOSITION Admitted 03/26/2024 04:31:50 PM       ICD-10-CM    1. SBO (small bowel obstruction) (HCC)  K56.609       2. Generalized abdominal pain  R10.84       3. Thrombocytopenia (HCC)  D69.6           Medical Decision Making     58-year-old male with history of cirrhosis.  Labs appear stable.  CT scan shows evidence of partial small bowel obstruction.  Discussed with general surgery who recommended hospitalist admission and surgeon will consult on the patient.     1 or more acute illnesses that pose a threat to life or bodily function.   Parental controlled substances given in the ED.  Discussion with external consultants.  Shared medical decision making was utilized in creating the patients health plan today.    I independently ordered and reviewed each unique test.       I interpreted the CT Scan consistent with small bowel obstruction.    The patient was admitted and I have discussed patient management with the admitting provider.          History     58-year-old male with history of alcoholic cirrhosis presents with abdominal pain.  Has had multiple prior abdominal surgeries secondary to incarcerated umbilical hernia and postop complications.  No surgery in the past 6 years.  Presents with abdominal pain for 2 weeks.  Decreased bowel movements.  Some nausea but no vomiting.  No fevers.        ROS     Review of Systems   Constitutional: Negative.    HENT: Negative.     Respiratory: Negative.     Cardiovascular: Negative.    Gastrointestinal:  Positive for abdominal pain, constipation and nausea.   Genitourinary: Negative.    All other systems reviewed and are negative.       Physical Exam     Vitals signs and nursing note reviewed:  Vitals:    03/26/24 1716 03/26/24 1717 03/26/24 1718 03/26/24 1735   BP:  108/65  129/64   Pulse:    53   Resp:    16   Temp:    98.2 °F (36.8 °C)   TempSrc:    Oral  Leukocyte Est, UA POC Negative NEG      Performed by: Julio C Greenfield          CT ABDOMEN PELVIS W IV CONTRAST Additional Contrast? None   Final Result   Homogeneous liver cirrhosis, portosystemic shunt, dilated portal   venous collaterals, dilatation of the portal venous system, and mild homogeneous   splenomegaly.   Partial mechanical small bowel obstruction developing since prior study   10/25/2018 and constipation. The level of partial obstruction is in the mid   abdomen, but the cause of obstruction is not visible. There is no mass in this   area although there are surgical clips.. Fluid collections in the abdominal wall   noted on prior exam have resolved since 10/25/2018.         If there are any questions about this report, I can be reached on [employer]   or at [phone]                      No results for input(s): \"COVID19\" in the last 72 hours.    Voice dictation software was used during the making of this note.  This software is not perfect and grammatical and other typographical errors may be present.  This note has not been completely proofread for errors.        Vilma Pritchard PA  03/26/24 2795

## 2024-03-27 LAB
ALBUMIN SERPL-MCNC: 2.6 G/DL (ref 3.5–5)
ALBUMIN/GLOB SERPL: 0.8 (ref 0.4–1.6)
ALP SERPL-CCNC: 58 U/L (ref 50–136)
ALT SERPL-CCNC: 21 U/L (ref 12–65)
ANION GAP SERPL CALC-SCNC: 4 MMOL/L (ref 2–11)
AST SERPL-CCNC: 26 U/L (ref 15–37)
BASOPHILS # BLD: 0 K/UL (ref 0–0.2)
BASOPHILS NFR BLD: 0 % (ref 0–2)
BILIRUB SERPL-MCNC: 2.8 MG/DL (ref 0.2–1.1)
BILIRUB UR QL: NEGATIVE
BUN SERPL-MCNC: 14 MG/DL (ref 6–23)
CALCIUM SERPL-MCNC: 8.3 MG/DL (ref 8.3–10.4)
CHLORIDE SERPL-SCNC: 114 MMOL/L (ref 103–113)
CO2 SERPL-SCNC: 24 MMOL/L (ref 21–32)
CREAT SERPL-MCNC: 0.88 MG/DL (ref 0.8–1.5)
DIFFERENTIAL METHOD BLD: ABNORMAL
EOSINOPHIL # BLD: 0.1 K/UL (ref 0–0.8)
EOSINOPHIL NFR BLD: 2 % (ref 0.5–7.8)
ERYTHROCYTE [DISTWIDTH] IN BLOOD BY AUTOMATED COUNT: 14.3 % (ref 11.9–14.6)
GLOBULIN SER CALC-MCNC: 3.2 G/DL (ref 2.8–4.5)
GLUCOSE SERPL-MCNC: 98 MG/DL (ref 65–100)
GLUCOSE UR QL STRIP.AUTO: NEGATIVE MG/DL
HCT VFR BLD AUTO: 34.1 % (ref 41.1–50.3)
HGB BLD-MCNC: 12.2 G/DL (ref 13.6–17.2)
IMM GRANULOCYTES # BLD AUTO: 0 K/UL (ref 0–0.5)
IMM GRANULOCYTES NFR BLD AUTO: 0 % (ref 0–5)
KETONES UR-MCNC: 15 MG/DL
LEUKOCYTE ESTERASE UR QL STRIP: NEGATIVE
LYMPHOCYTES # BLD: 0.9 K/UL (ref 0.5–4.6)
LYMPHOCYTES NFR BLD: 19 % (ref 13–44)
MAGNESIUM SERPL-MCNC: 2.2 MG/DL (ref 1.8–2.4)
MCH RBC QN AUTO: 35.3 PG (ref 26.1–32.9)
MCHC RBC AUTO-ENTMCNC: 35.8 G/DL (ref 31.4–35)
MCV RBC AUTO: 98.6 FL (ref 82–102)
MONOCYTES # BLD: 0.6 K/UL (ref 0.1–1.3)
MONOCYTES NFR BLD: 13 % (ref 4–12)
NEUTS SEG # BLD: 3 K/UL (ref 1.7–8.2)
NEUTS SEG NFR BLD: 66 % (ref 43–78)
NITRITE UR QL: NEGATIVE
NRBC # BLD: 0 K/UL (ref 0–0.2)
PH UR: 7 (ref 5–9)
PLATELET # BLD AUTO: 65 K/UL (ref 150–450)
PMV BLD AUTO: 10.3 FL (ref 9.4–12.3)
POTASSIUM SERPL-SCNC: 3.6 MMOL/L (ref 3.5–5.1)
PROT SERPL-MCNC: 5.8 G/DL (ref 6.3–8.2)
PROT UR QL: NEGATIVE MG/DL
RBC # BLD AUTO: 3.46 M/UL (ref 4.23–5.6)
RBC # UR STRIP: ABNORMAL
SODIUM SERPL-SCNC: 142 MMOL/L (ref 136–146)
SP GR UR: 1.01 (ref 1–1.02)
UROBILINOGEN UR QL: 1 EU/DL (ref 0.2–1)
WBC # BLD AUTO: 4.6 K/UL (ref 4.3–11.1)

## 2024-03-27 PROCEDURE — 2500000003 HC RX 250 WO HCPCS: Performed by: STUDENT IN AN ORGANIZED HEALTH CARE EDUCATION/TRAINING PROGRAM

## 2024-03-27 PROCEDURE — 80053 COMPREHEN METABOLIC PANEL: CPT

## 2024-03-27 PROCEDURE — 83735 ASSAY OF MAGNESIUM: CPT

## 2024-03-27 PROCEDURE — 6370000000 HC RX 637 (ALT 250 FOR IP): Performed by: HOSPITALIST

## 2024-03-27 PROCEDURE — 6360000002 HC RX W HCPCS: Performed by: STUDENT IN AN ORGANIZED HEALTH CARE EDUCATION/TRAINING PROGRAM

## 2024-03-27 PROCEDURE — 6370000000 HC RX 637 (ALT 250 FOR IP): Performed by: STUDENT IN AN ORGANIZED HEALTH CARE EDUCATION/TRAINING PROGRAM

## 2024-03-27 PROCEDURE — 36415 COLL VENOUS BLD VENIPUNCTURE: CPT

## 2024-03-27 PROCEDURE — 85025 COMPLETE CBC W/AUTO DIFF WBC: CPT

## 2024-03-27 PROCEDURE — 1100000000 HC RM PRIVATE

## 2024-03-27 RX ORDER — HYDROMORPHONE HYDROCHLORIDE 1 MG/ML
1 INJECTION, SOLUTION INTRAMUSCULAR; INTRAVENOUS; SUBCUTANEOUS
Status: DISCONTINUED | OUTPATIENT
Start: 2024-03-27 | End: 2024-03-30 | Stop reason: HOSPADM

## 2024-03-27 RX ORDER — CARBOXYMETHYLCELLULOSE SODIUM 10 MG/ML
1 GEL OPHTHALMIC 3 TIMES DAILY
Status: DISCONTINUED | OUTPATIENT
Start: 2024-03-27 | End: 2024-03-30 | Stop reason: HOSPADM

## 2024-03-27 RX ORDER — HYDROMORPHONE HYDROCHLORIDE 1 MG/ML
0.5 INJECTION, SOLUTION INTRAMUSCULAR; INTRAVENOUS; SUBCUTANEOUS
Status: DISCONTINUED | OUTPATIENT
Start: 2024-03-27 | End: 2024-03-28

## 2024-03-27 RX ORDER — DIMETHICONE, CAMPHOR (SYNTHETIC), MENTHOL, AND PHENOL 1.1; .5; .625; .5 G/100G; G/100G; G/100G; G/100G
OINTMENT TOPICAL PRN
Status: DISCONTINUED | OUTPATIENT
Start: 2024-03-27 | End: 2024-03-30 | Stop reason: HOSPADM

## 2024-03-27 RX ADMIN — MORPHINE SULFATE 4 MG: 4 INJECTION, SOLUTION INTRAMUSCULAR; INTRAVENOUS at 12:39

## 2024-03-27 RX ADMIN — Medication: at 19:49

## 2024-03-27 RX ADMIN — MORPHINE SULFATE 4 MG: 4 INJECTION, SOLUTION INTRAMUSCULAR; INTRAVENOUS at 18:17

## 2024-03-27 RX ADMIN — ONDANSETRON 4 MG: 2 INJECTION INTRAMUSCULAR; INTRAVENOUS at 22:22

## 2024-03-27 RX ADMIN — RIFAXIMIN 400 MG: 200 TABLET ORAL at 19:49

## 2024-03-27 RX ADMIN — RIFAXIMIN 400 MG: 200 TABLET ORAL at 08:24

## 2024-03-27 RX ADMIN — HYDROMORPHONE HYDROCHLORIDE 1 MG: 1 INJECTION, SOLUTION INTRAMUSCULAR; INTRAVENOUS; SUBCUTANEOUS at 19:44

## 2024-03-27 RX ADMIN — RIFAXIMIN 400 MG: 200 TABLET ORAL at 14:44

## 2024-03-27 RX ADMIN — CARBOXYMETHYLCELLULOSE SODIUM 1 DROP: 10 GEL OPHTHALMIC at 22:18

## 2024-03-27 RX ADMIN — HYDROMORPHONE HYDROCHLORIDE 1 MG: 1 INJECTION, SOLUTION INTRAMUSCULAR; INTRAVENOUS; SUBCUTANEOUS at 22:16

## 2024-03-27 RX ADMIN — MORPHINE SULFATE 4 MG: 4 INJECTION, SOLUTION INTRAMUSCULAR; INTRAVENOUS at 03:10

## 2024-03-27 RX ADMIN — LEVOTHYROXINE SODIUM 50 MCG: 0.05 TABLET ORAL at 05:39

## 2024-03-27 ASSESSMENT — PAIN DESCRIPTION - DESCRIPTORS
DESCRIPTORS: ACHING
DESCRIPTORS: STABBING
DESCRIPTORS: STABBING

## 2024-03-27 ASSESSMENT — PAIN SCALES - GENERAL
PAINLEVEL_OUTOF10: 8
PAINLEVEL_OUTOF10: 0
PAINLEVEL_OUTOF10: 10
PAINLEVEL_OUTOF10: 10
PAINLEVEL_OUTOF10: 3
PAINLEVEL_OUTOF10: 10

## 2024-03-27 ASSESSMENT — PAIN DESCRIPTION - LOCATION
LOCATION: ABDOMEN

## 2024-03-27 ASSESSMENT — PAIN DESCRIPTION - ORIENTATION
ORIENTATION: RIGHT;LEFT
ORIENTATION: UPPER;LOWER;LEFT;RIGHT

## 2024-03-27 NOTE — CONSULTS
H&P/Consult Note/Progress Note/Office Note:   Coty Ocampo  MRN: [id number]  :1965  Age:58 y.o.    General Surgery Consult ordered by: Dr. Banuelos; Hospitalist  Reason for General Surgery consult: SBO    HPI: Coty Ocampo is a 58 y.o. male with a past medical history of cirrhosis, depression, iron deficiency anemia, and portal HTN who presented to the ED 3/26/24 with C/o worsening abdominal pain with associated nausea, no vomiting, over the last 2 weeks. Pt also reports a decrease in his normal bowel movements. Pt denies other symptoms.     Labs: Potassium 3.2, Magnesium 1.6, Ammonia 13, Total Bili 4.6, WBC 4.2    3/26/24 CT Abdomen & Pelvis  IMPRESSION:  Homogeneous liver cirrhosis, portosystemic shunt, dilated portal  venous collaterals, dilatation of the portal venous system, and mild homogeneous splenomegaly.    Partial mechanical small bowel obstruction developing since prior study 10/25/2018 and constipation. The level of partial obstruction is in the mid abdomen, but the cause of obstruction is not visible. There is no mass in this area although there are surgical clips.. Fluid collections in the abdominal wall noted on prior exam have resolved since 10/25/2018.    Past Surgical History: Hernia repair x 4, TIPS, Liver biopsy    Pt does not currently take blood thinners.     3/27/24: No current complaints or acute events overnight. Feeling better. Abdominal pain improving. No nausea or vomiting. Reports +flatus/-BM. AF, NAD.  Labs: Total Bili 2.8    Past Medical History:   Diagnosis Date    Cirrhosis of liver (HCC)     Dr. Dawn ([facility] [place] [place]     Depression     Hypocalcemia     Hypokalemia     Iron deficiency anemia     Multiple fractures of ribs of both sides     Portal hypertension (HCC)     Vitamin D deficiency      Past Surgical History:   Procedure Laterality Date    HERNIA REPAIR  2017,2018,2018,and 2018    abd x4    IR TIPS INSERTION  2017    IR  -- --   03/26/24 1614 -- -- -- 57 -- 95 % -- --   03/26/24 1613 -- -- -- 56 -- 94 % -- --   03/26/24 1603 -- -- -- 64 14 98 % -- --   03/26/24 1559 -- -- -- 64 23 99 % -- --   03/26/24 1553 -- -- -- 67 10 98 % -- --   03/26/24 1544 -- -- -- 65 14 98 % -- --   03/26/24 1533 121/65 -- -- 62 16 98 % -- --   03/26/24 1400 135/74 -- -- 58 22 99 % -- --   03/26/24 1345 129/68 -- -- 58 -- 96 % -- --   03/26/24 1301 112/68 97.6 °F (36.4 °C) Oral 62 16 100 % 1.829 m (6') 77.1 kg (170 lb)       Amount and/or Complexity of Data Reviewed and Analyzed:  I reviewed and analyzed all of the unique labs and radiologic studies that are shown below as well as any that are in the HPI, and any that are in the expanded problem list below  *Each unique test, order, or document contributes to the combination of 2 or combination of 3 in Category 1 below.  For this visit I also reviewed old records and prior notes.      Recent Labs     03/26/24  1339 03/27/24  0457   WBC 4.2* 4.6   HGB 13.7 12.2*   PLT 69* 65*    142   K 3.2* 3.6    114*   CO2 23 24   BUN 13 14   INR 1.4  --    APTT 28.8  --    ALT 24 21     Review of most recent CBC  Lab Results   Component Value Date    WBC 4.6 03/27/2024    HGB 12.2 (L) 03/27/2024    HCT 34.1 (L) 03/27/2024    MCV 98.6 03/27/2024    PLT 65 (L) 03/27/2024       Review of most recent BMP  Lab Results   Component Value Date/Time     03/27/2024 04:57 AM    K 3.6 03/27/2024 04:57 AM     03/27/2024 04:57 AM    CO2 24 03/27/2024 04:57 AM    BUN 14 03/27/2024 04:57 AM    CREATININE 0.88 03/27/2024 04:57 AM    GLUCOSE 98 03/27/2024 04:57 AM    CALCIUM 8.3 03/27/2024 04:57 AM    LABGLOM >90 03/27/2024 04:57 AM        Review of most recent LFTs (and lipase if done)  Lab Results   Component Value Date    ALT 21 03/27/2024    AST 26 03/27/2024    ALKPHOS 58 03/27/2024    BILITOT 2.8 (H) 03/27/2024     Lab Results   Component Value Date    LIPASE 69 (L) 03/26/2024       Lab Results   Component

## 2024-03-27 NOTE — PROGRESS NOTES
Hospitalist Progress Note   Admit Date:  3/26/2024  1:13 PM   Name:  Coty Ocampo   Age:  58 y.o.  Sex:  male  :  1965   MRN:  [id number]   Room:  [facility]    Presenting/Chief Complaint: Abdominal Pain     Reason(s) for Admission: SBO (small bowel obstruction) (HCC) [K56.609]  Thrombocytopenia (HCC) [D69.6]  Generalized abdominal pain [R10.84]     Hospital Course:   Coty Ocampo is a 58 y.o. male with past medical history of cirrhosis who presented to the ED on 3/26/2024 with cc of nausea and abdominal pain x 2 weeks that has worsened over the last 2-3 days.  CT showed partial mechanical bowel obstruction and constipation.  Patient admitted with partial small bowel obstruction.  General surgery consulted, given patient complicated surgery history.    Subjective & 24hr Events:   Patient is seen and examined at the bedside.  Reports he is feeling better.  Denies nausea or vomiting, reports abdominal pain has been improved.  Does not want NGT.  Passing flatus but no bowel movement yet.  General surgery has evaluated patient and would like to give trial of clears.    Assessment & Plan:     Partial SBO  -General surgery following, appreciate recommendation  -Trial of clear liquid diet today  -Reports occasional flatus but no BM  -Await return of bowel function     Cirrhosis  -Rifaximin     Hypokalemia  -Potassium 3.6 today, monitor and replace electrolytes as needed  -Repeat BMP in the a.m.     Hypothyroidism  -Synthroid     Mood disorder  -Fluoxetine     Thrombocytopenia  -Chronic; hold Lovenox      Anticipated Discharge Arrangements:   Home    PT/OT evals and PPD ordered?  Therapy   Diet:  ADULT DIET; Clear Liquid  VTE prophylaxis: SCD's   Code status: Full Code      Non-peripheral Lines and Tubes (if present):          Telemetry (if present):           Hospital Problems:  Principal Problem:    SBO (small bowel obstruction) (HCC)  Active Problems:    Alcoholic cirrhosis (HCC)    Hx of

## 2024-03-28 PROCEDURE — 6370000000 HC RX 637 (ALT 250 FOR IP): Performed by: STUDENT IN AN ORGANIZED HEALTH CARE EDUCATION/TRAINING PROGRAM

## 2024-03-28 PROCEDURE — 1100000000 HC RM PRIVATE

## 2024-03-28 PROCEDURE — 2580000003 HC RX 258: Performed by: STUDENT IN AN ORGANIZED HEALTH CARE EDUCATION/TRAINING PROGRAM

## 2024-03-28 PROCEDURE — 2500000003 HC RX 250 WO HCPCS: Performed by: STUDENT IN AN ORGANIZED HEALTH CARE EDUCATION/TRAINING PROGRAM

## 2024-03-28 RX ORDER — OXYCODONE HYDROCHLORIDE 5 MG/1
5 TABLET ORAL EVERY 4 HOURS PRN
Status: DISCONTINUED | OUTPATIENT
Start: 2024-03-28 | End: 2024-03-30 | Stop reason: HOSPADM

## 2024-03-28 RX ORDER — POLYETHYLENE GLYCOL 3350 17 G/17G
17 POWDER, FOR SOLUTION ORAL 2 TIMES DAILY PRN
Status: DISCONTINUED | OUTPATIENT
Start: 2024-03-28 | End: 2024-03-30 | Stop reason: HOSPADM

## 2024-03-28 RX ADMIN — POLYETHYLENE GLYCOL 3350 17 G: 17 POWDER, FOR SOLUTION ORAL at 21:07

## 2024-03-28 RX ADMIN — SODIUM CHLORIDE: 9 INJECTION, SOLUTION INTRAVENOUS at 06:09

## 2024-03-28 RX ADMIN — CARBOXYMETHYLCELLULOSE SODIUM 1 DROP: 10 GEL OPHTHALMIC at 21:09

## 2024-03-28 RX ADMIN — RIFAXIMIN 400 MG: 200 TABLET ORAL at 14:11

## 2024-03-28 RX ADMIN — SODIUM CHLORIDE, PRESERVATIVE FREE 10 ML: 5 INJECTION INTRAVENOUS at 07:50

## 2024-03-28 RX ADMIN — CARBOXYMETHYLCELLULOSE SODIUM 1 DROP: 10 GEL OPHTHALMIC at 14:12

## 2024-03-28 RX ADMIN — CARBOXYMETHYLCELLULOSE SODIUM 1 DROP: 10 GEL OPHTHALMIC at 07:53

## 2024-03-28 RX ADMIN — RIFAXIMIN 400 MG: 200 TABLET ORAL at 21:09

## 2024-03-28 RX ADMIN — Medication: at 21:09

## 2024-03-28 RX ADMIN — LEVOTHYROXINE SODIUM 50 MCG: 0.05 TABLET ORAL at 06:08

## 2024-03-28 RX ADMIN — POLYETHYLENE GLYCOL 3350 17 G: 17 POWDER, FOR SOLUTION ORAL at 07:53

## 2024-03-28 RX ADMIN — HYDROMORPHONE HYDROCHLORIDE 1 MG: 1 INJECTION, SOLUTION INTRAMUSCULAR; INTRAVENOUS; SUBCUTANEOUS at 01:12

## 2024-03-28 RX ADMIN — RIFAXIMIN 400 MG: 200 TABLET ORAL at 07:52

## 2024-03-28 RX ADMIN — ACETAMINOPHEN 650 MG: 325 TABLET, FILM COATED ORAL at 12:32

## 2024-03-28 ASSESSMENT — PAIN DESCRIPTION - DESCRIPTORS: DESCRIPTORS: SHARP

## 2024-03-28 ASSESSMENT — PAIN DESCRIPTION - ORIENTATION: ORIENTATION: LOWER;INNER

## 2024-03-28 ASSESSMENT — PAIN SCALES - GENERAL
PAINLEVEL_OUTOF10: 3
PAINLEVEL_OUTOF10: 10

## 2024-03-28 ASSESSMENT — PAIN DESCRIPTION - LOCATION: LOCATION: ABDOMEN

## 2024-03-28 NOTE — PLAN OF CARE
Problem: Discharge Planning  Goal: Discharge to home or other facility with appropriate resources  3/28/2024 0008 by Ketty Estrada, RN  Outcome: Progressing     Problem: Pain  Goal: Verbalizes/displays adequate comfort level or baseline comfort level  3/28/2024 0008 by Ketty Estrada, RN  Outcome: Progressing    Problem: Gastrointestinal - Adult  Goal: Minimal or absence of nausea and vomiting  Outcome: Progressing  Goal: Maintains or returns to baseline bowel function  Outcome: Progressing

## 2024-03-28 NOTE — PLAN OF CARE
Problem: Discharge Planning  Goal: Discharge to home or other facility with appropriate resources  3/28/2024 0907 by Ketty Irizarry RN  Outcome: Progressing  3/28/2024 0008 by Ketty Esrtada RN  Outcome: Progressing     Problem: Pain  Goal: Verbalizes/displays adequate comfort level or baseline comfort level  3/28/2024 0907 by Ketty Irizarry RN  Outcome: Progressing  3/28/2024 0008 by Ketty Estrada RN  Outcome: Progressing     Problem: Gastrointestinal - Adult  Goal: Minimal or absence of nausea and vomiting  3/28/2024 0907 by Ketty Irizarry RN  Outcome: Progressing  3/28/2024 0008 by Ketty Estrada RN  Outcome: Progressing  Goal: Maintains or returns to baseline bowel function  3/28/2024 0907 by Ketty Irizarry RN  Outcome: Progressing  3/28/2024 0008 by Ketty Estrada RN  Outcome: Progressing     Problem: Safety - Adult  Goal: Free from fall injury  Outcome: Progressing

## 2024-03-28 NOTE — PROGRESS NOTES
Admit Date: 3/26/2024    POD * No surgery found *    Procedure:  * No surgery found *    Subjective:     Patient awake in bed. Feeling a little better. Tolerating CLD. No nausea or vomiting. Reports passing more flatus. No BM. AF, NAD.     Objective:       Vitals:    24 1931 24 2246 24 0445 24 0721   BP: 120/68   115/70   Pulse: 57   (!) 48   Resp:  14 14 16   Temp: 98.4 °F (36.9 °C)   98.6 °F (37 °C)   TempSrc: Oral   Oral   SpO2: 96%   94%   Weight:       Height:           Temp (24hrs), Av.5 °F (36.9 °C), Min:98.4 °F (36.9 °C), Max:98.6 °F (37 °C)  .  I&O reviewed as documented.    [unfilled]     Physical Exam:   Constitutional: Alert, cooperative, no acute distress  Eyes: Scleral icterus    ENMT: no external lesions; no obvious neck masses, no ear or lip lesions, nares normal  CV: RRR. Normal perfusion  Resp: No JVD.  Breathing is  non-labored; no audible wheezing.    GI: soft and non-distended, minimally ttp, well healed incision.     Musculoskeletal: No embolic signs or cyanosis.   Neuro:  Oriented; moves all 4; no focal deficits  Psychiatric: normal affect and mood    Labs: No results found for this or any previous visit (from the past 24 hour(s)).    All Data Reviewed    XR Results (most recent):  @[employer](BSH4827:1)@   US Results (most recent):  @[employer]IM[employer](ZBF6380:1)@   Assessment:     Principal Problem:    SBO (small bowel obstruction) (HCC)  Active Problems:    Alcoholic cirrhosis (HCC)    Hx of tobacco use, presenting hazards to health    Postsurgical hypothyroidism    Alcoholic cirrhosis of liver with ascites (HCC)    Hypokalemia    Mood disorder (HCC)    Thrombocytopenia (HCC)  Resolved Problems:    * No resolved hospital problems. *        Plan/Recommendations/Medical Decision Making:     Care Management per Hospitalist  Continue conservative management  Waiting on improved bowel function  Correct Lytes prn  OOB/ ambulate  Advance to Full Liquids

## 2024-03-28 NOTE — PROGRESS NOTES
Hospitalist Progress Note   Admit Date:  3/26/2024  1:13 PM   Name:  Coty Ocampo   Age:  58 y.o.  Sex:  male  :  1965   MRN:  [id number]   Room:  [facility]    Presenting/Chief Complaint: Abdominal Pain     Reason(s) for Admission: SBO (small bowel obstruction) (HCC) [K56.609]  Thrombocytopenia (HCC) [D69.6]  Generalized abdominal pain [R10.84]     Hospital Course:   Coty Ocampo is a 58 y.o. male with past medical history of cirrhosis who presented to the ED on 3/26/2024 with cc of nausea and abdominal pain x 2 weeks that has worsened over the last 2-3 days.  CT showed partial mechanical bowel obstruction and constipation.  Patient admitted with partial small bowel obstruction.  General surgery consulted, given patient complicated surgery history.    Subjective & 24hr Events:   Patient is seen and examined at the bedside.  Complaining of persistent abdominal pain, requiring IV Dilaudid every 3 hours.  Denies nausea.  Passing flatus but no bowel movement yet.  Surgery has advance diet to full liquid diet.    Assessment & Plan:     Partial SBO  -General surgery following, appreciate recommendation  -Diet advanced to full liquid diet today  -Reports occasional flatus but no BM  -Await return of bowel function     Cirrhosis  -Rifaximin     Hypokalemia  -monitor and replace electrolytes as needed  -Repeat BMP in the a.m.     Hypothyroidism  -Synthroid     Mood disorder  -Fluoxetine     Thrombocytopenia  -Chronic; hold Lovenox      Anticipated Discharge Arrangements:   Home    PT/OT evals and PPD ordered?  Therapy   Diet:  ADULT DIET; Full Liquid  VTE prophylaxis: SCD's   Code status: Full Code      Non-peripheral Lines and Tubes (if present):          Telemetry (if present):           Hospital Problems:  Principal Problem:    SBO (small bowel obstruction) (HCC)  Active Problems:    Alcoholic cirrhosis (HCC)    Hx of tobacco use, presenting hazards to health    Postsurgical hypothyroidism

## 2024-03-28 NOTE — ACP (ADVANCE CARE PLANNING)
Advance Care Planning     General Advance Care Planning (ACP) Conversation    Date of Conversation: 3/26/2024  Conducted with: Patient with Decision Making Capacity    Healthcare Decision Maker:    Primary Decision Maker: TerrencePriya - Brother/Sister - [phone]  Click here to complete Healthcare Decision Makers including selection of the Healthcare Decision Maker Relationship (ie \"Primary\").    Content/Action Overview:  Reviewed DNR/DNI and patient elects Full Code (Attempt Resuscitation)    Length of Voluntary ACP Conversation in minutes:  <16 minutes (Non-Billable)    Carmelina Freeman

## 2024-03-28 NOTE — CARE COORDINATION
Met with Mr. Ocampo at bedside for initial CM assessment. Patient is alert and oriented x4. Demographics and PCP verified. He last saw his PCP approximately 6 months ago. Mr. Ocampo lives alone in a one level home with no steps to enter. He was independent with mobility and ADLs at most recent baseline and used no DME or services. Mr. Ocampo is on disability but remains an active  in the community. He has good family support including his father, sister and two sons. He also has friends/neighbor support. He plans to return home at discharge and anticipates no needs from CM at this time. CM will continue to follow and assist with transition at discharge along with any other needs that may arise during his stay.       03/28/24 1029   Service Assessment   Patient Orientation Alert and Oriented;Person;Place;Situation   Cognition Alert   History Provided By Patient   Primary Caregiver Self   Accompanied By/Relationship N/A   Support Systems Children;Parent;Family Members;Friends/Neighbors   Patient's Healthcare Decision Maker is: Legal Next of Kin   PCP Verified by CM Yes   Last Visit to PCP Within last 6 months   Prior Functional Level Independent in ADLs/IADLs   Current Functional Level Independent in ADLs/IADLs   Can patient return to prior living arrangement Yes   Ability to make needs known: Good   Family able to assist with home care needs: Yes   Would you like for me to discuss the discharge plan with any other family members/significant others, and if so, who? Yes  (sister-Priya Ocampo)   Financial Resources Medicare   Community Resources None   Social/Functional History   Lives With Alone   Type of Home House   Home Layout One level   Home Access Level entry   Bathroom Shower/Tub Tub/Shower unit   Bathroom Toilet Standard   Bathroom Equipment None   Bathroom Accessibility Accessible   Home Equipment None   ADL Assistance Independent   Homemaking Assistance Independent   Ambulation Assistance

## 2024-03-29 LAB
ANION GAP SERPL CALC-SCNC: 6 MMOL/L (ref 2–11)
BASOPHILS # BLD: 0 K/UL (ref 0–0.2)
BASOPHILS NFR BLD: 0 % (ref 0–2)
BUN SERPL-MCNC: 8 MG/DL (ref 6–23)
CALCIUM SERPL-MCNC: 7.9 MG/DL (ref 8.3–10.4)
CHLORIDE SERPL-SCNC: 114 MMOL/L (ref 103–113)
CO2 SERPL-SCNC: 23 MMOL/L (ref 21–32)
CREAT SERPL-MCNC: 0.82 MG/DL (ref 0.8–1.5)
DIFFERENTIAL METHOD BLD: ABNORMAL
EOSINOPHIL # BLD: 0.2 K/UL (ref 0–0.8)
EOSINOPHIL NFR BLD: 6 % (ref 0.5–7.8)
ERYTHROCYTE [DISTWIDTH] IN BLOOD BY AUTOMATED COUNT: 13.7 % (ref 11.9–14.6)
GLUCOSE SERPL-MCNC: 96 MG/DL (ref 65–100)
HCT VFR BLD AUTO: 32.3 % (ref 41.1–50.3)
HGB BLD-MCNC: 11.8 G/DL (ref 13.6–17.2)
IMM GRANULOCYTES # BLD AUTO: 0 K/UL (ref 0–0.5)
IMM GRANULOCYTES NFR BLD AUTO: 0 % (ref 0–5)
LYMPHOCYTES # BLD: 0.9 K/UL (ref 0.5–4.6)
LYMPHOCYTES NFR BLD: 29 % (ref 13–44)
MAGNESIUM SERPL-MCNC: 1.6 MG/DL (ref 1.8–2.4)
MCH RBC QN AUTO: 35.3 PG (ref 26.1–32.9)
MCHC RBC AUTO-ENTMCNC: 36.5 G/DL (ref 31.4–35)
MCV RBC AUTO: 96.7 FL (ref 82–102)
MONOCYTES # BLD: 0.4 K/UL (ref 0.1–1.3)
MONOCYTES NFR BLD: 13 % (ref 4–12)
NEUTS SEG # BLD: 1.6 K/UL (ref 1.7–8.2)
NEUTS SEG NFR BLD: 52 % (ref 43–78)
NRBC # BLD: 0 K/UL (ref 0–0.2)
PLATELET # BLD AUTO: 79 K/UL (ref 150–450)
PMV BLD AUTO: 10.5 FL (ref 9.4–12.3)
POTASSIUM SERPL-SCNC: 3.3 MMOL/L (ref 3.5–5.1)
RBC # BLD AUTO: 3.34 M/UL (ref 4.23–5.6)
SODIUM SERPL-SCNC: 143 MMOL/L (ref 136–146)
WBC # BLD AUTO: 3 K/UL (ref 4.3–11.1)

## 2024-03-29 PROCEDURE — 6370000000 HC RX 637 (ALT 250 FOR IP): Performed by: FAMILY MEDICINE

## 2024-03-29 PROCEDURE — 6360000002 HC RX W HCPCS: Performed by: FAMILY MEDICINE

## 2024-03-29 PROCEDURE — 6370000000 HC RX 637 (ALT 250 FOR IP): Performed by: STUDENT IN AN ORGANIZED HEALTH CARE EDUCATION/TRAINING PROGRAM

## 2024-03-29 PROCEDURE — 83735 ASSAY OF MAGNESIUM: CPT

## 2024-03-29 PROCEDURE — 6370000000 HC RX 637 (ALT 250 FOR IP): Performed by: SURGERY

## 2024-03-29 PROCEDURE — 99221 1ST HOSP IP/OBS SF/LOW 40: CPT | Performed by: SURGERY

## 2024-03-29 PROCEDURE — 85025 COMPLETE CBC W/AUTO DIFF WBC: CPT

## 2024-03-29 PROCEDURE — 36415 COLL VENOUS BLD VENIPUNCTURE: CPT

## 2024-03-29 PROCEDURE — 80048 BASIC METABOLIC PNL TOTAL CA: CPT

## 2024-03-29 PROCEDURE — 2580000003 HC RX 258: Performed by: STUDENT IN AN ORGANIZED HEALTH CARE EDUCATION/TRAINING PROGRAM

## 2024-03-29 PROCEDURE — 1100000000 HC RM PRIVATE

## 2024-03-29 RX ORDER — MAGNESIUM SULFATE IN WATER 40 MG/ML
2000 INJECTION, SOLUTION INTRAVENOUS ONCE
Status: COMPLETED | OUTPATIENT
Start: 2024-03-29 | End: 2024-03-29

## 2024-03-29 RX ORDER — POTASSIUM CHLORIDE 20 MEQ/1
40 TABLET, EXTENDED RELEASE ORAL ONCE
Status: COMPLETED | OUTPATIENT
Start: 2024-03-29 | End: 2024-03-29

## 2024-03-29 RX ORDER — POLYETHYLENE GLYCOL 3350 17 G/17G
17 POWDER, FOR SOLUTION ORAL ONCE
Status: COMPLETED | OUTPATIENT
Start: 2024-03-29 | End: 2024-03-29

## 2024-03-29 RX ADMIN — POTASSIUM CHLORIDE 40 MEQ: 1500 TABLET, EXTENDED RELEASE ORAL at 09:13

## 2024-03-29 RX ADMIN — MAGNESIUM SULFATE HEPTAHYDRATE 2000 MG: 40 INJECTION, SOLUTION INTRAVENOUS at 09:16

## 2024-03-29 RX ADMIN — POLYETHYLENE GLYCOL 3350 17 G: 17 POWDER, FOR SOLUTION ORAL at 20:33

## 2024-03-29 RX ADMIN — RIFAXIMIN 400 MG: 200 TABLET ORAL at 20:33

## 2024-03-29 RX ADMIN — RIFAXIMIN 400 MG: 200 TABLET ORAL at 09:13

## 2024-03-29 RX ADMIN — SODIUM CHLORIDE, PRESERVATIVE FREE 10 ML: 5 INJECTION INTRAVENOUS at 20:35

## 2024-03-29 RX ADMIN — LEVOTHYROXINE SODIUM 50 MCG: 0.05 TABLET ORAL at 05:56

## 2024-03-29 RX ADMIN — RIFAXIMIN 400 MG: 200 TABLET ORAL at 15:07

## 2024-03-29 RX ADMIN — POLYETHYLENE GLYCOL 3350 17 G: 17 POWDER, FOR SOLUTION ORAL at 15:07

## 2024-03-29 NOTE — PROGRESS NOTES
Hospitalist Progress Note   Admit Date:  3/26/2024  1:13 PM   Name:  Coty Ocampo   Age:  58 y.o.  Sex:  male  :  1965   MRN:  [id number]   Room:  [facility]    Presenting/Chief Complaint: Abdominal Pain     Reason(s) for Admission: SBO (small bowel obstruction) (HCC) [K56.609]  Thrombocytopenia (HCC) [D69.6]  Generalized abdominal pain [R10.84]     Hospital Course:   Coty Ocampo is a 58 y.o. male with past medical history of cirrhosis who presented to the ED on 3/26/2024 with cc of nausea and abdominal pain x 2 weeks that has worsened over the last 2-3 days.  CT showed partial mechanical bowel obstruction and constipation.  Patient admitted with partial small bowel obstruction.  General surgery consulted, given patient complicated surgery history.    Subjective & 24hr Events:   Patient is seen and examined at the bedside.  Reports feeling the same.  Complaining of abdominal cramps and pain.  Trying to avoid IV narcotics.  Passing flatus but no bowel movement yet.    Patient had many questions for his small bowel obstruction., I tried to answer to the best of my knowledge.  I informed him that I will let surgery know about his concerns.  I notified surgery NP    Assessment & Plan:     Partial SBO  -General surgery following, appreciate recommendation  -Diet advanced to full liquid diet 3/28  -Reports occasional flatus but no BM  -Await return of bowel function     Cirrhosis  -Rifaximin     Hypokalemia  -monitor and replace electrolytes as needed  -Repeat BMP in the a.m.     Hypothyroidism  -Synthroid     Mood disorder  -Fluoxetine     Thrombocytopenia  -Chronic; hold Lovenox      Anticipated Discharge Arrangements:   Home    PT/OT evals and PPD ordered?  Therapy   Diet:  ADULT DIET; Full Liquid  VTE prophylaxis: SCD's   Code status: Full Code      Non-peripheral Lines and Tubes (if present):          Telemetry (if present):           Hospital Problems:  Principal Problem:    SBO (small

## 2024-03-29 NOTE — PLAN OF CARE
Problem: Discharge Planning  Goal: Discharge to home or other facility with appropriate resources  3/29/2024 0832 by Ida Vasquez RN  Outcome: Progressing  3/28/2024 2211 by Ketty Estrada RN  Outcome: Progressing     Problem: Pain  Goal: Verbalizes/displays adequate comfort level or baseline comfort level  3/29/2024 0832 by Ida Vasquez RN  Outcome: Progressing  3/28/2024 2211 by Ketty Estrada RN  Outcome: Progressing     Problem: Gastrointestinal - Adult  Goal: Minimal or absence of nausea and vomiting  3/29/2024 0832 by Ida Vasquez RN  Outcome: Progressing  3/28/2024 2211 by Ketty Estrada RN  Outcome: Progressing  Goal: Maintains or returns to baseline bowel function  3/29/2024 0832 by Ida Vasquez RN  Outcome: Progressing  3/28/2024 2211 by Ketty Estrada RN  Outcome: Progressing     Problem: Safety - Adult  Goal: Free from fall injury  3/29/2024 0832 by Ida Vasquez RN  Outcome: Progressing  3/28/2024 2211 by Ketty Estrada RN  Outcome: Progressing

## 2024-03-29 NOTE — PLAN OF CARE
Problem: Discharge Planning  Goal: Discharge to home or other facility with appropriate resources  3/28/2024 2211 by Ketty Estrada RN  Outcome: Progressing    Problem: Pain  Goal: Verbalizes/displays adequate comfort level or baseline comfort level  3/28/2024 2211 by Ketty Estrada RN  Outcome: Progressing    Problem: Gastrointestinal - Adult  Goal: Minimal or absence of nausea and vomiting  3/28/2024 2211 by Ketty Estrada RN  Outcome: Progressing    Goal: Maintains or returns to baseline bowel function  3/28/2024 2211 by Ketty Estrada RN  Outcome: Progressing     Problem: Gastrointestinal - Adult  Goal: Maintains or returns to baseline bowel function  3/28/2024 2211 by Ketty Estrada RN  Outcome: Progressing    Problem: Safety - Adult  Goal: Free from fall injury  3/28/2024 2211 by Ketty Estrada RN  Outcome: Progressing

## 2024-03-30 ENCOUNTER — APPOINTMENT (OUTPATIENT)
Dept: GENERAL RADIOLOGY | Age: 59
End: 2024-03-30
Payer: MEDICARE

## 2024-03-30 VITALS
OXYGEN SATURATION: 97 % | SYSTOLIC BLOOD PRESSURE: 112 MMHG | BODY MASS INDEX: 23.03 KG/M2 | TEMPERATURE: 97.9 F | WEIGHT: 170 LBS | HEIGHT: 72 IN | HEART RATE: 51 BPM | RESPIRATION RATE: 18 BRPM | DIASTOLIC BLOOD PRESSURE: 68 MMHG

## 2024-03-30 LAB
ANION GAP SERPL CALC-SCNC: 2 MMOL/L (ref 2–11)
BASOPHILS # BLD: 0 K/UL (ref 0–0.2)
BASOPHILS NFR BLD: 1 % (ref 0–2)
BUN SERPL-MCNC: 8 MG/DL (ref 6–23)
CALCIUM SERPL-MCNC: 8.3 MG/DL (ref 8.3–10.4)
CHLORIDE SERPL-SCNC: 113 MMOL/L (ref 103–113)
CO2 SERPL-SCNC: 28 MMOL/L (ref 21–32)
CREAT SERPL-MCNC: 0.99 MG/DL (ref 0.8–1.5)
DIFFERENTIAL METHOD BLD: ABNORMAL
EOSINOPHIL # BLD: 0.3 K/UL (ref 0–0.8)
EOSINOPHIL NFR BLD: 8 % (ref 0.5–7.8)
ERYTHROCYTE [DISTWIDTH] IN BLOOD BY AUTOMATED COUNT: 14.4 % (ref 11.9–14.6)
GLUCOSE SERPL-MCNC: 97 MG/DL (ref 65–100)
HCT VFR BLD AUTO: 36.7 % (ref 41.1–50.3)
HGB BLD-MCNC: 13.1 G/DL (ref 13.6–17.2)
IMM GRANULOCYTES # BLD AUTO: 0 K/UL (ref 0–0.5)
IMM GRANULOCYTES NFR BLD AUTO: 0 % (ref 0–5)
LYMPHOCYTES # BLD: 1.3 K/UL (ref 0.5–4.6)
LYMPHOCYTES NFR BLD: 37 % (ref 13–44)
MCH RBC QN AUTO: 35.1 PG (ref 26.1–32.9)
MCHC RBC AUTO-ENTMCNC: 35.7 G/DL (ref 31.4–35)
MCV RBC AUTO: 98.4 FL (ref 82–102)
MONOCYTES # BLD: 0.4 K/UL (ref 0.1–1.3)
MONOCYTES NFR BLD: 13 % (ref 4–12)
NEUTS SEG # BLD: 1.4 K/UL (ref 1.7–8.2)
NEUTS SEG NFR BLD: 41 % (ref 43–78)
NRBC # BLD: 0 K/UL (ref 0–0.2)
PLATELET # BLD AUTO: 89 K/UL (ref 150–450)
PMV BLD AUTO: 10.7 FL (ref 9.4–12.3)
POTASSIUM SERPL-SCNC: 4.1 MMOL/L (ref 3.5–5.1)
RBC # BLD AUTO: 3.73 M/UL (ref 4.23–5.6)
SODIUM SERPL-SCNC: 143 MMOL/L (ref 136–146)
WBC # BLD AUTO: 3.5 K/UL (ref 4.3–11.1)

## 2024-03-30 PROCEDURE — 36415 COLL VENOUS BLD VENIPUNCTURE: CPT

## 2024-03-30 PROCEDURE — 6370000000 HC RX 637 (ALT 250 FOR IP): Performed by: STUDENT IN AN ORGANIZED HEALTH CARE EDUCATION/TRAINING PROGRAM

## 2024-03-30 PROCEDURE — 85025 COMPLETE CBC W/AUTO DIFF WBC: CPT

## 2024-03-30 PROCEDURE — 80048 BASIC METABOLIC PNL TOTAL CA: CPT

## 2024-03-30 RX ORDER — POLYETHYLENE GLYCOL 3350 17 G/17G
17 POWDER, FOR SOLUTION ORAL 2 TIMES DAILY PRN
Qty: 60 PACKET | Refills: 0 | Status: SHIPPED | OUTPATIENT
Start: 2024-03-30 | End: 2024-04-29

## 2024-03-30 RX ORDER — OXYCODONE HYDROCHLORIDE 5 MG/1
5 TABLET ORAL EVERY 6 HOURS PRN
Qty: 28 TABLET | Refills: 0 | Status: SHIPPED | OUTPATIENT
Start: 2024-03-30 | End: 2024-04-06

## 2024-03-30 RX ADMIN — LEVOTHYROXINE SODIUM 50 MCG: 0.05 TABLET ORAL at 05:38

## 2024-03-30 NOTE — DISCHARGE SUMMARY
Hospitalist Discharge Summary   Admit Date:  3/26/2024  1:13 PM   DC Note date: 3/30/2024  Name:  Coty Ocampo   Age:  58 y.o.  Sex:  male  :  1965   MRN:  [id number]   Room:  [facility]  PCP:  Roslyn Parada APRN - [facility]    Presenting Complaint: Abdominal Pain     Initial Admission Diagnosis: SBO (small bowel obstruction) (HCC) [K56.609]  Thrombocytopenia (HCC) [D69.6]  Generalized abdominal pain [R10.84]     Problem List for this Hospitalization (present on admission):    Principal Problem:    SBO (small bowel obstruction) (HCC)  Active Problems:    Alcoholic cirrhosis (HCC)    Hx of tobacco use, presenting hazards to health    Postsurgical hypothyroidism    Alcoholic cirrhosis of liver with ascites (HCC)    Hypokalemia    Mood disorder (HCC)    Thrombocytopenia (HCC)  Resolved Problems:    * No resolved hospital problems. *      Hospital Course:  Coty Ocampo is a 58 y.o. male with past medical history of cirrhosis and extensive abdominal surgery history, patient of [facility] who presented to the ED on 3/26/2024 with cc of nausea and abdominal pain x 2 weeks that has worsened over the last 2-3 days.  CT showed partial mechanical bowel obstruction and constipation.      Patient admitted with partial small bowel obstruction.  General surgery consulted.  Patient started on conservative management including bowel rest, IV fluids, antiemetic and analgesic as needed with gradual improvement in symptoms.  Passing gas but no bowel movement.  Started on trial of clear liquid diet 3/27 which patient tolerated well.  Started on MiraLAX.  Patient finally had bowel movement on 3/30.  Diet advanced and patient tolerated well.    All other chronic conditions stable and we continued essential home meds.  Electrolytes repleted as needed.  No new complaint on the day of discharge.  Patient is stable for discharge home today with close follow-up with PCP outpatient    Disposition: Home  Diet: ADULT DIET;  Regular  Code Status: Full Code    Follow Ups:  Follow-up Information       Follow up With Specialties Details Why Contact Roslyn Rollins APRN - NP  Follow up in 1 week(s)  [address] [facility]  [address]  [facility] [id number] [phone]              Time spent in patient discharge and coordination 37 minutes.        Follow up labs/diagnostics (ultimately defer to outpatient provider):  Defer to Follow-up Provider    Plan was discussed with Patient.  All questions answered.  Patient was stable at time of discharge.  Instructions given to call a physician or return if any concerns.        Current Discharge Medication List        START taking these medications    Details   polyethylene glycol (GLYCOLAX) 17 g packet Take 1 packet by mouth 2 times daily as needed for Constipation  Qty: 60 packet, Refills: 0      oxyCODONE (ROXICODONE) 5 MG immediate release tablet Take 1 tablet by mouth every 6 hours as needed for Pain for up to 7 days. Max Daily Amount: 20 mg  Qty: 28 tablet, Refills: 0    Comments: Reduce doses taken as pain becomes manageable  Associated Diagnoses: SBO (small bowel obstruction) ([facility])           CONTINUE these medications which have NOT CHANGED    Details   levothyroxine (SYNTHROID) 50 MCG tablet Take 1 tablet by mouth Daily  Qty: 30 tablet, Refills: 11    Associated Diagnoses: Postsurgical hypothyroidism      FLUoxetine (PROZAC) 10 MG capsule TAKE 1 CAPSULE BY MOUTH EVERY DAY FOR 90 DAYS      XIFAXAN 550 MG tablet       sildenafil (VIAGRA) 100 MG tablet TAKE 1 TABLET BY MOUTH DAILY AS NEEDED           STOP taking these medications       Selenium 200 MCG CAPS Comments:   Reason for Stopping:               Some of the medications may be marked as \"stop taking\" by the system; but in reality pt or family reported already being off these meds; defer to outpatient/prescribing providers.      Procedures done this admission:  * No surgery found *    Consults this admission:  IP CONSULT TO

## 2024-03-30 NOTE — PLAN OF CARE
Problem: Discharge Planning  Goal: Discharge to home or other facility with appropriate resources  3/30/2024 0922 by Ida Vasquez RN  Outcome: Progressing  3/29/2024 2308 by Emelyn Mercado RN  Outcome: Progressing  Flowsheets (Taken 3/29/2024 2038)  Discharge to home or other facility with appropriate resources: Identify discharge learning needs (meds, wound care, etc)     Problem: Pain  Goal: Verbalizes/displays adequate comfort level or baseline comfort level  3/30/2024 0922 by Ida Vasquez RN  Outcome: Progressing  3/29/2024 2308 by Emelyn Mercado RN  Outcome: Progressing     Problem: Gastrointestinal - Adult  Goal: Minimal or absence of nausea and vomiting  3/30/2024 0922 by Ida Vasquez RN  Outcome: Progressing  3/29/2024 2308 by Emelyn Mercado RN  Outcome: Progressing  Goal: Maintains or returns to baseline bowel function  3/30/2024 0922 by Ida Vasquez RN  Outcome: Progressing  3/29/2024 2308 by Emelyn Mercado RN  Outcome: Progressing     Problem: Safety - Adult  Goal: Free from fall injury  3/30/2024 0922 by Ida Vasquez RN  Outcome: Progressing  3/29/2024 2308 by Emelyn Mercado RN  Outcome: Progressing

## 2024-03-30 NOTE — PLAN OF CARE
Problem: Discharge Planning  Goal: Discharge to home or other facility with appropriate resources  Outcome: Progressing  Flowsheets (Taken 3/29/2024 2038)  Discharge to home or other facility with appropriate resources: Identify discharge learning needs (meds, wound care, etc)     Problem: Pain  Goal: Verbalizes/displays adequate comfort level or baseline comfort level  Outcome: Progressing     Problem: Gastrointestinal - Adult  Goal: Minimal or absence of nausea and vomiting  Outcome: Progressing  Goal: Maintains or returns to baseline bowel function  Outcome: Progressing     Problem: Safety - Adult  Goal: Free from fall injury  Outcome: Progressing

## 2024-03-30 NOTE — PROGRESS NOTES
DISCHARGE SUMMARY     The discharge information has been reviewed with the patient.  The patient verbalized understanding.    Discharge medications reviewed with the patient and appropriate educational materials and side effects teaching were provided.

## 2024-04-10 DIAGNOSIS — E89.0 POSTSURGICAL HYPOTHYROIDISM: ICD-10-CM

## 2024-04-10 LAB
T4 FREE SERPL-MCNC: 1.8 NG/DL (ref 0.78–1.46)
TSH W FREE THYROID IF ABNORMAL: 0.11 UIU/ML (ref 0.36–3.74)

## 2024-04-11 ENCOUNTER — TELEPHONE (OUTPATIENT)
Dept: ENDOCRINOLOGY | Age: 59
End: 2024-04-11

## 2024-04-11 DIAGNOSIS — E89.0 POSTSURGICAL HYPOTHYROIDISM: ICD-10-CM

## 2024-04-11 RX ORDER — LEVOTHYROXINE SODIUM 0.05 MG/1
50 TABLET ORAL
Qty: 30 TABLET | Refills: 11 | Status: SHIPPED | OUTPATIENT
Start: 2024-04-11

## 2024-06-12 ENCOUNTER — TELEPHONE (OUTPATIENT)
Dept: ENDOCRINOLOGY | Age: 59
End: 2024-06-12

## 2024-06-12 ENCOUNTER — OFFICE VISIT (OUTPATIENT)
Dept: ENDOCRINOLOGY | Age: 59
End: 2024-06-12
Payer: MEDICARE

## 2024-06-12 VITALS
DIASTOLIC BLOOD PRESSURE: 70 MMHG | HEART RATE: 48 BPM | OXYGEN SATURATION: 97 % | SYSTOLIC BLOOD PRESSURE: 100 MMHG | WEIGHT: 174 LBS | BODY MASS INDEX: 23.6 KG/M2

## 2024-06-12 DIAGNOSIS — E07.9 THYROID EYE DISEASE: ICD-10-CM

## 2024-06-12 DIAGNOSIS — E89.0 POSTSURGICAL HYPOTHYROIDISM: ICD-10-CM

## 2024-06-12 DIAGNOSIS — H57.89 THYROID EYE DISEASE: ICD-10-CM

## 2024-06-12 DIAGNOSIS — Z86.39 HISTORY OF GRAVES' DISEASE: Primary | ICD-10-CM

## 2024-06-12 DIAGNOSIS — E89.0 POSTSURGICAL HYPOTHYROIDISM: Primary | ICD-10-CM

## 2024-06-12 LAB
T4 FREE SERPL-MCNC: 1.2 NG/DL (ref 0.9–1.7)
TSH W FREE THYROID IF ABNORMAL: 0.04 UIU/ML (ref 0.27–4.2)

## 2024-06-12 PROCEDURE — 99214 OFFICE O/P EST MOD 30 MIN: CPT | Performed by: INTERNAL MEDICINE

## 2024-06-12 RX ORDER — LEVOTHYROXINE SODIUM 0.05 MG/1
50 TABLET ORAL
Qty: 30 TABLET | Refills: 11 | Status: SHIPPED | OUTPATIENT
Start: 2024-06-12

## 2024-06-12 RX ORDER — LEVOTHYROXINE SODIUM 0.05 MG/1
50 TABLET ORAL
Qty: 30 TABLET | Refills: 11 | Status: SHIPPED | OUTPATIENT
Start: 2024-06-12 | End: 2024-06-12 | Stop reason: SDUPTHER

## 2024-06-12 ASSESSMENT — ENCOUNTER SYMPTOMS
DIARRHEA: 0
CONSTIPATION: 0
ROS SKIN COMMENTS: DENIES HAIR LOSS, DRY SKIN.

## 2024-06-12 NOTE — PROGRESS NOTES
Ricki Abrams MD, FACE  [facility]  [address] [place] [address], [facility] [address]  [place] [address]  Phone [phone]  Facsimile [phone]          Coty Ocampo is a 58 y.o. male seen 6/12/2024 for follow up of hypothyroidism        ASSESSMENT AND PLAN:    1. Postsurgical hypothyroidism  He is clinically euthyroid.  I will check his thyroid function tests and let him know of his dose needs to be adjusted.      - levothyroxine (SYNTHROID) 50 MCG tablet; Take 1 tablet by mouth Six times weekly  Dispense: 30 tablet; Refill: 11    2. Thyroid eye disease  Previously followed by Dr. Guaman.  He stopped smoking 5/2023.  I instructed him to start using artificial tears.  We again discussed the data regarding selenium supplementation.    - Selenium 200 MCG CAPS; Take 1 capsule by mouth daily  Dispense: 1 capsule; Refill: 0      Follow-up and Dispositions    Return in about 6 months (around 12/12/2024).         HISTORY OF PRESENT ILLNESS:    THYROID DISEASE     Presentation/Diagnosis: Graves' disease status post total thyroidectomy 4/28/2022.     Symptoms: See review of systems.       Treatment: Takes name brand in AM correctly.    Imaging:  CT orbits 3/6/2020: Findings consistent with thyroid orbitopathy, more pronounced on the right compared to the left.  There is bilateral extraocular muscle enlargement.  Within the right orbit, there is increased size of the superior rectus, medial rectus and inferior rectus muscles as well as superior oblique muscle.  There is associated mild proptosis.  Within the left orbit there is also extraocular muscle enlargement in a similar distribution.  Fat is maintained around the optic nerve without obvious compression.     Labs:  2/17/2022: TSH 0.010, free T4 0.81, free T3 3.2.  5/27/2022: TSH 0.01, free T4 2.4.  9/2/2022: TSH 0.01, free T4 2.4.  12/1/2022: TSH 0.03, free T4 1.9.  4/13/2023: TSH 0.66.  4/10/2024: TSH 0.11, free T4

## 2024-06-12 NOTE — TELEPHONE ENCOUNTER
He is still on too much thyroid hormone.  Since he had a total thyroidectomy, I am concerned that his thyroid gland may be growing back.  I would like for him to decrease his levothyroxine and get his labs repeated in 2 months.  I would also like for him to have a thyroid ultrasound.

## 2024-06-20 ENCOUNTER — HOSPITAL ENCOUNTER (OUTPATIENT)
Dept: ULTRASOUND IMAGING | Age: 59
Discharge: HOME | End: 2024-06-22
Payer: MEDICARE

## 2024-06-20 DIAGNOSIS — E89.0 POSTSURGICAL HYPOTHYROIDISM: ICD-10-CM

## 2024-06-20 DIAGNOSIS — Z86.39 HISTORY OF GRAVES' DISEASE: ICD-10-CM

## 2024-06-20 PROCEDURE — 76536 US EXAM OF HEAD AND NECK: CPT

## 2024-06-26 ENCOUNTER — TELEPHONE (OUTPATIENT)
Dept: ENDOCRINOLOGY | Age: 59
End: 2024-06-26

## 2024-06-26 NOTE — TELEPHONE ENCOUNTER
His ultrasound showed that he does have thyroid tissue on both sides which is growing back.  This is likely why we continue to have to decrease his levothyroxine.  Tell him to make sure and get his labs repeated in 2 months on the lower dose.  We may need to consider referring him for radioactive iodine in order to radiate/ablate the remaining thyroid gland.

## 2024-06-27 NOTE — TELEPHONE ENCOUNTER
I spoke to the Pt he does have a few questions. He is wondering if the surgeon messed up, why is this happening, should he be concerned, is this normal, he has liver disease and thought you didn't want him to do the Radioactive iodine? He stated that that is just a few questions he has at this time but would really like to make a sooner appointment to go over all of this with you. Please advise

## 2024-06-27 NOTE — TELEPHONE ENCOUNTER
No, the surgeon did not do anything wrong.  Anytime someone has thyroid surgery, there can be a tiny amount of residual thyroid cells or tissue left behind.  In patients with Graves' disease, the thyroid gland can rarely regenerate from the cells that are left behind.  This is not very common, but it does happen occasionally.  Having another operation would be technically difficult, however.  The reason that I did not initially treat him with radioactive iodine is because of his thyroid eye disease.  Radioactive iodine can make the eye disease worse in about 15% of patients.  In his case now, however, radioactive iodine is likely the best choice to get rid of the residual thyroid tissue.  We can pretreat him with steroids if needed in order to try and prevent any complications with the thyroid eye disease.  If he would like to discuss this further, have him follow-up with me in 2 months when his next labs are due.

## 2024-06-28 NOTE — TELEPHONE ENCOUNTER
I Spoke to the Pt he is still frustrated and still has more questions. Marlyn is making him an appointment to be seen in 2 months

## 2024-07-10 NOTE — PROGRESS NOTES
poDate: 7/10/2024      Name: Coty Ocampo      MRN: [id number]       : 1965       Age: 59 y.o.    Sex: male        Roslyn Parada Rochelle, APRN - NP       CC:  No chief complaint on file.      HPI:  The patient presents for the first office visit after the patient was seen for a partial bowel obstruction on 3/29/24. The patient resolved without surgery. He has a history of cirrhosis with a portosystemic shunt.     7/15/24: The patient wants to have a left forearm soft tissue mass excised. He has had it \"for some time.\" It has increased in size and now painful at times. He would like it excised.    Physical Exam:     There were no vitals taken for this visit.    General: Alert, oriented, cooperative white male in no acute distress.     Neck: Supple, trachea midline, no appreciable thyromegaly  Resp: Breathing is  non-labored. Lungs clear to auscultation without wheezing or rhonchi   CV: RRR. No murmurs, rubs or gallops appreciated.  Abd: soft non-tender and non-distended without peritoneal signs. +bs    Left arm: soft tissue mass dorsum of left forearm, 4 cm in diameter. No signs of infection. No drainage.    Assessment/Plan:  Coty Ocampo is a 59 y.o. male who has signs and symptoms consistent with a soft tissue mass of the left forearm.    1.  Excision of a left forearm soft tissue mass in the operating room.    2.  I went through the risks of bleeding, infection, anesthesia, hematoma/seroma formation and possible recurrence of the lesion. I said it may be necessary to place a drain. It may be necessary to leave the wound open, if badly infected.    LIDIA NULL MD  [facility]   7/10/2024  3:20 PM

## 2024-07-15 ENCOUNTER — PREP FOR PROCEDURE (OUTPATIENT)
Dept: SURGERY | Age: 59
End: 2024-07-15

## 2024-07-15 ENCOUNTER — OFFICE VISIT (OUTPATIENT)
Dept: SURGERY | Age: 59
End: 2024-07-15
Payer: MEDICARE

## 2024-07-15 VITALS
SYSTOLIC BLOOD PRESSURE: 126 MMHG | HEART RATE: 50 BPM | DIASTOLIC BLOOD PRESSURE: 47 MMHG | WEIGHT: 170.2 LBS | BODY MASS INDEX: 23.08 KG/M2

## 2024-07-15 DIAGNOSIS — K70.30 ALCOHOLIC CIRRHOSIS OF LIVER WITHOUT ASCITES (HCC): ICD-10-CM

## 2024-07-15 DIAGNOSIS — R22.32 MASS OF UPPER LIMB, LEFT: ICD-10-CM

## 2024-07-15 DIAGNOSIS — M79.89 SOFT TISSUE MASS: Primary | ICD-10-CM

## 2024-07-15 PROCEDURE — 1036F TOBACCO NON-USER: CPT | Performed by: SURGERY

## 2024-07-15 PROCEDURE — 99213 OFFICE O/P EST LOW 20 MIN: CPT | Performed by: SURGERY

## 2024-07-15 PROCEDURE — G8420 CALC BMI NORM PARAMETERS: HCPCS | Performed by: SURGERY

## 2024-07-15 PROCEDURE — 3017F COLORECTAL CA SCREEN DOC REV: CPT | Performed by: SURGERY

## 2024-07-15 PROCEDURE — G8427 DOCREV CUR MEDS BY ELIG CLIN: HCPCS | Performed by: SURGERY

## 2024-07-24 NOTE — PERIOP NOTE
Patient verified name and .  Order for consent was found in EHR and matches case posting; patient verifies procedure.   Consent for Dr. Hernandes to perform an excision of a left forearm soft tissue mass     Type 1b surgery, PAT phone assessment complete.  Orders were received.  Labs per surgeon: None  Labs per anesthesia protocol: none    Patient answered medical/surgical history questions at their best of ability. All prior to admission medications documented in EPIC.    Patient instructed to continue taking all prescription medications up to the day of surgery but to take only the following medications the day of surgery according to anesthesia guidelines with a small sip of water: Levothyroxine, Xifaxan (cleared with Dr Becerril)  Also, patient is requested to take 2 Tylenol in the morning and then again before bed on the day before surgery. Regular or extra strength may be used.       Patient informed that all vitamins and supplements should be held 7 days prior to surgery and NSAIDS 5 days prior to surgery. Prescription meds to hold:Sildenafil x 24 hours  prior to surgery    Patient instructed on the following:    > Arrive at A Entrance, time of arrival to be called the day before by 1700  > NPO after midnight, unless otherwise indicated, including gum, mints, and ice chips  > Responsible adult must drive patient to the hospital, stay during surgery, and patient will need supervision 24 hours after anesthesia  > Use non moisturizing soap in shower the night before surgery and on the morning of surgery  > All piercings must be removed prior to arrival.    > Leave all valuables (money and jewelry) at home but bring insurance card and ID on DOS.   > You may be required to pay a deductible or co-pay on the day of your procedure. You can pre-pay by calling [phone] if your surgery is at the [facility] or [phone] if your surgery is at the [facility].  > Do not wear make-up, nail polish, lotions,

## 2024-07-24 NOTE — H&P
poDate: 2024      Name: Coty Ocampo      MRN: [id number]       : 1965       Age: 59 y.o.    Sex: male        Roslyn Parada Rochelle, APRN - NP       CC:  No chief complaint on file.      HPI:  The patient presents for the first office visit after the patient was seen for a partial bowel obstruction on 3/29/24. The patient resolved without surgery. He has a history of cirrhosis with a portosystemic shunt.     7/15/24: The patient wants to have a left forearm soft tissue mass excised. He has had it \"for some time.\" It has increased in size and now painful at times. He would like it excised.    Physical Exam:     Ht 1.829 m (6')   Wt 77.1 kg (170 lb)   BMI 23.06 kg/m²     General: Alert, oriented, cooperative white male in no acute distress.     Neck: Supple, trachea midline, no appreciable thyromegaly  Resp: Breathing is  non-labored. Lungs clear to auscultation without wheezing or rhonchi   CV: RRR. No murmurs, rubs or gallops appreciated.  Abd: soft non-tender and non-distended without peritoneal signs. +bs    Left arm: soft tissue mass dorsum of left forearm, 4 cm in diameter. No signs of infection. No drainage.    Assessment/Plan:  Coty Ocampo is a 59 y.o. male who has signs and symptoms consistent with a soft tissue mass of the left forearm.    1.  Excision of a left forearm soft tissue mass in the operating room.    2.  I went through the risks of bleeding, infection, anesthesia, hematoma/seroma formation and possible recurrence of the lesion. I said it may be necessary to place a drain. It may be necessary to leave the wound open, if badly infected.    LIDIA NULL MD  [facility]   2024  1:06 PM

## 2024-07-24 NOTE — PERIOP NOTE
Dear  Terrence,      Thank you for completing your phone assessment with me today. Here are your requested surgery instructions. Please call #[phone], opt 7 or 9 with any questions/concerns.    Your surgery is scheduled at [facility]- [address] [place] [address], [facility] (red brick [facility] with green roof).  Please arrive at Entrance A OUTPATIENT SURGERY (next door to the ER); pre-op (#[phone]) will call you on the business day before your surgery with your arrival time. If you have any questions on the day of surgery, please call the pre-op dept. at the telephone number above.     Nothing to eat after midnight which includes any gum, mints, candy, or ice chips. Please take these medications on the morning of surgery with a small sip of water:  Levothyroxine, Xifaxan  .  On the day before surgery please take 2 Tylenol in the morning and then again before bed. You may use either regular or extra strength.     Please stop all vitamins/supplements 7 days prior to surgery and stop all NSAIDS (ibuprofen, naproxen, aleve, motrin, advil) 5 days before your surgery.     A responsible adult must drive you to the hospital, remain in the building during surgery and you will need adult supervision for 24 hours after anesthesia.    Please use non moisturizing soap (Dial, Safeguard, etc.) the night before surgery and on the morning of surgery unless you have been otherwise instructed. Do NOT wear deodorant, make-up, nail polish, lotions, cologne, perfumes, powders or oil on your skin. All piercings/metal/jewelry must be removed prior to arrival.  If you wear contacts then you will need to bring a case to store them in or wear your glasses.     Please leave all your valuables at home but be sure to bring your insurance card and ID on the day of surgery for registration/identification.      Our Guide to Surgery with additional information can be

## 2024-07-29 ENCOUNTER — ANESTHESIA EVENT (OUTPATIENT)
Dept: SURGERY | Age: 59
End: 2024-07-29
Payer: MEDICARE

## 2024-07-29 RX ORDER — ACETAMINOPHEN 500 MG
1000 TABLET ORAL ONCE
Status: CANCELLED | OUTPATIENT
Start: 2024-07-29 | End: 2024-07-29

## 2024-07-30 ENCOUNTER — HOSPITAL ENCOUNTER (OUTPATIENT)
Age: 59
Setting detail: OUTPATIENT SURGERY
Discharge: HOME | End: 2024-07-30
Attending: SURGERY | Admitting: SURGERY
Payer: MEDICARE

## 2024-07-30 ENCOUNTER — ANESTHESIA (OUTPATIENT)
Dept: SURGERY | Age: 59
End: 2024-07-30
Payer: MEDICARE

## 2024-07-30 VITALS
WEIGHT: 170.1 LBS | BODY MASS INDEX: 23.04 KG/M2 | SYSTOLIC BLOOD PRESSURE: 133 MMHG | DIASTOLIC BLOOD PRESSURE: 64 MMHG | HEIGHT: 72 IN | TEMPERATURE: 98 F | HEART RATE: 77 BPM | OXYGEN SATURATION: 98 % | RESPIRATION RATE: 16 BRPM

## 2024-07-30 DIAGNOSIS — R22.32 MASS OF UPPER LIMB, LEFT: Primary | ICD-10-CM

## 2024-07-30 PROCEDURE — 3700000000 HC ANESTHESIA ATTENDED CARE: Performed by: SURGERY

## 2024-07-30 PROCEDURE — 2709999900 HC NON-CHARGEABLE SUPPLY: Performed by: SURGERY

## 2024-07-30 PROCEDURE — 3600000012 HC SURGERY LEVEL 2 ADDTL 15MIN: Performed by: SURGERY

## 2024-07-30 PROCEDURE — 25071 EXC FOREARM LES SC 3 CM/>: CPT | Performed by: SURGERY

## 2024-07-30 PROCEDURE — 7100000000 HC PACU RECOVERY - FIRST 15 MIN: Performed by: SURGERY

## 2024-07-30 PROCEDURE — 2500000003 HC RX 250 WO HCPCS: Performed by: NURSE ANESTHETIST, CERTIFIED REGISTERED

## 2024-07-30 PROCEDURE — 7100000011 HC PHASE II RECOVERY - ADDTL 15 MIN: Performed by: SURGERY

## 2024-07-30 PROCEDURE — 3600000002 HC SURGERY LEVEL 2 BASE: Performed by: SURGERY

## 2024-07-30 PROCEDURE — 2580000003 HC RX 258: Performed by: ANESTHESIOLOGY

## 2024-07-30 PROCEDURE — 2500000003 HC RX 250 WO HCPCS: Performed by: SURGERY

## 2024-07-30 PROCEDURE — 6360000002 HC RX W HCPCS: Performed by: SURGERY

## 2024-07-30 PROCEDURE — 88304 TISSUE EXAM BY PATHOLOGIST: CPT

## 2024-07-30 PROCEDURE — 3700000001 HC ADD 15 MINUTES (ANESTHESIA): Performed by: SURGERY

## 2024-07-30 PROCEDURE — 6360000002 HC RX W HCPCS: Performed by: NURSE ANESTHETIST, CERTIFIED REGISTERED

## 2024-07-30 PROCEDURE — 7100000001 HC PACU RECOVERY - ADDTL 15 MIN: Performed by: SURGERY

## 2024-07-30 PROCEDURE — 7100000010 HC PHASE II RECOVERY - FIRST 15 MIN: Performed by: SURGERY

## 2024-07-30 RX ORDER — NALOXONE HYDROCHLORIDE 0.4 MG/ML
INJECTION, SOLUTION INTRAMUSCULAR; INTRAVENOUS; SUBCUTANEOUS PRN
Status: DISCONTINUED | OUTPATIENT
Start: 2024-07-30 | End: 2024-07-30 | Stop reason: HOSPADM

## 2024-07-30 RX ORDER — MIDAZOLAM HYDROCHLORIDE 1 MG/ML
INJECTION INTRAMUSCULAR; INTRAVENOUS PRN
Status: DISCONTINUED | OUTPATIENT
Start: 2024-07-30 | End: 2024-07-30 | Stop reason: SDUPTHER

## 2024-07-30 RX ORDER — BUPIVACAINE HYDROCHLORIDE 5 MG/ML
INJECTION, SOLUTION EPIDURAL; INTRACAUDAL PRN
Status: DISCONTINUED | OUTPATIENT
Start: 2024-07-30 | End: 2024-07-30 | Stop reason: HOSPADM

## 2024-07-30 RX ORDER — ONDANSETRON 2 MG/ML
INJECTION INTRAMUSCULAR; INTRAVENOUS PRN
Status: DISCONTINUED | OUTPATIENT
Start: 2024-07-30 | End: 2024-07-30 | Stop reason: SDUPTHER

## 2024-07-30 RX ORDER — PROPOFOL 10 MG/ML
INJECTION, EMULSION INTRAVENOUS PRN
Status: DISCONTINUED | OUTPATIENT
Start: 2024-07-30 | End: 2024-07-30 | Stop reason: SDUPTHER

## 2024-07-30 RX ORDER — SODIUM CHLORIDE 0.9 % (FLUSH) 0.9 %
5-40 SYRINGE (ML) INJECTION EVERY 12 HOURS SCHEDULED
Status: DISCONTINUED | OUTPATIENT
Start: 2024-07-30 | End: 2024-07-30 | Stop reason: HOSPADM

## 2024-07-30 RX ORDER — HYDRALAZINE HYDROCHLORIDE 20 MG/ML
10 INJECTION INTRAMUSCULAR; INTRAVENOUS
Status: DISCONTINUED | OUTPATIENT
Start: 2024-07-30 | End: 2024-07-30 | Stop reason: HOSPADM

## 2024-07-30 RX ORDER — FENTANYL CITRATE 50 UG/ML
100 INJECTION, SOLUTION INTRAMUSCULAR; INTRAVENOUS
Status: DISCONTINUED | OUTPATIENT
Start: 2024-07-30 | End: 2024-07-30 | Stop reason: HOSPADM

## 2024-07-30 RX ORDER — OXYCODONE HYDROCHLORIDE AND ACETAMINOPHEN 5; 325 MG/1; MG/1
1 TABLET ORAL EVERY 6 HOURS PRN
Qty: 20 TABLET | Refills: 0 | Status: SHIPPED | OUTPATIENT
Start: 2024-07-30 | End: 2024-08-04

## 2024-07-30 RX ORDER — DEXAMETHASONE SODIUM PHOSPHATE 10 MG/ML
INJECTION INTRAMUSCULAR; INTRAVENOUS PRN
Status: DISCONTINUED | OUTPATIENT
Start: 2024-07-30 | End: 2024-07-30 | Stop reason: SDUPTHER

## 2024-07-30 RX ORDER — LABETALOL HYDROCHLORIDE 5 MG/ML
10 INJECTION, SOLUTION INTRAVENOUS
Status: DISCONTINUED | OUTPATIENT
Start: 2024-07-30 | End: 2024-07-30 | Stop reason: HOSPADM

## 2024-07-30 RX ORDER — PROCHLORPERAZINE EDISYLATE 5 MG/ML
5 INJECTION INTRAMUSCULAR; INTRAVENOUS
Status: DISCONTINUED | OUTPATIENT
Start: 2024-07-30 | End: 2024-07-30 | Stop reason: HOSPADM

## 2024-07-30 RX ORDER — SODIUM CHLORIDE 9 MG/ML
INJECTION, SOLUTION INTRAVENOUS PRN
Status: DISCONTINUED | OUTPATIENT
Start: 2024-07-30 | End: 2024-07-30 | Stop reason: HOSPADM

## 2024-07-30 RX ORDER — LIDOCAINE HYDROCHLORIDE 10 MG/ML
1 INJECTION, SOLUTION INFILTRATION; PERINEURAL
Status: DISCONTINUED | OUTPATIENT
Start: 2024-07-30 | End: 2024-07-30 | Stop reason: HOSPADM

## 2024-07-30 RX ORDER — OXYCODONE HYDROCHLORIDE 5 MG/1
5 TABLET ORAL
Status: DISCONTINUED | OUTPATIENT
Start: 2024-07-30 | End: 2024-07-30 | Stop reason: HOSPADM

## 2024-07-30 RX ORDER — SODIUM CHLORIDE 0.9 % (FLUSH) 0.9 %
5-40 SYRINGE (ML) INJECTION PRN
Status: DISCONTINUED | OUTPATIENT
Start: 2024-07-30 | End: 2024-07-30 | Stop reason: HOSPADM

## 2024-07-30 RX ORDER — SODIUM CHLORIDE, SODIUM LACTATE, POTASSIUM CHLORIDE, CALCIUM CHLORIDE 600; 310; 30; 20 MG/100ML; MG/100ML; MG/100ML; MG/100ML
INJECTION, SOLUTION INTRAVENOUS CONTINUOUS
Status: DISCONTINUED | OUTPATIENT
Start: 2024-07-30 | End: 2024-07-30 | Stop reason: HOSPADM

## 2024-07-30 RX ORDER — LIDOCAINE HYDROCHLORIDE 20 MG/ML
INJECTION, SOLUTION EPIDURAL; INFILTRATION; INTRACAUDAL; PERINEURAL PRN
Status: DISCONTINUED | OUTPATIENT
Start: 2024-07-30 | End: 2024-07-30 | Stop reason: SDUPTHER

## 2024-07-30 RX ORDER — LIDOCAINE HYDROCHLORIDE 10 MG/ML
INJECTION, SOLUTION INFILTRATION; PERINEURAL PRN
Status: DISCONTINUED | OUTPATIENT
Start: 2024-07-30 | End: 2024-07-30 | Stop reason: HOSPADM

## 2024-07-30 RX ORDER — ONDANSETRON 2 MG/ML
4 INJECTION INTRAMUSCULAR; INTRAVENOUS
Status: DISCONTINUED | OUTPATIENT
Start: 2024-07-30 | End: 2024-07-30 | Stop reason: HOSPADM

## 2024-07-30 RX ORDER — MIDAZOLAM HYDROCHLORIDE 2 MG/2ML
2 INJECTION, SOLUTION INTRAMUSCULAR; INTRAVENOUS
Status: DISCONTINUED | OUTPATIENT
Start: 2024-07-30 | End: 2024-07-30 | Stop reason: HOSPADM

## 2024-07-30 RX ORDER — EPHEDRINE SULFATE 5 MG/ML
INJECTION INTRAVENOUS PRN
Status: DISCONTINUED | OUTPATIENT
Start: 2024-07-30 | End: 2024-07-30 | Stop reason: SDUPTHER

## 2024-07-30 RX ORDER — FENTANYL CITRATE 50 UG/ML
INJECTION, SOLUTION INTRAMUSCULAR; INTRAVENOUS PRN
Status: DISCONTINUED | OUTPATIENT
Start: 2024-07-30 | End: 2024-07-30 | Stop reason: SDUPTHER

## 2024-07-30 RX ADMIN — SODIUM CHLORIDE, POTASSIUM CHLORIDE, SODIUM LACTATE AND CALCIUM CHLORIDE: 600; 310; 30; 20 INJECTION, SOLUTION INTRAVENOUS at 08:19

## 2024-07-30 RX ADMIN — MIDAZOLAM 2 MG: 1 INJECTION INTRAMUSCULAR; INTRAVENOUS at 09:45

## 2024-07-30 RX ADMIN — FENTANYL CITRATE 50 MCG: 50 INJECTION, SOLUTION INTRAMUSCULAR; INTRAVENOUS at 09:58

## 2024-07-30 RX ADMIN — Medication 2000 MG: at 10:08

## 2024-07-30 RX ADMIN — FENTANYL CITRATE 50 MCG: 50 INJECTION, SOLUTION INTRAMUSCULAR; INTRAVENOUS at 10:17

## 2024-07-30 RX ADMIN — DEXAMETHASONE SODIUM PHOSPHATE 10 MG: 10 INJECTION INTRAMUSCULAR; INTRAVENOUS at 10:10

## 2024-07-30 RX ADMIN — EPHEDRINE SULFATE 10 MG: 5 INJECTION INTRAVENOUS at 09:58

## 2024-07-30 RX ADMIN — LIDOCAINE HYDROCHLORIDE 100 MG: 20 INJECTION, SOLUTION EPIDURAL; INFILTRATION; INTRACAUDAL; PERINEURAL at 09:58

## 2024-07-30 RX ADMIN — ONDANSETRON 4 MG: 2 INJECTION INTRAMUSCULAR; INTRAVENOUS at 10:10

## 2024-07-30 RX ADMIN — PROPOFOL 200 MG: 10 INJECTION, EMULSION INTRAVENOUS at 09:58

## 2024-07-30 ASSESSMENT — PAIN SCALES - GENERAL
PAINLEVEL_OUTOF10: 0

## 2024-07-30 ASSESSMENT — PAIN - FUNCTIONAL ASSESSMENT: PAIN_FUNCTIONAL_ASSESSMENT: 0-10

## 2024-07-30 NOTE — INTERVAL H&P NOTE
Update History & Physical    The patient's History and Physical of 7/24/24was reviewed with the patient and I examined the patient. There was no change. The surgical site was confirmed by the patient and me.     Plan: The risks, benefits, expected outcome, and alternative to the recommended procedure have been discussed with the patient. Patient understands and wants to proceed with the procedure.     Electronically signed by LIDIA NULL MD on 7/30/2024 at 9:26 AM

## 2024-07-30 NOTE — ANESTHESIA PRE PROCEDURE
Department of Anesthesiology  Preprocedure Note       Name:  Coty Ocampo   Age:  59 y.o.  :  1965                                          MRN:  [id number]         Date:  2024      Surgeon: Surgeon(s):  Vipul Hernandes MD    Procedure: Procedure(s):  LEFT FOREARM SOFT TISSUE MASS EXCISION    Medications prior to admission:   Prior to Admission medications    Medication Sig Start Date End Date Taking? Authorizing Provider   Selenium 200 MCG CAPS Take 1 capsule by mouth daily 24   Ricki Abrams MD   levothyroxine (SYNTHROID) 50 MCG tablet Take 1 tablet by mouth Five times weekly 24   Ricki Abrams MD   XIFAXAN 550 MG tablet Take 1 tablet by mouth 2 times daily 22   ProviderTisha MD   sildenafil (VIAGRA) 100 MG tablet TAKE 1 TABLET BY MOUTH DAILY AS NEEDED 21   Automatic Reconciliation, Ar       Current medications:    Current Facility-Administered Medications   Medication Dose Route Frequency Provider Last Rate Last Admin    ceFAZolin (ANCEF) 2000 mg in sterile water 20 mL IV syringe  2,000 mg IntraVENous On Call Vipul Hernandes MD        lidocaine 1 % injection 1 mL  1 mL IntraDERmal Once PRN Gianluca Sen DO        fentaNYL (SUBLIMAZE) injection 100 mcg  100 mcg IntraVENous Once PRN Gianluca Sen DO        lactated ringers IV soln infusion   IntraVENous Continuous Gianluca Sen DO        sodium chloride flush 0.9 % injection 5-40 mL  5-40 mL IntraVENous 2 times per day Gianluca Sen DO        sodium chloride flush 0.9 % injection 5-40 mL  5-40 mL IntraVENous PRN Gianluca Sen DO        0.9 % sodium chloride infusion   IntraVENous PRN Gianluca Sen DO        midazolam PF (VERSED) injection 2 mg  2 mg IntraVENous Once PRN Gianluca Sen DO           Allergies:    Allergies   Allergen Reactions    Lactulose Hallucinations     Pt states makes him hallucinate and become \"sick>\"    Tramadol Nausea And

## 2024-07-30 NOTE — ANESTHESIA PROCEDURE NOTES
Airway  Date/Time: 7/30/2024 10:00 AM  Urgency: elective      General Information and Staff    Patient location during procedure: OR  Performed: resident/CRNA/CAA   Performed by: Lindsay Garcia APRN - CRNA  Authorized by: Gianluca Sen DO      Indications and Patient Condition  Indications for airway management: anesthesia  Spontaneous Ventilation: absent  Sedation level: deep  Preoxygenated: yes  Patient position: sniffing  MILS not maintained throughout  Mask difficulty assessment: not attempted    Final Airway Details  Final airway type: supraglottic airway      Successful airway: oropharyngeal  Size 4     Number of attempts at approach: 1

## 2024-07-30 NOTE — BRIEF OP NOTE
Brief Postoperative Note      Patient: Coty Ocampo  YOB: 1965  MRN: [id number]    Date of Procedure: 7/30/2024    Pre-Op Diagnosis Codes:     * Mass of upper limb, left [R22.32]    Post-Op Diagnosis: Same, probable lipoma       Procedure(s):  LEFT FOREARM SOFT TISSUE MASS EXCISION MEASURING 3 CM X 3 CM X 2 CM    Surgeon(s):  Vipul Null MD    Assistant:  First Assistant: Carline Coombs    Anesthesia: General plus local    Estimated Blood Loss (mL): Minimal    Complications: None    Specimens:   ID Type Source Tests Collected by Time Destination   A : Left Forearm Soft Tissue Mass Tissue Arm SURGICAL PATHOLOGY Vipul Null MD 7/30/2024 1015        Implants:  * No implants in log *      Drains: * No LDAs found *    Findings:  Infection Present At Time Of Surgery (PATOS) (choose all levels that have infection present):  No infection present  Other Findings: See dictated note.  This procedure was not performed to treat primary cutaneous melanoma through wide local excision    Electronically signed by VIPUL NULL MD on 7/30/2024 at 10:30 AM

## 2024-07-30 NOTE — ANESTHESIA POSTPROCEDURE EVALUATION
Department of Anesthesiology  Postprocedure Note    Patient: Coty Ocampo  MRN: [id number]  YOB: 1965  Date of evaluation: 7/30/2024    Procedure Summary       Date: 07/30/24 Room / Location: [facility] OR  / [facility] MAIN OR    Anesthesia Start: 0941 Anesthesia Stop: 1040    Procedure: LEFT FOREARM SOFT TISSUE MASS EXCISION (Left) Diagnosis:       Mass of upper limb, left      (Mass of upper limb, left [R22.32])    Surgeons: Vipul Hernandes MD Responsible Provider: Gianluca Sen DO    Anesthesia Type: General ASA Status: 3            Anesthesia Type: General    Kinjal Phase I: Kinjal Score: 10    Kinjal Phase II: Kinjal Score: 10    Anesthesia Post Evaluation    Patient location during evaluation: PACU  Level of consciousness: awake and alert  Airway patency: patent  Nausea & Vomiting: no nausea  Cardiovascular status: hemodynamically stable  Respiratory status: acceptable  Hydration status: euvolemic  Pain management: satisfactory to patient    No notable events documented.

## 2024-07-30 NOTE — OP NOTE
[facility] [address]  [address]                            OPERATIVE REPORT      PATIENT NAME: MEET MCCORMICK        : 1965  MED REC NO: [id number]                       ROOM: MARCY  ACCOUNT NO: [id number]                       ADMIT DATE: 2024  PROVIDER: Vipul Hernandes MD    DATE OF SERVICE:  2024    PREOPERATIVE DIAGNOSES:  Left forearm soft tissue mass.    POSTOPERATIVE DIAGNOSES:  Left forearm soft tissue mass, probable lipoma.    PROCEDURES PERFORMED:  Excision of a left forearm soft tissue mass measuring 3 cm x 3 cm x 2 cm.    SURGEON:  Vipul Hernandes MD    ASSISTANT:  Carline Coombs first assistant.    ANESTHESIA:  General anesthesia via LMA with Dr. Sen plus 30 mL 0.5% Marcaine and 10 mL of 1% Xylocaine, both plain used as local anesthesia at the end of the procedure.    ESTIMATED BLOOD LOSS:  5 cc's    SPECIMENS REMOVED:  Left forearm soft tissue mass    INTRAOPERATIVE FINDINGS:  Lipoma.     COMPLICATIONS:  None.    IMPLANTS:  None.    INDICATIONS:  This is a 59-year-old male, who I saw for a partial bowel obstruction in the hospital.  He came back to see me after undergoing conservative therapy with no surgery recommended for the partial bowel obstruction, which resolved on its own.  In discussion, he pointed out a lesion on his left forearm that he wanted to have it excised.  He said it is increased in size and was painful when pressure was applied to it.  He asked that it to be excised.  I went through risks of bleeding, infection, anesthesia, hematoma, seroma formation, potential for recurrence of the lesion.  He was agreeable, signed a consent form, was scheduled for 2024.    DESCRIPTION OF PROCEDURE:  The patient was seen in the preop area, where the left forearm lesion was marked.  He was then transported to room #7 [facility], placed on the operating table in

## 2024-07-30 NOTE — DISCHARGE INSTRUCTIONS
1. Diet as tolerated except for a  low fat diet after laparoscopic cholecystectomy.    2. Showering is allowed, but no tub baths, hot tubs or swimming.    3. Drainage is common from the wounds. Change the dressings as needed. Call our office if the wounds become reddened, tender, feel warm to the touch or pus starts to drain from the wound.    4. Take prescribed pain medication as directed, usually Percocet, Norco, Ultram or Dilaudid. Take over the counter medication for minor pain.    5. Ice may be applied intermittently to the surgical site or sites.    6. Call or office, [phone], if problems arise.    7. Follow up in the office at the assigned time.    8. Resume all medications as taken per surgery, unless specifically instructed not to take certain ones.    9. No lifting more than 25 pounds until told otherwise.    10. Driving is allowed 3 days after surgery as long as you feel comfortable enough to drive and have not taken any prescription pain medication prior to driving.

## 2024-08-12 NOTE — PROGRESS NOTES
[facility]      Coty Ocampo   presents for follow-up after left forearm STM excision. by Dr. Hernandes.  He reports no problems with eating, bowel movements, voiding, or their wound and no ongoing postoperative problems.      EXAM:  He  is in no distress  There is no residual tenderness in the abdomen   Incision: healing well. He had a seroma under the wound. 7cc of serous fluid drained from the site. No complications.     Pathology:    Macroscopic Description        Coty Ocampo, received in formalin labeled left forearm soft tissue   mass and specimen consists of a fragment of tan-yellow fatty soft tissue   measuring 3.6 x 2.9 x 1.2 cm.  Dissection reveals grossly unremarkable   adipose tissue.  Representative sections are submitted in 3 cassettes.   Cold ischemic time:  Less than 1 minute.  Total time in formalin:  34       ASSESSMENT/PLAN:    1. Mass of upper limb, left         Return with worsening seroma.  Return to normal activity.  Call with any concerns.     Return if symptoms worsen or fail to improve.     CODY Garcia

## 2024-08-13 ENCOUNTER — OFFICE VISIT (OUTPATIENT)
Dept: SURGERY | Age: 59
End: 2024-08-13
Payer: MEDICARE

## 2024-08-13 DIAGNOSIS — R22.32 MASS OF UPPER LIMB, LEFT: Primary | ICD-10-CM

## 2024-08-13 PROCEDURE — G8420 CALC BMI NORM PARAMETERS: HCPCS

## 2024-08-13 PROCEDURE — 3017F COLORECTAL CA SCREEN DOC REV: CPT

## 2024-08-13 PROCEDURE — G8427 DOCREV CUR MEDS BY ELIG CLIN: HCPCS

## 2024-08-13 PROCEDURE — 99213 OFFICE O/P EST LOW 20 MIN: CPT

## 2024-08-13 PROCEDURE — 1036F TOBACCO NON-USER: CPT

## 2024-08-27 ENCOUNTER — HOSPITAL ENCOUNTER (EMERGENCY)
Age: 59
Discharge: HOME | End: 2024-08-27
Payer: MEDICARE

## 2024-08-27 ENCOUNTER — APPOINTMENT (OUTPATIENT)
Dept: GENERAL RADIOLOGY | Age: 59
End: 2024-08-27
Payer: MEDICARE

## 2024-08-27 VITALS
SYSTOLIC BLOOD PRESSURE: 112 MMHG | HEART RATE: 50 BPM | WEIGHT: 165 LBS | DIASTOLIC BLOOD PRESSURE: 80 MMHG | OXYGEN SATURATION: 99 % | RESPIRATION RATE: 15 BRPM | TEMPERATURE: 98.4 F | BODY MASS INDEX: 21.87 KG/M2 | HEIGHT: 73 IN

## 2024-08-27 DIAGNOSIS — S92.414A CLOSED NONDISPLACED FRACTURE OF PROXIMAL PHALANX OF RIGHT GREAT TOE, INITIAL ENCOUNTER: Primary | ICD-10-CM

## 2024-08-27 PROCEDURE — 99283 EMERGENCY DEPT VISIT LOW MDM: CPT

## 2024-08-27 PROCEDURE — 73630 X-RAY EXAM OF FOOT: CPT

## 2024-08-27 PROCEDURE — 73610 X-RAY EXAM OF ANKLE: CPT

## 2024-08-27 ASSESSMENT — PAIN DESCRIPTION - LOCATION: LOCATION: ANKLE;FOOT

## 2024-08-27 ASSESSMENT — PAIN - FUNCTIONAL ASSESSMENT: PAIN_FUNCTIONAL_ASSESSMENT: 0-10

## 2024-08-27 ASSESSMENT — PAIN SCALES - GENERAL: PAINLEVEL_OUTOF10: 7

## 2024-08-27 ASSESSMENT — PAIN DESCRIPTION - ORIENTATION: ORIENTATION: RIGHT
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
## 2024-08-27 NOTE — DISCHARGE INSTRUCTIONS
You have a small fracture in the base of your great toe.  Stay in the hard soled shoe until seen by orthopedics.  Return here with worsening or worrisome symptoms.
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
## 2024-08-28 DIAGNOSIS — E89.0 POSTSURGICAL HYPOTHYROIDISM: ICD-10-CM

## 2024-08-28 LAB
T4 FREE SERPL-MCNC: 1.3 NG/DL (ref 0.9–1.7)
TSH W FREE THYROID IF ABNORMAL: 0.03 UIU/ML (ref 0.27–4.2)

## 2024-08-30 ENCOUNTER — OFFICE VISIT (OUTPATIENT)
Dept: ENDOCRINOLOGY | Age: 59
End: 2024-08-30
Payer: MEDICARE

## 2024-08-30 VITALS
SYSTOLIC BLOOD PRESSURE: 122 MMHG | BODY MASS INDEX: 22.64 KG/M2 | DIASTOLIC BLOOD PRESSURE: 62 MMHG | OXYGEN SATURATION: 97 % | WEIGHT: 171.6 LBS | HEART RATE: 65 BPM

## 2024-08-30 DIAGNOSIS — H57.89 THYROID EYE DISEASE: ICD-10-CM

## 2024-08-30 DIAGNOSIS — E07.9 THYROID EYE DISEASE: ICD-10-CM

## 2024-08-30 DIAGNOSIS — E89.0 POSTSURGICAL HYPOTHYROIDISM: Primary | ICD-10-CM

## 2024-08-30 PROCEDURE — 99214 OFFICE O/P EST MOD 30 MIN: CPT | Performed by: INTERNAL MEDICINE

## 2024-08-30 ASSESSMENT — ENCOUNTER SYMPTOMS
CONSTIPATION: 0
DIARRHEA: 0

## 2024-08-30 NOTE — PROGRESS NOTES
Ricki Abrams MD, FACE  [facility]  [address] [place] [address], [address]  [place] [address]  Phone [phone]  Facsimile [phone]          Coty Ocampo is a 59 y.o. male seen 8/30/2024 for follow up of hypothyroidism        ASSESSMENT AND PLAN:    1. Postsurgical hypothyroidism  He remains biochemically thyrotoxic despite very low doses of levothyroxine.  I became concerned that he likely had regeneration of thyroid tissue following thyroidectomy for Graves' disease.  Thyroid ultrasound 6/2024 confirmed the presence of bilateral thyroid tissue consistent with Graves' disease.  At this point I will have him discontinue thyroid hormone replacement.  I will repeat his thyroid function tests in 4 weeks, including thyroid-stimulating immunoglobulins.  I suspect that he will likely need radioactive iodine therapy in order to ablate the regenerative thyroid tissue.  We discussed that there is a risk of exacerbating thyroid disease with radioactive iodine therapy, and I would likely treat him with glucocorticoids following radioactive iodine.  Given his history of cirrhosis, I would likely use prednisolone rather than prednisone.    2. Thyroid eye disease  Previously followed by Dr. Guaman.  He stopped smoking 5/2023.  I instructed him to continue using artificial tears.  He did not notice any benefit with selenium supplementation.      Follow-up and Dispositions    Return in about 4 weeks (around 9/27/2024), or Friday afternoon is okay.         HISTORY OF PRESENT ILLNESS:    THYROID DISEASE     Presentation/Diagnosis: Graves' disease status post total thyroidectomy 4/28/2022.     Symptoms: See review of systems.       Treatment: Takes name brand in AM correctly.    Imaging:  CT orbits 3/6/2020: Findings consistent with thyroid orbitopathy, more pronounced on the right compared to the left.  There is bilateral extraocular muscle enlargement.  Within the right orbit,  [facility]  [address] [place] [address] [phone]    HPI:    Tariq Zuniga is a 59 y.o.  BLACK/  female who presents to clinic today for evaluation of the issues listed above.     Subjective;    Pt have a PMHx significant for DM, CKD, MI s/p LAD stent (2011), CHF s/p ICD (2015), HTN and HLD.    DM with CKD IV:  Last A1C was <7 per patient.  Diet controlled.  Has overall been well controlled.    Also has CKD IV, follows with Dr. Hernández (Nephro). Last saw Dr. Hernández about 2 months ago (5/2024), Cr 2.34 with GFR 23 (reviewed on patient's portal).  Renal flow ultrasound was negative per patient.    CHF s/p ICD and h/o MI s/p stent:  Had another episode of CHF exacerbation last year.  Follows with Dr. Kohler with [facility] and the [facility] team at [facility].  On Isordil, amlodipine, hydralazine and bumex.   Taken off entresto.    Pt denies any  fever, chill, chest pain, or sob.    Mammogram done recently (03/2023) - states that it was negative.    Other Health Habits and social history:  Smoking history: former smoker  Alcohol history: socially   , no children.     Other providers:  -Cardiology  -Nephrology    Allergies - reviewed:   Allergies   Allergen Reactions    Shellfish Allergy Hives, Nausea And Vomiting and Shortness Of Breath    Ace Inhibitors Cough     ?10/2011    Doxycycline Nausea Only       Past Medical History - reviewed:  Past Medical History:   Diagnosis Date    Asthma     mild    CHF (congestive heart failure) (HCC)     Diabetes mellitus, type 2 (HCC) 08/24/2012    GERD (gastroesophageal reflux disease) 03/16/2011    GERD (gastroesophageal reflux disease)     hiatal hernia    Heart disease 2015    Heart failure (HCC)     Hypercholesterolemia     Hypertension 03/16/2011    Ill-defined condition     pacemaker put in last July 2015 because of CHF    Impaired fasting glucose 04/18/2011    Iron  kg (171 lb 9.6 oz)   08/27/24 74.8 kg (165 lb)   07/30/24 77.2 kg (170 lb 1.6 oz)       Physical Exam  Constitutional:       General: He is not in acute distress.  Eyes:      Comments: No proptosis.  +Mild periorbital edema.  Subtle left upper lid ptosis.     Neck:      Comments: Thyroidectomy scar.  Cardiovascular:      Rate and Rhythm: Normal rate and regular rhythm.   Lymphadenopathy:      Cervical: No cervical adenopathy.   Neurological:      Motor: No tremor.         Orders Placed This Encounter   Procedures    TSH     Standing Status:   Future     Standing Expiration Date:   8/30/2025    T4, Free     Standing Status:   Future     Standing Expiration Date:   8/30/2025    T3, Free     Standing Status:   Future     Standing Expiration Date:   8/30/2025    Thyroid Stimulating Immunoglobulin     Standing Status:   Future     Standing Expiration Date:   8/30/2025         Current Outpatient Medications   Medication Sig Dispense Refill    XIFAXAN 550 MG tablet Take 1 tablet by mouth 2 times daily      sildenafil (VIAGRA) 100 MG tablet TAKE 1 TABLET BY MOUTH DAILY AS NEEDED       No current facility-administered medications for this visit.

## 2024-09-11 ENCOUNTER — OFFICE VISIT (OUTPATIENT)
Dept: ENDOCRINOLOGY | Age: 59
End: 2024-09-11
Payer: MEDICARE

## 2024-09-11 VITALS
SYSTOLIC BLOOD PRESSURE: 124 MMHG | HEART RATE: 60 BPM | DIASTOLIC BLOOD PRESSURE: 62 MMHG | BODY MASS INDEX: 22.93 KG/M2 | RESPIRATION RATE: 16 BRPM | HEIGHT: 73 IN | OXYGEN SATURATION: 97 % | WEIGHT: 173 LBS

## 2024-09-11 DIAGNOSIS — E89.0 POSTSURGICAL HYPOTHYROIDISM: Primary | ICD-10-CM

## 2024-09-11 DIAGNOSIS — H57.89 THYROID EYE DISEASE: ICD-10-CM

## 2024-09-11 DIAGNOSIS — E07.9 THYROID EYE DISEASE: ICD-10-CM

## 2024-09-11 DIAGNOSIS — E89.0 POSTSURGICAL HYPOTHYROIDISM: ICD-10-CM

## 2024-09-11 LAB
T4 FREE SERPL-MCNC: 0.8 NG/DL (ref 0.9–1.7)
TSH, 3RD GENERATION: 0.08 UIU/ML (ref 0.27–4.2)

## 2024-09-11 PROCEDURE — G2211 COMPLEX E/M VISIT ADD ON: HCPCS | Performed by: INTERNAL MEDICINE

## 2024-09-11 PROCEDURE — 99214 OFFICE O/P EST MOD 30 MIN: CPT | Performed by: INTERNAL MEDICINE

## 2024-09-11 RX ORDER — TURMERIC ROOT EXTRACT 500 MG
TABLET ORAL
COMMUNITY

## 2024-09-11 ASSESSMENT — ENCOUNTER SYMPTOMS
CONSTIPATION: 0
DIARRHEA: 0

## 2024-09-12 ENCOUNTER — TELEPHONE (OUTPATIENT)
Dept: ENDOCRINOLOGY | Age: 59
End: 2024-09-12

## 2024-09-12 DIAGNOSIS — E89.0 POSTSURGICAL HYPOTHYROIDISM: Primary | ICD-10-CM

## 2024-09-14 LAB
T3FREE SERPL-MCNC: 2.6 PG/ML (ref 2–4.4)
TSI ACT/NOR SER: 25.4 IU/L (ref 0–0.55)

## 2024-09-19 ENCOUNTER — TELEPHONE (OUTPATIENT)
Dept: ENDOCRINOLOGY | Age: 59
End: 2024-09-19

## 2024-09-24 ENCOUNTER — TELEPHONE (OUTPATIENT)
Dept: ENDOCRINOLOGY | Age: 59
End: 2024-09-24

## 2024-09-24 DIAGNOSIS — R68.82 LOW LIBIDO: Primary | ICD-10-CM

## 2024-09-25 DIAGNOSIS — E89.0 POSTSURGICAL HYPOTHYROIDISM: ICD-10-CM

## 2024-09-25 DIAGNOSIS — R68.82 LOW LIBIDO: ICD-10-CM

## 2024-09-25 LAB
FSH SERPL-ACNC: 1.9 MIU/ML
LH SERPL-ACNC: 2.3 MIU/ML
PROLACTIN SERPL-MCNC: 7.4 NG/ML (ref 4–15.2)
T4 FREE SERPL-MCNC: 0.7 NG/DL (ref 0.9–1.7)
TSH, 3RD GENERATION: 0.51 UIU/ML (ref 0.27–4.2)

## 2024-09-26 LAB — T3FREE SERPL-MCNC: 2.3 PG/ML (ref 2–4.4)

## 2024-09-27 LAB — TSI ACT/NOR SER: 34.4 IU/L (ref 0–0.55)

## 2024-10-09 LAB
TESTOST FREE MFR SERPL: 0.6 %
TESTOST FREE SERPL-MCNC: 46 PG/ML
TESTOST SERPL-MCNC: 759 NG/DL

## 2024-10-25 ENCOUNTER — OFFICE VISIT (OUTPATIENT)
Dept: ENDOCRINOLOGY | Age: 59
End: 2024-10-25
Payer: MEDICARE

## 2024-10-25 VITALS
HEART RATE: 60 BPM | RESPIRATION RATE: 18 BRPM | DIASTOLIC BLOOD PRESSURE: 56 MMHG | OXYGEN SATURATION: 95 % | WEIGHT: 166 LBS | SYSTOLIC BLOOD PRESSURE: 108 MMHG | HEIGHT: 73 IN | BODY MASS INDEX: 22 KG/M2

## 2024-10-25 DIAGNOSIS — E07.9 THYROID EYE DISEASE: ICD-10-CM

## 2024-10-25 DIAGNOSIS — E05.00 GRAVES' DISEASE: Primary | ICD-10-CM

## 2024-10-25 DIAGNOSIS — H57.89 THYROID EYE DISEASE: ICD-10-CM

## 2024-10-25 DIAGNOSIS — E05.00 GRAVES' DISEASE: ICD-10-CM

## 2024-10-25 LAB
T4 FREE SERPL-MCNC: 0.8 NG/DL (ref 0.9–1.7)
TSH, 3RD GENERATION: 0.76 UIU/ML (ref 0.27–4.2)

## 2024-10-25 PROCEDURE — 99214 OFFICE O/P EST MOD 30 MIN: CPT | Performed by: INTERNAL MEDICINE

## 2024-10-25 ASSESSMENT — ENCOUNTER SYMPTOMS
CONSTIPATION: 1
DIARRHEA: 0

## 2024-10-25 NOTE — PROGRESS NOTES
Ricki Abrams MD, [facility]  [address], [address]  [place] [address]  Phone [phone]  Facsimile [phone]          Coty Ocampo is a 59 y.o. male seen 10/25/2024 for follow up of hypothyroidism        ASSESSMENT AND PLAN:    1. Graves' disease  Status post total thyroidectomy 4/2022.  He remained biochemically thyrotoxic despite very low doses of levothyroxine.  I became concerned that he likely had regeneration of thyroid tissue following thyroidectomy for Graves' disease.  Thyroid ultrasound 6/2024 confirmed the presence of bilateral thyroid tissue consistent with Graves' disease.  I had him discontinue thyroid hormone replacement 6/2024.  His most recent thyroid function tests revealed a normal TSH and free T3 in the setting of a mildly low free T4.  I will repeat his thyroid function tests today and prior to next visit.  If his hyperthyroidism recurs, then he will likely need radioactive iodine therapy in order to ablate the regenerative thyroid tissue.  We discussed that there is a risk of exacerbating thyroid eye disease with radioactive iodine therapy, and I would likely treat him with glucocorticoids following radioactive iodine.  Given his history of cirrhosis, I would likely use prednisolone rather than prednisone.  Alternatively, he may choose to restart methimazole but we had difficulty getting his thyroid function tests normalized.    2. Thyroid eye disease  Previously followed by Dr. Guaman and now followed by Dr. Hancock.  He stopped smoking 5/2023.  I instructed him to continue using artificial tears.  He did not notice any benefit with selenium supplementation.       Follow-up and Dispositions    Return in about 3 months (around 1/25/2025).         HISTORY OF PRESENT ILLNESS:    THYROID DISEASE     Presentation/Diagnosis: Graves' disease status post total thyroidectomy 4/28/2022.     Symptoms: See review of systems.

## 2024-10-26 LAB — T3FREE SERPL-MCNC: 2.8 PG/ML (ref 2–4.4)

## 2024-10-29 ENCOUNTER — TELEPHONE (OUTPATIENT)
Dept: ENDOCRINOLOGY | Age: 59
End: 2024-10-29

## 2024-10-29 NOTE — TELEPHONE ENCOUNTER
----- Message from Dr. Ricki Abrams MD sent at 10/28/2024 12:45 PM EDT -----  His thyroid levels are stable currently.  I want him to stay off his thyroid medication for now.

## 2024-10-29 NOTE — TELEPHONE ENCOUNTER
Spoke to patient.  Gave results message.  Pt is aware he needs to stay off thyroid medication for now.  Confirmed next appt.

## 2024-12-16 DIAGNOSIS — E89.0 POSTSURGICAL HYPOTHYROIDISM: ICD-10-CM

## 2024-12-17 LAB — TSH W FREE THYROID IF ABNORMAL: 0.43 UIU/ML (ref 0.27–4.2)

## 2024-12-18 ENCOUNTER — OFFICE VISIT (OUTPATIENT)
Dept: ENDOCRINOLOGY | Age: 59
End: 2024-12-18
Payer: MEDICARE

## 2024-12-18 VITALS
HEART RATE: 56 BPM | BODY MASS INDEX: 23.33 KG/M2 | DIASTOLIC BLOOD PRESSURE: 62 MMHG | OXYGEN SATURATION: 98 % | HEIGHT: 73 IN | WEIGHT: 176 LBS | RESPIRATION RATE: 18 BRPM | SYSTOLIC BLOOD PRESSURE: 104 MMHG

## 2024-12-18 DIAGNOSIS — E07.9 THYROID EYE DISEASE: ICD-10-CM

## 2024-12-18 DIAGNOSIS — E05.00 GRAVES' DISEASE: Primary | ICD-10-CM

## 2024-12-18 DIAGNOSIS — H57.89 THYROID EYE DISEASE: ICD-10-CM

## 2024-12-18 PROCEDURE — 99214 OFFICE O/P EST MOD 30 MIN: CPT | Performed by: INTERNAL MEDICINE

## 2024-12-18 RX ORDER — TADALAFIL 20 MG/1
TABLET ORAL
COMMUNITY

## 2024-12-18 ASSESSMENT — ENCOUNTER SYMPTOMS
DIARRHEA: 0
CONSTIPATION: 0

## 2024-12-18 NOTE — PROGRESS NOTES
occasional irritation, redness and tearing.  His vision is stable.  He denies diplopia.  He quit smoking 5/22/2023.  Selenium was ineffective and caused headaches.  He has been seen by Dr. Hancock and is now going to [facility].  He is taking eye drops for severe dry eyes, which has helped.   Cardiovascular:  Negative for palpitations.   Gastrointestinal:  Negative for constipation and diarrhea.   Endocrine: Negative for cold intolerance and heat intolerance.   Neurological:  Negative for tremors.   Psychiatric/Behavioral:  Negative for sleep disturbance.        Vital Signs:  /62 (Site: Left Upper Arm, Position: Sitting, Cuff Size: Medium Adult)   Pulse 56   Resp 18   Ht 1.854 m (6' 1\")   Wt 79.8 kg (176 lb)   SpO2 98%   BMI 23.22 kg/m²     Wt Readings from Last 3 Encounters:   12/18/24 79.8 kg (176 lb)   10/25/24 75.3 kg (166 lb)   09/11/24 78.5 kg (173 lb)       Physical Exam  Constitutional:       General: He is not in acute distress.  Eyes:      Comments: No proptosis.  Subtle left upper lid ptosis.     Neck:      Comments: Thyroidectomy scar.  Cardiovascular:      Rate and Rhythm: Normal rate and regular rhythm.   Lymphadenopathy:      Cervical: No cervical adenopathy.   Neurological:      Motor: No tremor.         Orders Placed This Encounter   Procedures    TSH     Standing Status:   Future     Standing Expiration Date:   12/18/2025    T4, Free     Standing Status:   Future     Standing Expiration Date:   12/18/2025    T3, Free     Standing Status:   Future     Standing Expiration Date:   12/18/2025         Current Outpatient Medications   Medication Sig Dispense Refill    tadalafil (CIALIS) 20 MG tablet TAKE 1 TABLET BY MOUTH EVERY DAY AS NEEDED FOR 30 DAYS      Multiple Vitamin (ONE-A-DAY 55 PLUS PO) Take by mouth      XIFAXAN 550 MG tablet Take 1 tablet by mouth 2 times daily      sildenafil (VIAGRA) 100 MG tablet TAKE 1 TABLET BY MOUTH DAILY AS NEEDED       No current facility-administered

## 2024-12-24 ENCOUNTER — HOSPITAL ENCOUNTER (INPATIENT)
Age: 59
LOS: 10 days | Discharge: HOME | End: 2025-01-03
Attending: EMERGENCY MEDICINE | Admitting: INTERNAL MEDICINE
Payer: MEDICARE

## 2024-12-24 ENCOUNTER — APPOINTMENT (OUTPATIENT)
Dept: CT IMAGING | Age: 59
End: 2024-12-24
Payer: MEDICARE

## 2024-12-24 DIAGNOSIS — R10.84 GENERALIZED ABDOMINAL PAIN: ICD-10-CM

## 2024-12-24 DIAGNOSIS — K56.609 SMALL BOWEL OBSTRUCTION (HCC): Primary | ICD-10-CM

## 2024-12-24 DIAGNOSIS — Z87.19 HISTORY OF ESOPHAGEAL VARICES: ICD-10-CM

## 2024-12-24 LAB
ALBUMIN SERPL-MCNC: 3.3 G/DL (ref 3.5–5)
ALBUMIN/GLOB SERPL: 0.8 (ref 1–1.9)
ALP SERPL-CCNC: 79 U/L (ref 40–129)
ALT SERPL-CCNC: 19 U/L (ref 8–55)
ANION GAP SERPL CALC-SCNC: 15 MMOL/L (ref 7–16)
APPEARANCE UR: CLEAR
AST SERPL-CCNC: 38 U/L (ref 15–37)
BACTERIA URNS QL MICRO: NEGATIVE /HPF
BASOPHILS # BLD: 0 K/UL (ref 0–0.2)
BASOPHILS NFR BLD: 0 % (ref 0–2)
BILIRUB SERPL-MCNC: 2.7 MG/DL (ref 0–1.2)
BILIRUB UR QL: NEGATIVE
BUN SERPL-MCNC: 13 MG/DL (ref 6–23)
CALCIUM SERPL-MCNC: 11.2 MG/DL (ref 8.8–10.2)
CASTS URNS QL MICRO: 0 /LPF
CHLORIDE SERPL-SCNC: 105 MMOL/L (ref 98–107)
CO2 SERPL-SCNC: 24 MMOL/L (ref 20–29)
COLOR UR: NORMAL
CREAT SERPL-MCNC: 1.22 MG/DL (ref 0.8–1.3)
CRYSTALS URNS QL MICRO: 0 /LPF
DIFFERENTIAL METHOD BLD: ABNORMAL
EOSINOPHIL # BLD: 0 K/UL (ref 0–0.8)
EOSINOPHIL NFR BLD: 0 % (ref 0.5–7.8)
EPI CELLS #/AREA URNS HPF: NORMAL /HPF
ERYTHROCYTE [DISTWIDTH] IN BLOOD BY AUTOMATED COUNT: 13.5 % (ref 11.9–14.6)
GLOBULIN SER CALC-MCNC: 3.9 G/DL (ref 2.3–3.5)
GLUCOSE SERPL-MCNC: 120 MG/DL (ref 70–99)
GLUCOSE UR STRIP.AUTO-MCNC: NEGATIVE MG/DL
HCT VFR BLD AUTO: 41.2 % (ref 41.1–50.3)
HGB BLD-MCNC: 15 G/DL (ref 13.6–17.2)
HGB UR QL STRIP: NEGATIVE
HYALINE CASTS URNS QL MICRO: NORMAL /LPF
IMM GRANULOCYTES # BLD AUTO: 0 K/UL (ref 0–0.5)
IMM GRANULOCYTES NFR BLD AUTO: 0 % (ref 0–5)
INR PPP: 1.2
KETONES UR QL STRIP.AUTO: NEGATIVE MG/DL
LEUKOCYTE ESTERASE UR QL STRIP.AUTO: NEGATIVE
LIPASE SERPL-CCNC: 40 U/L (ref 13–60)
LYMPHOCYTES # BLD: 0.5 K/UL (ref 0.5–4.6)
LYMPHOCYTES NFR BLD: 9 % (ref 13–44)
MCH RBC QN AUTO: 34.4 PG (ref 26.1–32.9)
MCHC RBC AUTO-ENTMCNC: 36.4 G/DL (ref 31.4–35)
MCV RBC AUTO: 94.5 FL (ref 82–102)
MONOCYTES # BLD: 0.3 K/UL (ref 0.1–1.3)
MONOCYTES NFR BLD: 5 % (ref 4–12)
MUCOUS THREADS URNS QL MICRO: 0 /LPF
NEUTS SEG # BLD: 4.3 K/UL (ref 1.7–8.2)
NEUTS SEG NFR BLD: 84 % (ref 43–78)
NITRITE UR QL STRIP.AUTO: NEGATIVE
NRBC # BLD: 0 K/UL (ref 0–0.2)
PH UR STRIP: 8.5 (ref 5–9)
PLATELET # BLD AUTO: 86 K/UL (ref 150–450)
PMV BLD AUTO: 9.4 FL (ref 9.4–12.3)
POTASSIUM SERPL-SCNC: 3.5 MMOL/L (ref 3.5–5.1)
PROT SERPL-MCNC: 7.3 G/DL (ref 6.3–8.2)
PROT UR STRIP-MCNC: NEGATIVE MG/DL
PROTHROMBIN TIME: 15.7 SEC (ref 11.3–14.9)
RBC # BLD AUTO: 4.36 M/UL (ref 4.23–5.6)
RBC #/AREA URNS HPF: NORMAL /HPF
SODIUM SERPL-SCNC: 144 MMOL/L (ref 136–145)
SP GR UR REFRACTOMETRY: 1.02 (ref 1–1.02)
TSH W FREE THYROID IF ABNORMAL: 0.55 UIU/ML (ref 0.27–4.2)
URINE CULTURE IF INDICATED: NORMAL
UROBILINOGEN UR QL STRIP.AUTO: 1 EU/DL (ref 0.2–1)
WBC # BLD AUTO: 5.1 K/UL (ref 4.3–11.1)
WBC URNS QL MICRO: NORMAL /HPF

## 2024-12-24 PROCEDURE — 96374 THER/PROPH/DIAG INJ IV PUSH: CPT

## 2024-12-24 PROCEDURE — 6360000002 HC RX W HCPCS: Performed by: EMERGENCY MEDICINE

## 2024-12-24 PROCEDURE — 83690 ASSAY OF LIPASE: CPT

## 2024-12-24 PROCEDURE — 1100000000 HC RM PRIVATE

## 2024-12-24 PROCEDURE — 99285 EMERGENCY DEPT VISIT HI MDM: CPT

## 2024-12-24 PROCEDURE — 2500000003 HC RX 250 WO HCPCS: Performed by: EMERGENCY MEDICINE

## 2024-12-24 PROCEDURE — 81001 URINALYSIS AUTO W/SCOPE: CPT

## 2024-12-24 PROCEDURE — 74177 CT ABD & PELVIS W/CONTRAST: CPT

## 2024-12-24 PROCEDURE — 6370000000 HC RX 637 (ALT 250 FOR IP): Performed by: INTERNAL MEDICINE

## 2024-12-24 PROCEDURE — 2580000003 HC RX 258: Performed by: EMERGENCY MEDICINE

## 2024-12-24 PROCEDURE — 2500000003 HC RX 250 WO HCPCS: Performed by: INTERNAL MEDICINE

## 2024-12-24 PROCEDURE — 85610 PROTHROMBIN TIME: CPT

## 2024-12-24 PROCEDURE — 80053 COMPREHEN METABOLIC PANEL: CPT

## 2024-12-24 PROCEDURE — 84443 ASSAY THYROID STIM HORMONE: CPT

## 2024-12-24 PROCEDURE — 85025 COMPLETE CBC W/AUTO DIFF WBC: CPT

## 2024-12-24 PROCEDURE — 6360000002 HC RX W HCPCS: Performed by: INTERNAL MEDICINE

## 2024-12-24 PROCEDURE — 6360000004 HC RX CONTRAST MEDICATION: Performed by: EMERGENCY MEDICINE

## 2024-12-24 PROCEDURE — 96375 TX/PRO/DX INJ NEW DRUG ADDON: CPT

## 2024-12-24 RX ORDER — HYDROMORPHONE HYDROCHLORIDE 1 MG/ML
0.75 INJECTION, SOLUTION INTRAMUSCULAR; INTRAVENOUS; SUBCUTANEOUS ONCE
Status: COMPLETED | OUTPATIENT
Start: 2024-12-24 | End: 2024-12-24

## 2024-12-24 RX ORDER — ONDANSETRON 2 MG/ML
4 INJECTION INTRAMUSCULAR; INTRAVENOUS EVERY 6 HOURS PRN
Status: DISCONTINUED | OUTPATIENT
Start: 2024-12-24 | End: 2025-01-03 | Stop reason: HOSPADM

## 2024-12-24 RX ORDER — MAGNESIUM SULFATE IN WATER 40 MG/ML
2000 INJECTION, SOLUTION INTRAVENOUS PRN
Status: DISCONTINUED | OUTPATIENT
Start: 2024-12-24 | End: 2025-01-03 | Stop reason: HOSPADM

## 2024-12-24 RX ORDER — POLYETHYLENE GLYCOL 3350 17 G/17G
17 POWDER, FOR SOLUTION ORAL DAILY PRN
Status: DISCONTINUED | OUTPATIENT
Start: 2024-12-24 | End: 2024-12-25

## 2024-12-24 RX ORDER — POTASSIUM CHLORIDE 7.45 MG/ML
10 INJECTION INTRAVENOUS PRN
Status: DISCONTINUED | OUTPATIENT
Start: 2024-12-24 | End: 2024-12-25

## 2024-12-24 RX ORDER — POTASSIUM CHLORIDE 1500 MG/1
40 TABLET, EXTENDED RELEASE ORAL PRN
Status: DISCONTINUED | OUTPATIENT
Start: 2024-12-24 | End: 2024-12-25

## 2024-12-24 RX ORDER — MAGNESIUM HYDROXIDE/ALUMINUM HYDROXICE/SIMETHICONE 120; 1200; 1200 MG/30ML; MG/30ML; MG/30ML
30 SUSPENSION ORAL EVERY 6 HOURS PRN
Status: DISCONTINUED | OUTPATIENT
Start: 2024-12-24 | End: 2025-01-03 | Stop reason: HOSPADM

## 2024-12-24 RX ORDER — ACETAMINOPHEN 650 MG/1
650 SUPPOSITORY RECTAL EVERY 6 HOURS PRN
Status: DISCONTINUED | OUTPATIENT
Start: 2024-12-24 | End: 2025-01-03 | Stop reason: HOSPADM

## 2024-12-24 RX ORDER — ONDANSETRON 2 MG/ML
4 INJECTION INTRAMUSCULAR; INTRAVENOUS ONCE
Status: COMPLETED | OUTPATIENT
Start: 2024-12-24 | End: 2024-12-24

## 2024-12-24 RX ORDER — ACETAMINOPHEN 325 MG/1
650 TABLET ORAL EVERY 6 HOURS PRN
Status: DISCONTINUED | OUTPATIENT
Start: 2024-12-24 | End: 2025-01-03 | Stop reason: HOSPADM

## 2024-12-24 RX ORDER — MORPHINE SULFATE 4 MG/ML
4 INJECTION, SOLUTION INTRAMUSCULAR; INTRAVENOUS ONCE
Status: COMPLETED | OUTPATIENT
Start: 2024-12-24 | End: 2024-12-24

## 2024-12-24 RX ORDER — SODIUM CHLORIDE 9 MG/ML
INJECTION, SOLUTION INTRAVENOUS PRN
Status: DISCONTINUED | OUTPATIENT
Start: 2024-12-24 | End: 2025-01-03 | Stop reason: HOSPADM

## 2024-12-24 RX ORDER — SODIUM CHLORIDE 0.9 % (FLUSH) 0.9 %
5-40 SYRINGE (ML) INJECTION EVERY 12 HOURS SCHEDULED
Status: DISCONTINUED | OUTPATIENT
Start: 2024-12-24 | End: 2025-01-03 | Stop reason: HOSPADM

## 2024-12-24 RX ORDER — ONDANSETRON 4 MG/1
4 TABLET, ORALLY DISINTEGRATING ORAL EVERY 8 HOURS PRN
Status: DISCONTINUED | OUTPATIENT
Start: 2024-12-24 | End: 2025-01-03 | Stop reason: HOSPADM

## 2024-12-24 RX ORDER — BUPROPION HYDROCHLORIDE 150 MG/1
150 TABLET ORAL EVERY MORNING
COMMUNITY
End: 2024-12-24

## 2024-12-24 RX ORDER — ASPIRIN 81 MG/1
81 TABLET ORAL DAILY
COMMUNITY
End: 2024-12-24

## 2024-12-24 RX ORDER — ENOXAPARIN SODIUM 100 MG/ML
40 INJECTION SUBCUTANEOUS NIGHTLY
Status: DISCONTINUED | OUTPATIENT
Start: 2024-12-24 | End: 2024-12-25

## 2024-12-24 RX ORDER — IOPAMIDOL 755 MG/ML
100 INJECTION, SOLUTION INTRAVASCULAR
Status: COMPLETED | OUTPATIENT
Start: 2024-12-24 | End: 2024-12-24

## 2024-12-24 RX ORDER — BISACODYL 10 MG
10 SUPPOSITORY, RECTAL RECTAL DAILY PRN
Status: DISCONTINUED | OUTPATIENT
Start: 2024-12-24 | End: 2025-01-03 | Stop reason: HOSPADM

## 2024-12-24 RX ORDER — MORPHINE SULFATE 2 MG/ML
2 INJECTION, SOLUTION INTRAMUSCULAR; INTRAVENOUS
Status: DISCONTINUED | OUTPATIENT
Start: 2024-12-24 | End: 2024-12-25

## 2024-12-24 RX ORDER — MORPHINE SULFATE 10 MG/ML
6 INJECTION, SOLUTION INTRAMUSCULAR; INTRAVENOUS ONCE
Status: COMPLETED | OUTPATIENT
Start: 2024-12-24 | End: 2024-12-24

## 2024-12-24 RX ORDER — FLUTICASONE PROPIONATE 50 MCG
1 SPRAY, SUSPENSION (ML) NASAL DAILY
COMMUNITY
Start: 2024-09-18 | End: 2024-12-24

## 2024-12-24 RX ORDER — FAMOTIDINE 10 MG/ML
20 INJECTION, SOLUTION INTRAVENOUS
Status: COMPLETED | OUTPATIENT
Start: 2024-12-24 | End: 2024-12-24

## 2024-12-24 RX ORDER — FAMOTIDINE 20 MG/1
10 TABLET, FILM COATED ORAL DAILY PRN
Status: DISCONTINUED | OUTPATIENT
Start: 2024-12-24 | End: 2025-01-03 | Stop reason: HOSPADM

## 2024-12-24 RX ORDER — SODIUM CHLORIDE 0.9 % (FLUSH) 0.9 %
5-40 SYRINGE (ML) INJECTION PRN
Status: DISCONTINUED | OUTPATIENT
Start: 2024-12-24 | End: 2025-01-03 | Stop reason: HOSPADM

## 2024-12-24 RX ADMIN — RIFAXIMIN 400 MG: 200 TABLET ORAL at 20:32

## 2024-12-24 RX ADMIN — HYDROMORPHONE HYDROCHLORIDE 0.75 MG: 1 INJECTION, SOLUTION INTRAMUSCULAR; INTRAVENOUS; SUBCUTANEOUS at 16:03

## 2024-12-24 RX ADMIN — MORPHINE SULFATE 4 MG: 4 INJECTION, SOLUTION INTRAMUSCULAR; INTRAVENOUS at 17:00

## 2024-12-24 RX ADMIN — IOPAMIDOL 100 ML: 755 INJECTION, SOLUTION INTRAVENOUS at 15:07

## 2024-12-24 RX ADMIN — FAMOTIDINE 20 MG: 10 INJECTION, SOLUTION INTRAVENOUS at 16:12

## 2024-12-24 RX ADMIN — MORPHINE SULFATE 6 MG: 10 INJECTION INTRAVENOUS at 14:29

## 2024-12-24 RX ADMIN — MORPHINE SULFATE 2 MG: 2 INJECTION, SOLUTION INTRAMUSCULAR; INTRAVENOUS at 20:30

## 2024-12-24 RX ADMIN — ONDANSETRON 4 MG: 2 INJECTION, SOLUTION INTRAMUSCULAR; INTRAVENOUS at 14:29

## 2024-12-24 RX ADMIN — SODIUM CHLORIDE 40 MG: 9 INJECTION INTRAMUSCULAR; INTRAVENOUS; SUBCUTANEOUS at 16:05

## 2024-12-24 ASSESSMENT — PAIN SCALES - GENERAL
PAINLEVEL_OUTOF10: 8
PAINLEVEL_OUTOF10: 8
PAINLEVEL_OUTOF10: 10

## 2024-12-24 ASSESSMENT — PAIN DESCRIPTION - LOCATION
LOCATION: ABDOMEN

## 2024-12-24 ASSESSMENT — PAIN - FUNCTIONAL ASSESSMENT: PAIN_FUNCTIONAL_ASSESSMENT: 0-10

## 2024-12-24 NOTE — H&P
Hospitalist History and Physical   Admit Date:  2024  2:05 PM   Name:  Coty Ocampo   Age:  59 y.o.  Sex:  male  :  1965   MRN:  [id number]   Room:  [facility]/    Presenting/Chief Complaint: Abdominal Pain     Reason(s) for Admission: SBO (small bowel obstruction) (HCC) [K56.609]     History of Present Illness:   Coty Ocampo is a 59 y.o. male with medical history of cirrhosis, s/p TIPS, hx of abdominal surgeries, prior SBO who presented with n/v, abdominal pain that started yesterday.  Pt reports he was able to eat minimally yesterday but unable to tolerate anything today. Reports having 2-3 episodes of nausea and diarrhea.    In the ED, he was hemodynamically stable.  Laboratory workup was remarkable for total bili of 2.7. CT A/P with cirrhosis, patent TIPS, evidence of prior SBP with anastomosis and dilated small bowel loops suggestive of SBO.    Assessment & Plan:   SBO  Hx of prior SBO  CT with SBO as well as prior SBO.  - general surgery has been consulted by ED  - GI consulted by ED per request of Surgery  - NPO  - IVF  - hold off on NGT. Defer to Surgery    Cirrhosis  S/p TIPS  - on xifaxin   - not encephalopathic .     Hx of Graves disease  - no long on synthroid  - check TSH    PT/OT evals ordered?  Not ordered; patient not expected to need rehab  Diet: NPO  VTE prophylaxis: Lovenox  Code status: FULL    Non-peripheral Lines and Tubes (if present):             Hospital Problems:  Principal Problem:    SBO (small bowel obstruction) (HCC)  Active Problems:    Alcoholic cirrhosis (HCC)    Graves' disease  Resolved Problems:    * No resolved hospital problems. *        Objective:   Patient Vitals for the past 24 hrs:   Temp Pulse Resp BP SpO2   24 1630 -- 62 16 132/68 90 %   24 1616 -- -- -- -- 92 %   24 1615 -- 50 16 133/62 --   24 1501 -- -- -- -- 94 %   24 1500 -- 69 15 135/64 --   24 1434 -- -- -- -- 95 %   24 1433 -- -- -- 132/70  0.2 - 1.0 EU/dL    Nitrite, Urine Negative NEG      Leukocyte Esterase, Urine Negative NEG      Urine Culture if Indicated CULTURE NOT INDICATED BY UA RESULT      WBC, UA 0-4 U4 /hpf    RBC, UA 0-5 U5 /hpf    BACTERIA, URINE Negative NEG /hpf    Epithelial Cells, UA 0-5 U5 /hpf    Hyaline Casts, UA 0-2 /lpf    Casts 0 0 /lpf    Crystals 0 0 /LPF    Mucus, UA 0 0 /lpf       No results for input(s): \"COVID19\" in the last 72 hours.    CT ABDOMEN PELVIS W IV CONTRAST Additional Contrast? None    Result Date: 12/24/2024  CT OF THE ABDOMEN AND PELVIS INDICATION: Abdominal pain TECHNIQUE: Multiple 2D axial images were obtained through the abdomen and pelvis.  Oral contrast was used for bowel opacification.  100mL of Isovue 370 intravenous contrast was used for better evaluation of solid organs and vascular structures.  Radiation dose reduction techniques were used for this study.  All CT scans performed at this facility use one or all of the following: Automated exposure control, adjustment of the mA and/or kVp according to patient's size, iterative reconstruction. COMPARISON: 3/26/2024 FINDINGS: - LUNG BASES: No infiltrates or masses. - LIVER: Nodular appearance of the hepatic contour suggesting cirrhosis with patent TIPS. No focal hepatic masses. - GALLBLADDER/BILE DUCTS: No bile duct dilatation. - PANCREAS: Normal. - SPLEEN: Normal. - ADRENALS: Normal. - KIDNEYS/URETERS: Multiple small nonobstructing lower pole right renal calculi. No hydronephrosis or significant mass. - BLADDER: Normal. - REPRODUCTIVE ORGANS: No pelvic masses. - BOWEL: Multiple anastomotic sutures within mid-distal small bowel loops. Distended small bowel loops proximally suggesting small bowel obstruction at the anastomotic site. More distal small bowel loops as well as colonic loops are normal in caliber. There is no free air intramural air. There is no free fluid. - LYMPH NODES: No significant retroperitoneal, mesenteric, or pelvic adenopathy. -

## 2024-12-24 NOTE — ED PROVIDER NOTES
Emergency Department Provider Signout / Continuation of Care Note         DISPOSITION  : Admit to hospitalist       ICD-10-CM    1. Generalized abdominal pain  R10.84           The patient's care was signed out to me at shift change.      Final Plan      Patient signed out to me pending results of CT scan which does show findings of small bowel obstruction consistent with presentation and history of previous small bowel obstruction.  While under my care, patient received additional doses of medication including IV Dilaudid, IV Protonix, IV famotidine.  I spoke with general surgery in detail about the patient who recommended hospitalist admission as well as GI consultation.  GI was also consulted, appreciate their assistance and recommendations of patient care.  In considertion of patient clinical presentation, laboratory/radiologic findings, diagnoses/conditions previously discussed and clinical course as previously discussed, I did feel it appropriate to admit the patient for further evaluation, observation and management.  I communicated with the hospitalist in detail about the patient and they agreed to see and admit the patient.  Patient/family verbalized understanding and agreement with this course of action and plan.  I appreciate the assistance and expertise of the hospitalist team in further diagnostic care and management of the patient.    1 or more acute illnesses that pose a threat to life or bodily function.   Prescription drug management performed.  Parental controlled substances given in the ED.  Chronic medical problems impacting care include history of alcoholic cirrhosis, previous TIPS procedure, previous small bowel obstruction, history of esophageal varices.  Shared medical decision making was utilized in creating the patients health plan today.  Considerations: The following items were considered but not ordered: Consider NG tube placement, however patient with documented history of previous

## 2024-12-24 NOTE — ED PROVIDER NOTES
Emergency Department Provider Note       PCP: Roslyn Parada APRN - [facility]   Age: 59 y.o.   Sex: male     DISPOSITION      ICD-10-CM    1. Generalized abdominal pain  R10.84           Medical Decision Making     Being evaluated for abdominal pain and concern for small bowel obstruction.  With his history this is certainly a possibility.  Also has a history of alcoholic cirrhosis so hepatobiliary disorder, pancreatitis, or other intra-abdominal process causing his discomfort.  Afebrile on arrival.  Is complaining of pain with nausea and vomiting so was ordered IV nausea and pain medication.  Plan for blood work, urinalysis, and CT imaging.  ED Course as of 12/24/24 1540   Tue Dec 24, 2024   1539 Patient's lab work is unremarkable.  Waiting for results of the CT imaging.  Care of the patient will be signed off to oncoming provider for results of CT and final disposition. [WERNER]      ED Course User Index  [WERNER] Collette Gracia, DO     1 or more acute illnesses that pose a threat to life or bodily function.   Parental controlled substances given in the ED.  Shared medical decision making was utilized in creating the patients health plan today.  I independently ordered and reviewed each unique test.    I reviewed external records: provider visit note from outside specialist.  I reviewed external records: previous imaging study including radiologist interpretation.       I interpreted the CT Scan CT on pelvis shows air-fluid levels consistent with likely small bowel obstruction.              History     Patient is a 59-year-old male presenting with complaint of a small bowel obstruction.  Patient endorses that his head and SBO in the past.  He has not had a bowel movement in approximately 48 hours.  He has been having increasing pain with nausea and now vomiting since that time.  Denies any fevers or chills.  Denies any urinary symptoms.    The history is provided by the patient.     Physical Exam

## 2024-12-24 NOTE — PROGRESS NOTES
TRANSFER - IN REPORT:    Verbal report received from VINICIO Dunaway on Coty Ocampo  being received from ED for routine progression of patient care      Report consisted of patient's Situation, Background, Assessment and   Recommendations(SBAR).     Information from the following report(s) Nurse Handoff Report was reviewed with the receiving nurse.    Opportunity for questions and clarification was provided.      Assessment completed upon patient's arrival to unit and care assumed.

## 2024-12-24 NOTE — ED TRIAGE NOTES
Patient reports constipation and abd pain. Patient reports no BM x 2 days, states he has had SBO before and it feels similar

## 2024-12-24 NOTE — ED NOTES
Attempted to call report x1.  advised Jacquelyn Adrienne is currently in a pt's room and would need to call back.

## 2024-12-25 LAB
ANION GAP SERPL CALC-SCNC: 9 MMOL/L (ref 7–16)
BASOPHILS # BLD: 0 K/UL (ref 0–0.2)
BASOPHILS NFR BLD: 0 % (ref 0–2)
BUN SERPL-MCNC: 16 MG/DL (ref 6–23)
CALCIUM SERPL-MCNC: 10.3 MG/DL (ref 8.8–10.2)
CHLORIDE SERPL-SCNC: 109 MMOL/L (ref 98–107)
CO2 SERPL-SCNC: 27 MMOL/L (ref 20–29)
CREAT SERPL-MCNC: 1.25 MG/DL (ref 0.8–1.3)
DIFFERENTIAL METHOD BLD: ABNORMAL
EOSINOPHIL # BLD: 0 K/UL (ref 0–0.8)
EOSINOPHIL NFR BLD: 0 % (ref 0.5–7.8)
ERYTHROCYTE [DISTWIDTH] IN BLOOD BY AUTOMATED COUNT: 14.2 % (ref 11.9–14.6)
GLUCOSE SERPL-MCNC: 131 MG/DL (ref 70–99)
HCT VFR BLD AUTO: 41.6 % (ref 41.1–50.3)
HGB BLD-MCNC: 14.8 G/DL (ref 13.6–17.2)
IMM GRANULOCYTES # BLD AUTO: 0 K/UL (ref 0–0.5)
IMM GRANULOCYTES NFR BLD AUTO: 0 % (ref 0–5)
LYMPHOCYTES # BLD: 0.7 K/UL (ref 0.5–4.6)
LYMPHOCYTES NFR BLD: 10 % (ref 13–44)
MCH RBC QN AUTO: 35 PG (ref 26.1–32.9)
MCHC RBC AUTO-ENTMCNC: 35.6 G/DL (ref 31.4–35)
MCV RBC AUTO: 98.3 FL (ref 82–102)
MONOCYTES # BLD: 0.5 K/UL (ref 0.1–1.3)
MONOCYTES NFR BLD: 7 % (ref 4–12)
NEUTS SEG # BLD: 5.6 K/UL (ref 1.7–8.2)
NEUTS SEG NFR BLD: 83 % (ref 43–78)
NRBC # BLD: 0 K/UL (ref 0–0.2)
PLATELET # BLD AUTO: 88 K/UL (ref 150–450)
PMV BLD AUTO: 10.3 FL (ref 9.4–12.3)
POTASSIUM SERPL-SCNC: 4 MMOL/L (ref 3.5–5.1)
RBC # BLD AUTO: 4.23 M/UL (ref 4.23–5.6)
SODIUM SERPL-SCNC: 145 MMOL/L (ref 136–145)
WBC # BLD AUTO: 6.7 K/UL (ref 4.3–11.1)

## 2024-12-25 PROCEDURE — 6370000000 HC RX 637 (ALT 250 FOR IP)

## 2024-12-25 PROCEDURE — 6370000000 HC RX 637 (ALT 250 FOR IP): Performed by: INTERNAL MEDICINE

## 2024-12-25 PROCEDURE — 2500000003 HC RX 250 WO HCPCS: Performed by: INTERNAL MEDICINE

## 2024-12-25 PROCEDURE — 36415 COLL VENOUS BLD VENIPUNCTURE: CPT

## 2024-12-25 PROCEDURE — 6360000002 HC RX W HCPCS: Performed by: INTERNAL MEDICINE

## 2024-12-25 PROCEDURE — 80048 BASIC METABOLIC PNL TOTAL CA: CPT

## 2024-12-25 PROCEDURE — 85025 COMPLETE CBC W/AUTO DIFF WBC: CPT

## 2024-12-25 PROCEDURE — 1100000000 HC RM PRIVATE

## 2024-12-25 RX ORDER — POTASSIUM CHLORIDE 1500 MG/1
40 TABLET, EXTENDED RELEASE ORAL PRN
Status: DISCONTINUED | OUTPATIENT
Start: 2024-12-25 | End: 2025-01-03 | Stop reason: HOSPADM

## 2024-12-25 RX ORDER — POTASSIUM CHLORIDE 7.45 MG/ML
10 INJECTION INTRAVENOUS PRN
Status: DISCONTINUED | OUTPATIENT
Start: 2024-12-25 | End: 2025-01-03 | Stop reason: HOSPADM

## 2024-12-25 RX ORDER — POLYETHYLENE GLYCOL 3350 17 G/17G
17 POWDER, FOR SOLUTION ORAL DAILY
Status: DISCONTINUED | OUTPATIENT
Start: 2024-12-25 | End: 2024-12-30

## 2024-12-25 RX ORDER — MORPHINE SULFATE 4 MG/ML
4 INJECTION, SOLUTION INTRAMUSCULAR; INTRAVENOUS
Status: DISCONTINUED | OUTPATIENT
Start: 2024-12-25 | End: 2024-12-26

## 2024-12-25 RX ORDER — HYDROMORPHONE HYDROCHLORIDE 1 MG/ML
0.5 INJECTION, SOLUTION INTRAMUSCULAR; INTRAVENOUS; SUBCUTANEOUS EVERY 4 HOURS PRN
Status: DISCONTINUED | OUTPATIENT
Start: 2024-12-25 | End: 2024-12-30

## 2024-12-25 RX ADMIN — MORPHINE SULFATE 4 MG: 4 INJECTION, SOLUTION INTRAMUSCULAR; INTRAVENOUS at 09:48

## 2024-12-25 RX ADMIN — RIFAXIMIN 400 MG: 200 TABLET ORAL at 09:51

## 2024-12-25 RX ADMIN — MORPHINE SULFATE 4 MG: 4 INJECTION, SOLUTION INTRAMUSCULAR; INTRAVENOUS at 14:36

## 2024-12-25 RX ADMIN — MORPHINE SULFATE 2 MG: 2 INJECTION, SOLUTION INTRAMUSCULAR; INTRAVENOUS at 07:39

## 2024-12-25 RX ADMIN — HYDROMORPHONE HYDROCHLORIDE 0.5 MG: 1 INJECTION, SOLUTION INTRAMUSCULAR; INTRAVENOUS; SUBCUTANEOUS at 18:07

## 2024-12-25 RX ADMIN — SODIUM CHLORIDE, PRESERVATIVE FREE 10 ML: 5 INJECTION INTRAVENOUS at 19:58

## 2024-12-25 RX ADMIN — POLYETHYLENE GLYCOL 3350 17 G: 17 POWDER, FOR SOLUTION ORAL at 09:51

## 2024-12-25 RX ADMIN — RIFAXIMIN 400 MG: 200 TABLET ORAL at 19:57

## 2024-12-25 RX ADMIN — ONDANSETRON 4 MG: 2 INJECTION, SOLUTION INTRAMUSCULAR; INTRAVENOUS at 17:42

## 2024-12-25 RX ADMIN — MORPHINE SULFATE 4 MG: 4 INJECTION, SOLUTION INTRAMUSCULAR; INTRAVENOUS at 17:41

## 2024-12-25 RX ADMIN — RIFAXIMIN 400 MG: 200 TABLET ORAL at 14:35

## 2024-12-25 RX ADMIN — HYDROMORPHONE HYDROCHLORIDE 0.5 MG: 1 INJECTION, SOLUTION INTRAMUSCULAR; INTRAVENOUS; SUBCUTANEOUS at 21:37

## 2024-12-25 RX ADMIN — SODIUM CHLORIDE, PRESERVATIVE FREE 10 ML: 5 INJECTION INTRAVENOUS at 09:54

## 2024-12-25 RX ADMIN — ONDANSETRON 4 MG: 2 INJECTION, SOLUTION INTRAMUSCULAR; INTRAVENOUS at 07:42

## 2024-12-25 ASSESSMENT — PAIN SCALES - GENERAL
PAINLEVEL_OUTOF10: 10
PAINLEVEL_OUTOF10: 2
PAINLEVEL_OUTOF10: 9
PAINLEVEL_OUTOF10: 4

## 2024-12-25 ASSESSMENT — PAIN SCALES - WONG BAKER
WONGBAKER_NUMERICALRESPONSE: HURTS A LITTLE BIT
WONGBAKER_NUMERICALRESPONSE: HURTS LITTLE MORE

## 2024-12-25 ASSESSMENT — PAIN DESCRIPTION - LOCATION
LOCATION: ABDOMEN

## 2024-12-25 ASSESSMENT — PAIN DESCRIPTION - ORIENTATION: ORIENTATION: MID

## 2024-12-25 NOTE — PROGRESS NOTES
Range    Color, UA YELLOW/STRAW      Appearance CLEAR      Specific Gravity, UA 1.019 1.001 - 1.023      pH, Urine 8.5 5.0 - 9.0      Protein, UA Negative NEG mg/dL    Glucose, Ur Negative NEG mg/dL    Ketones, Urine Negative NEG mg/dL    Bilirubin, Urine Negative NEG      Blood, Urine Negative NEG      Urobilinogen, Urine 1.0 0.2 - 1.0 EU/dL    Nitrite, Urine Negative NEG      Leukocyte Esterase, Urine Negative NEG      Urine Culture if Indicated CULTURE NOT INDICATED BY UA RESULT      WBC, UA 0-4 U4 /hpf    RBC, UA 0-5 U5 /hpf    BACTERIA, URINE Negative NEG /hpf    Epithelial Cells, UA 0-5 U5 /hpf    Hyaline Casts, UA 0-2 /lpf    Casts 0 0 /lpf    Crystals 0 0 /LPF    Mucus, UA 0 0 /lpf   TSH reflex to FT4    Collection Time: 12/24/24  2:25 PM   Result Value Ref Range    TSH w Free Thyroid if Abnormal 0.55 0.27 - 4.20 UIU/ML   Protime-INR    Collection Time: 12/24/24  4:40 PM   Result Value Ref Range    Protime 15.7 (H) 11.3 - 14.9 sec    INR 1.2     Basic Metabolic Panel w/ Reflex to MG    Collection Time: 12/25/24  3:14 AM   Result Value Ref Range    Sodium 145 136 - 145 mmol/L    Potassium 4.0 3.5 - 5.1 mmol/L    Chloride 109 (H) 98 - 107 mmol/L    CO2 27 20 - 29 mmol/L    Anion Gap 9 7 - 16 mmol/L    Glucose 131 (H) 70 - 99 mg/dL    BUN 16 6 - 23 MG/DL    Creatinine 1.25 0.80 - 1.30 MG/DL    Est, Glom Filt Rate 66 >60 ml/min/1.73m2    Calcium 10.3 (H) 8.8 - 10.2 MG/DL   CBC with Auto Differential    Collection Time: 12/25/24  3:14 AM   Result Value Ref Range    WBC 6.7 4.3 - 11.1 K/uL    RBC 4.23 4.23 - 5.6 M/uL    Hemoglobin 14.8 13.6 - 17.2 g/dL    Hematocrit 41.6 41.1 - 50.3 %    MCV 98.3 82.0 - 102.0 FL    MCH 35.0 (H) 26.1 - 32.9 PG    MCHC 35.6 (H) 31.4 - 35.0 g/dL    RDW 14.2 11.9 - 14.6 %    Platelets 88 (L) 150 - 450 K/uL    MPV 10.3 9.4 - 12.3 FL    nRBC 0.00 0.0 - 0.2 K/uL    Differential Type AUTOMATED      Neutrophils % 83 (H) 43 - 78 %    Lymphocytes % 10 (L) 13 - 44 %    Monocytes % 7 4.0 -

## 2024-12-25 NOTE — PLAN OF CARE
Problem: Discharge Planning  Goal: Discharge to home or other facility with appropriate resources  Outcome: Progressing  Flowsheets (Taken 12/24/2024 1932)  Discharge to home or other facility with appropriate resources:   Identify barriers to discharge with patient and caregiver   Identify discharge learning needs (meds, wound care, etc)     Problem: Pain  Goal: Verbalizes/displays adequate comfort level or baseline comfort level  Outcome: Progressing

## 2024-12-25 NOTE — PROGRESS NOTES
TRANSFER - OUT REPORT:    Verbal report given to Elvie on Coty Ocampo  being transferred to [facility] for routine progression of patient care       Report consisted of patient's Situation, Background, Assessment and   Recommendations(SBAR).     Information from the following report(s) Nurse Handoff Report was reviewed with the receiving nurse.           Lines:   Peripheral IV 12/24/24 Left Antecubital (Active)   Site Assessment Clean, dry & intact 12/25/24 1012   Line Status Flushed;Capped 12/25/24 1012   Line Care Ports disinfected;Connections checked and tightened 12/25/24 1012   Phlebitis Assessment No symptoms 12/25/24 1012   Infiltration Assessment 0 12/25/24 1012   Alcohol Cap Used No 12/25/24 1012   Dressing Status Clean, dry & intact 12/25/24 1012   Dressing Type Transparent 12/25/24 1012        Opportunity for questions and clarification was provided.      Patient transported with:

## 2024-12-25 NOTE — CONSULTS
H&P/Consult Note/Progress Note/Office Note:   Coty Ocampo  MRN: [id number]  :1965  Age:59 y.o.    HPI: Coty Ocampo is a 59 y.o. male who  has a past medical history of Arthritis, Cirrhosis of liver (HCC), Depression, Hypocalcemia, Hypokalemia, Iron deficiency anemia, Multiple fractures of ribs of both sides, Portal hypertension (HCC), Thyroid disease, and Vitamin D deficiency.  hx of abdominal surgeries, prior SBO who presented with n/v, abdominal pain that started yesterday.  Pt reports he was able to eat minimally yesterday but unable to tolerate anything today. Reports having 2-3 episodes of nausea and diarrhea. In the ED, he was hemodynamically stable.  Laboratory workup was remarkable for total bili of 2.7. CT A/P with cirrhosis, patent TIPS, evidence of prior SBP with anastomosis and dilated small bowel loops suggestive of SBO.    IMPRESSION:  Cirrhosis.     Patent TIPS.     Evidence of prior small bowel resection with anastomosis. Dilated small bowel  loops proximal anastomosis suggesting small bowel obstruction, possibly due  adhesions.     If providers have any questions about this report, I can be reached on  PerfectServe.    Today reports that he still having abdominal pain.  He has not passed any gas or had any bowel movements.  He reports that he did have an episode of vomiting this morning, however he reports that he \"cheated\" and had something to drink.  He has not had any episodes since.  No fevers or chills.  He reports a history of abdominal surgeries in the past.  He is not on any blood thinners.  He reports that he would like something \"stronger\" such as lactulose to help him have a bowel movement.  He denies any new issues overnight.  He is otherwise doing well.      Past Medical History:   Diagnosis Date    Arthritis     Cirrhosis of liver (HCC)     Dr. Dawn ([facility]) and Dr. Mendieta ([facility])     Depression     Hypocalcemia     Hypokalemia     Iron deficiency anemia   found *  * No surgery found *        ASSESSMENT/PLAN:  [unfilled]  Principal Problem:    SBO (small bowel obstruction) (HCC)  Active Problems:    Alcoholic cirrhosis (HCC)    Graves' disease  Resolved Problems:    * No resolved hospital problems. *     Patient Active Problem List    Diagnosis Date Noted    Graves' disease 10/25/2024    Mass of upper limb, left 07/15/2024    SBO (small bowel obstruction) (HCC) 03/26/2024    Hypokalemia 03/26/2024    Mood disorder (HCC) 03/26/2024    Thrombocytopenia (HCC) 03/26/2024    Decreased libido 10/20/2023    Thyroid eye disease 03/03/2020    Alcoholic cirrhosis (HCC) 11/06/2019    Postsurgical hypothyroidism 11/06/2019    Alcoholic cirrhosis of liver with ascites (HCC) 09/19/2017    Pulmonary nodule 03/25/2014    Hx of tobacco use, presenting hazards to health 03/25/2014    Adenopathy 03/25/2014      59-year-old male presented to the emergency department with complaints of abdominal pain.  A CT scan was suggestive of a small bowel obstruction.  Today, he reports that he has not passed any gas or had any bowel movements.  He does report some mild abdominal pain.  On exam, there is no distention.  Minimal tenderness to palpation.  Will continue conservative management at this time.  IV fluids, n.p.o., pain management, and MiraLAX. Perfect serve with any questions or concerns.        Signed: CODY Garcia  12/25/2024    8:10 AM

## 2024-12-26 LAB
ANION GAP SERPL CALC-SCNC: 10 MMOL/L (ref 7–16)
BASOPHILS # BLD: 0 K/UL (ref 0–0.2)
BASOPHILS NFR BLD: 1 % (ref 0–2)
BUN SERPL-MCNC: 19 MG/DL (ref 6–23)
CALCIUM SERPL-MCNC: 8.7 MG/DL (ref 8.8–10.2)
CHLORIDE SERPL-SCNC: 107 MMOL/L (ref 98–107)
CO2 SERPL-SCNC: 25 MMOL/L (ref 20–29)
CREAT SERPL-MCNC: 1.14 MG/DL (ref 0.8–1.3)
DIFFERENTIAL METHOD BLD: ABNORMAL
EOSINOPHIL # BLD: 0.1 K/UL (ref 0–0.8)
EOSINOPHIL NFR BLD: 2 % (ref 0.5–7.8)
ERYTHROCYTE [DISTWIDTH] IN BLOOD BY AUTOMATED COUNT: 14 % (ref 11.9–14.6)
GLUCOSE SERPL-MCNC: 97 MG/DL (ref 70–99)
HCT VFR BLD AUTO: 37.3 % (ref 41.1–50.3)
HGB BLD-MCNC: 13.3 G/DL (ref 13.6–17.2)
IMM GRANULOCYTES # BLD AUTO: 0 K/UL (ref 0–0.5)
IMM GRANULOCYTES NFR BLD AUTO: 0 % (ref 0–5)
LYMPHOCYTES # BLD: 0.9 K/UL (ref 0.5–4.6)
LYMPHOCYTES NFR BLD: 17 % (ref 13–44)
MCH RBC QN AUTO: 35.5 PG (ref 26.1–32.9)
MCHC RBC AUTO-ENTMCNC: 35.7 G/DL (ref 31.4–35)
MCV RBC AUTO: 99.5 FL (ref 82–102)
MONOCYTES # BLD: 0.5 K/UL (ref 0.1–1.3)
MONOCYTES NFR BLD: 11 % (ref 4–12)
NEUTS SEG # BLD: 3.5 K/UL (ref 1.7–8.2)
NEUTS SEG NFR BLD: 69 % (ref 43–78)
NRBC # BLD: 0 K/UL (ref 0–0.2)
PLATELET # BLD AUTO: 75 K/UL (ref 150–450)
PMV BLD AUTO: 10.2 FL (ref 9.4–12.3)
POTASSIUM SERPL-SCNC: 3.7 MMOL/L (ref 3.5–5.1)
RBC # BLD AUTO: 3.75 M/UL (ref 4.23–5.6)
SODIUM SERPL-SCNC: 142 MMOL/L (ref 136–145)
WBC # BLD AUTO: 5.1 K/UL (ref 4.3–11.1)

## 2024-12-26 PROCEDURE — 85025 COMPLETE CBC W/AUTO DIFF WBC: CPT

## 2024-12-26 PROCEDURE — 80048 BASIC METABOLIC PNL TOTAL CA: CPT

## 2024-12-26 PROCEDURE — 36415 COLL VENOUS BLD VENIPUNCTURE: CPT

## 2024-12-26 PROCEDURE — 6360000002 HC RX W HCPCS: Performed by: INTERNAL MEDICINE

## 2024-12-26 PROCEDURE — 6370000000 HC RX 637 (ALT 250 FOR IP): Performed by: INTERNAL MEDICINE

## 2024-12-26 PROCEDURE — 1100000000 HC RM PRIVATE

## 2024-12-26 PROCEDURE — 2500000003 HC RX 250 WO HCPCS: Performed by: FAMILY MEDICINE

## 2024-12-26 PROCEDURE — 2500000003 HC RX 250 WO HCPCS: Performed by: NURSE PRACTITIONER

## 2024-12-26 PROCEDURE — 6370000000 HC RX 637 (ALT 250 FOR IP): Performed by: NURSE PRACTITIONER

## 2024-12-26 PROCEDURE — 6370000000 HC RX 637 (ALT 250 FOR IP)

## 2024-12-26 PROCEDURE — 2500000003 HC RX 250 WO HCPCS: Performed by: INTERNAL MEDICINE

## 2024-12-26 RX ORDER — DEXTROSE MONOHYDRATE, SODIUM CHLORIDE, AND POTASSIUM CHLORIDE 50; 2.98; 4.5 G/1000ML; G/1000ML; G/1000ML
INJECTION, SOLUTION INTRAVENOUS CONTINUOUS
Status: DISPENSED | OUTPATIENT
Start: 2024-12-27 | End: 2025-01-01

## 2024-12-26 RX ORDER — BISACODYL 10 MG
10 SUPPOSITORY, RECTAL RECTAL ONCE
Status: COMPLETED | OUTPATIENT
Start: 2024-12-26 | End: 2024-12-26

## 2024-12-26 RX ORDER — DEXTROSE MONOHYDRATE, SODIUM CHLORIDE, AND POTASSIUM CHLORIDE 50; 1.49; 4.5 G/1000ML; G/1000ML; G/1000ML
INJECTION, SOLUTION INTRAVENOUS CONTINUOUS
Status: DISCONTINUED | OUTPATIENT
Start: 2024-12-26 | End: 2024-12-26 | Stop reason: RX

## 2024-12-26 RX ADMIN — HYDROMORPHONE HYDROCHLORIDE 0.5 MG: 1 INJECTION, SOLUTION INTRAMUSCULAR; INTRAVENOUS; SUBCUTANEOUS at 04:10

## 2024-12-26 RX ADMIN — RIFAXIMIN 400 MG: 200 TABLET ORAL at 14:14

## 2024-12-26 RX ADMIN — HYDROMORPHONE HYDROCHLORIDE 0.5 MG: 1 INJECTION, SOLUTION INTRAMUSCULAR; INTRAVENOUS; SUBCUTANEOUS at 17:24

## 2024-12-26 RX ADMIN — BISACODYL 10 MG: 10 SUPPOSITORY RECTAL at 08:52

## 2024-12-26 RX ADMIN — HYDROMORPHONE HYDROCHLORIDE 0.5 MG: 1 INJECTION, SOLUTION INTRAMUSCULAR; INTRAVENOUS; SUBCUTANEOUS at 12:55

## 2024-12-26 RX ADMIN — POTASSIUM CHLORIDE, DEXTROSE MONOHYDRATE AND SODIUM CHLORIDE: 150; 5; 450 INJECTION, SOLUTION INTRAVENOUS at 09:43

## 2024-12-26 RX ADMIN — HYDROMORPHONE HYDROCHLORIDE 0.5 MG: 1 INJECTION, SOLUTION INTRAMUSCULAR; INTRAVENOUS; SUBCUTANEOUS at 08:51

## 2024-12-26 RX ADMIN — POTASSIUM CHLORIDE, DEXTROSE MONOHYDRATE AND SODIUM CHLORIDE: 300; 5; 450 INJECTION, SOLUTION INTRAVENOUS at 23:51

## 2024-12-26 RX ADMIN — RIFAXIMIN 400 MG: 200 TABLET ORAL at 21:13

## 2024-12-26 RX ADMIN — HYDROMORPHONE HYDROCHLORIDE 0.5 MG: 1 INJECTION, SOLUTION INTRAMUSCULAR; INTRAVENOUS; SUBCUTANEOUS at 21:13

## 2024-12-26 RX ADMIN — RIFAXIMIN 400 MG: 200 TABLET ORAL at 08:52

## 2024-12-26 RX ADMIN — SODIUM CHLORIDE, PRESERVATIVE FREE 10 ML: 5 INJECTION INTRAVENOUS at 21:14

## 2024-12-26 RX ADMIN — POLYETHYLENE GLYCOL 3350 17 G: 17 POWDER, FOR SOLUTION ORAL at 08:54

## 2024-12-26 RX ADMIN — ACETAMINOPHEN 650 MG: 325 TABLET, FILM COATED ORAL at 11:51

## 2024-12-26 ASSESSMENT — PAIN SCALES - GENERAL
PAINLEVEL_OUTOF10: 9
PAINLEVEL_OUTOF10: 10
PAINLEVEL_OUTOF10: 10
PAINLEVEL_OUTOF10: 6
PAINLEVEL_OUTOF10: 9
PAINLEVEL_OUTOF10: 4
PAINLEVEL_OUTOF10: 4
PAINLEVEL_OUTOF10: 10
PAINLEVEL_OUTOF10: 9

## 2024-12-26 ASSESSMENT — PAIN DESCRIPTION - LOCATION
LOCATION: ABDOMEN

## 2024-12-26 ASSESSMENT — PAIN DESCRIPTION - ORIENTATION
ORIENTATION: MID

## 2024-12-26 ASSESSMENT — PAIN DESCRIPTION - DESCRIPTORS
DESCRIPTORS: DISCOMFORT

## 2024-12-26 NOTE — PROGRESS NOTES
H&P/Consult Note/Progress Note/Office Note:   Coty Ocampo  MRN: [id number]  :1965  Age:59 y.o.    HPI: Coty Ocampo is a 59 y.o. male who  has a past medical history of Arthritis, Cirrhosis of liver (HCC), Depression, Hypocalcemia, Hypokalemia, Iron deficiency anemia, Multiple fractures of ribs of both sides, Portal hypertension (HCC), Thyroid disease, and Vitamin D deficiency.  hx of abdominal surgeries, prior SBO who presented with n/v, abdominal pain that started yesterday.  Pt reports he was able to eat minimally yesterday but unable to tolerate anything today. Reports having 2-3 episodes of nausea and diarrhea. In the ED, he was hemodynamically stable.  Laboratory workup was remarkable for total bili of 2.7. CT A/P with cirrhosis, patent TIPS, evidence of prior SBP with anastomosis and dilated small bowel loops suggestive of SBO.    IMPRESSION:  Cirrhosis.     Patent TIPS.     Evidence of prior small bowel resection with anastomosis. Dilated small bowel  loops proximal anastomosis suggesting small bowel obstruction, possibly due  adhesions.     If providers have any questions about this report, I can be reached on  Perfect[employer]erve.    Today reports that he still having abdominal pain.  He has not passed any gas or had any bowel movements.  He reports that he did have an episode of vomiting this morning, however he reports that he \"cheated\" and had something to drink.  He has not had any episodes since.  No fevers or chills.  He reports a history of abdominal surgeries in the past.  He is not on any blood thinners.  He reports that he would like something \"stronger\" such as lactulose to help him have a bowel movement.  He denies any new issues overnight.  He is otherwise doing well.    24: Alert, awake. Abd pain unchanged. BELCHING   No peritoneal signs. No IVF hanging despite NPO.  VSS/AF.  -Flatus -BM    Past Medical History:   Diagnosis Date    Arthritis     Cirrhosis of liver  (small bowel obstruction) (HCC)  Active Problems:    Alcoholic cirrhosis (HCC)    Graves' disease  Resolved Problems:    * No resolved hospital problems. *     Patient Active Problem List    Diagnosis Date Noted    Graves' disease 10/25/2024    Mass of upper limb, left 07/15/2024    SBO (small bowel obstruction) (HCC) 03/26/2024    Hypokalemia 03/26/2024    Mood disorder (HCC) 03/26/2024    Thrombocytopenia (HCC) 03/26/2024    Decreased libido 10/20/2023    Thyroid eye disease 03/03/2020    Alcoholic cirrhosis (HCC) 11/06/2019    Postsurgical hypothyroidism 11/06/2019    Alcoholic cirrhosis of liver with ascites (HCC) 09/19/2017    Pulmonary nodule 03/25/2014    Hx of tobacco use, presenting hazards to health 03/25/2014    Adenopathy 03/25/2014      59-year-old male presented to the emergency department with complaints of abdominal pain.  A CT scan was suggestive of a small bowel obstruction.    Continued -Flatus or BM. + belching.   Persistent mild abdominal pain without distension  No IV fluids hanging, n.p.o., pain management, and MiraLAX. -will add IVF and a suppository today.         Signed: JAMI DENT - CNP  12/26/2024    7:01 AM

## 2024-12-26 NOTE — PLAN OF CARE
Problem: Discharge Planning  Goal: Discharge to home or other facility with appropriate resources  12/26/2024 1348 by Jacquelyn Nina RN  Outcome: Progressing  12/26/2024 0231 by Grant Hanson RN  Outcome: Progressing     Problem: Pain  Goal: Verbalizes/displays adequate comfort level or baseline comfort level  12/26/2024 1348 by Jacquelyn Nina RN  Outcome: Progressing  12/26/2024 0231 by Grant Hanson, RN  Outcome: Progressing

## 2024-12-26 NOTE — CARE COORDINATION
Pt screened for dc planning needs. Chart reviewed and pt/father interviewed. Pt admitted for SBO. He is disabled and unemployed. He lives with his parents and reports he cares for them. He is independent with care, uses no DME, drives and denies any dc planning needs. He last saw his PCP one week ago. He has prescription coverage. No immediate needs identified at this time     12/26/24 5343   Service Assessment   Patient Orientation Alert and Oriented   Cognition Alert   History Provided By Patient   Primary Caregiver Self   Accompanied By/Relationship father   Support Systems Parent   Patient's Healthcare Decision Maker is: Legal Next of Kin   PCP Verified by CM Yes   Last Visit to PCP Within last 3 months   Prior Functional Level Independent in ADLs/IADLs   Current Functional Level Independent in ADLs/IADLs   Can patient return to prior living arrangement Yes   Ability to make needs known: Good   Family able to assist with home care needs: No   Would you like for me to discuss the discharge plan with any other family members/significant others, and if so, who? No   Financial Resources Medicare   Social/Functional History   Lives With Parent   Services At/After Discharge   Services At/After Discharge None

## 2024-12-26 NOTE — PROGRESS NOTES
Hospitalist Progress Note   Admit Date:  2024  2:05 PM   Name:  Coty Ocampo   Age:  59 y.o.  Sex:  male  :  1965   MRN:  [id number]   Room:  [facility]/    Presenting/Chief Complaint: Abdominal Pain     Reason(s) for Admission: Small bowel obstruction (HCC) [K56.609]  SBO (small bowel obstruction) (HCC) [K56.609]  Generalized abdominal pain [R10.84]  History of esophageal varices [Z87.19]     Hospital Course:   Please refer to the admission H&P for details of presentation.      In summary, Coty Ocampo is a 59 y.o. male with medical history significant for cirrhosis, s/p TIPS, hx of abdominal surgeries, prior SBO who presented with n/v, abdominal pain that started yesterday.  Pt reports he was able to eat minimally yesterday but unable to tolerate anything today. Reports having 2-3 episodes of nausea and diarrhea.     In the ED, he was hemodynamically stable.  Laboratory workup was remarkable for total bili of 2.7. CT A/P with cirrhosis, patent TIPS, evidence of prior SBP with anastomosis and dilated small bowel loops suggestive of SBO.    Subjective/24 hr Events (24) :  Patient seen this morning on rounds lying in bed.  Continues to have pain in his abdomen that is diffuse.  No bowel movements or flatus.  Tolerating water with ice chips and no further vomiting.  Discussed current treatment plan with general surgery.  Given lack of peritonitis, continue conservative treatment.    Assessment & Plan:     Small bowel obstruction  Hx of small bowel obstruction  - As evidenced on CT abdomen/pelvis with significant abdominal pain and lack of bowel movement/flatus  - Patient tolerating ice chips and water with no evidence of peritonitis on exam.  Discussed with general surgery today.  Continue conservative management  - Discussed opioid pain medications and how they can worsen SBO.  Will continue low-dose Dilaudid for now given significance of pain  - Case was discussed with GI

## 2024-12-27 PROBLEM — Z95.828 S/P TIPS (TRANSJUGULAR INTRAHEPATIC PORTOSYSTEMIC SHUNT): Status: ACTIVE | Noted: 2024-12-27

## 2024-12-27 LAB
ANION GAP SERPL CALC-SCNC: 8 MMOL/L (ref 7–16)
BASOPHILS # BLD: 0 K/UL (ref 0–0.2)
BASOPHILS NFR BLD: 0 % (ref 0–2)
BUN SERPL-MCNC: 15 MG/DL (ref 6–23)
CALCIUM SERPL-MCNC: 8.2 MG/DL (ref 8.8–10.2)
CHLORIDE SERPL-SCNC: 108 MMOL/L (ref 98–107)
CO2 SERPL-SCNC: 24 MMOL/L (ref 20–29)
CREAT SERPL-MCNC: 1.04 MG/DL (ref 0.8–1.3)
DIFFERENTIAL METHOD BLD: ABNORMAL
EOSINOPHIL # BLD: 0.3 K/UL (ref 0–0.8)
EOSINOPHIL NFR BLD: 7 % (ref 0.5–7.8)
ERYTHROCYTE [DISTWIDTH] IN BLOOD BY AUTOMATED COUNT: 13.5 % (ref 11.9–14.6)
GLUCOSE SERPL-MCNC: 101 MG/DL (ref 70–99)
HCT VFR BLD AUTO: 36.6 % (ref 41.1–50.3)
HGB BLD-MCNC: 13.1 G/DL (ref 13.6–17.2)
IMM GRANULOCYTES # BLD AUTO: 0 K/UL (ref 0–0.5)
IMM GRANULOCYTES NFR BLD AUTO: 0 % (ref 0–5)
LYMPHOCYTES # BLD: 1 K/UL (ref 0.5–4.6)
LYMPHOCYTES NFR BLD: 22 % (ref 13–44)
MAGNESIUM SERPL-MCNC: 1.9 MG/DL (ref 1.8–2.4)
MCH RBC QN AUTO: 34.7 PG (ref 26.1–32.9)
MCHC RBC AUTO-ENTMCNC: 35.8 G/DL (ref 31.4–35)
MCV RBC AUTO: 96.8 FL (ref 82–102)
MONOCYTES # BLD: 0.6 K/UL (ref 0.1–1.3)
MONOCYTES NFR BLD: 13 % (ref 4–12)
NEUTS SEG # BLD: 2.6 K/UL (ref 1.7–8.2)
NEUTS SEG NFR BLD: 58 % (ref 43–78)
NRBC # BLD: 0 K/UL (ref 0–0.2)
PHOSPHATE SERPL-MCNC: 2.1 MG/DL (ref 2.5–4.5)
PLATELET # BLD AUTO: 75 K/UL (ref 150–450)
PMV BLD AUTO: 10 FL (ref 9.4–12.3)
POTASSIUM SERPL-SCNC: 3.8 MMOL/L (ref 3.5–5.1)
RBC # BLD AUTO: 3.78 M/UL (ref 4.23–5.6)
SODIUM SERPL-SCNC: 140 MMOL/L (ref 136–145)
WBC # BLD AUTO: 4.5 K/UL (ref 4.3–11.1)

## 2024-12-27 PROCEDURE — 6360000002 HC RX W HCPCS: Performed by: INTERNAL MEDICINE

## 2024-12-27 PROCEDURE — 6370000000 HC RX 637 (ALT 250 FOR IP)

## 2024-12-27 PROCEDURE — 83735 ASSAY OF MAGNESIUM: CPT

## 2024-12-27 PROCEDURE — 2500000003 HC RX 250 WO HCPCS: Performed by: FAMILY MEDICINE

## 2024-12-27 PROCEDURE — 36415 COLL VENOUS BLD VENIPUNCTURE: CPT

## 2024-12-27 PROCEDURE — 84100 ASSAY OF PHOSPHORUS: CPT

## 2024-12-27 PROCEDURE — 80048 BASIC METABOLIC PNL TOTAL CA: CPT

## 2024-12-27 PROCEDURE — 1100000000 HC RM PRIVATE

## 2024-12-27 PROCEDURE — 6370000000 HC RX 637 (ALT 250 FOR IP): Performed by: INTERNAL MEDICINE

## 2024-12-27 PROCEDURE — 85025 COMPLETE CBC W/AUTO DIFF WBC: CPT

## 2024-12-27 PROCEDURE — 2500000003 HC RX 250 WO HCPCS: Performed by: INTERNAL MEDICINE

## 2024-12-27 RX ADMIN — HYDROMORPHONE HYDROCHLORIDE 0.5 MG: 1 INJECTION, SOLUTION INTRAMUSCULAR; INTRAVENOUS; SUBCUTANEOUS at 06:33

## 2024-12-27 RX ADMIN — HYDROMORPHONE HYDROCHLORIDE 0.5 MG: 1 INJECTION, SOLUTION INTRAMUSCULAR; INTRAVENOUS; SUBCUTANEOUS at 00:43

## 2024-12-27 RX ADMIN — RIFAXIMIN 400 MG: 200 TABLET ORAL at 13:34

## 2024-12-27 RX ADMIN — HYDROMORPHONE HYDROCHLORIDE 0.5 MG: 1 INJECTION, SOLUTION INTRAMUSCULAR; INTRAVENOUS; SUBCUTANEOUS at 16:46

## 2024-12-27 RX ADMIN — HYDROMORPHONE HYDROCHLORIDE 0.5 MG: 1 INJECTION, SOLUTION INTRAMUSCULAR; INTRAVENOUS; SUBCUTANEOUS at 23:42

## 2024-12-27 RX ADMIN — ALUMINUM HYDROXIDE, MAGNESIUM HYDROXIDE, DIMETHICONE 30 ML: 400; 400; 40 SUSPENSION ORAL at 22:43

## 2024-12-27 RX ADMIN — RIFAXIMIN 400 MG: 200 TABLET ORAL at 08:53

## 2024-12-27 RX ADMIN — SODIUM CHLORIDE, PRESERVATIVE FREE 10 ML: 5 INJECTION INTRAVENOUS at 23:44

## 2024-12-27 RX ADMIN — HYDROMORPHONE HYDROCHLORIDE 0.5 MG: 1 INJECTION, SOLUTION INTRAMUSCULAR; INTRAVENOUS; SUBCUTANEOUS at 19:50

## 2024-12-27 RX ADMIN — POLYETHYLENE GLYCOL 3350 17 G: 17 POWDER, FOR SOLUTION ORAL at 08:53

## 2024-12-27 RX ADMIN — POTASSIUM CHLORIDE, DEXTROSE MONOHYDRATE AND SODIUM CHLORIDE: 300; 5; 450 INJECTION, SOLUTION INTRAVENOUS at 13:34

## 2024-12-27 RX ADMIN — HYDROMORPHONE HYDROCHLORIDE 0.5 MG: 1 INJECTION, SOLUTION INTRAMUSCULAR; INTRAVENOUS; SUBCUTANEOUS at 12:20

## 2024-12-27 RX ADMIN — SODIUM CHLORIDE, PRESERVATIVE FREE 10 ML: 5 INJECTION INTRAVENOUS at 19:50

## 2024-12-27 ASSESSMENT — PAIN DESCRIPTION - DESCRIPTORS
DESCRIPTORS: SHARP
DESCRIPTORS: DISCOMFORT
DESCRIPTORS: DISCOMFORT
DESCRIPTORS: SHARP
DESCRIPTORS: BURNING

## 2024-12-27 ASSESSMENT — PAIN SCALES - GENERAL
PAINLEVEL_OUTOF10: 10
PAINLEVEL_OUTOF10: 4
PAINLEVEL_OUTOF10: 8
PAINLEVEL_OUTOF10: 10
PAINLEVEL_OUTOF10: 5
PAINLEVEL_OUTOF10: 10

## 2024-12-27 ASSESSMENT — PAIN DESCRIPTION - LOCATION
LOCATION: ABDOMEN

## 2024-12-27 ASSESSMENT — PAIN DESCRIPTION - ORIENTATION
ORIENTATION: MID
ORIENTATION: MID;UPPER
ORIENTATION: MID

## 2024-12-27 ASSESSMENT — PAIN - FUNCTIONAL ASSESSMENT
PAIN_FUNCTIONAL_ASSESSMENT: PREVENTS OR INTERFERES SOME ACTIVE ACTIVITIES AND ADLS
PAIN_FUNCTIONAL_ASSESSMENT: PREVENTS OR INTERFERES SOME ACTIVE ACTIVITIES AND ADLS

## 2024-12-27 NOTE — PROGRESS NOTES
H&P/Consult Note/Progress Note/Office Note:   Coty Ocampo  MRN: [id number]  :1965  Age:59 y.o.    HPI: Coty Ocampo is a 59 y.o. male who  has a past medical history of Arthritis, Cirrhosis of liver (HCC), Depression, Hypocalcemia, Hypokalemia, Iron deficiency anemia, Multiple fractures of ribs of both sides, Portal hypertension (HCC), Thyroid disease, and Vitamin D deficiency.  hx of abdominal surgeries, prior SBO who presented with n/v, abdominal pain that started yesterday.  Pt reports he was able to eat minimally yesterday but unable to tolerate anything today. Reports having 2-3 episodes of nausea and diarrhea. In the ED, he was hemodynamically stable.  Laboratory workup was remarkable for total bili of 2.7. CT A/P with cirrhosis, patent TIPS, evidence of prior SBP with anastomosis and dilated small bowel loops suggestive of SBO.    IMPRESSION:  Cirrhosis.     Patent TIPS.     Evidence of prior small bowel resection with anastomosis. Dilated small bowel  loops proximal anastomosis suggesting small bowel obstruction, possibly due  adhesions.     If providers have any questions about this report, I can be reached on  Perfect[employer]erve.    Today reports that he still having abdominal pain.  He has not passed any gas or had any bowel movements.  He reports that he did have an episode of vomiting this morning, however he reports that he \"cheated\" and had something to drink.  He has not had any episodes since.  No fevers or chills.  He reports a history of abdominal surgeries in the past.  He is not on any blood thinners.  He reports that he would like something \"stronger\" such as lactulose to help him have a bowel movement.  He denies any new issues overnight.  He is otherwise doing well.    24: Alert, awake. Abd pain unchanged. BELCHING   No peritoneal signs. No IVF hanging despite NPO.  VSS/AF.  -Flatus -BM    24: Alert, awake. Abd: flat, soft with tenderness. No guarding on exam.   (HCC)    Graves' disease  Resolved Problems:    * No resolved hospital problems. *     Patient Active Problem List    Diagnosis Date Noted    Graves' disease 10/25/2024    Mass of upper limb, left 07/15/2024    SBO (small bowel obstruction) (HCC) 03/26/2024    Hypokalemia 03/26/2024    Mood disorder (HCC) 03/26/2024    Thrombocytopenia (HCC) 03/26/2024    Decreased libido 10/20/2023    Thyroid eye disease 03/03/2020    Alcoholic cirrhosis (HCC) 11/06/2019    Postsurgical hypothyroidism 11/06/2019    Alcoholic cirrhosis of liver with ascites (HCC) 09/19/2017    Pulmonary nodule 03/25/2014    Hx of tobacco use, presenting hazards to health 03/25/2014    Adenopathy 03/25/2014      59-year-old male presented to the emergency department with complaints of abdominal pain.  12/24/24CT scan was suggestive of a small bowel obstruction- evidence of a small bowel resection w anastomosis.   Continued -Flatus or BM. + belching. Expelled suppository yesterday with small stool/hard.   Persistent mild abdominal pain without distension  Continue IV fluids hanging, n.p.o., pain management, and MiraLAX and discuss with Dr Payne repeat CT imaging with oral contrast for today.          Signed: JAMI DENT - CNP  12/27/2024    6:12 AM

## 2024-12-27 NOTE — PROGRESS NOTES
Hospitalist Progress Note   Admit Date:  2024  2:05 PM   Name:  Coty Ocampo   Age:  59 y.o.  Sex:  male  :  1965   MRN:  [id number]   Room:  [facility]/    Presenting/Chief Complaint: Abdominal Pain     Reason(s) for Admission: Small bowel obstruction (HCC) [K56.609]  SBO (small bowel obstruction) (HCC) [K56.609]  Generalized abdominal pain [R10.84]  History of esophageal varices [Z87.19]     Hospital Course:   Please refer to the admission H&P for details of presentation.      In summary, Coty Ocampo is a 59 y.o. male with medical history significant for cirrhosis, s/p TIPS, hx of abdominal surgeries, prior SBO who presented with n/v, abdominal pain that started yesterday.  Pt reports he was able to eat minimally yesterday but unable to tolerate anything today. Reports having 2-3 episodes of nausea and diarrhea.     In the ED, he was hemodynamically stable.  Laboratory workup was remarkable for total bili of 2.7. CT A/P with cirrhosis, patent TIPS, evidence of prior SBP with anastomosis and dilated small bowel loops suggestive of SBO.    Subjective/24 hr Events (24) :  Patient seen this morning on rounds.  Says he had 2 very small bowel movements yesterday afternoon.  Feels like he may have a bowel movement today.  Asking if he can try clear liquids.  Discussed with general surgery, okay with advancing diet.  No evidence of peritonitis on exam.    Assessment & Plan:     Small bowel obstruction  Hx of small bowel obstruction  - As evidenced on CT abdomen/pelvis with significant abdominal pain and lack of bowel movement/flatus  - Pain improved slightly today with 2 very small bowel movements yesterday.  Discussed with general surgery.  Okay to advance diet to clear liquids  - Continue suppository  - On low-dose Dilaudid as needed for pain control.  Counseled on the importance of only using these if you have to to reduce risk of worsening obstruction  - Case was discussed with

## 2024-12-27 NOTE — CARE COORDINATION
Discharge planning was discussed with Interdisciplinary Care Team during IDR rounds. Per MD, it is suspected that patient may have re-obstructed. Plan for IV hydration, pain control, and repeat CT with contrast today. Surgery is following. Once medically stable for discharge, patient should discharge home with no needs from CM standpoint. CM will continue to follow.

## 2024-12-28 ENCOUNTER — APPOINTMENT (OUTPATIENT)
Dept: GENERAL RADIOLOGY | Age: 59
End: 2024-12-28
Payer: MEDICARE

## 2024-12-28 LAB
ANION GAP SERPL CALC-SCNC: 9 MMOL/L (ref 7–16)
BASOPHILS # BLD: 0 K/UL (ref 0–0.2)
BASOPHILS NFR BLD: 0 % (ref 0–2)
BUN SERPL-MCNC: 13 MG/DL (ref 6–23)
CALCIUM SERPL-MCNC: 8.6 MG/DL (ref 8.8–10.2)
CHLORIDE SERPL-SCNC: 106 MMOL/L (ref 98–107)
CO2 SERPL-SCNC: 23 MMOL/L (ref 20–29)
CREAT SERPL-MCNC: 1.04 MG/DL (ref 0.8–1.3)
DIFFERENTIAL METHOD BLD: ABNORMAL
EOSINOPHIL # BLD: 0.1 K/UL (ref 0–0.8)
EOSINOPHIL NFR BLD: 2 % (ref 0.5–7.8)
ERYTHROCYTE [DISTWIDTH] IN BLOOD BY AUTOMATED COUNT: 13.2 % (ref 11.9–14.6)
GLUCOSE SERPL-MCNC: 125 MG/DL (ref 70–99)
HCT VFR BLD AUTO: 40.3 % (ref 41.1–50.3)
HGB BLD-MCNC: 14.8 G/DL (ref 13.6–17.2)
IMM GRANULOCYTES # BLD AUTO: 0.1 K/UL (ref 0–0.5)
IMM GRANULOCYTES NFR BLD AUTO: 2 % (ref 0–5)
LYMPHOCYTES # BLD: 0.7 K/UL (ref 0.5–4.6)
LYMPHOCYTES NFR BLD: 12 % (ref 13–44)
MAGNESIUM SERPL-MCNC: 2 MG/DL (ref 1.8–2.4)
MCH RBC QN AUTO: 34.8 PG (ref 26.1–32.9)
MCHC RBC AUTO-ENTMCNC: 36.7 G/DL (ref 31.4–35)
MCV RBC AUTO: 94.8 FL (ref 82–102)
MONOCYTES # BLD: 0.6 K/UL (ref 0.1–1.3)
MONOCYTES NFR BLD: 11 % (ref 4–12)
NEUTS SEG # BLD: 4.1 K/UL (ref 1.7–8.2)
NEUTS SEG NFR BLD: 73 % (ref 43–78)
NRBC # BLD: 0 K/UL (ref 0–0.2)
PHOSPHATE SERPL-MCNC: 2 MG/DL (ref 2.5–4.5)
PLATELET # BLD AUTO: 86 K/UL (ref 150–450)
PMV BLD AUTO: 10.1 FL (ref 9.4–12.3)
POTASSIUM SERPL-SCNC: 4.1 MMOL/L (ref 3.5–5.1)
RBC # BLD AUTO: 4.25 M/UL (ref 4.23–5.6)
SODIUM SERPL-SCNC: 138 MMOL/L (ref 136–145)
WBC # BLD AUTO: 5.7 K/UL (ref 4.3–11.1)

## 2024-12-28 PROCEDURE — 6370000000 HC RX 637 (ALT 250 FOR IP): Performed by: INTERNAL MEDICINE

## 2024-12-28 PROCEDURE — 6360000002 HC RX W HCPCS: Performed by: INTERNAL MEDICINE

## 2024-12-28 PROCEDURE — 83735 ASSAY OF MAGNESIUM: CPT

## 2024-12-28 PROCEDURE — 6370000000 HC RX 637 (ALT 250 FOR IP)

## 2024-12-28 PROCEDURE — 84100 ASSAY OF PHOSPHORUS: CPT

## 2024-12-28 PROCEDURE — 74019 RADEX ABDOMEN 2 VIEWS: CPT

## 2024-12-28 PROCEDURE — 80048 BASIC METABOLIC PNL TOTAL CA: CPT

## 2024-12-28 PROCEDURE — 2500000003 HC RX 250 WO HCPCS: Performed by: INTERNAL MEDICINE

## 2024-12-28 PROCEDURE — 1100000000 HC RM PRIVATE

## 2024-12-28 PROCEDURE — 36415 COLL VENOUS BLD VENIPUNCTURE: CPT

## 2024-12-28 PROCEDURE — 2500000003 HC RX 250 WO HCPCS: Performed by: FAMILY MEDICINE

## 2024-12-28 PROCEDURE — 6370000000 HC RX 637 (ALT 250 FOR IP): Performed by: FAMILY MEDICINE

## 2024-12-28 PROCEDURE — 85025 COMPLETE CBC W/AUTO DIFF WBC: CPT

## 2024-12-28 RX ORDER — SIMETHICONE 80 MG
160 TABLET,CHEWABLE ORAL ONCE
Status: COMPLETED | OUTPATIENT
Start: 2024-12-28 | End: 2024-12-28

## 2024-12-28 RX ADMIN — RIFAXIMIN 400 MG: 200 TABLET ORAL at 08:43

## 2024-12-28 RX ADMIN — SIMETHICONE 160 MG: 80 TABLET, CHEWABLE ORAL at 21:36

## 2024-12-28 RX ADMIN — POLYETHYLENE GLYCOL 3350 17 G: 17 POWDER, FOR SOLUTION ORAL at 08:06

## 2024-12-28 RX ADMIN — SODIUM CHLORIDE, PRESERVATIVE FREE 10 ML: 5 INJECTION INTRAVENOUS at 03:52

## 2024-12-28 RX ADMIN — ACETAMINOPHEN 650 MG: 325 TABLET, FILM COATED ORAL at 19:40

## 2024-12-28 RX ADMIN — HYDROMORPHONE HYDROCHLORIDE 0.5 MG: 1 INJECTION, SOLUTION INTRAMUSCULAR; INTRAVENOUS; SUBCUTANEOUS at 08:05

## 2024-12-28 RX ADMIN — HYDROMORPHONE HYDROCHLORIDE 0.5 MG: 1 INJECTION, SOLUTION INTRAMUSCULAR; INTRAVENOUS; SUBCUTANEOUS at 03:52

## 2024-12-28 RX ADMIN — FAMOTIDINE 10 MG: 20 TABLET, FILM COATED ORAL at 14:09

## 2024-12-28 RX ADMIN — MINERAL OIL 1 ENEMA: 100 ENEMA RECTAL at 14:06

## 2024-12-28 RX ADMIN — ONDANSETRON 4 MG: 4 TABLET, ORALLY DISINTEGRATING ORAL at 12:57

## 2024-12-28 RX ADMIN — SODIUM CHLORIDE, PRESERVATIVE FREE 10 ML: 5 INJECTION INTRAVENOUS at 19:30

## 2024-12-28 RX ADMIN — POTASSIUM CHLORIDE, DEXTROSE MONOHYDRATE AND SODIUM CHLORIDE: 300; 5; 450 INJECTION, SOLUTION INTRAVENOUS at 01:56

## 2024-12-28 RX ADMIN — RIFAXIMIN 400 MG: 200 TABLET ORAL at 14:06

## 2024-12-28 RX ADMIN — POTASSIUM CHLORIDE, DEXTROSE MONOHYDRATE AND SODIUM CHLORIDE: 300; 5; 450 INJECTION, SOLUTION INTRAVENOUS at 19:29

## 2024-12-28 RX ADMIN — RIFAXIMIN 400 MG: 200 TABLET ORAL at 21:36

## 2024-12-28 RX ADMIN — SODIUM CHLORIDE, PRESERVATIVE FREE 10 ML: 5 INJECTION INTRAVENOUS at 08:06

## 2024-12-28 RX ADMIN — ALUMINUM HYDROXIDE, MAGNESIUM HYDROXIDE, DIMETHICONE 30 ML: 400; 400; 40 SUSPENSION ORAL at 19:32

## 2024-12-28 RX ADMIN — HYDROMORPHONE HYDROCHLORIDE 0.5 MG: 1 INJECTION, SOLUTION INTRAMUSCULAR; INTRAVENOUS; SUBCUTANEOUS at 12:52

## 2024-12-28 ASSESSMENT — PAIN DESCRIPTION - LOCATION
LOCATION: ABDOMEN

## 2024-12-28 ASSESSMENT — PAIN DESCRIPTION - DESCRIPTORS
DESCRIPTORS: ACHING

## 2024-12-28 ASSESSMENT — PAIN - FUNCTIONAL ASSESSMENT: PAIN_FUNCTIONAL_ASSESSMENT: PREVENTS OR INTERFERES SOME ACTIVE ACTIVITIES AND ADLS

## 2024-12-28 ASSESSMENT — PAIN SCALES - GENERAL
PAINLEVEL_OUTOF10: 3
PAINLEVEL_OUTOF10: 10

## 2024-12-28 NOTE — CARE COORDINATION
General surgery recommending two-view abdominal x-ray. Gastrograffin SBFT, IVFs, xifaxan. SBFT will not be done until Monday, 12/30/24. Anticipate routine DC when medically stable.

## 2024-12-28 NOTE — PROGRESS NOTES
General Surgery Progress Note    12/28/2024    Admit Date: 12/24/2024    Subjective:   Surgery On Call (for Dr. Payne) Hospital day number 5.  \"What am I doing here?\" Was the question I was asked when I entered the room this morning. The patient continues to have \"sharp\" pain without flatus or BM. He is on a clear liquid diet, but has not taken much. No NG-tube is in place, as I guess he has esophageal varices. I know he has a portosystemic shunt which I learned during a previous admission.     Objective:     /75   Pulse 58   Temp 98.1 °F (36.7 °C) (Oral)   Resp 18   Ht 1.829 m (6')   Wt 77.1 kg (170 lb)   SpO2 95%   BMI 23.06 kg/m²       Intake/Output Summary (Last 24 hours) at 12/28/2024 0795  Last data filed at 12/28/2024 0147  Gross per 24 hour   Intake 240 ml   Output 400 ml   Net -160 ml        EXAM:  ABD mild distention, active BS's. Mild diffuse tenderness.        Data Review    Recent Results (from the past 24 hour(s))   Basic Metabolic Panel w/ Reflex to MG    Collection Time: 12/28/24  6:09 AM   Result Value Ref Range    Sodium 138 136 - 145 mmol/L    Potassium 4.1 3.5 - 5.1 mmol/L    Chloride 106 98 - 107 mmol/L    CO2 23 20 - 29 mmol/L    Anion Gap 9 7 - 16 mmol/L    Glucose 125 (H) 70 - 99 mg/dL    BUN 13 6 - 23 MG/DL    Creatinine 1.04 0.80 - 1.30 MG/DL    Est, Glom Filt Rate 83 >60 ml/min/1.73m2    Calcium 8.6 (L) 8.8 - 10.2 MG/DL   CBC with Auto Differential    Collection Time: 12/28/24  6:09 AM   Result Value Ref Range    WBC 5.7 4.3 - 11.1 K/uL    RBC 4.25 4.23 - 5.6 M/uL    Hemoglobin 14.8 13.6 - 17.2 g/dL    Hematocrit 40.3 (L) 41.1 - 50.3 %    MCV 94.8 82.0 - 102.0 FL    MCH 34.8 (H) 26.1 - 32.9 PG    MCHC 36.7 (H) 31.4 - 35.0 g/dL    RDW 13.2 11.9 - 14.6 %    Platelets 86 (L) 150 - 450 K/uL    MPV 10.1 9.4 - 12.3 FL    nRBC 0.00 0.0 - 0.2 K/uL    Differential Type AUTOMATED      Neutrophils % 73 43 - 78 %    Lymphocytes % 12 (L) 13 - 44 %    Monocytes % 11 4.0 - 12.0 %     Eosinophils % 2 0.5 - 7.8 %    Basophils % 0 0.0 - 2.0 %    Immature Granulocytes % 2 0.0 - 5.0 %    Neutrophils Absolute 4.1 1.7 - 8.2 K/UL    Lymphocytes Absolute 0.7 0.5 - 4.6 K/UL    Monocytes Absolute 0.6 0.1 - 1.3 K/UL    Eosinophils Absolute 0.1 0.0 - 0.8 K/UL    Basophils Absolute 0.0 0.0 - 0.2 K/UL    Immature Granulocytes Absolute 0.1 0.0 - 0.5 K/UL   Magnesium    Collection Time: 12/28/24  6:09 AM   Result Value Ref Range    Magnesium 2.0 1.8 - 2.4 mg/dL   Phosphorus    Collection Time: 12/28/24  6:09 AM   Result Value Ref Range    Phosphorus 2.0 (L) 2.5 - 4.5 MG/DL        Principal Problem:    SBO (small bowel obstruction) (HCC)  Active Problems:    Alcoholic cirrhosis (HCC)    Postsurgical hypothyroidism    Thrombocytopenia (HCC)    Graves' disease    S/P TIPS (transjugular intrahepatic portosystemic shunt)  Resolved Problems:    * No resolved hospital problems. *        Plan: 1. Two view AXR'S  2. Would order a gastrorgraffin SBFT, but it will not be done until Monday 12/30/24.  3. Continue IVF'S.   4. Emmanuelle Hernandes MD.

## 2024-12-28 NOTE — PLAN OF CARE
Problem: Discharge Planning  Goal: Discharge to home or other facility with appropriate resources  12/28/2024 1043 by Eemterio Barton, RN  Outcome: Progressing  12/27/2024 2307 by Emelyn Mercado RN  Outcome: Progressing  Flowsheets (Taken 12/27/2024 1953)  Discharge to home or other facility with appropriate resources: Identify discharge learning needs (meds, wound care, etc)     Problem: Pain  Goal: Verbalizes/displays adequate comfort level or baseline comfort level  12/28/2024 1043 by Emeterio Barton, RN  Outcome: Progressing  12/27/2024 2307 by Emelyn Mercado, RN  Outcome: Progressing

## 2024-12-28 NOTE — PLAN OF CARE
Problem: Discharge Planning  Goal: Discharge to home or other facility with appropriate resources  Outcome: Progressing  Flowsheets (Taken 12/27/2024 1953)  Discharge to home or other facility with appropriate resources: Identify discharge learning needs (meds, wound care, etc)     Problem: Pain  Goal: Verbalizes/displays adequate comfort level or baseline comfort level  Outcome: Progressing

## 2024-12-28 NOTE — PROGRESS NOTES
Hospitalist Progress Note   Admit Date:  2024  2:05 PM   Name:  Coty Ocampo   Age:  59 y.o.  Sex:  male  :  1965   MRN:  [id number]   Room:  [facility]/    Presenting/Chief Complaint: Abdominal Pain     Reason(s) for Admission: Small bowel obstruction (HCC) [K56.609]  SBO (small bowel obstruction) (HCC) [K56.609]  Generalized abdominal pain [R10.84]  History of esophageal varices [Z87.19]     Hospital Course:   Please refer to the admission H&P for details of presentation.      In summary, Coty Ocampo is a 59 y.o. male with medical history significant for cirrhosis, s/p TIPS, hx of abdominal surgeries, prior SBO who presented with n/v, abdominal pain that started yesterday.  Pt reports he was able to eat minimally yesterday but unable to tolerate anything today. Reports having 2-3 episodes of nausea and diarrhea.     In the ED, he was hemodynamically stable.  Laboratory workup was remarkable for total bili of 2.7. CT A/P with cirrhosis, patent TIPS, evidence of prior SBP with anastomosis and dilated small bowel loops suggestive of SBO.    Subjective/24 hr Events (24) :  Patient seen this morning on rounds.  Continues to have diffuse abdominal pain and not able to tolerate clear liquids well.  Evaluated by surgery this morning who ordered 2 view x-ray of abdomen.  Shows distended loops of bowel with no free air.  She had a episode of emesis when going down to x-ray.  Discussed treatment plan with surgery.  Okay with mineral oral enema.  Continue conservative management.  I sympathized with the patient's frustration that he is not getting better.    Assessment & Plan:     Small bowel obstruction  Hx of small bowel obstruction  - As evidenced on CT abdomen/pelvis with significant abdominal pain and lack of bowel movement/flatus  - On low-dose Dilaudid as needed for pain control.  Counseled on the importance of only using these if you have to to reduce risk of worsening  Magnesium 2.0 1.8 - 2.4 mg/dL   Phosphorus    Collection Time: 12/28/24  6:09 AM   Result Value Ref Range    Phosphorus 2.0 (L) 2.5 - 4.5 MG/DL       No results for input(s): \"COVID19\" in the last 72 hours.    Current Meds:  Current Facility-Administered Medications   Medication Dose Route Frequency    dextrose 5 % and 0.45 % NaCl with KCl 40 mEq infusion   IntraVENous Continuous    polyethylene glycol (GLYCOLAX) packet 17 g  17 g Oral Daily    potassium chloride (KLOR-CON M) extended release tablet 40 mEq  40 mEq Oral PRN    Or    potassium bicarb-citric acid (EFFER-K) effervescent tablet 40 mEq  40 mEq Oral PRN    Or    potassium chloride 10 mEq/100 mL IVPB (Peripheral Line)  10 mEq IntraVENous PRN    HYDROmorphone HCl PF (DILAUDID) injection 0.5 mg  0.5 mg IntraVENous Q4H PRN    rifAXIMin (XIFAXAN) tablet 400 mg  400 mg Oral TID    sodium chloride flush 0.9 % injection 5-40 mL  5-40 mL IntraVENous 2 times per day    sodium chloride flush 0.9 % injection 5-40 mL  5-40 mL IntraVENous PRN    0.9 % sodium chloride infusion   IntraVENous PRN    magnesium sulfate 2000 mg in 50 mL IVPB premix  2,000 mg IntraVENous PRN    ondansetron (ZOFRAN-ODT) disintegrating tablet 4 mg  4 mg Oral Q8H PRN    Or    ondansetron (ZOFRAN) injection 4 mg  4 mg IntraVENous Q6H PRN    melatonin tablet 3 mg  3 mg Oral Nightly PRN    bisacodyl (DULCOLAX) suppository 10 mg  10 mg Rectal Daily PRN    famotidine (PEPCID) tablet 10 mg  10 mg Oral Daily PRN    aluminum & magnesium hydroxide-simethicone (MAALOX) 200-200-20 MG/5ML suspension 30 mL  30 mL Oral Q6H PRN    acetaminophen (TYLENOL) tablet 650 mg  650 mg Oral Q6H PRN    Or    acetaminophen (TYLENOL) suppository 650 mg  650 mg Rectal Q6H PRN       Signed:  ARLETTE PERRY MD    Part of this note may have been written by using a voice dictation software.  The note has been proof read but may still contain some grammatical/other typographical errors.

## 2024-12-29 LAB
ANION GAP SERPL CALC-SCNC: 8 MMOL/L (ref 7–16)
BASOPHILS # BLD: 0 K/UL (ref 0–0.2)
BASOPHILS NFR BLD: 0 % (ref 0–2)
BUN SERPL-MCNC: 14 MG/DL (ref 6–23)
CALCIUM SERPL-MCNC: 8.5 MG/DL (ref 8.8–10.2)
CHLORIDE SERPL-SCNC: 110 MMOL/L (ref 98–107)
CO2 SERPL-SCNC: 21 MMOL/L (ref 20–29)
CREAT SERPL-MCNC: 1.05 MG/DL (ref 0.8–1.3)
DIFFERENTIAL METHOD BLD: ABNORMAL
EOSINOPHIL # BLD: 0.3 K/UL (ref 0–0.8)
EOSINOPHIL NFR BLD: 5 % (ref 0.5–7.8)
ERYTHROCYTE [DISTWIDTH] IN BLOOD BY AUTOMATED COUNT: 13.4 % (ref 11.9–14.6)
GLUCOSE SERPL-MCNC: 107 MG/DL (ref 70–99)
HCT VFR BLD AUTO: 38 % (ref 41.1–50.3)
HGB BLD-MCNC: 13.8 G/DL (ref 13.6–17.2)
IMM GRANULOCYTES # BLD AUTO: 0 K/UL (ref 0–0.5)
IMM GRANULOCYTES NFR BLD AUTO: 0 % (ref 0–5)
LYMPHOCYTES # BLD: 1.3 K/UL (ref 0.5–4.6)
LYMPHOCYTES NFR BLD: 22 % (ref 13–44)
MAGNESIUM SERPL-MCNC: 2.1 MG/DL (ref 1.8–2.4)
MCH RBC QN AUTO: 34.5 PG (ref 26.1–32.9)
MCHC RBC AUTO-ENTMCNC: 36.3 G/DL (ref 31.4–35)
MCV RBC AUTO: 95 FL (ref 82–102)
MONOCYTES # BLD: 0.7 K/UL (ref 0.1–1.3)
MONOCYTES NFR BLD: 12 % (ref 4–12)
NEUTS SEG # BLD: 3.4 K/UL (ref 1.7–8.2)
NEUTS SEG NFR BLD: 60 % (ref 43–78)
NRBC # BLD: 0 K/UL (ref 0–0.2)
PHOSPHATE SERPL-MCNC: 1.8 MG/DL (ref 2.5–4.5)
PLATELET # BLD AUTO: 88 K/UL (ref 150–450)
PMV BLD AUTO: 10.4 FL (ref 9.4–12.3)
POTASSIUM SERPL-SCNC: 4.3 MMOL/L (ref 3.5–5.1)
RBC # BLD AUTO: 4 M/UL (ref 4.23–5.6)
SODIUM SERPL-SCNC: 139 MMOL/L (ref 136–145)
WBC # BLD AUTO: 5.6 K/UL (ref 4.3–11.1)

## 2024-12-29 PROCEDURE — 6370000000 HC RX 637 (ALT 250 FOR IP)

## 2024-12-29 PROCEDURE — 6360000002 HC RX W HCPCS: Performed by: INTERNAL MEDICINE

## 2024-12-29 PROCEDURE — 6370000000 HC RX 637 (ALT 250 FOR IP): Performed by: INTERNAL MEDICINE

## 2024-12-29 PROCEDURE — 83735 ASSAY OF MAGNESIUM: CPT

## 2024-12-29 PROCEDURE — 2500000003 HC RX 250 WO HCPCS: Performed by: FAMILY MEDICINE

## 2024-12-29 PROCEDURE — 99231 SBSQ HOSP IP/OBS SF/LOW 25: CPT | Performed by: SURGERY

## 2024-12-29 PROCEDURE — 80048 BASIC METABOLIC PNL TOTAL CA: CPT

## 2024-12-29 PROCEDURE — 1100000000 HC RM PRIVATE

## 2024-12-29 PROCEDURE — 85025 COMPLETE CBC W/AUTO DIFF WBC: CPT

## 2024-12-29 PROCEDURE — 84100 ASSAY OF PHOSPHORUS: CPT

## 2024-12-29 PROCEDURE — 36415 COLL VENOUS BLD VENIPUNCTURE: CPT

## 2024-12-29 PROCEDURE — 2500000003 HC RX 250 WO HCPCS: Performed by: INTERNAL MEDICINE

## 2024-12-29 RX ADMIN — RIFAXIMIN 400 MG: 200 TABLET ORAL at 09:16

## 2024-12-29 RX ADMIN — HYDROMORPHONE HYDROCHLORIDE 0.5 MG: 1 INJECTION, SOLUTION INTRAMUSCULAR; INTRAVENOUS; SUBCUTANEOUS at 21:08

## 2024-12-29 RX ADMIN — HYDROMORPHONE HYDROCHLORIDE 0.5 MG: 1 INJECTION, SOLUTION INTRAMUSCULAR; INTRAVENOUS; SUBCUTANEOUS at 15:58

## 2024-12-29 RX ADMIN — MINERAL OIL 1 ENEMA: 100 ENEMA RECTAL at 11:10

## 2024-12-29 RX ADMIN — POTASSIUM CHLORIDE, DEXTROSE MONOHYDRATE AND SODIUM CHLORIDE: 300; 5; 450 INJECTION, SOLUTION INTRAVENOUS at 11:11

## 2024-12-29 RX ADMIN — SODIUM CHLORIDE, PRESERVATIVE FREE 10 ML: 5 INJECTION INTRAVENOUS at 21:09

## 2024-12-29 RX ADMIN — POLYETHYLENE GLYCOL 3350 17 G: 17 POWDER, FOR SOLUTION ORAL at 09:16

## 2024-12-29 RX ADMIN — ONDANSETRON 4 MG: 4 TABLET, ORALLY DISINTEGRATING ORAL at 21:13

## 2024-12-29 RX ADMIN — HYDROMORPHONE HYDROCHLORIDE 0.5 MG: 1 INJECTION, SOLUTION INTRAMUSCULAR; INTRAVENOUS; SUBCUTANEOUS at 12:31

## 2024-12-29 RX ADMIN — RIFAXIMIN 400 MG: 200 TABLET ORAL at 15:45

## 2024-12-29 ASSESSMENT — PAIN DESCRIPTION - DESCRIPTORS: DESCRIPTORS: ACHING;SHARP

## 2024-12-29 ASSESSMENT — PAIN - FUNCTIONAL ASSESSMENT: PAIN_FUNCTIONAL_ASSESSMENT: PREVENTS OR INTERFERES SOME ACTIVE ACTIVITIES AND ADLS

## 2024-12-29 ASSESSMENT — PAIN SCALES - GENERAL
PAINLEVEL_OUTOF10: 9
PAINLEVEL_OUTOF10: 8

## 2024-12-29 ASSESSMENT — PAIN DESCRIPTION - LOCATION: LOCATION: ABDOMEN

## 2024-12-29 NOTE — PROGRESS NOTES
General Surgery Progress Note    12/29/2024    Admit Date: 12/24/2024    Subjective:   Surgery On Call (for Dr. Payne) Hospital day number 6.    Patient reports his abdominal pain is significantly improved from yesterday.  He states that after he received an enema yesterday the pain improved.  He states that he has been passing flatus, but denies bowel movement.  Denies any nausea or vomiting.  Does endorse some burping.  Tolerating liquids.    Objective:     /71   Pulse 56   Temp 98.1 °F (36.7 °C)   Resp 16   Ht 1.829 m (6')   Wt 77.1 kg (170 lb)   SpO2 98%   BMI 23.06 kg/m²       Intake/Output Summary (Last 24 hours) at 12/29/2024 0810  Last data filed at 12/28/2024 1016  Gross per 24 hour   Intake --   Output 200 ml   Net -200 ml        EXAM:  ABD mild distention, active BS's. Mild diffuse tenderness.        Data Review    Recent Results (from the past 24 hour(s))   Basic Metabolic Panel w/ Reflex to MG    Collection Time: 12/29/24  5:57 AM   Result Value Ref Range    Sodium 139 136 - 145 mmol/L    Potassium 4.3 3.5 - 5.1 mmol/L    Chloride 110 (H) 98 - 107 mmol/L    CO2 21 20 - 29 mmol/L    Anion Gap 8 7 - 16 mmol/L    Glucose 107 (H) 70 - 99 mg/dL    BUN 14 6 - 23 MG/DL    Creatinine 1.05 0.80 - 1.30 MG/DL    Est, Glom Filt Rate 82 >60 ml/min/1.73m2    Calcium 8.5 (L) 8.8 - 10.2 MG/DL   CBC with Auto Differential    Collection Time: 12/29/24  5:57 AM   Result Value Ref Range    WBC 5.6 4.3 - 11.1 K/uL    RBC 4.00 (L) 4.23 - 5.6 M/uL    Hemoglobin 13.8 13.6 - 17.2 g/dL    Hematocrit 38.0 (L) 41.1 - 50.3 %    MCV 95.0 82.0 - 102.0 FL    MCH 34.5 (H) 26.1 - 32.9 PG    MCHC 36.3 (H) 31.4 - 35.0 g/dL    RDW 13.4 11.9 - 14.6 %    Platelets 88 (L) 150 - 450 K/uL    MPV 10.4 9.4 - 12.3 FL    nRBC 0.00 0.0 - 0.2 K/uL    Differential Type AUTOMATED      Neutrophils % 60 43 - 78 %    Lymphocytes % 22 13 - 44 %    Monocytes % 12 4.0 - 12.0 %    Eosinophils % 5 0.5 - 7.8 %    Basophils % 0 0.0 - 2.0 %

## 2024-12-29 NOTE — PROGRESS NOTES
Hospitalist Progress Note   Admit Date:  2024  2:05 PM   Name:  Coty Ocampo   Age:  59 y.o.  Sex:  male  :  1965   MRN:  [id number]   Room:  [facility]/    Presenting/Chief Complaint: Abdominal Pain     Reason(s) for Admission: Small bowel obstruction (HCC) [K56.609]  SBO (small bowel obstruction) (HCC) [K56.609]  Generalized abdominal pain [R10.84]  History of esophageal varices [Z87.19]     Hospital Course:   Please refer to the admission H&P for details of presentation.      In summary, Coty Ocampo is a 59 y.o. male with medical history significant for cirrhosis, s/p TIPS, hx of abdominal surgeries, prior SBO who presented with n/v, abdominal pain that started yesterday.  Pt reports he was able to eat minimally yesterday but unable to tolerate anything today. Reports having 2-3 episodes of nausea and diarrhea.     In the ED, he was hemodynamically stable.  Laboratory workup was remarkable for total bili of 2.7. CT A/P with cirrhosis, patent TIPS, evidence of prior SBP with anastomosis and dilated small bowel loops suggestive of SBO.    Subjective/24 hr Events (24) :  Patient given dose of simethicone overnight.  Discussed with surgery yesterday and okay with mineral oil enema.  Remains on clear liquid diet.    Patient seen this morning on rounds.  Says he is feeling better than yesterday.  No bowel movements, but passing some gas.  Says his abdominal pain went away after receiving enema yesterday.  Doing well with clear liquids.  Plan for additional enema today.    Assessment & Plan:     Small bowel obstruction  Hx of small bowel obstruction  - As evidenced on CT abdomen/pelvis with significant abdominal pain and lack of bowel movement/flatus  - On low-dose Dilaudid as needed for pain control.  Counseled on the importance of only using these if you have to to reduce risk of worsening obstruction  - Case was discussed with GI previously (Dr. Barton).  Trying to for NG tube if    Magnesium    Collection Time: 12/28/24  6:09 AM   Result Value Ref Range    Magnesium 2.0 1.8 - 2.4 mg/dL   Phosphorus    Collection Time: 12/28/24  6:09 AM   Result Value Ref Range    Phosphorus 2.0 (L) 2.5 - 4.5 MG/DL   Basic Metabolic Panel w/ Reflex to MG    Collection Time: 12/29/24  5:57 AM   Result Value Ref Range    Sodium 139 136 - 145 mmol/L    Potassium 4.3 3.5 - 5.1 mmol/L    Chloride 110 (H) 98 - 107 mmol/L    CO2 21 20 - 29 mmol/L    Anion Gap 8 7 - 16 mmol/L    Glucose 107 (H) 70 - 99 mg/dL    BUN 14 6 - 23 MG/DL    Creatinine 1.05 0.80 - 1.30 MG/DL    Est, Glom Filt Rate 82 >60 ml/min/1.73m2    Calcium 8.5 (L) 8.8 - 10.2 MG/DL   CBC with Auto Differential    Collection Time: 12/29/24  5:57 AM   Result Value Ref Range    WBC 5.6 4.3 - 11.1 K/uL    RBC 4.00 (L) 4.23 - 5.6 M/uL    Hemoglobin 13.8 13.6 - 17.2 g/dL    Hematocrit 38.0 (L) 41.1 - 50.3 %    MCV 95.0 82.0 - 102.0 FL    MCH 34.5 (H) 26.1 - 32.9 PG    MCHC 36.3 (H) 31.4 - 35.0 g/dL    RDW 13.4 11.9 - 14.6 %    Platelets 88 (L) 150 - 450 K/uL    MPV 10.4 9.4 - 12.3 FL    nRBC 0.00 0.0 - 0.2 K/uL    Differential Type AUTOMATED      Neutrophils % 60 43 - 78 %    Lymphocytes % 22 13 - 44 %    Monocytes % 12 4.0 - 12.0 %    Eosinophils % 5 0.5 - 7.8 %    Basophils % 0 0.0 - 2.0 %    Immature Granulocytes % 0 0.0 - 5.0 %    Neutrophils Absolute 3.4 1.7 - 8.2 K/UL    Lymphocytes Absolute 1.3 0.5 - 4.6 K/UL    Monocytes Absolute 0.7 0.1 - 1.3 K/UL    Eosinophils Absolute 0.3 0.0 - 0.8 K/UL    Basophils Absolute 0.0 0.0 - 0.2 K/UL    Immature Granulocytes Absolute 0.0 0.0 - 0.5 K/UL   Magnesium    Collection Time: 12/29/24  5:57 AM   Result Value Ref Range    Magnesium 2.1 1.8 - 2.4 mg/dL   Phosphorus    Collection Time: 12/29/24  5:57 AM   Result Value Ref Range    Phosphorus 1.8 (L) 2.5 - 4.5 MG/DL       No results for input(s): \"COVID19\" in the last 72 hours.    Current Meds:  Current Facility-Administered Medications   Medication Dose Route

## 2024-12-29 NOTE — PLAN OF CARE
Problem: Discharge Planning  Goal: Discharge to home or other facility with appropriate resources  12/29/2024 0003 by Emelyn Mercado, RN  Outcome: Progressing  Flowsheets (Taken 12/28/2024 1934)  Discharge to home or other facility with appropriate resources: Identify discharge learning needs (meds, wound care, etc)  12/28/2024 1043 by Emeterio Barton, RN  Outcome: Progressing     Problem: Pain  Goal: Verbalizes/displays adequate comfort level or baseline comfort level  12/29/2024 0003 by Emelyn Mercado, RN  Outcome: Progressing  12/28/2024 1043 by Emeterio Barton, RN  Outcome: Progressing

## 2024-12-30 ENCOUNTER — APPOINTMENT (OUTPATIENT)
Dept: GENERAL RADIOLOGY | Age: 59
End: 2024-12-30
Payer: MEDICARE

## 2024-12-30 PROBLEM — K76.82 HEPATIC ENCEPHALOPATHY (HCC): Status: ACTIVE | Noted: 2024-12-30

## 2024-12-30 LAB
ALBUMIN SERPL-MCNC: 3.1 G/DL (ref 3.5–5)
ALBUMIN/GLOB SERPL: 0.9 (ref 1–1.9)
ALP SERPL-CCNC: 76 U/L (ref 40–129)
ALT SERPL-CCNC: 40 U/L (ref 8–55)
AMMONIA PLAS-SCNC: 265 UMOL/L (ref 16–60)
ANION GAP SERPL CALC-SCNC: 12 MMOL/L (ref 7–16)
AST SERPL-CCNC: 70 U/L (ref 15–37)
BASOPHILS # BLD: 0 K/UL (ref 0–0.2)
BASOPHILS NFR BLD: 1 % (ref 0–2)
BILIRUB SERPL-MCNC: 5 MG/DL (ref 0–1.2)
BUN SERPL-MCNC: 15 MG/DL (ref 6–23)
CALCIUM SERPL-MCNC: 9 MG/DL (ref 8.8–10.2)
CHLORIDE SERPL-SCNC: 113 MMOL/L (ref 98–107)
CO2 SERPL-SCNC: 19 MMOL/L (ref 20–29)
CREAT SERPL-MCNC: 1.16 MG/DL (ref 0.8–1.3)
DIFFERENTIAL METHOD BLD: ABNORMAL
EOSINOPHIL # BLD: 0.1 K/UL (ref 0–0.8)
EOSINOPHIL NFR BLD: 2 % (ref 0.5–7.8)
ERYTHROCYTE [DISTWIDTH] IN BLOOD BY AUTOMATED COUNT: 13.5 % (ref 11.9–14.6)
GLOBULIN SER CALC-MCNC: 3.5 G/DL (ref 2.3–3.5)
GLUCOSE BLD STRIP.AUTO-MCNC: 127 MG/DL (ref 65–100)
GLUCOSE SERPL-MCNC: 125 MG/DL (ref 70–99)
HCT VFR BLD AUTO: 40 % (ref 41.1–50.3)
HGB BLD-MCNC: 14.4 G/DL (ref 13.6–17.2)
IMM GRANULOCYTES # BLD AUTO: 0 K/UL (ref 0–0.5)
IMM GRANULOCYTES NFR BLD AUTO: 0 % (ref 0–5)
LYMPHOCYTES # BLD: 0.5 K/UL (ref 0.5–4.6)
LYMPHOCYTES NFR BLD: 14 % (ref 13–44)
MCH RBC QN AUTO: 34.4 PG (ref 26.1–32.9)
MCHC RBC AUTO-ENTMCNC: 36 G/DL (ref 31.4–35)
MCV RBC AUTO: 95.7 FL (ref 82–102)
MONOCYTES # BLD: 0.4 K/UL (ref 0.1–1.3)
MONOCYTES NFR BLD: 12 % (ref 4–12)
NEUTS SEG # BLD: 2.3 K/UL (ref 1.7–8.2)
NEUTS SEG NFR BLD: 72 % (ref 43–78)
NRBC # BLD: 0 K/UL (ref 0–0.2)
PLATELET # BLD AUTO: 88 K/UL (ref 150–450)
PMV BLD AUTO: 10.4 FL (ref 9.4–12.3)
POTASSIUM SERPL-SCNC: 4.2 MMOL/L (ref 3.5–5.1)
PROT SERPL-MCNC: 6.6 G/DL (ref 6.3–8.2)
RBC # BLD AUTO: 4.18 M/UL (ref 4.23–5.6)
SERVICE CMNT-IMP: ABNORMAL
SODIUM SERPL-SCNC: 144 MMOL/L (ref 136–145)
WBC # BLD AUTO: 3.2 K/UL (ref 4.3–11.1)

## 2024-12-30 PROCEDURE — 2580000003 HC RX 258

## 2024-12-30 PROCEDURE — 1100000000 HC RM PRIVATE

## 2024-12-30 PROCEDURE — 74018 RADEX ABDOMEN 1 VIEW: CPT

## 2024-12-30 PROCEDURE — 6360000002 HC RX W HCPCS: Performed by: INTERNAL MEDICINE

## 2024-12-30 PROCEDURE — 80053 COMPREHEN METABOLIC PANEL: CPT

## 2024-12-30 PROCEDURE — 82962 GLUCOSE BLOOD TEST: CPT

## 2024-12-30 PROCEDURE — 82140 ASSAY OF AMMONIA: CPT

## 2024-12-30 PROCEDURE — 6360000004 HC RX CONTRAST MEDICATION: Performed by: NURSE PRACTITIONER

## 2024-12-30 PROCEDURE — 74250 X-RAY XM SM INT 1CNTRST STD: CPT

## 2024-12-30 PROCEDURE — 6370000000 HC RX 637 (ALT 250 FOR IP)

## 2024-12-30 PROCEDURE — 0D9670Z DRAINAGE OF STOMACH WITH DRAINAGE DEVICE, VIA NATURAL OR ARTIFICIAL OPENING: ICD-10-PCS

## 2024-12-30 PROCEDURE — 2500000003 HC RX 250 WO HCPCS: Performed by: INTERNAL MEDICINE

## 2024-12-30 PROCEDURE — 6360000002 HC RX W HCPCS

## 2024-12-30 PROCEDURE — 6370000000 HC RX 637 (ALT 250 FOR IP): Performed by: INTERNAL MEDICINE

## 2024-12-30 PROCEDURE — 2500000003 HC RX 250 WO HCPCS: Performed by: FAMILY MEDICINE

## 2024-12-30 PROCEDURE — 85025 COMPLETE CBC W/AUTO DIFF WBC: CPT

## 2024-12-30 PROCEDURE — 36415 COLL VENOUS BLD VENIPUNCTURE: CPT

## 2024-12-30 RX ORDER — SODIUM CHLORIDE 9 MG/ML
INJECTION, SOLUTION INTRAVENOUS PRN
Status: DISCONTINUED | OUTPATIENT
Start: 2024-12-30 | End: 2025-01-03 | Stop reason: HOSPADM

## 2024-12-30 RX ORDER — HALOPERIDOL 5 MG/ML
5 INJECTION INTRAMUSCULAR ONCE
Status: COMPLETED | OUTPATIENT
Start: 2024-12-30 | End: 2024-12-30

## 2024-12-30 RX ORDER — LORAZEPAM 1 MG/1
4 TABLET ORAL
Status: DISCONTINUED | OUTPATIENT
Start: 2024-12-30 | End: 2025-01-03 | Stop reason: HOSPADM

## 2024-12-30 RX ORDER — DIATRIZOATE MEGLUMINE AND DIATRIZOATE SODIUM 660; 100 MG/ML; MG/ML
15 SOLUTION ORAL; RECTAL
Status: DISCONTINUED | OUTPATIENT
Start: 2024-12-30 | End: 2025-01-03 | Stop reason: HOSPADM

## 2024-12-30 RX ORDER — POLYETHYLENE GLYCOL 3350 17 G/17G
17 POWDER, FOR SOLUTION ORAL DAILY PRN
Status: DISCONTINUED | OUTPATIENT
Start: 2024-12-30 | End: 2025-01-03 | Stop reason: HOSPADM

## 2024-12-30 RX ORDER — LACTULOSE 10 G/15ML
20 SOLUTION ORAL 3 TIMES DAILY
Status: DISCONTINUED | OUTPATIENT
Start: 2024-12-30 | End: 2024-12-30

## 2024-12-30 RX ORDER — SODIUM CHLORIDE 0.9 % (FLUSH) 0.9 %
5-40 SYRINGE (ML) INJECTION EVERY 12 HOURS SCHEDULED
Status: DISCONTINUED | OUTPATIENT
Start: 2024-12-30 | End: 2025-01-03 | Stop reason: HOSPADM

## 2024-12-30 RX ORDER — LORAZEPAM 1 MG/1
1 TABLET ORAL
Status: DISCONTINUED | OUTPATIENT
Start: 2024-12-30 | End: 2025-01-03 | Stop reason: HOSPADM

## 2024-12-30 RX ORDER — LACTULOSE 10 G/15ML
20 SOLUTION ORAL 3 TIMES DAILY
Status: DISCONTINUED | OUTPATIENT
Start: 2024-12-30 | End: 2025-01-03 | Stop reason: HOSPADM

## 2024-12-30 RX ORDER — LORAZEPAM 2 MG/ML
2 INJECTION INTRAMUSCULAR ONCE
Status: COMPLETED | OUTPATIENT
Start: 2024-12-30 | End: 2024-12-30

## 2024-12-30 RX ORDER — LORAZEPAM 1 MG/1
2 TABLET ORAL
Status: DISCONTINUED | OUTPATIENT
Start: 2024-12-30 | End: 2025-01-03 | Stop reason: HOSPADM

## 2024-12-30 RX ORDER — LORAZEPAM 1 MG/1
3 TABLET ORAL
Status: DISCONTINUED | OUTPATIENT
Start: 2024-12-30 | End: 2025-01-03 | Stop reason: HOSPADM

## 2024-12-30 RX ORDER — LANOLIN ALCOHOL/MO/W.PET/CERES
100 CREAM (GRAM) TOPICAL DAILY
Status: DISCONTINUED | OUTPATIENT
Start: 2024-12-31 | End: 2025-01-01

## 2024-12-30 RX ORDER — SODIUM CHLORIDE 0.9 % (FLUSH) 0.9 %
5-40 SYRINGE (ML) INJECTION PRN
Status: DISCONTINUED | OUTPATIENT
Start: 2024-12-30 | End: 2025-01-03 | Stop reason: HOSPADM

## 2024-12-30 RX ORDER — LACTULOSE 10 G/15ML
20 SOLUTION ORAL 3 TIMES DAILY
Status: DISCONTINUED | OUTPATIENT
Start: 2024-12-31 | End: 2024-12-30

## 2024-12-30 RX ORDER — FOLIC ACID 1 MG/1
1 TABLET ORAL DAILY
Status: DISCONTINUED | OUTPATIENT
Start: 2024-12-31 | End: 2025-01-01

## 2024-12-30 RX ADMIN — SODIUM CHLORIDE, PRESERVATIVE FREE 1 MG: 5 INJECTION INTRAVENOUS at 22:26

## 2024-12-30 RX ADMIN — LORAZEPAM 1 MG: 1 TABLET ORAL at 12:13

## 2024-12-30 RX ADMIN — LACTULOSE 20 G: 10 SOLUTION ORAL at 22:15

## 2024-12-30 RX ADMIN — RIFAXIMIN 400 MG: 200 TABLET ORAL at 22:09

## 2024-12-30 RX ADMIN — POTASSIUM CHLORIDE, DEXTROSE MONOHYDRATE AND SODIUM CHLORIDE: 300; 5; 450 INJECTION, SOLUTION INTRAVENOUS at 02:51

## 2024-12-30 RX ADMIN — LORAZEPAM 2 MG: 2 INJECTION INTRAMUSCULAR; INTRAVENOUS at 17:07

## 2024-12-30 RX ADMIN — HALOPERIDOL LACTATE 5 MG: 5 INJECTION, SOLUTION INTRAMUSCULAR at 13:53

## 2024-12-30 RX ADMIN — SODIUM CHLORIDE, PRESERVATIVE FREE 10 ML: 5 INJECTION INTRAVENOUS at 02:50

## 2024-12-30 RX ADMIN — DIATRIZOATE MEGLUMINE AND DIATRIZOATE SODIUM 120 ML: 660; 100 LIQUID ORAL; RECTAL at 09:04

## 2024-12-30 RX ADMIN — HYDROMORPHONE HYDROCHLORIDE 0.5 MG: 1 INJECTION, SOLUTION INTRAMUSCULAR; INTRAVENOUS; SUBCUTANEOUS at 02:48

## 2024-12-30 RX ADMIN — HALOPERIDOL LACTATE 5 MG: 5 INJECTION, SOLUTION INTRAMUSCULAR at 17:07

## 2024-12-30 ASSESSMENT — PAIN SCALES - GENERAL: PAINLEVEL_OUTOF10: 10

## 2024-12-30 ASSESSMENT — PAIN DESCRIPTION - LOCATION: LOCATION: ABDOMEN

## 2024-12-30 NOTE — PROGRESS NOTES
MD notified of patients request for additional pain medicine. No further orders received at this time.

## 2024-12-30 NOTE — CARE COORDINATION
Chart reviewed by RNCM and discussed in IDR     CM following for continued stay.    Anticipated DC in 2 days.    Patient to have SBFT today per general surgery.     Anticipate patient to DC home with no additional needs when medically stable    Please consult case management if any additional discharge needs arise.

## 2024-12-30 NOTE — PROGRESS NOTES
General Surgery Progress Note    12/30/2024    Admit Date: 12/24/2024    Subjective:   Gen Surgery : Hospital day number 7     Patient reports his abdominal pain is unchanged 8-10 on pain scale  He states that he has been passing flatus, but denies bowel movement.  Denies any nausea or vomiting.  Does endorse some burping.   CLD initiated 12/27    Objective:     /71   Pulse 58   Temp 98.2 °F (36.8 °C) (Oral)   Resp 12   Ht 1.829 m (6')   Wt 77.1 kg (170 lb)   SpO2 96%   BMI 23.06 kg/m²       Intake/Output Summary (Last 24 hours) at 12/30/2024 0627  Last data filed at 12/29/2024 2157  Gross per 24 hour   Intake --   Output 425 ml   Net -425 ml        EXAM:  ABD mild distention, active BS's. Mild diffuse tenderness.        Data Review    No results found for this or any previous visit (from the past 24 hour(s)).       Principal Problem:    Small bowel obstruction (HCC)  Active Problems:    Alcoholic cirrhosis (HCC)    Postsurgical hypothyroidism    Thrombocytopenia (HCC)    Graves' disease    S/P TIPS (transjugular intrahepatic portosystemic shunt)  Resolved Problems:    * No resolved hospital problems. *        Plan:   1.  Plan for gastrorgraffin SBFT ordered for today  2.  Continue clear liquid diet as tolerated and IVF'S.  Repeat enema pending SBFT results  3.  Emmanuelle JOSUE, JAMI - CNP

## 2024-12-30 NOTE — PLAN OF CARE
Problem: Discharge Planning  Goal: Discharge to home or other facility with appropriate resources  Outcome: Progressing  Flowsheets (Taken 12/29/2024 2114)  Discharge to home or other facility with appropriate resources: Identify discharge learning needs (meds, wound care, etc)     Problem: Pain  Goal: Verbalizes/displays adequate comfort level or baseline comfort level  Outcome: Progressing

## 2024-12-30 NOTE — PROGRESS NOTES
Hospitalist Progress Note   Admit Date:  2024  2:05 PM   Name:  Coty Ocampo   Age:  59 y.o.  Sex:  male  :  1965   MRN:  [id number]   Room:  [facility]/    Presenting/Chief Complaint: Abdominal Pain     Reason(s) for Admission: Small bowel obstruction (HCC) [K56.609]  SBO (small bowel obstruction) (HCC) [K56.609]  Generalized abdominal pain [R10.84]  History of esophageal varices [Z87.19]     Hospital Course:   Please refer to the admission H&P for details of presentation.      In summary, Coty Ocampo is a 59 y.o. male with medical history significant for cirrhosis, s/p TIPS, hx of abdominal surgeries, prior SBO who presented with n/v, abdominal pain that started yesterday.  Pt reports he was able to eat minimally yesterday but unable to tolerate anything today. Reports having 2-3 episodes of nausea and diarrhea.     In the ED, he was hemodynamically stable.  Laboratory workup was remarkable for total bili of 2.7. CT A/P with cirrhosis, patent TIPS, evidence of prior SBP with anastomosis and dilated small bowel loops suggestive of SBO.    Subjective/24 hr Events (24) :  Received second enema .  Passing gas, but no bowel movements.  Small bowel follow-through study ordered and pending for today.    Patient seen today on rounds and much more lethargic.  Discussed with nursing staff, and this is an acute change for him.  Check stat labs that resulted in significantly elevated ammonia level.  Reportedly having more loose bowel movements today.  Also seems like he did not get his rifaximin today as well.  Discussed with ICU nursing staff.  Plan to place NG tube and start lactulose.  Titrate for 2-3 loose bowel movements.  Required Haldol x 2 today for combativeness.    Assessment & Plan:     Hepatic encephalopathy  Alcoholic cirrhosis status post TIPS  - This is an acute change for the patient.  Stat labs ordered today with evidence of significantly elevated ammonia  - Start     Or    LORazepam (ATIVAN) 4 mg in sodium chloride (PF) 0.9 % 10 mL injection  4 mg IntraVENous Q1H PRN    polyethylene glycol (GLYCOLAX) packet 17 g  17 g Oral Daily PRN    lactulose (CHRONULAC) 10 GM/15ML solution 20 g  20 g Oral TID    dextrose 5 % and 0.45 % NaCl with KCl 40 mEq infusion   IntraVENous Continuous    potassium chloride (KLOR-CON M) extended release tablet 40 mEq  40 mEq Oral PRN    Or    potassium bicarb-citric acid (EFFER-K) effervescent tablet 40 mEq  40 mEq Oral PRN    Or    potassium chloride 10 mEq/100 mL IVPB (Peripheral Line)  10 mEq IntraVENous PRN    rifAXIMin (XIFAXAN) tablet 400 mg  400 mg Oral TID    sodium chloride flush 0.9 % injection 5-40 mL  5-40 mL IntraVENous 2 times per day    sodium chloride flush 0.9 % injection 5-40 mL  5-40 mL IntraVENous PRN    0.9 % sodium chloride infusion   IntraVENous PRN    magnesium sulfate 2000 mg in 50 mL IVPB premix  2,000 mg IntraVENous PRN    ondansetron (ZOFRAN-ODT) disintegrating tablet 4 mg  4 mg Oral Q8H PRN    Or    ondansetron (ZOFRAN) injection 4 mg  4 mg IntraVENous Q6H PRN    melatonin tablet 3 mg  3 mg Oral Nightly PRN    bisacodyl (DULCOLAX) suppository 10 mg  10 mg Rectal Daily PRN    famotidine (PEPCID) tablet 10 mg  10 mg Oral Daily PRN    aluminum & magnesium hydroxide-simethicone (MAALOX) 200-200-20 MG/5ML suspension 30 mL  30 mL Oral Q6H PRN    acetaminophen (TYLENOL) tablet 650 mg  650 mg Oral Q6H PRN    Or    acetaminophen (TYLENOL) suppository 650 mg  650 mg Rectal Q6H PRN       Signed:  ARLETTE PERRY MD    Part of this note may have been written by using a voice dictation software.  The note has been proof read but may still contain some grammatical/other typographical errors.

## 2024-12-31 LAB
ALBUMIN SERPL-MCNC: 2.8 G/DL (ref 3.5–5)
ALBUMIN/GLOB SERPL: 0.8 (ref 1–1.9)
ALP SERPL-CCNC: 70 U/L (ref 40–129)
ALT SERPL-CCNC: 42 U/L (ref 8–55)
AMMONIA PLAS-SCNC: 121 UMOL/L (ref 16–60)
ANION GAP SERPL CALC-SCNC: 12 MMOL/L (ref 7–16)
AST SERPL-CCNC: 52 U/L (ref 15–37)
BASOPHILS # BLD: 0 K/UL (ref 0–0.2)
BASOPHILS NFR BLD: 0 % (ref 0–2)
BILIRUB SERPL-MCNC: 3 MG/DL (ref 0–1.2)
BUN SERPL-MCNC: 19 MG/DL (ref 6–23)
CALCIUM SERPL-MCNC: 8.5 MG/DL (ref 8.8–10.2)
CHLORIDE SERPL-SCNC: 116 MMOL/L (ref 98–107)
CO2 SERPL-SCNC: 18 MMOL/L (ref 20–29)
CREAT SERPL-MCNC: 1.16 MG/DL (ref 0.8–1.3)
DIFFERENTIAL METHOD BLD: ABNORMAL
EOSINOPHIL # BLD: 0.2 K/UL (ref 0–0.8)
EOSINOPHIL NFR BLD: 5 % (ref 0.5–7.8)
ERYTHROCYTE [DISTWIDTH] IN BLOOD BY AUTOMATED COUNT: 13.9 % (ref 11.9–14.6)
GLOBULIN SER CALC-MCNC: 3.4 G/DL (ref 2.3–3.5)
GLUCOSE SERPL-MCNC: 117 MG/DL (ref 70–99)
HCT VFR BLD AUTO: 38.5 % (ref 41.1–50.3)
HGB BLD-MCNC: 13.8 G/DL (ref 13.6–17.2)
IMM GRANULOCYTES # BLD AUTO: 0 K/UL (ref 0–0.5)
IMM GRANULOCYTES NFR BLD AUTO: 0 % (ref 0–5)
LYMPHOCYTES # BLD: 0.9 K/UL (ref 0.5–4.6)
LYMPHOCYTES NFR BLD: 24 % (ref 13–44)
MCH RBC QN AUTO: 34.8 PG (ref 26.1–32.9)
MCHC RBC AUTO-ENTMCNC: 35.8 G/DL (ref 31.4–35)
MCV RBC AUTO: 97 FL (ref 82–102)
MONOCYTES # BLD: 0.5 K/UL (ref 0.1–1.3)
MONOCYTES NFR BLD: 12 % (ref 4–12)
NEUTS SEG # BLD: 2.3 K/UL (ref 1.7–8.2)
NEUTS SEG NFR BLD: 59 % (ref 43–78)
NRBC # BLD: 0 K/UL (ref 0–0.2)
PLATELET # BLD AUTO: 90 K/UL (ref 150–450)
PMV BLD AUTO: 10.4 FL (ref 9.4–12.3)
POTASSIUM SERPL-SCNC: 4 MMOL/L (ref 3.5–5.1)
PROT SERPL-MCNC: 6.2 G/DL (ref 6.3–8.2)
RBC # BLD AUTO: 3.97 M/UL (ref 4.23–5.6)
SODIUM SERPL-SCNC: 146 MMOL/L (ref 136–145)
WBC # BLD AUTO: 3.9 K/UL (ref 4.3–11.1)

## 2024-12-31 PROCEDURE — 80053 COMPREHEN METABOLIC PANEL: CPT

## 2024-12-31 PROCEDURE — 6360000002 HC RX W HCPCS

## 2024-12-31 PROCEDURE — C1751 CATH, INF, PER/CENT/MIDLINE: HCPCS

## 2024-12-31 PROCEDURE — 2500000003 HC RX 250 WO HCPCS

## 2024-12-31 PROCEDURE — 6370000000 HC RX 637 (ALT 250 FOR IP): Performed by: INTERNAL MEDICINE

## 2024-12-31 PROCEDURE — 36415 COLL VENOUS BLD VENIPUNCTURE: CPT

## 2024-12-31 PROCEDURE — 2580000003 HC RX 258

## 2024-12-31 PROCEDURE — 36569 INSJ PICC 5 YR+ W/O IMAGING: CPT

## 2024-12-31 PROCEDURE — 82140 ASSAY OF AMMONIA: CPT

## 2024-12-31 PROCEDURE — 02HV33Z INSERTION OF INFUSION DEVICE INTO SUPERIOR VENA CAVA, PERCUTANEOUS APPROACH: ICD-10-PCS

## 2024-12-31 PROCEDURE — 2500000003 HC RX 250 WO HCPCS: Performed by: FAMILY MEDICINE

## 2024-12-31 PROCEDURE — 1100000000 HC RM PRIVATE

## 2024-12-31 PROCEDURE — 6370000000 HC RX 637 (ALT 250 FOR IP)

## 2024-12-31 PROCEDURE — 85025 COMPLETE CBC W/AUTO DIFF WBC: CPT

## 2024-12-31 RX ORDER — SODIUM CHLORIDE 0.9 % (FLUSH) 0.9 %
5-40 SYRINGE (ML) INJECTION EVERY 12 HOURS SCHEDULED
Status: DISCONTINUED | OUTPATIENT
Start: 2024-12-31 | End: 2025-01-03 | Stop reason: HOSPADM

## 2024-12-31 RX ORDER — SODIUM CHLORIDE 9 MG/ML
INJECTION, SOLUTION INTRAVENOUS PRN
Status: DISCONTINUED | OUTPATIENT
Start: 2024-12-31 | End: 2025-01-03 | Stop reason: HOSPADM

## 2024-12-31 RX ORDER — SODIUM CHLORIDE 0.9 % (FLUSH) 0.9 %
5-40 SYRINGE (ML) INJECTION PRN
Status: DISCONTINUED | OUTPATIENT
Start: 2024-12-31 | End: 2025-01-03 | Stop reason: HOSPADM

## 2024-12-31 RX ORDER — HALOPERIDOL 5 MG/ML
1 INJECTION INTRAMUSCULAR ONCE
Status: COMPLETED | OUTPATIENT
Start: 2024-12-31 | End: 2025-01-01

## 2024-12-31 RX ADMIN — SODIUM CHLORIDE, PRESERVATIVE FREE 1 MG: 5 INJECTION INTRAVENOUS at 21:56

## 2024-12-31 RX ADMIN — SODIUM CHLORIDE, PRESERVATIVE FREE 1 MG: 5 INJECTION INTRAVENOUS at 19:46

## 2024-12-31 RX ADMIN — POTASSIUM CHLORIDE, DEXTROSE MONOHYDRATE AND SODIUM CHLORIDE: 300; 5; 450 INJECTION, SOLUTION INTRAVENOUS at 06:11

## 2024-12-31 RX ADMIN — LACTULOSE 20 G: 10 SOLUTION ORAL at 21:14

## 2024-12-31 RX ADMIN — LACTULOSE 20 G: 10 SOLUTION ORAL at 08:39

## 2024-12-31 RX ADMIN — RIFAXIMIN 400 MG: 200 TABLET ORAL at 21:34

## 2024-12-31 RX ADMIN — LACTULOSE 20 G: 10 SOLUTION ORAL at 14:27

## 2024-12-31 RX ADMIN — LORAZEPAM 2 MG: 2 INJECTION INTRAMUSCULAR; INTRAVENOUS at 03:46

## 2024-12-31 RX ADMIN — SODIUM CHLORIDE, PRESERVATIVE FREE 1 MG: 5 INJECTION INTRAVENOUS at 14:26

## 2024-12-31 RX ADMIN — POTASSIUM CHLORIDE, DEXTROSE MONOHYDRATE AND SODIUM CHLORIDE: 300; 5; 450 INJECTION, SOLUTION INTRAVENOUS at 21:15

## 2024-12-31 RX ADMIN — SODIUM CHLORIDE, PRESERVATIVE FREE 10 ML: 5 INJECTION INTRAVENOUS at 08:44

## 2024-12-31 NOTE — PROGRESS NOTES
General Surgery Progress Note    12/31/2024    Admit Date: 12/24/2024    Subjective:   Gen Surgery : Hospital day number 8     Change in mental status since yesterday.  Ammonia elevated 265 12/30/24  No nausea or vomiting- NGT placed for Meds (Lactulose, etc)    CLD initiated 12/27- was tolerating prior to AMS 12/30/24    Objective:     BP (!) 146/80   Pulse 64   Temp 98.6 °F (37 °C) (Axillary)   Resp 16   Ht 1.829 m (6' 0.01\")   Wt 77.1 kg (170 lb)   SpO2 96%   BMI 23.05 kg/m²     No intake or output data in the 24 hours ending 12/31/24 0648       EXAM:  sitter at bedside  General:obtunded, not answering questions  Nose: NGT clamped  ABD mild distention, active BS's.        Data Review    Recent Results (from the past 24 hour(s))   CBC with Auto Differential    Collection Time: 12/30/24 11:15 AM   Result Value Ref Range    WBC 3.2 (L) 4.3 - 11.1 K/uL    RBC 4.18 (L) 4.23 - 5.6 M/uL    Hemoglobin 14.4 13.6 - 17.2 g/dL    Hematocrit 40.0 (L) 41.1 - 50.3 %    MCV 95.7 82.0 - 102.0 FL    MCH 34.4 (H) 26.1 - 32.9 PG    MCHC 36.0 (H) 31.4 - 35.0 g/dL    RDW 13.5 11.9 - 14.6 %    Platelets 88 (L) 150 - 450 K/uL    MPV 10.4 9.4 - 12.3 FL    nRBC 0.00 0.0 - 0.2 K/uL    Differential Type AUTOMATED      Neutrophils % 72 43 - 78 %    Lymphocytes % 14 13 - 44 %    Monocytes % 12 4.0 - 12.0 %    Eosinophils % 2 0.5 - 7.8 %    Basophils % 1 0.0 - 2.0 %    Immature Granulocytes % 0 0.0 - 5.0 %    Neutrophils Absolute 2.3 1.7 - 8.2 K/UL    Lymphocytes Absolute 0.5 0.5 - 4.6 K/UL    Monocytes Absolute 0.4 0.1 - 1.3 K/UL    Eosinophils Absolute 0.1 0.0 - 0.8 K/UL    Basophils Absolute 0.0 0.0 - 0.2 K/UL    Immature Granulocytes Absolute 0.0 0.0 - 0.5 K/UL   Comprehensive Metabolic Panel w/ Reflex to MG    Collection Time: 12/30/24 11:15 AM   Result Value Ref Range    Sodium 144 136 - 145 mmol/L    Potassium 4.2 3.5 - 5.1 mmol/L    Chloride 113 (H) 98 - 107 mmol/L    CO2 19 (L) 20 - 29 mmol/L    Anion Gap 12 7 - 16 mmol/L

## 2024-12-31 NOTE — PROGRESS NOTES
Unsuccessful picc placement.   Picc line ordered for TPN pt very agitated and unable to hold still.  After administering lidocaine pt continued to sit up in bed and contaminated my sterile field.  Ask primary RN to give med to calm pt, so ativan was given and pt fell asleep but when I would attempt to pull arm out to place line pt would not let arm come out and would sit up in bed pt not a candidate for bedside picc placement at this time.  Spoke with dietician about TPN she stated that she would see if he could get a different type of line.

## 2024-12-31 NOTE — CONSULTS
Nutrition Assessment  Assessment Type: Reassess  Reason for visit:  Parenteral Nutrition Management (Hospitalists) and NPO or Clear Liquid  Malnutrition Screening Tool Score: 0    Nutrition Intervention:   Food and/or Nutrient Delivery:   Meals and Snacks:  Diet:  Continue clears per provider.   Medical Food Supplements:   Medical food supplement therapy:  Continue Ensure Clear (clear liquid oral supplement) 240 calories, 8 grams protein per 8 ounce serving  Labs ordered for tomorrow am anticipating PPN start if ammonia continues to improve.    Coordination of Nutrition Care:  Coordination with belen care provider  Discussed with CORY Pearson via phone, Dr. Kesha TREVIZOT rounds, on unit and via Lora Adam, Vascular Access     Malnutrition Assessment:  Malnutrition Status: Insufficient data  Nutrition Focused Physical Exam: Deferred due to pt with agitation at PICC line attempt, actively hiccupping in his sleep with NG at RD encounter.    Nutrition Assessment:  Food/Nutrition Related History:   Unable to obtain from pt, h&p indicates pt ate minimally on 12/23 and was unable to tolerate anything on 12/24 at presentation.          Weight History:   Wt hx per EMR review:   [facility] [place] transplant clinic: 171# 11/24, 166# 2/23.    Nutrition Background:       PMH remarkable for graves disease, cirrhosis, s/p TIPS, hx of abdominal surgeries, prior SBO.  Presented with abdominal pain, NV.      Admitted with SBO.    Nutrition Monitoring/Evaluation:  NPO.CLD day 7, +SBFT 12/30  Findings are consistent with partial small bowel obstruction at or near to the surgical anastomotic site in the distal jejunum.  NG placed 12/30 for lactulose administration.    D5 and 0.45 NaCL with 40 meq KCl @ 75 ml/hr started 12/27.      +mineral oil enema 12/28 and 12/29.    +xifaxin, lactulose started 12/30.    Ammonia 265 12/30/24, 121 12/31.    PICC line attempt unsuccessful 12/31.  Continues with single PIV and IVF infusion.  He is currently too

## 2024-12-31 NOTE — PLAN OF CARE
Problem: Discharge Planning  Goal: Discharge to home or other facility with appropriate resources  Outcome: Progressing     Problem: Pain  Goal: Verbalizes/displays adequate comfort level or baseline comfort level  Outcome: Progressing     Problem: Safety - Adult  Goal: Free from fall injury  Outcome: Progressing     Problem: Safety - Medical Restraint  Goal: Remains free of injury from restraints (Restraint for Interference with Medical Device)  Description: INTERVENTIONS:  1. Determine that other, less restrictive measures have been tried or would not be effective before applying the restraint  2. Evaluate the patient's condition at the time of restraint application  3. Inform patient/family regarding the reason for restraint  4. Q2H: Monitor safety, psychosocial status, comfort, nutrition and hydration  Outcome: Progressing

## 2024-12-31 NOTE — CARE COORDINATION
Chart reviewed by RNCM and discussed in IDR     CM following for continued stay.  Patient's medical complexity has increased.  Patient had episode of confusion yesterday due to increased ammonia level.  Patient is now somnolent and difficult to arouse.  PICC line to be placed today with possible TPN started pending patient's ammonia level comes down.     Anticipate patient to be admitted for at least another 48 hours.    Would benefit from [facility] referral once he is more alert/oriented.    CM to follow for other potential DC needs.     Please consult case management if any additional discharge needs arise.

## 2024-12-31 NOTE — PLAN OF CARE
Problem: Discharge Planning  Goal: Discharge to home or other facility with appropriate resources  12/31/2024 1014 by Elizabeth Barker RN  Outcome: Progressing  12/31/2024 1013 by Elizabeth Barker RN  Outcome: Progressing  12/31/2024 0141 by Anastasia Bravo RN  Outcome: Progressing     Problem: Pain  Goal: Verbalizes/displays adequate comfort level or baseline comfort level  12/31/2024 1014 by Elizabeth Barker RN  Outcome: Progressing  12/31/2024 1013 by Elizabeth Barker RN  Outcome: Progressing  12/31/2024 0141 by Anastasia Bravo RN  Outcome: Progressing     Problem: Safety - Adult  Goal: Free from fall injury  12/31/2024 1014 by Elizabeth Barker RN  Outcome: Progressing  12/31/2024 1013 by Elizabeth Barker RN  Outcome: Progressing  12/31/2024 0141 by Anastasia Bravo RN  Outcome: Progressing     Problem: Safety - Medical Restraint  Goal: Remains free of injury from restraints (Restraint for Interference with Medical Device)  Description: INTERVENTIONS:  1. Determine that other, less restrictive measures have been tried or would not be effective before applying the restraint  2. Evaluate the patient's condition at the time of restraint application  3. Inform patient/family regarding the reason for restraint  4. Q2H: Monitor safety, psychosocial status, comfort, nutrition and hydration  12/31/2024 1014 by Elizabeth Barker RN  Outcome: Progressing  12/31/2024 1013 by Elizabeth Barker RN  Outcome: Progressing  12/31/2024 0141 by Anastasia Bravo RN  Outcome: Progressing

## 2024-12-31 NOTE — PLAN OF CARE
Problem: Discharge Planning  Goal: Discharge to home or other facility with appropriate resources  12/31/2024 1013 by Elziabeth Barker RN  Outcome: Progressing  12/31/2024 0141 by Anastasia Bravo RN  Outcome: Progressing     Problem: Pain  Goal: Verbalizes/displays adequate comfort level or baseline comfort level  12/31/2024 1013 by Elizabeth Barker RN  Outcome: Progressing  12/31/2024 0141 by Anastasia Bravo RN  Outcome: Progressing     Problem: Safety - Adult  Goal: Free from fall injury  12/31/2024 1013 by Elizabeth Barker RN  Outcome: Progressing  12/31/2024 0141 by Anastasia Bravo RN  Outcome: Progressing     Problem: Safety - Medical Restraint  Goal: Remains free of injury from restraints (Restraint for Interference with Medical Device)  Description: INTERVENTIONS:  1. Determine that other, less restrictive measures have been tried or would not be effective before applying the restraint  2. Evaluate the patient's condition at the time of restraint application  3. Inform patient/family regarding the reason for restraint  4. Q2H: Monitor safety, psychosocial status, comfort, nutrition and hydration  12/31/2024 1013 by Elizabeth Barker RN  Outcome: Progressing  12/31/2024 0141 by Anastasia Bravo RN  Outcome: Progressing

## 2024-12-31 NOTE — PROGRESS NOTES
Hospitalist Progress Note   Admit Date:  2024  2:05 PM   Name:  Coty Ocampo   Age:  59 y.o.  Sex:  male  :  1965   MRN:  [id number]   Room:  [facility]/    Presenting/Chief Complaint: Abdominal Pain     Reason(s) for Admission: Small bowel obstruction (HCC) [K56.609]  SBO (small bowel obstruction) (HCC) [K56.609]  Generalized abdominal pain [R10.84]  History of esophageal varices [Z87.19]     Hospital Course:   Please refer to the admission H&P for details of presentation.      In summary, Coty Ocampo is a 59 y.o. male with medical history significant for cirrhosis, s/p TIPS, hx of abdominal surgeries, and prior SBO who presented with n/v, abdominal pain that started prior to arrival.  Admitted for hepatic encephalopathy in setting of alcoholic liver cirrhosis status post TIPS procedure. CT A/P with cirrhosis, patent TIPS, evidence of prior SBP with anastomosis and dilated small bowel loops suggestive of SBO.  General surgery consulted.  Small bowel follow-through on  consistent with partial small bowel obstruction.  NG tube placed.  Advancing diet as tolerated.  Patient requiring lactulose and rifaximin for hepatic encephalopathy and significantly elevated ammonia levels.  Patient may require PT and OT when more stable.    Subjective/24 hr Events (24) :  No overnight events.  Confused and not respond to questions on exam.  Downtrending ammonia levels.  PICC line to be placed on .  May need to start TPN on .  Dietitian on board.    Assessment & Plan:     Hepatic encephalopathy  Alcoholic cirrhosis status post TIPS  - Ammonia 265 on .  Currently, 121 on .  - Start lactulose and rifaximin titrate for 2-3 loose bowel movements per day  - NG tube placed on .  Discussed below.  - Started on CIWA protocol given significant alcohol history  - May require TPN on .  PICC line ordered on .    Small bowel obstruction  Hx of small bowel obstruction  -  (Peripheral Line)  10 mEq IntraVENous PRN    rifAXIMin (XIFAXAN) tablet 400 mg  400 mg Oral TID    sodium chloride flush 0.9 % injection 5-40 mL  5-40 mL IntraVENous 2 times per day    sodium chloride flush 0.9 % injection 5-40 mL  5-40 mL IntraVENous PRN    0.9 % sodium chloride infusion   IntraVENous PRN    magnesium sulfate 2000 mg in 50 mL IVPB premix  2,000 mg IntraVENous PRN    ondansetron (ZOFRAN-ODT) disintegrating tablet 4 mg  4 mg Oral Q8H PRN    Or    ondansetron (ZOFRAN) injection 4 mg  4 mg IntraVENous Q6H PRN    melatonin tablet 3 mg  3 mg Oral Nightly PRN    bisacodyl (DULCOLAX) suppository 10 mg  10 mg Rectal Daily PRN    famotidine (PEPCID) tablet 10 mg  10 mg Oral Daily PRN    aluminum & magnesium hydroxide-simethicone (MAALOX) 200-200-20 MG/5ML suspension 30 mL  30 mL Oral Q6H PRN    acetaminophen (TYLENOL) tablet 650 mg  650 mg Oral Q6H PRN    Or    acetaminophen (TYLENOL) suppository 650 mg  650 mg Rectal Q6H PRN       Signed:  Vipul Rebolledo MD    Part of this note may have been written by using a voice dictation software.  The note has been proof read but may still contain some grammatical/other typographical errors.

## 2025-01-01 ENCOUNTER — APPOINTMENT (OUTPATIENT)
Dept: GENERAL RADIOLOGY | Age: 60
End: 2025-01-01
Payer: MEDICARE

## 2025-01-01 LAB
ALBUMIN SERPL-MCNC: 2.8 G/DL (ref 3.5–5)
ALBUMIN/GLOB SERPL: 0.9 (ref 1–1.9)
ALP SERPL-CCNC: 70 U/L (ref 40–129)
ALT SERPL-CCNC: 45 U/L (ref 8–55)
AMMONIA PLAS-SCNC: 88 UMOL/L (ref 16–60)
ANION GAP SERPL CALC-SCNC: 13 MMOL/L (ref 7–16)
AST SERPL-CCNC: 53 U/L (ref 15–37)
BASOPHILS # BLD: 0 K/UL (ref 0–0.2)
BASOPHILS NFR BLD: 0 % (ref 0–2)
BILIRUB SERPL-MCNC: 3.9 MG/DL (ref 0–1.2)
BUN SERPL-MCNC: 18 MG/DL (ref 6–23)
CALCIUM SERPL-MCNC: 8.5 MG/DL (ref 8.8–10.2)
CHLORIDE SERPL-SCNC: 116 MMOL/L (ref 98–107)
CO2 SERPL-SCNC: 17 MMOL/L (ref 20–29)
CREAT SERPL-MCNC: 1.01 MG/DL (ref 0.8–1.3)
DIFFERENTIAL METHOD BLD: ABNORMAL
EOSINOPHIL # BLD: 0.1 K/UL (ref 0–0.8)
EOSINOPHIL NFR BLD: 2 % (ref 0.5–7.8)
ERYTHROCYTE [DISTWIDTH] IN BLOOD BY AUTOMATED COUNT: 14 % (ref 11.9–14.6)
GLOBULIN SER CALC-MCNC: 3.1 G/DL (ref 2.3–3.5)
GLUCOSE SERPL-MCNC: 116 MG/DL (ref 70–99)
HCT VFR BLD AUTO: 37.9 % (ref 41.1–50.3)
HGB BLD-MCNC: 13.3 G/DL (ref 13.6–17.2)
IMM GRANULOCYTES # BLD AUTO: 0 K/UL (ref 0–0.5)
IMM GRANULOCYTES NFR BLD AUTO: 1 % (ref 0–5)
LYMPHOCYTES # BLD: 0.7 K/UL (ref 0.5–4.6)
LYMPHOCYTES NFR BLD: 12 % (ref 13–44)
MAGNESIUM SERPL-MCNC: 1.5 MG/DL (ref 1.8–2.4)
MCH RBC QN AUTO: 34.7 PG (ref 26.1–32.9)
MCHC RBC AUTO-ENTMCNC: 35.1 G/DL (ref 31.4–35)
MCV RBC AUTO: 99 FL (ref 82–102)
MONOCYTES # BLD: 0.6 K/UL (ref 0.1–1.3)
MONOCYTES NFR BLD: 10 % (ref 4–12)
NEUTS SEG # BLD: 4.6 K/UL (ref 1.7–8.2)
NEUTS SEG NFR BLD: 75 % (ref 43–78)
NRBC # BLD: 0 K/UL (ref 0–0.2)
PHOSPHATE SERPL-MCNC: 2.4 MG/DL (ref 2.5–4.5)
PLATELET # BLD AUTO: 79 K/UL (ref 150–450)
PMV BLD AUTO: 10.2 FL (ref 9.4–12.3)
POTASSIUM SERPL-SCNC: 3.8 MMOL/L (ref 3.5–5.1)
PROT SERPL-MCNC: 5.9 G/DL (ref 6.3–8.2)
RBC # BLD AUTO: 3.83 M/UL (ref 4.23–5.6)
SODIUM SERPL-SCNC: 146 MMOL/L (ref 136–145)
TRIGL SERPL-MCNC: 60 MG/DL (ref 0–150)
WBC # BLD AUTO: 6.2 K/UL (ref 4.3–11.1)

## 2025-01-01 PROCEDURE — 84478 ASSAY OF TRIGLYCERIDES: CPT

## 2025-01-01 PROCEDURE — 6360000002 HC RX W HCPCS

## 2025-01-01 PROCEDURE — 36415 COLL VENOUS BLD VENIPUNCTURE: CPT

## 2025-01-01 PROCEDURE — 83735 ASSAY OF MAGNESIUM: CPT

## 2025-01-01 PROCEDURE — 85025 COMPLETE CBC W/AUTO DIFF WBC: CPT

## 2025-01-01 PROCEDURE — 99232 SBSQ HOSP IP/OBS MODERATE 35: CPT | Performed by: SURGERY

## 2025-01-01 PROCEDURE — 2500000003 HC RX 250 WO HCPCS

## 2025-01-01 PROCEDURE — 82140 ASSAY OF AMMONIA: CPT

## 2025-01-01 PROCEDURE — 2500000003 HC RX 250 WO HCPCS: Performed by: FAMILY MEDICINE

## 2025-01-01 PROCEDURE — 80053 COMPREHEN METABOLIC PANEL: CPT

## 2025-01-01 PROCEDURE — 2580000003 HC RX 258

## 2025-01-01 PROCEDURE — 93005 ELECTROCARDIOGRAM TRACING: CPT

## 2025-01-01 PROCEDURE — 74018 RADEX ABDOMEN 1 VIEW: CPT

## 2025-01-01 PROCEDURE — 6370000000 HC RX 637 (ALT 250 FOR IP)

## 2025-01-01 PROCEDURE — 84100 ASSAY OF PHOSPHORUS: CPT

## 2025-01-01 PROCEDURE — 6370000000 HC RX 637 (ALT 250 FOR IP): Performed by: INTERNAL MEDICINE

## 2025-01-01 PROCEDURE — 1100000000 HC RM PRIVATE

## 2025-01-01 PROCEDURE — 2500000003 HC RX 250 WO HCPCS: Performed by: INTERNAL MEDICINE

## 2025-01-01 PROCEDURE — 6360000002 HC RX W HCPCS: Performed by: FAMILY MEDICINE

## 2025-01-01 RX ORDER — HALOPERIDOL 5 MG/ML
2 INJECTION INTRAMUSCULAR EVERY 6 HOURS PRN
Status: DISCONTINUED | OUTPATIENT
Start: 2025-01-01 | End: 2025-01-03 | Stop reason: HOSPADM

## 2025-01-01 RX ORDER — HALOPERIDOL 5 MG/ML
1 INJECTION INTRAMUSCULAR ONCE
Status: COMPLETED | OUTPATIENT
Start: 2025-01-01 | End: 2025-01-01

## 2025-01-01 RX ORDER — THIAMINE HYDROCHLORIDE 100 MG/ML
500 INJECTION, SOLUTION INTRAMUSCULAR; INTRAVENOUS DAILY
Status: COMPLETED | OUTPATIENT
Start: 2025-01-01 | End: 2025-01-03

## 2025-01-01 RX ORDER — MENTHOL/CAMPHOR/ALLANTOIN/PHE 0.6-0.5-1%
OINTMENT(EA) TOPICAL PRN
Status: DISCONTINUED | OUTPATIENT
Start: 2025-01-01 | End: 2025-01-03 | Stop reason: HOSPADM

## 2025-01-01 RX ORDER — HALOPERIDOL 5 MG/ML
1 INJECTION INTRAMUSCULAR NIGHTLY
Status: DISCONTINUED | OUTPATIENT
Start: 2025-01-01 | End: 2025-01-03 | Stop reason: HOSPADM

## 2025-01-01 RX ADMIN — HALOPERIDOL LACTATE 1 MG: 5 INJECTION, SOLUTION INTRAMUSCULAR at 04:44

## 2025-01-01 RX ADMIN — SODIUM CHLORIDE, PRESERVATIVE FREE 1 MG: 5 INJECTION INTRAVENOUS at 05:34

## 2025-01-01 RX ADMIN — LACTULOSE 20 G: 10 SOLUTION ORAL at 21:00

## 2025-01-01 RX ADMIN — LORAZEPAM 2 MG: 2 INJECTION INTRAMUSCULAR; INTRAVENOUS at 22:40

## 2025-01-01 RX ADMIN — LORAZEPAM 2 MG: 2 INJECTION INTRAMUSCULAR; INTRAVENOUS at 13:34

## 2025-01-01 RX ADMIN — THIAMINE HYDROCHLORIDE 500 MG: 100 INJECTION, SOLUTION INTRAMUSCULAR; INTRAVENOUS at 18:45

## 2025-01-01 RX ADMIN — SODIUM CHLORIDE, PRESERVATIVE FREE 10 ML: 5 INJECTION INTRAVENOUS at 09:40

## 2025-01-01 RX ADMIN — SODIUM CHLORIDE, PRESERVATIVE FREE 1 MG: 5 INJECTION INTRAVENOUS at 07:32

## 2025-01-01 RX ADMIN — RIFAXIMIN 400 MG: 200 TABLET ORAL at 13:15

## 2025-01-01 RX ADMIN — SODIUM CHLORIDE, PRESERVATIVE FREE 10 ML: 5 INJECTION INTRAVENOUS at 21:13

## 2025-01-01 RX ADMIN — SODIUM CHLORIDE, PRESERVATIVE FREE 1 MG: 5 INJECTION INTRAVENOUS at 10:32

## 2025-01-01 RX ADMIN — LACTULOSE 20 G: 10 SOLUTION ORAL at 09:30

## 2025-01-01 RX ADMIN — DEXTROSE, SOYBEAN OIL, ELECTROLYTES, LYSINE, PHENYLALANINE, LEUCINE, VALINE, THREONINE, METHIONINE, ISOLEUCINE, TRYPTOPHAN, ALANINE, ARGININE, GLYCINE, PROLINE, HISTIDINE, GLUTAMIC ACID, SERINE, ASPARTIC ACID AND TYROSINE
6.8; 3.5; 170; 174; 105; 68; 20; 187; 164; 164; 152; 116; 116; 116; 40; 333; 235; 164; 141; 141; 116; 94; 71; 4.8 INJECTION, EMULSION INTRAVENOUS at 18:39

## 2025-01-01 RX ADMIN — HALOPERIDOL LACTATE 1 MG: 5 INJECTION, SOLUTION INTRAMUSCULAR at 06:30

## 2025-01-01 RX ADMIN — LACTULOSE 20 G: 10 SOLUTION ORAL at 13:15

## 2025-01-01 RX ADMIN — HALOPERIDOL LACTATE 1 MG: 5 INJECTION, SOLUTION INTRAMUSCULAR at 21:08

## 2025-01-01 RX ADMIN — RIFAXIMIN 400 MG: 200 TABLET ORAL at 21:00

## 2025-01-01 RX ADMIN — POTASSIUM CHLORIDE, DEXTROSE MONOHYDRATE AND SODIUM CHLORIDE: 300; 5; 450 INJECTION, SOLUTION INTRAVENOUS at 10:14

## 2025-01-01 RX ADMIN — HALOPERIDOL LACTATE 2 MG: 5 INJECTION, SOLUTION INTRAMUSCULAR at 18:26

## 2025-01-01 RX ADMIN — POTASSIUM PHOSPHATE, MONOBASIC POTASSIUM PHOSPHATE, DIBASIC 15 MMOL: 224; 236 INJECTION, SOLUTION, CONCENTRATE INTRAVENOUS at 14:55

## 2025-01-01 NOTE — PROGRESS NOTES
Patient combative, hitting at staff, and attempting to pull off mitts and pull out NG tube. Dr. Moon notified and orders received to place bilateral wrist restraints.

## 2025-01-01 NOTE — PLAN OF CARE
Problem: Pain  Goal: Verbalizes/displays adequate comfort level or baseline comfort level  Outcome: Progressing     Problem: Safety - Medical Restraint  Goal: Remains free of injury from restraints (Restraint for Interference with Medical Device)  Outcome: Progressing  Flowsheets  Taken 1/1/2025 1545  Remains free of injury from restraints (restraint for interference with medical device): Every 2 hours: Monitor safety, psychosocial status, comfort, nutrition and hydration  Taken 1/1/2025 1345  Remains free of injury from restraints (restraint for interference with medical device): (brief checked) --  Taken 1/1/2025 1145  Remains free of injury from restraints (restraint for interference with medical device): Every 2 hours: Monitor safety, psychosocial status, comfort, nutrition and hydration     Continues to be confused, pulling at lines, trying to get OOB. Family in to visit and updated.  Pt has noted loose stool, and frequent flatus. Lactulose via NGT which is otherwise clamped.

## 2025-01-01 NOTE — PROGRESS NOTES
General Surgery Progress Note    1/1/2025    Admit Date: 12/24/2024    Subjective:       Agitated and confused today.  Now in wrist restraints, better s/p haldol.  Tolerating NG clamped. + flatus and Bms.    Objective:     BP (!) 120/90   Pulse 72   Temp 98.2 °F (36.8 °C) (Oral)   Resp 17   Ht 1.829 m (6' 0.01\")   Wt 70.3 kg (155 lb)   SpO2 99%   BMI 21.02 kg/m²       Intake/Output Summary (Last 24 hours) at 1/1/2025 1345  Last data filed at 1/1/2025 1326  Gross per 24 hour   Intake 4392.8 ml   Output --   Net 4392.8 ml        Physical Exam  Constitutional:       General: No acute distress.     Appearance: Normal appearance.   HENT:      Head: Normocephalic and atraumatic.   Eyes:      Pupils: Pupils are equal, round, and reactive to light.   Cardiovascular:      Rate and Rhythm: Normal rate and regular rhythm.      Pulses: Normal pulses.      Heart sounds: Normal heart sounds.   Pulmonary:      Effort: Pulmonary effort is normal.      Breath sounds: Normal breath sounds.   Abdominal:      Palpations: Abdomen is soft, nontender, nondistended   Musculoskeletal:         General: Normal range of motion.   Skin:     Findings: No rash.   Neurological:      Mental Status: Alert and oriented to person, place, and time.          Data Review   Recent Results (from the past 24 hour(s))   Comprehensive Metabolic Panel w/ Reflex to MG    Collection Time: 01/01/25  9:32 AM   Result Value Ref Range    Sodium 146 (H) 136 - 145 mmol/L    Potassium 3.8 3.5 - 5.1 mmol/L    Chloride 116 (H) 98 - 107 mmol/L    CO2 17 (L) 20 - 29 mmol/L    Anion Gap 13 7 - 16 mmol/L    Glucose 116 (H) 70 - 99 mg/dL    BUN 18 6 - 23 MG/DL    Creatinine 1.01 0.80 - 1.30 MG/DL    Est, Glom Filt Rate 86 >60 ml/min/1.73m2    Calcium 8.5 (L) 8.8 - 10.2 MG/DL    Total Bilirubin 3.9 (H) 0.0 - 1.2 MG/DL    ALT 45 8 - 55 U/L    AST 53 (H) 15 - 37 U/L    Alk Phosphatase 70 40 - 129 U/L    Total Protein 5.9 (L) 6.3 - 8.2 g/dL    Albumin 2.8 (L) 3.5 - 5.0

## 2025-01-01 NOTE — PROGRESS NOTES
Patient yelling, pulling at restraints. Dr. Moon notified and orders received for Haldol 1 mg IM once.

## 2025-01-01 NOTE — PROGRESS NOTES
Hospitalist Progress Note   Admit Date:  2024  2:05 PM   Name:  Coty Ocampo   Age:  59 y.o.  Sex:  male  :  1965   MRN:  [id number]   Room:  [facility]/    Presenting/Chief Complaint: Abdominal Pain     Reason(s) for Admission: Small bowel obstruction (HCC) [K56.609]  SBO (small bowel obstruction) (HCC) [K56.609]  Generalized abdominal pain [R10.84]  History of esophageal varices [Z87.19]     Hospital Course:   Please refer to the admission H&P for details of presentation.      In summary, Coty Ocampo is a 59 y.o. male with medical history significant for cirrhosis, s/p TIPS, hx of abdominal surgeries, and prior SBO who presented with n/v, abdominal pain that started prior to arrival.  Admitted for hepatic encephalopathy in setting of alcoholic liver cirrhosis status post TIPS procedure. CT A/P with cirrhosis, patent TIPS, evidence of prior SBP with anastomosis and dilated small bowel loops suggestive of SBO.  General surgery consulted.  Small bowel follow-through on  consistent with partial small bowel obstruction.  NG tube placed.  Advancing diet as tolerated.  Patient requiring lactulose and rifaximin for hepatic encephalopathy and significantly elevated ammonia levels.      Patient may require PT and OT when more stable.    Subjective/24 hr Events (25) :  Overnight patient was combative and aggressive.  Pulled out NG tube.  Patient given IM Haldol and placed in bilateral restraints.  Confused and not respond to questions on exam today.  Labs still pending for this morning.  PICC line unable to be placed on .  Will proceed with PPN if able.  Dietitian following.    Assessment & Plan:     Hepatic encephalopathy  Alcoholic cirrhosis status post TIPS  - Ammonia 265 on .  Currently, 121 on .  - Start lactulose and rifaximin titrate for 2-3 loose bowel movements per day.  - NG tube placed on . Pulled out on .   - Started on CIWA protocol given

## 2025-01-01 NOTE — CONSULTS
Nutrition Assessment  Assessment Type: Reassess, Consult  Reason for visit:  Parenteral Nutrition Management (Hospitalists) and NPO or Clear Liquid  Malnutrition Screening Tool Score: 0    Nutrition Intervention:   Food and/or Nutrient Delivery:   Parenteral Nutrition:  Peripheral parenteral nutrition (Osmolarity 725)   Peripheral line infusion  Initiate: Standard 3 in 1: Perikabiven (amino acids, electrolytes, dextrose and lipid emulsion) 80 ml/hr (1920 ml/24 hours)   Additives:   Adult multivitamin with vit K  No Multi-Trace Elements  Thiamine Moved to PN on 1/1  Folic Acid Moved to PN on 1/1  PN regimen provides per 24 hours: 1346 kcal/day (98% of needs), 45 grams of protein/day (68% of needs), 143 grams of CHO/day and 1920 ml of total volume/day.   Above regimen: Unable to meet calorie and protein goal secondary to peripheral infusion  Labs:   Basic Metabolic Panel, Hepatic Function Panel, Magnesium and Phosphorus active per nutrition parameters  Triglyceride tomorrow  POC Glucoses/SSI Not indicated  Nutrition Related Medication Management:  Electrolyte Replacement:   Continue prn protocol Magnesium and Potassium   Phosphorus replacement to be addressed individually secondary to national shortages.     15 mmol K phos 1/1 per discussion with Dr. Rebolledo.   Intravenous fluids:  Discontinue at 1800 with PN start  Meals and Snacks:  Diet:  Continue clears per provider.   Medical Food Supplements:   Medical food supplement therapy:  Continue Ensure Clear (clear liquid oral supplement) 240 calories, 8 grams protein per 8 ounce serving  Coordination of Nutrition Care:  Coordination with belen care provider  Discussed with Lili BILLINGS NP, Dr. Rebolledo and Zahraa RN      Malnutrition Assessment:  Malnutrition Status: Insufficient data  Nutrition Focused Physical Exam: Deferred due to pt with agitation at PICC line attempt, actively hiccupping in his sleep with NG at RD encounter.  Assessment completed remotely from D     TRIG 60 01/01/2025 09:32 AM     No results found for: \"LABA1C\"  Ammonia: 12/30: 235, 12/31: 121, 1/1: 88  Remarkable for: Mildly elevated Na, K trending down, mildly elevated glucose, depleted mg and phos, improving ammonia, AST and bili elevated    Current Nutrition Therapies:  ADULT DIET; Clear Liquid  ADULT ORAL NUTRITION SUPPLEMENT; Breakfast, Lunch, Dinner; Clear Liquid Oral Supplement    Current Intake:   Average Meal Intake: 0% Average Supplements Intake: 0%      Anthropometric Measures:  Height: 182.9 cm (6' 0.01\")  Current Body Wt: 70.3 kg (154 lb 15.7 oz) (12/31), Weight source: Bed scale  BMI: 21, Normal Weight (BMI 18.5-24.9)  Admission Body Weight: 77.1 kg (169 lb 15.6 oz) (stated)  Ideal Body Weight (Kg) (Calculated): 81 kg (178 lbs), 95.5 %  BMI Category Normal Weight (BMI 18.5-24.9)  Comparative Standards:  Energy (kcal/day): 8556-9358 (25-30 kcal/kg) (Kcal/kg Weight used: 55.1 kg (12/31 (bedscale wt at RD encounter)) Current  Protein (g/day): 66-83 (1.2-1.5 g/kg) (+lactulose) Weight Used: (Current) 55.1 kg  Fluid (ml/day):   (1 ml/kcal)    Nutrition Diagnosis:   Inadequate oral intake related to altered GI function as evidenced by NPO or clear liquid status due to medical condition    Nutrition Goal(s):   Previous Goal Met: Goal(s) Achieved  Active Goal: Tolerate nutrition support at goal rate, within 2 days  Type of Goal: New goal    Discharge Planning:    Too soon to determine    ROSIO FRIEND RD

## 2025-01-02 ENCOUNTER — APPOINTMENT (OUTPATIENT)
Dept: GENERAL RADIOLOGY | Age: 60
End: 2025-01-02
Payer: MEDICARE

## 2025-01-02 LAB
ALBUMIN SERPL-MCNC: 2.7 G/DL (ref 3.5–5)
ALBUMIN/GLOB SERPL: 0.8 (ref 1–1.9)
ALP SERPL-CCNC: 72 U/L (ref 40–129)
ALT SERPL-CCNC: 41 U/L (ref 8–55)
AMMONIA PLAS-SCNC: 58 UMOL/L (ref 16–60)
ANION GAP SERPL CALC-SCNC: 12 MMOL/L (ref 7–16)
AST SERPL-CCNC: 54 U/L (ref 15–37)
BASOPHILS # BLD: 0 K/UL (ref 0–0.2)
BASOPHILS NFR BLD: 0 % (ref 0–2)
BILIRUB DIRECT SERPL-MCNC: 0.9 MG/DL (ref 0–0.4)
BILIRUB SERPL-MCNC: 4.1 MG/DL (ref 0–1.2)
BUN SERPL-MCNC: 18 MG/DL (ref 6–23)
CALCIUM SERPL-MCNC: 8.6 MG/DL (ref 8.8–10.2)
CHLORIDE SERPL-SCNC: 112 MMOL/L (ref 98–107)
CO2 SERPL-SCNC: 18 MMOL/L (ref 20–29)
CREAT SERPL-MCNC: 0.95 MG/DL (ref 0.8–1.3)
DIFFERENTIAL METHOD BLD: ABNORMAL
EKG ATRIAL RATE: 86 BPM
EKG DIAGNOSIS: NORMAL
EKG P AXIS: 61 DEGREES
EKG P-R INTERVAL: 134 MS
EKG Q-T INTERVAL: 430 MS
EKG QRS DURATION: 88 MS
EKG QTC CALCULATION (BAZETT): 514 MS
EKG R AXIS: 38 DEGREES
EKG T AXIS: 64 DEGREES
EKG VENTRICULAR RATE: 86 BPM
EOSINOPHIL # BLD: 0.2 K/UL (ref 0–0.8)
EOSINOPHIL NFR BLD: 2 % (ref 0.5–7.8)
ERYTHROCYTE [DISTWIDTH] IN BLOOD BY AUTOMATED COUNT: 13.5 % (ref 11.9–14.6)
GLOBULIN SER CALC-MCNC: 3.6 G/DL (ref 2.3–3.5)
GLUCOSE SERPL-MCNC: 121 MG/DL (ref 70–99)
HCT VFR BLD AUTO: 38.7 % (ref 41.1–50.3)
HGB BLD-MCNC: 14.1 G/DL (ref 13.6–17.2)
IMM GRANULOCYTES # BLD AUTO: 0.1 K/UL (ref 0–0.5)
IMM GRANULOCYTES NFR BLD AUTO: 1 % (ref 0–5)
LYMPHOCYTES # BLD: 1.1 K/UL (ref 0.5–4.6)
LYMPHOCYTES NFR BLD: 12 % (ref 13–44)
MAGNESIUM SERPL-MCNC: 1.5 MG/DL (ref 1.8–2.4)
MCH RBC QN AUTO: 35 PG (ref 26.1–32.9)
MCHC RBC AUTO-ENTMCNC: 36.4 G/DL (ref 31.4–35)
MCV RBC AUTO: 96 FL (ref 82–102)
MONOCYTES # BLD: 0.7 K/UL (ref 0.1–1.3)
MONOCYTES NFR BLD: 8 % (ref 4–12)
NEUTS SEG # BLD: 7.1 K/UL (ref 1.7–8.2)
NEUTS SEG NFR BLD: 77 % (ref 43–78)
NRBC # BLD: 0 K/UL (ref 0–0.2)
PHOSPHATE SERPL-MCNC: 3 MG/DL (ref 2.5–4.5)
PLATELET # BLD AUTO: 85 K/UL (ref 150–450)
PMV BLD AUTO: 10.6 FL (ref 9.4–12.3)
POTASSIUM SERPL-SCNC: 3.8 MMOL/L (ref 3.5–5.1)
PROT SERPL-MCNC: 6.3 G/DL (ref 6.3–8.2)
RBC # BLD AUTO: 4.03 M/UL (ref 4.23–5.6)
SODIUM SERPL-SCNC: 142 MMOL/L (ref 136–145)
TRIGL SERPL-MCNC: 67 MG/DL (ref 0–150)
WBC # BLD AUTO: 9.2 K/UL (ref 4.3–11.1)

## 2025-01-02 PROCEDURE — 6360000002 HC RX W HCPCS

## 2025-01-02 PROCEDURE — 84478 ASSAY OF TRIGLYCERIDES: CPT

## 2025-01-02 PROCEDURE — 2500000003 HC RX 250 WO HCPCS

## 2025-01-02 PROCEDURE — 80053 COMPREHEN METABOLIC PANEL: CPT

## 2025-01-02 PROCEDURE — 84100 ASSAY OF PHOSPHORUS: CPT

## 2025-01-02 PROCEDURE — 6370000000 HC RX 637 (ALT 250 FOR IP): Performed by: INTERNAL MEDICINE

## 2025-01-02 PROCEDURE — 6360000002 HC RX W HCPCS: Performed by: INTERNAL MEDICINE

## 2025-01-02 PROCEDURE — 93010 ELECTROCARDIOGRAM REPORT: CPT | Performed by: INTERNAL MEDICINE

## 2025-01-02 PROCEDURE — 74018 RADEX ABDOMEN 1 VIEW: CPT

## 2025-01-02 PROCEDURE — 2580000003 HC RX 258

## 2025-01-02 PROCEDURE — 82248 BILIRUBIN DIRECT: CPT

## 2025-01-02 PROCEDURE — 6370000000 HC RX 637 (ALT 250 FOR IP)

## 2025-01-02 PROCEDURE — 71045 X-RAY EXAM CHEST 1 VIEW: CPT

## 2025-01-02 PROCEDURE — 83735 ASSAY OF MAGNESIUM: CPT

## 2025-01-02 PROCEDURE — 1100000000 HC RM PRIVATE

## 2025-01-02 PROCEDURE — 36415 COLL VENOUS BLD VENIPUNCTURE: CPT

## 2025-01-02 PROCEDURE — 82140 ASSAY OF AMMONIA: CPT

## 2025-01-02 PROCEDURE — 85025 COMPLETE CBC W/AUTO DIFF WBC: CPT

## 2025-01-02 RX ADMIN — LACTULOSE 20 G: 10 SOLUTION ORAL at 10:54

## 2025-01-02 RX ADMIN — MAGNESIUM SULFATE HEPTAHYDRATE 2000 MG: 40 INJECTION, SOLUTION INTRAVENOUS at 10:29

## 2025-01-02 RX ADMIN — RIFAXIMIN 550 MG: 550 TABLET ORAL at 14:36

## 2025-01-02 RX ADMIN — Medication 7 G: at 10:32

## 2025-01-02 RX ADMIN — HALOPERIDOL LACTATE 2 MG: 5 INJECTION, SOLUTION INTRAMUSCULAR at 06:08

## 2025-01-02 RX ADMIN — LACTULOSE 20 G: 10 SOLUTION ORAL at 20:32

## 2025-01-02 RX ADMIN — DEXTROSE, SOYBEAN OIL, ELECTROLYTES, LYSINE, PHENYLALANINE, LEUCINE, VALINE, THREONINE, METHIONINE, ISOLEUCINE, TRYPTOPHAN, ALANINE, ARGININE, GLYCINE, PROLINE, HISTIDINE, GLUTAMIC ACID, SERINE, ASPARTIC ACID AND TYROSINE
6.8; 3.5; 170; 174; 105; 68; 20; 187; 164; 164; 152; 116; 116; 116; 40; 333; 235; 164; 141; 141; 116; 94; 71; 4.8 INJECTION, EMULSION INTRAVENOUS at 18:18

## 2025-01-02 RX ADMIN — LACTULOSE 20 G: 10 SOLUTION ORAL at 14:36

## 2025-01-02 RX ADMIN — THIAMINE HYDROCHLORIDE 500 MG: 100 INJECTION, SOLUTION INTRAMUSCULAR; INTRAVENOUS at 09:01

## 2025-01-02 RX ADMIN — LORAZEPAM 2 MG: 2 INJECTION INTRAMUSCULAR; INTRAVENOUS at 16:07

## 2025-01-02 RX ADMIN — SODIUM CHLORIDE, PRESERVATIVE FREE 1 MG: 5 INJECTION INTRAVENOUS at 01:16

## 2025-01-02 RX ADMIN — LORAZEPAM 2 MG: 2 INJECTION INTRAMUSCULAR; INTRAVENOUS at 09:12

## 2025-01-02 RX ADMIN — RIFAXIMIN 400 MG: 200 TABLET ORAL at 10:54

## 2025-01-02 RX ADMIN — SODIUM CHLORIDE, PRESERVATIVE FREE 10 ML: 5 INJECTION INTRAVENOUS at 09:13

## 2025-01-02 RX ADMIN — HALOPERIDOL LACTATE 2 MG: 5 INJECTION, SOLUTION INTRAMUSCULAR at 18:10

## 2025-01-02 NOTE — PLAN OF CARE
Problem: Discharge Planning  Goal: Discharge to home or other facility with appropriate resources  Outcome: Progressing     Problem: Pain  Goal: Verbalizes/displays adequate comfort level or baseline comfort level  1/1/2025 2355 by Raf Aldana RN  Outcome: Progressing  1/1/2025 1625 by Zahraa Grigsby RN  Outcome: Progressing     Problem: Safety - Adult  Goal: Free from fall injury  1/1/2025 2355 by Raf Aldana RN  Outcome: Progressing  1/1/2025 1625 by Zahraa Grigsby RN  Outcome: Progressing     Problem: Safety - Medical Restraint  Goal: Remains free of injury from restraints (Restraint for Interference with Medical Device)  Description: INTERVENTIONS:  1. Determine that other, less restrictive measures have been tried or would not be effective before applying the restraint  2. Evaluate the patient's condition at the time of restraint application  3. Inform patient/family regarding the reason for restraint  4. Q2H: Monitor safety, psychosocial status, comfort, nutrition and hydration  1/1/2025 2355 by Raf Aldana RN  Outcome: Progressing  1/1/2025 1625 by Zahraa Grigsby RN  Outcome: Progressing  Flowsheets  Taken 1/1/2025 1545  Remains free of injury from restraints (restraint for interference with medical device): Every 2 hours: Monitor safety, psychosocial status, comfort, nutrition and hydration  Taken 1/1/2025 1345  Remains free of injury from restraints (restraint for interference with medical device): (brief checked) --  Taken 1/1/2025 1145  Remains free of injury from restraints (restraint for interference with medical device): Every 2 hours: Monitor safety, psychosocial status, comfort, nutrition and hydration

## 2025-01-02 NOTE — PROGRESS NOTES
Admit Date: 2024    POD * No surgery found *    Procedure:  * No surgery found *    Subjective:     Patient became combative overnight and required IM Haldol and restraints. She pulled out the NG tube. According to the nurses report, it looks like he had two bowel movements yesterday. History is difficult to obtain due to mental statues. He became slightly aggressive during our encounter and was requesting to leave. No new concerns. He reports that his pain is improving.     Objective:       Vitals:    25 1558 25 1914 25 0104 25 0753   BP: 119/70 (!) 144/76 (!) 143/77 131/71   Pulse: 63 74 84 66   Resp: 17 20 18 18   Temp: 98.4 °F (36.9 °C) 98.8 °F (37.1 °C) 99.1 °F (37.3 °C) 98.1 °F (36.7 °C)   TempSrc: Oral Axillary     SpO2: 96% 95% 96% 96%   Weight:       Height:           Temp (24hrs), Av.6 °F (37 °C), Min:98.1 °F (36.7 °C), Max:99.1 °F (37.3 °C)  .  I&O reviewed as documented.    [unfilled]     Physical Exam:   Constitutional: Alert, oriented, cooperative patient in no acute distress; appears stated age /71   Pulse 66   Temp 98.1 °F (36.7 °C)   Resp 18   Ht 1.829 m (6' 0.01\")   Wt 70.3 kg (155 lb)   SpO2 96%   BMI 21.02 kg/m²   Eyes:Sclera are clear. EOMs intact  ENMT: no external lesions' gross hearing normal; no obvious neck masses, no ear or lip lesions, nares normal  CV: RRR. Normal perfusion  Resp: No JVD.  Breathing is  non-labored; no audible wheezing.    GI: soft and non-distended. Non tender.    Musculoskeletal: unremarkable with normal function. No embolic signs or cyanosis.   Neuro:  Oriented; moves all 4; no focal deficits  Psychiatric: normal affect and mood, no memory impairment     Labs:   Recent Results (from the past 24 hour(s))   Comprehensive Metabolic Panel w/ Reflex to MG    Collection Time: 25  9:32 AM   Result Value Ref Range    Sodium 146 (H) 136 - 145 mmol/L    Potassium 3.8 3.5 - 5.1 mmol/L    Chloride 116 (H) 98 - 107

## 2025-01-02 NOTE — CARE COORDINATION
Per rounds, patient continues to be confused and combative.  Required IM haldol and he was placed in restraints.  NG tube in place.    Patient would benefit from FAVOR eval and PT/OT evals once he is more medically stable to help determine DC disposition.

## 2025-01-02 NOTE — PROGRESS NOTES
Nutrition Assessment  Assessment Type: Reassess  Reason for visit:  Parenteral Nutrition Management (Hospitalists) and NPO or Clear Liquid  Malnutrition Screening Tool Score: 1    Nutrition Intervention:   Food and/or Nutrient Delivery:   Parenteral Nutrition:  Peripheral parenteral nutrition (Osmolarity 725)   Peripheral line infusion  Continue: Standard 3 in 1: Perikabiven (amino acids, electrolytes, dextrose and lipid emulsion) 80 ml/hr (1920 ml/24 hours)   Additives:   Adult multivitamin with vit K  No Multi-Trace Elements  Thiamine  removed on 1/2 as high dose thiamine started    Folic Acid Moved to PN on 1/1  PN regimen provides per 24 hours: 1346 kcal/day (98% of needs), 45 grams of protein/day (68% of needs), 143 grams of CHO/day and 1920 ml of total volume/day.   Above regimen: Unable to meet calorie and protein goal secondary to peripheral infusion  Labs:   Basic Metabolic Panel, Hepatic Function Panel, Magnesium and Phosphorus active per nutrition parameters  Triglyceride weekly on Thursday  POC Glucoses/SSI Not indicated  Nutrition Related Medication Management:  Electrolyte Replacement:   Continue prn protocol Magnesium and Potassium   Phosphorus replacement to be addressed individually secondary to national shortages.     Intravenous fluids:  Not applicable  Meals and Snacks:  Diet:  Continue clears per provider.   Medical Food Supplements:   Medical food supplement therapy:  Continue Ensure Clear (clear liquid oral supplement) 240 calories, 8 grams protein per 8 ounce serving  Coordination of Nutrition Care:  Coordination with belen care provider  Discussed with VINICIO Barker     Malnutrition Assessment:  Malnutrition Status: Insufficient data  Nutrition Focused Physical Exam: Deferred due to pt with agitation at PICC line attempt, actively hiccupping in his sleep with NG at RD encounter.  Assessment completed remotely from [facility] 1/1  Nutrition Assessment:  Food/Nutrition Related History:   Unable to  obtain from pt, h&p indicates pt ate minimally on 12/23 and was unable to tolerate anything on 12/24 at presentation.          Weight History:   Wt hx per EMR review:   [facility] [place] [facility] transplant clinic: 171# 11/24, 166# 2/23.    Nutrition Background:       PMH remarkable for graves disease, cirrhosis, s/p TIPS, hx of abdominal surgeries, prior SBO.  Presented with abdominal pain, NV.      Admitted with SBO.    Nutrition Monitoring/Evaluation:  NPO.CLD day 7, +SBFT 12/30  Findings are consistent with partial small bowel obstruction at or near to the surgical anastomotic site in the distal jejunum.  KUB 11/1 for NG placement: Decreased dilatation of small bowel loops consistent with improvement in partial small bowel obstruction, Contrast present in the colon on prior KUB has partially cleared.  Vascular access unable to place PICC 12/31.    NG placed 12/30 for lactulose administration, self d/c 12/31 overnight, replaced 1/1.    PPN (perikabiven initiated 1/1)    One family member presented discussed plan to continue PN until patient mentation improved for better po intake as SBO improving. Discussed with RN plan to hold PPN for 2 hours to give 2g Mag for replacement d/t limited line access.     Abdominal Status (last documented by nursing):   Last BM (including prior to admit): 01/01/25, GI Symptoms: Cramping, Flatus BM with lactulose (2x 1/1)  Pertinent Medications: lactulose via NG, haldol, (thiamin and folic acid have not been administered d/t NG)  Continuous: none  IVF: D5 and 0.45% NaCL with 40 meq KCl @ 75 ml/hr (since 12/26 late pm)  Electrolyte Replacement:  1/1: 15 mmol Kphos, 1/2: 2g Mag  Pertinent administered PRN: pepcid (12/28), zofran (12/29)  Pertinent Labs:   Lab Results   Component Value Date/Time     01/02/2025 06:32 AM    K 3.8 01/02/2025 06:32 AM     01/02/2025 06:32 AM    CO2 18 01/02/2025 06:32 AM    BUN 18 01/02/2025 06:32 AM    CREATININE 0.95 01/02/2025 06:32 AM    GLUCOSE 121 01/02/2025

## 2025-01-02 NOTE — PLAN OF CARE
Problem: Discharge Planning  Goal: Discharge to home or other facility with appropriate resources  1/2/2025 1110 by Elizabeth Barker RN  Outcome: Progressing  1/1/2025 2355 by Raf Aldana RN  Outcome: Progressing     Problem: Pain  Goal: Verbalizes/displays adequate comfort level or baseline comfort level  1/2/2025 1110 by Elizabeth Barker RN  Outcome: Progressing  1/1/2025 2355 by Raf Aldana RN  Outcome: Progressing     Problem: Safety - Adult  Goal: Free from fall injury  1/2/2025 1110 by Elizabeth Barker RN  Outcome: Progressing  1/1/2025 2355 by Raf Aldana RN  Outcome: Progressing     Problem: Safety - Medical Restraint  Goal: Remains free of injury from restraints (Restraint for Interference with Medical Device)  Description: INTERVENTIONS:  1. Determine that other, less restrictive measures have been tried or would not be effective before applying the restraint  2. Evaluate the patient's condition at the time of restraint application  3. Inform patient/family regarding the reason for restraint  4. Q2H: Monitor safety, psychosocial status, comfort, nutrition and hydration  1/2/2025 1110 by Elizabeth Barker RN  Outcome: Progressing  1/1/2025 2355 by Raf Aldana RN  Outcome: Progressing

## 2025-01-02 NOTE — PROGRESS NOTES
significant alcohol history    Small bowel obstruction, improving  Hx of small bowel obstruction  - CT abdomen/pelvis performed. Refer to HPI  - Small bowel follow-through with partial SBO  - On low-dose Dilaudid as needed for pain control.    - Case was discussed with GI previously (Dr. Barton).  NG tube placed on 12/30.  Would not leave in very long (history of varices).  - Advance diet as tolerated. Continue IV fluids.  - TPN started on 1/1.  Dietitian following.     Hx of Graves disease  - TSH within normal limits. Not on Synthroid    Anticipated Discharge Arrangements:   Home likely within the next 1-2 days    PT/OT evals ordered?  Not ordered; patient not expected to need rehab  Diet:  ADULT DIET; Clear Liquid  ADULT ORAL NUTRITION SUPPLEMENT; Breakfast, Lunch, Dinner; Clear Liquid Oral Supplement  PN-Adult 3 in 1 - Standard Electrolytes - Peripheral Line  VTE prophylaxis: SCD's   Code status: Full Code    Non-peripheral Lines and Tubes (if present):      NG/OG/NJ/NE Tube Nasogastric Left nostril (Active)        Telemetry (if present):  Cardiac/Telemetry Monitor On: No        Hospital Problems:  Principal Problem:    Small bowel obstruction (HCC)  Active Problems:    Alcoholic cirrhosis (HCC)    Postsurgical hypothyroidism    Thrombocytopenia (HCC)    Graves' disease    S/P TIPS (transjugular intrahepatic portosystemic shunt)    Hepatic encephalopathy (HCC)  Resolved Problems:    * No resolved hospital problems. *      Objective:   Patient Vitals for the past 24 hrs:   Temp Pulse Resp BP SpO2   01/02/25 0104 99.1 °F (37.3 °C) 84 18 (!) 143/77 96 %   01/01/25 1914 98.8 °F (37.1 °C) 74 20 (!) 144/76 95 %   01/01/25 1558 98.4 °F (36.9 °C) 63 17 119/70 96 %   01/01/25 1443 -- 68 -- -- --   01/01/25 1346 98.4 °F (36.9 °C) 68 16 137/76 99 %       Oxygen Therapy  SpO2: 96 %  Pulse via Oximetry: 60 beats per minute  O2 Device: None (Room air)    Estimated body mass index is 21.02 kg/m² as calculated from the following:    ml/min/1.73m2    Calcium 8.6 (L) 8.8 - 10.2 MG/DL    Total Bilirubin 4.1 (H) 0.0 - 1.2 MG/DL    ALT 41 8 - 55 U/L    AST 54 (H) 15 - 37 U/L    Alk Phosphatase 72 40 - 129 U/L    Total Protein 6.3 6.3 - 8.2 g/dL    Albumin 2.7 (L) 3.5 - 5.0 g/dL    Globulin 3.6 (H) 2.3 - 3.5 g/dL    Albumin/Globulin Ratio 0.8 (L) 1.0 - 1.9     CBC with Auto Differential    Collection Time: 01/02/25  6:32 AM   Result Value Ref Range    WBC 9.2 4.3 - 11.1 K/uL    RBC 4.03 (L) 4.23 - 5.6 M/uL    Hemoglobin 14.1 13.6 - 17.2 g/dL    Hematocrit 38.7 (L) 41.1 - 50.3 %    MCV 96.0 82.0 - 102.0 FL    MCH 35.0 (H) 26.1 - 32.9 PG    MCHC 36.4 (H) 31.4 - 35.0 g/dL    RDW 13.5 11.9 - 14.6 %    Platelets 85 (L) 150 - 450 K/uL    MPV 10.6 9.4 - 12.3 FL    nRBC 0.00 0.0 - 0.2 K/uL    Differential Type AUTOMATED      Neutrophils % 77 43 - 78 %    Lymphocytes % 12 (L) 13 - 44 %    Monocytes % 8 4.0 - 12.0 %    Eosinophils % 2 0.5 - 7.8 %    Basophils % 0 0.0 - 2.0 %    Immature Granulocytes % 1 0.0 - 5.0 %    Neutrophils Absolute 7.1 1.7 - 8.2 K/UL    Lymphocytes Absolute 1.1 0.5 - 4.6 K/UL    Monocytes Absolute 0.7 0.1 - 1.3 K/UL    Eosinophils Absolute 0.2 0.0 - 0.8 K/UL    Basophils Absolute 0.0 0.0 - 0.2 K/UL    Immature Granulocytes Absolute 0.1 0.0 - 0.5 K/UL   Magnesium    Collection Time: 01/02/25  6:32 AM   Result Value Ref Range    Magnesium 1.5 (L) 1.8 - 2.4 mg/dL   Phosphorus    Collection Time: 01/02/25  6:32 AM   Result Value Ref Range    Phosphorus 3.0 2.5 - 4.5 MG/DL   Ammonia    Collection Time: 01/02/25  6:32 AM   Result Value Ref Range    Ammonia 58 16 - 60 umol/L   Bilirubin, Direct    Collection Time: 01/02/25  6:32 AM   Result Value Ref Range    Bilirubin, Direct 0.9 (H) 0.0 - 0.4 MG/DL       No results for input(s): \"COVID19\" in the last 72 hours.    Current Meds:  Current Facility-Administered Medications   Medication Dose Route Frequency    PN-Adult 3 in 1 - Standard Electrolytes - Peripheral Line   IntraVENous Continuous TPN

## 2025-01-03 ENCOUNTER — APPOINTMENT (OUTPATIENT)
Dept: GENERAL RADIOLOGY | Age: 60
End: 2025-01-03
Payer: MEDICARE

## 2025-01-03 VITALS
HEIGHT: 72 IN | BODY MASS INDEX: 21.25 KG/M2 | WEIGHT: 156.9 LBS | SYSTOLIC BLOOD PRESSURE: 119 MMHG | RESPIRATION RATE: 16 BRPM | DIASTOLIC BLOOD PRESSURE: 77 MMHG | OXYGEN SATURATION: 97 % | HEART RATE: 61 BPM | TEMPERATURE: 97.9 F

## 2025-01-03 LAB
ANION GAP SERPL CALC-SCNC: 12 MMOL/L (ref 7–16)
BUN SERPL-MCNC: 18 MG/DL (ref 6–23)
CALCIUM SERPL-MCNC: 8.3 MG/DL (ref 8.8–10.2)
CHLORIDE SERPL-SCNC: 108 MMOL/L (ref 98–107)
CO2 SERPL-SCNC: 19 MMOL/L (ref 20–29)
CREAT SERPL-MCNC: 0.96 MG/DL (ref 0.8–1.3)
GLUCOSE SERPL-MCNC: 108 MG/DL (ref 70–99)
MAGNESIUM SERPL-MCNC: 1.9 MG/DL (ref 1.8–2.4)
PHOSPHATE SERPL-MCNC: 3.3 MG/DL (ref 2.5–4.5)
POTASSIUM SERPL-SCNC: 3.6 MMOL/L (ref 3.5–5.1)
SODIUM SERPL-SCNC: 139 MMOL/L (ref 136–145)

## 2025-01-03 PROCEDURE — 36415 COLL VENOUS BLD VENIPUNCTURE: CPT

## 2025-01-03 PROCEDURE — 6360000002 HC RX W HCPCS

## 2025-01-03 PROCEDURE — 97161 PT EVAL LOW COMPLEX 20 MIN: CPT

## 2025-01-03 PROCEDURE — 97535 SELF CARE MNGMENT TRAINING: CPT

## 2025-01-03 PROCEDURE — 80048 BASIC METABOLIC PNL TOTAL CA: CPT

## 2025-01-03 PROCEDURE — 97165 OT EVAL LOW COMPLEX 30 MIN: CPT

## 2025-01-03 PROCEDURE — 84100 ASSAY OF PHOSPHORUS: CPT

## 2025-01-03 PROCEDURE — 71045 X-RAY EXAM CHEST 1 VIEW: CPT

## 2025-01-03 PROCEDURE — 6370000000 HC RX 637 (ALT 250 FOR IP)

## 2025-01-03 PROCEDURE — 97530 THERAPEUTIC ACTIVITIES: CPT

## 2025-01-03 PROCEDURE — 83735 ASSAY OF MAGNESIUM: CPT

## 2025-01-03 RX ORDER — LACTULOSE 10 G/15ML
20 SOLUTION ORAL 3 TIMES DAILY
Qty: 946 ML | Refills: 0 | Status: SHIPPED | OUTPATIENT
Start: 2025-01-03 | End: 2025-01-14

## 2025-01-03 RX ORDER — FAMOTIDINE 10 MG
10 TABLET ORAL DAILY PRN
Qty: 60 TABLET | Refills: 3 | Status: SHIPPED | OUTPATIENT
Start: 2025-01-03

## 2025-01-03 RX ADMIN — THIAMINE HYDROCHLORIDE 500 MG: 100 INJECTION, SOLUTION INTRAMUSCULAR; INTRAVENOUS at 09:14

## 2025-01-03 RX ADMIN — LACTULOSE 20 G: 10 SOLUTION ORAL at 09:14

## 2025-01-03 RX ADMIN — RIFAXIMIN 550 MG: 550 TABLET ORAL at 09:14

## 2025-01-03 NOTE — PROGRESS NOTES
(perikabiven initiated 1/1)    Patient awake sitting up in bread drinking ensure clear. Patient with significant turnaround in 24 hours. Patient consuming full liquids okay. Will stop PPN after current bag finishes. NG removed 1/3- no meds given so far 1/3.     Abdominal Status (last documented by nursing):   Last BM (including prior to admit): 01/01/25, GI Symptoms: Diarrhea BM with lactulose (2x 1/1)  Pertinent Medications: lactulose via NG, haldol, (thiamin and folic acid have not been administered d/t NG)  Continuous: none  IVF: D5 and 0.45% NaCL with 40 meq KCl @ 75 ml/hr (since 12/26 late pm)  Electrolyte Replacement:  1/1: 15 mmol Kphos, 1/2: 2g Mag  Pertinent administered PRN: pepcid (12/28), zofran (12/29)  Pertinent Labs:   Lab Results   Component Value Date/Time     01/03/2025 06:52 AM    K 3.6 01/03/2025 06:52 AM     01/03/2025 06:52 AM    CO2 19 01/03/2025 06:52 AM    BUN 18 01/03/2025 06:52 AM    CREATININE 0.96 01/03/2025 06:52 AM    GLUCOSE 108 01/03/2025 06:52 AM    CALCIUM 8.3 01/03/2025 06:52 AM    PHOS 3.3 01/03/2025 06:52 AM    MG 1.9 01/03/2025 06:52 AM     Lab Results   Component Value Date/Time    POCGLU 127 12/30/2024 11:55 AM     Lab Results   Component Value Date/Time    TRIG 67 01/02/2025 06:32 AM    TRIG 60 01/01/2025 09:32 AM     No results found for: \"LABA1C\"  Ammonia: 12/30: 235, 12/31: 121, 1/1: 88  Remarkable for: NA WNL, K WNL, mildly elevated glucose, Mag normalized, Phos WNL, improving ammonia, AST and bili elevated, TG WNL    Current Nutrition Therapies:  ADULT ORAL NUTRITION SUPPLEMENT; Breakfast, Lunch, Dinner; Clear Liquid Oral Supplement  PN-Adult 3 in 1 - Standard Electrolytes - Peripheral Line  ADULT DIET; Full Liquid  Active Parenteral Nutrition Order:  Composition: 3-in-1 Peripheral (perikabiven)  Lipids: None  Duration: Continuous  Volume/Rate: 80 ml/hr (1920 ml/24 hours)  Provides/24 hours: 1346 kcal/day (98% of needs), 45 grams of protein/day (68% of

## 2025-01-03 NOTE — PROGRESS NOTES
12.3 FL    nRBC 0.00 0.0 - 0.2 K/uL    Differential Type AUTOMATED      Neutrophils % 75 43 - 78 %    Lymphocytes % 12 (L) 13 - 44 %    Monocytes % 10 4.0 - 12.0 %    Eosinophils % 2 0.5 - 7.8 %    Basophils % 0 0.0 - 2.0 %    Immature Granulocytes % 1 0.0 - 5.0 %    Neutrophils Absolute 4.6 1.7 - 8.2 K/UL    Lymphocytes Absolute 0.7 0.5 - 4.6 K/UL    Monocytes Absolute 0.6 0.1 - 1.3 K/UL    Eosinophils Absolute 0.1 0.0 - 0.8 K/UL    Basophils Absolute 0.0 0.0 - 0.2 K/UL    Immature Granulocytes Absolute 0.0 0.0 - 0.5 K/UL   Magnesium    Collection Time: 01/01/25  9:32 AM   Result Value Ref Range    Magnesium 1.5 (L) 1.8 - 2.4 mg/dL   Phosphorus    Collection Time: 01/01/25  9:32 AM   Result Value Ref Range    Phosphorus 2.4 (L) 2.5 - 4.5 MG/DL   Triglyceride    Collection Time: 01/01/25  9:32 AM   Result Value Ref Range    Triglycerides 60 0 - 150 MG/DL   Ammonia    Collection Time: 01/01/25  9:32 AM   Result Value Ref Range    Ammonia 88 (H) 16 - 60 umol/L   EKG 12 Lead    Collection Time: 01/01/25  6:02 PM   Result Value Ref Range    Ventricular Rate 86 BPM    Atrial Rate 86 BPM    P-R Interval 134 ms    QRS Duration 88 ms    Q-T Interval 430 ms    QTc Calculation (Bazett) 514 ms    P Axis 61 degrees    R Axis 38 degrees    T Axis 64 degrees    Diagnosis       !!! Poor data quality, interpretation may be adversely affected  Normal sinus rhythm  Prolonged QT  Abnormal ECG  No previous ECGs available  Confirmed by RUBY LEES (), NATHAN SMITH ([id number]) on 1/2/2025 6:46:00 AM     Comprehensive Metabolic Panel w/ Reflex to MG    Collection Time: 01/02/25  6:32 AM   Result Value Ref Range    Sodium 142 136 - 145 mmol/L    Potassium 3.8 3.5 - 5.1 mmol/L    Chloride 112 (H) 98 - 107 mmol/L    CO2 18 (L) 20 - 29 mmol/L    Anion Gap 12 7 - 16 mmol/L    Glucose 121 (H) 70 - 99 mg/dL    BUN 18 6 - 23 MG/DL    Creatinine 0.95 0.80 - 1.30 MG/DL    Est, Glom Filt Rate >90 >60 ml/min/1.73m2    Calcium 8.6 (L) 8.8 - 10.2 MG/DL     mg Oral Q1H PRN    Or    LORazepam (ATIVAN) 3 mg in sodium chloride (PF) 0.9 % 10 mL injection  3 mg IntraVENous Q1H PRN    Or    LORazepam (ATIVAN) tablet 4 mg  4 mg Oral Q1H PRN    Or    LORazepam (ATIVAN) 4 mg in sodium chloride (PF) 0.9 % 10 mL injection  4 mg IntraVENous Q1H PRN    polyethylene glycol (GLYCOLAX) packet 17 g  17 g Oral Daily PRN    lactulose (CHRONULAC) 10 GM/15ML solution 20 g  20 g Oral TID    potassium chloride (KLOR-CON M) extended release tablet 40 mEq  40 mEq Oral PRN    Or    potassium bicarb-citric acid (EFFER-K) effervescent tablet 40 mEq  40 mEq Oral PRN    Or    potassium chloride 10 mEq/100 mL IVPB (Peripheral Line)  10 mEq IntraVENous PRN    sodium chloride flush 0.9 % injection 5-40 mL  5-40 mL IntraVENous 2 times per day    sodium chloride flush 0.9 % injection 5-40 mL  5-40 mL IntraVENous PRN    0.9 % sodium chloride infusion   IntraVENous PRN    magnesium sulfate 2000 mg in 50 mL IVPB premix  2,000 mg IntraVENous PRN    ondansetron (ZOFRAN-ODT) disintegrating tablet 4 mg  4 mg Oral Q8H PRN    Or    ondansetron (ZOFRAN) injection 4 mg  4 mg IntraVENous Q6H PRN    melatonin tablet 3 mg  3 mg Oral Nightly PRN    bisacodyl (DULCOLAX) suppository 10 mg  10 mg Rectal Daily PRN    famotidine (PEPCID) tablet 10 mg  10 mg Oral Daily PRN    aluminum & magnesium hydroxide-simethicone (MAALOX) 200-200-20 MG/5ML suspension 30 mL  30 mL Oral Q6H PRN    acetaminophen (TYLENOL) tablet 650 mg  650 mg Oral Q6H PRN    Or    acetaminophen (TYLENOL) suppository 650 mg  650 mg Rectal Q6H PRN       Signed:  Vipul Rebolledo MD    Part of this note may have been written by using a voice dictation software.  The note has been proof read but may still contain some grammatical/other typographical errors.

## 2025-01-03 NOTE — PROGRESS NOTES
Admit Date: 2024    POD * No surgery found *    Procedure:  * No surgery found *    Subjective:     Patient is doing much better today.  He sitting upright and eating.  He is out of the restraints.  Hospitalist reports that his ammonia levels are normalizing.  His mental status is improving.  He is having 2-3 bowel movements daily.  His pain is improved.  He has no new concerns overnight.  He is otherwise doing well.    Objective:       Vitals:    25 1519 25 1934 25 0151 25 0735   BP: 130/76 (!) 149/88 134/77 119/77   Pulse: 69 73 57 61   Resp: 16 18 18 16   Temp: 97.5 °F (36.4 °C) 97.9 °F (36.6 °C) 97.5 °F (36.4 °C) 97.9 °F (36.6 °C)   TempSrc: Oral Oral Axillary Axillary   SpO2: 99% 99% 96% 97%   Weight:       Height:           Temp (24hrs), Av.7 °F (36.5 °C), Min:97.5 °F (36.4 °C), Max:97.9 °F (36.6 °C)  .  I&O reviewed as documented.    [unfilled]     Physical Exam:   Constitutional: Alert, oriented, cooperative patient in no acute distress; appears stated age /77   Pulse 61   Temp 97.9 °F (36.6 °C) (Axillary)   Resp 16   Ht 1.829 m (6' 0.01\")   Wt 71.2 kg (156 lb 14.4 oz)   SpO2 97%   BMI 21.27 kg/m²   Eyes:Sclera are clear. EOMs intact  ENMT: no external lesions' gross hearing normal; no obvious neck masses, no ear or lip lesions, nares normal  CV: RRR. Normal perfusion  Resp: No JVD.  Breathing is  non-labored; no audible wheezing.    GI: soft and non-distended.  No TTP.     Musculoskeletal: unremarkable with normal function. No embolic signs or cyanosis.   Neuro:  Oriented; moves all 4; no focal deficits  Psychiatric: normal affect and mood, no memory impairment     Labs:   Recent Results (from the past 24 hour(s))   Basic Metabolic Panel    Collection Time: 25  6:52 AM   Result Value Ref Range    Sodium 139 136 - 145 mmol/L    Potassium 3.6 3.5 - 5.1 mmol/L    Chloride 108 (H) 98 - 107 mmol/L    CO2 19 (L) 20 - 29 mmol/L    Anion Gap 12 7 - 16  mmol/L    Glucose 108 (H) 70 - 99 mg/dL    BUN 18 6 - 23 MG/DL    Creatinine 0.96 0.80 - 1.30 MG/DL    Est, Glom Filt Rate >90 >60 ml/min/1.73m2    Calcium 8.3 (L) 8.8 - 10.2 MG/DL   Magnesium    Collection Time: 01/03/25  6:52 AM   Result Value Ref Range    Magnesium 1.9 1.8 - 2.4 mg/dL   Phosphorus    Collection Time: 01/03/25  6:52 AM   Result Value Ref Range    Phosphorus 3.3 2.5 - 4.5 MG/DL       Data Reviewed    XR Results (most recent):  @[employer]T(UNB1369:1)@    Results (most recent):  @[employer](AKI3156:1)@   Assessment:     Principal Problem:    Small bowel obstruction (HCC)  Active Problems:    Alcoholic cirrhosis (HCC)    Postsurgical hypothyroidism    Thrombocytopenia (HCC)    Graves' disease    S/P TIPS (transjugular intrahepatic portosystemic shunt)    Hepatic encephalopathy (HCC)  Resolved Problems:    * No resolved hospital problems. *        Plan/Recommendations/Medical Decision Making:     -Pull NG tube.  -We will sign off for now, however if there is any changes please do not hesitate to reach out.  -[employer] with any questions or concerns in the interim.  -Refer to hospitalist for additional management.    CODY Garcia

## 2025-01-03 NOTE — PLAN OF CARE
Problem: Discharge Planning  Goal: Discharge to home or other facility with appropriate resources  1/3/2025 1240 by Ida Vasquez RN  Outcome: Progressing  1/3/2025 0053 by Ketty Estrada RN  Outcome: Progressing     Problem: Pain  Goal: Verbalizes/displays adequate comfort level or baseline comfort level  1/3/2025 1240 by Ida Vasquez RN  Outcome: Progressing  1/3/2025 0053 by Ketty Estrada RN  Outcome: Progressing     Problem: Safety - Adult  Goal: Free from fall injury  1/3/2025 1240 by Ida Vasquez RN  Outcome: Progressing  1/3/2025 0053 by Ketty Estrada RN  Outcome: Progressing     Problem: Safety - Medical Restraint  Goal: Remains free of injury from restraints (Restraint for Interference with Medical Device)  Description: INTERVENTIONS:  1. Determine that other, less restrictive measures have been tried or would not be effective before applying the restraint  2. Evaluate the patient's condition at the time of restraint application  3. Inform patient/family regarding the reason for restraint  4. Q2H: Monitor safety, psychosocial status, comfort, nutrition and hydration  1/3/2025 1240 by Ida Vasquez RN  Outcome: Progressing  1/3/2025 0053 by Ketty Estrada RN  Outcome: Progressing

## 2025-01-03 NOTE — DISCHARGE SUMMARY
Hospitalist Discharge Summary   Admit Date:  2024  2:05 PM   DC Note date: 1/3/2025  Name:  Coty Ocampo   Age:  59 y.o.  Sex:  male  :  1965   MRN:  [id number]   Room:  [facility]  PCP:  Roslyn Parada APRN - NP    Presenting Complaint: Abdominal Pain     Initial Admission Diagnosis: Small bowel obstruction (HCC) [K56.609]  SBO (small bowel obstruction) (HCC) [K56.609]  Generalized abdominal pain [R10.84]  History of esophageal varices [Z87.19]     Problem List for this Hospitalization (present on admission):    Principal Problem:    Small bowel obstruction (HCC)  Active Problems:    Alcoholic cirrhosis (HCC)    Postsurgical hypothyroidism    Thrombocytopenia (HCC)    Graves' disease    S/P TIPS (transjugular intrahepatic portosystemic shunt)    Hepatic encephalopathy (HCC)  Resolved Problems:    * No resolved hospital problems. *      Hospital Course:    Coty Ocampo is a 59 y.o. male with medical history significant for cirrhosis, s/p TIPS, hx of abdominal surgeries, and prior SBO who presented with n/v, abdominal pain that started prior to arrival.  Admitted for hepatic encephalopathy in setting of alcoholic liver cirrhosis status post TIPS procedure. CT A/P with cirrhosis, patent TIPS, evidence of prior SBP with anastomosis and dilated small bowel loops suggestive of SBO.  General surgery consulted.  Small bowel follow-through on  consistent with partial small bowel obstruction.  NG tube placed.  Removed on 1/3.  Advancing diet as tolerated.  Patient is tolerating diet well.  Patient requiring lactulose and rifaximin for hepatic encephalopathy and significantly elevated ammonia levels.  Fortunately, this has improved significantly.  Patient tolerating oral diet accordingly.  Plan will be to discharge patient today with close follow-up with gastroenterology, transplant center at [facility], and primary care provider.  I would continue with lactulose and rifaximin to titrate  equally round.    HENT:  Nares appear normal, no drainage.  Moist mucous membranes  Neck:  No restricted ROM.  Trachea midline  CV:   RRR.  No m/r/g.  No JVD  Lungs:   CTAB.  No wheezing, rhonchi, or rales.  Respirations even, unlabored  Abdomen:   Soft, nontender, nondistended.    Extremities: Warm and dry.   No edema.    Skin:     No rashes.  Normal coloration  Neuro:  CN II-XII grossly intact.  Psych:  Normal mood and affect.      Signed:  Vipul Rebolledo MD    Part of this note may have been written by using a voice dictation software.  The note has been proof read but may still contain some grammatical/other typographical errors.

## 2025-01-03 NOTE — PLAN OF CARE
Problem: Discharge Planning  Goal: Discharge to home or other facility with appropriate resources  1/3/2025 0053 by Ketty Estrada RN  Outcome: Progressing     Problem: Pain  Goal: Verbalizes/displays adequate comfort level or baseline comfort level  1/3/2025 0053 by Ketty Estrada RN  Outcome: Progressing     Problem: Safety - Adult  Goal: Free from fall injury  1/3/2025 0053 by Ketty Estrada RN  Outcome: Progressing     Problem: Safety - Medical Restraint  Goal: Remains free of injury from restraints (Restraint for Interference with Medical Device)  Description: INTERVENTIONS:  1. Determine that other, less restrictive measures have been tried or would not be effective before applying the restraint  2. Evaluate the patient's condition at the time of restraint application  3. Inform patient/family regarding the reason for restraint  4. Q2H: Monitor safety, psychosocial status, comfort, nutrition and hydration  1/3/2025 0053 by Ketty Estrada RN  Outcome: Progressing

## 2025-01-03 NOTE — PROGRESS NOTES
DISCHARGE SUMMARY from Nurse      The discharge information has been reviewed with the patient and parent.  The patient and parent verbalized understanding.    Discharge medications reviewed with the patient and appropriate educational materials and side effects teaching were provided.    Patient declined any home health services per CM.  Patient being driven home by father in private vehicle.

## 2025-01-03 NOTE — PROGRESS NOTES
ACUTE OCCUPATIONAL THERAPY GOALS:   (Developed with and agreed upon by patient and/or caregiver.)  1. Patient will perform grooming with modified independence.  2. Patient will perform Upper body dressing with supervision/set-up  3. Patient will perform lower body dressing with supervision/set-up  4. Patient will perform upper and lower body bathing with minimal assistance/contact guard assist.  5. Patient will perform functional transfers with supervision/set-up.  7. Patient will participate in 30 + minutes of ADL/ therapeutic exercise/therapeutic activity with min rest breaks to increase activity tolerance for self care.  8. Patient will perform ADL functional mobility in room with contact guard assist x 1.     Goals to be achieved in 7 days.     OCCUPATIONAL THERAPY Initial Assessment, Daily Note, and AM       OT Visit Days: 1  Acknowledge Orders  Time  OT Charge Capture  Rehab Caseload Tracker      Coty Ocampo is a 59 y.o. male   PRIMARY DIAGNOSIS: Small bowel obstruction (HCC)  Small bowel obstruction (HCC) [K56.609]  SBO (small bowel obstruction) (HCC) [K56.609]  Generalized abdominal pain [R10.84]  History of esophageal varices [Z87.19]       Reason for Referral: Other lack of cordination (R27.8)  Difficulty in walking, Not elsewhere classified (R26.2)  Inpatient: Payor: [facility] MEDICARE / Plan: LTAC, [facility] within [facility] MEDICARE ADVANTAGE / Product Type: *No Product type* /     ASSESSMENT:     REHAB RECOMMENDATIONS:   Recommendation to date pending progress:  Setting:  Continued occupational therapy recommended at discharge.   Equipment:    To Be Determined     ASSESSMENT:  Mr. Ocampo is a 59 year old male admitted for small bowel obstruction and hepatic encephalopathy. Pt's medical history includes cirrhosis, ETOH abuse, abdominal surgeries. Pt at baseline lives with father in single story house with 2-3 JHONY, independent in ADLs and mobility with no AD. Pt does appear to have altered mentation which may impact his

## 2025-01-03 NOTE — CARE COORDINATION
Patient with discharge orders for today.  RNCM offered [facility] referral to patient for substance abuse and he declined.  Therapy also recommended \"continued therapy at discharge\".  Offered to set up home health for patient and he declined this service as well.  Patient is eager to discharge from the hospital. No additional needs made known to CM. Patient has met all treatment goals and milestones for discharge. Family to provide transportation home. CM following until patient is discharged.        01/03/25 1147   Services At/After Discharge   Transition of Care Consult (CM Consult) N/A   Services At/After Discharge None   [place] Resource Information Provided? No   Mode of Transport at Discharge Other (see comment)  (Family)   Confirm Follow Up Transport Family   Condition of Participation: Discharge Planning   The Plan for Transition of Care is related to the following treatment goals: Patient to DC home and return to baseline level of function.   The Patient and/or Patient Representative was provided with a Choice of Provider? Patient   The Patient and/Or Patient Representative agree with the Discharge Plan? Yes   Freedom of Choice list was provided with basic dialogue that supports the patient's individualized plan of care/goals, treatment preferences, and shares the quality data associated with the providers?  Yes

## 2025-01-03 NOTE — PROGRESS NOTES
ACUTE PHYSICAL THERAPY GOALS:   (Developed with and agreed upon by patient and/or caregiver.)  (1.) Coty Ocampo  will move from supine to sit and sit to supine  with INDEPENDENCE with bed flat and no rail within 4-7 treatment day(s).    (2.) oCty Ocampo will transfer from bed to chair and chair to bed with SUPERVISION using the least restrictive device within 4-7 treatment day(s).    (3.) Coty Ocampo will ambulate with CONTACT GUARD ASSIST for 650 feet with the least restrictive device within 4-7 treatment day(s).   (4.) Coty Ocampo will perform standing static and dynamic balance activities x 10 minutes with MINIMAL ASSIST to improve safety within 4-7 treatment day(s).  (5.) Coty Ocampo will ascend and descend 3 stairs using both hand rail(s) with CONTACT GUARD ASSIST to improve functional mobility and safety within 4-7 treatment day(s).  (6.) Coty Ocampo will perform therapeutic exercises x 10 min for HEP with MINIMAL ASSIST to improve strength, endurance, and functional mobility within 4-7 treatment day(s).      PHYSICAL THERAPY Initial Assessment and AM  (Link to Caseload Tracking: PT Visit Days : 1  Acknowledge Orders  Time In/Out  PT Charge Capture  Rehab Caseload Tracker    Coty Ocampo is a 59 y.o. male   PRIMARY DIAGNOSIS: Small bowel obstruction (HCC)  Small bowel obstruction (HCC) [K56.609]  SBO (small bowel obstruction) (HCC) [K56.609]  Generalized abdominal pain [R10.84]  History of esophageal varices [Z87.19]       Reason for Referral: Generalized Muscle Weakness (M62.81)  Difficulty in walking, Not elsewhere classified (R26.2)  Inpatient: Payor: [facility] MEDICARE / Plan: [facility] MEDICARE ADVANTAGE / Product Type: *No Product type* /     ASSESSMENT:     REHAB RECOMMENDATIONS:   Recommendation to date pending progress:  Setting:  Continued physical therapy recommended at discharge.    Equipment:    To Be Determined     ASSESSMENT:    functional Activity tolerance, Balance, Coordination, Mobility, and Strength.    TREATMENT GRID:  N/A    AFTER TREATMENT PRECAUTIONS: Alarm Activated, Bed/Chair Locked, Call light within reach, Chair, Needs within reach, RN notified, and Visitors at bedside    INTERDISCIPLINARY COLLABORATION:  RN/ PCT and OT/ BOWENS    EDUCATION: Education Given To: Patient  Education Provided: Role of Therapy;Plan of Care  Education Outcome: Continued education needed    TIME IN/OUT:  Time In: 0915  Time Out: 0950  Minutes: 35    RUPINDER VENTURA, PT

## 2025-01-07 ENCOUNTER — APPOINTMENT (OUTPATIENT)
Dept: GENERAL RADIOLOGY | Age: 60
DRG: 442 | End: 2025-01-07
Payer: MEDICARE

## 2025-01-07 ENCOUNTER — HOSPITAL ENCOUNTER (INPATIENT)
Age: 60
LOS: 4 days | Discharge: HOME HEALTH | DRG: 442 | End: 2025-01-13
Attending: EMERGENCY MEDICINE | Admitting: STUDENT IN AN ORGANIZED HEALTH CARE EDUCATION/TRAINING PROGRAM
Payer: MEDICARE

## 2025-01-07 ENCOUNTER — APPOINTMENT (OUTPATIENT)
Dept: CT IMAGING | Age: 60
DRG: 442 | End: 2025-01-07
Payer: MEDICARE

## 2025-01-07 DIAGNOSIS — E72.20 HYPERAMMONEMIA (HCC): ICD-10-CM

## 2025-01-07 DIAGNOSIS — K76.82 HEPATIC ENCEPHALOPATHY (HCC): Primary | ICD-10-CM

## 2025-01-07 PROBLEM — K74.60 DECOMPENSATION OF CIRRHOSIS OF LIVER (HCC): Status: ACTIVE | Noted: 2025-01-07

## 2025-01-07 PROBLEM — K72.90 DECOMPENSATION OF CIRRHOSIS OF LIVER (HCC): Status: ACTIVE | Noted: 2025-01-07

## 2025-01-07 LAB
ALBUMIN SERPL-MCNC: 3.3 G/DL (ref 3.5–5)
ALBUMIN/GLOB SERPL: 1 (ref 1–1.9)
ALP SERPL-CCNC: 80 U/L (ref 40–129)
ALT SERPL-CCNC: 42 U/L (ref 8–55)
AMMONIA PLAS-SCNC: 98 UMOL/L (ref 16–60)
ANION GAP SERPL CALC-SCNC: 11 MMOL/L (ref 7–16)
AST SERPL-CCNC: 44 U/L (ref 15–37)
BASOPHILS # BLD: 0.03 K/UL (ref 0–0.2)
BASOPHILS NFR BLD: 0.5 % (ref 0–2)
BILIRUB SERPL-MCNC: 3.7 MG/DL (ref 0–1.2)
BUN SERPL-MCNC: 27 MG/DL (ref 6–23)
CALCIUM SERPL-MCNC: 9.7 MG/DL (ref 8.8–10.2)
CHLORIDE SERPL-SCNC: 110 MMOL/L (ref 98–107)
CO2 SERPL-SCNC: 26 MMOL/L (ref 20–29)
CREAT SERPL-MCNC: 1.2 MG/DL (ref 0.8–1.3)
DIFFERENTIAL METHOD BLD: ABNORMAL
EKG ATRIAL RATE: 71 BPM
EKG DIAGNOSIS: NORMAL
EKG P AXIS: 55 DEGREES
EKG P-R INTERVAL: 146 MS
EKG Q-T INTERVAL: 432 MS
EKG QRS DURATION: 104 MS
EKG QTC CALCULATION (BAZETT): 480 MS
EKG R AXIS: 59 DEGREES
EKG T AXIS: 54 DEGREES
EKG VENTRICULAR RATE: 74 BPM
EOSINOPHIL # BLD: 0.03 K/UL (ref 0–0.8)
EOSINOPHIL NFR BLD: 0.5 % (ref 0.5–7.8)
ERYTHROCYTE [DISTWIDTH] IN BLOOD BY AUTOMATED COUNT: 13.9 % (ref 11.9–14.6)
ETHANOL SERPL-MCNC: <11 MG/DL (ref 0–0.08)
GLOBULIN SER CALC-MCNC: 3.3 G/DL (ref 2.3–3.5)
GLUCOSE SERPL-MCNC: 127 MG/DL (ref 70–99)
HCT VFR BLD AUTO: 40.7 % (ref 41.1–50.3)
HGB BLD-MCNC: 14.7 G/DL (ref 13.6–17.2)
IMM GRANULOCYTES # BLD AUTO: 0.04 K/UL (ref 0–0.5)
IMM GRANULOCYTES NFR BLD AUTO: 0.6 % (ref 0–5)
LACTATE SERPL-SCNC: 2.1 MMOL/L (ref 0.5–2)
LACTATE SERPL-SCNC: 2.8 MMOL/L (ref 0.5–2)
LIPASE SERPL-CCNC: 23 U/L (ref 13–60)
LYMPHOCYTES # BLD: 0.93 K/UL (ref 0.5–4.6)
LYMPHOCYTES NFR BLD: 14.1 % (ref 13–44)
MAGNESIUM SERPL-MCNC: 1.8 MG/DL (ref 1.8–2.4)
MCH RBC QN AUTO: 35.7 PG (ref 26.1–32.9)
MCHC RBC AUTO-ENTMCNC: 36.1 G/DL (ref 31.4–35)
MCV RBC AUTO: 98.8 FL (ref 82–102)
MONOCYTES # BLD: 0.79 K/UL (ref 0.1–1.3)
MONOCYTES NFR BLD: 12 % (ref 4–12)
NEUTS SEG # BLD: 4.78 K/UL (ref 1.7–8.2)
NEUTS SEG NFR BLD: 72.3 % (ref 43–78)
NRBC # BLD: 0 K/UL (ref 0–0.2)
PLATELET # BLD AUTO: 108 K/UL (ref 150–450)
PMV BLD AUTO: 10.4 FL (ref 9.4–12.3)
POTASSIUM SERPL-SCNC: 4.3 MMOL/L (ref 3.5–5.1)
PROCALCITONIN SERPL-MCNC: 0.09 NG/ML (ref 0–0.1)
PROT SERPL-MCNC: 6.7 G/DL (ref 6.3–8.2)
RBC # BLD AUTO: 4.12 M/UL (ref 4.23–5.6)
SODIUM SERPL-SCNC: 147 MMOL/L (ref 136–145)
TROPONIN T SERPL HS-MCNC: 13 NG/L (ref 0–22)
TSH W FREE THYROID IF ABNORMAL: 1.01 UIU/ML (ref 0.27–4.2)
WBC # BLD AUTO: 6.6 K/UL (ref 4.3–11.1)

## 2025-01-07 PROCEDURE — 2580000003 HC RX 258: Performed by: INTERNAL MEDICINE

## 2025-01-07 PROCEDURE — 96361 HYDRATE IV INFUSION ADD-ON: CPT

## 2025-01-07 PROCEDURE — 96374 THER/PROPH/DIAG INJ IV PUSH: CPT

## 2025-01-07 PROCEDURE — 82140 ASSAY OF AMMONIA: CPT

## 2025-01-07 PROCEDURE — 6360000002 HC RX W HCPCS: Performed by: INTERNAL MEDICINE

## 2025-01-07 PROCEDURE — 83735 ASSAY OF MAGNESIUM: CPT

## 2025-01-07 PROCEDURE — 84484 ASSAY OF TROPONIN QUANT: CPT

## 2025-01-07 PROCEDURE — 74018 RADEX ABDOMEN 1 VIEW: CPT

## 2025-01-07 PROCEDURE — 6360000002 HC RX W HCPCS: Performed by: STUDENT IN AN ORGANIZED HEALTH CARE EDUCATION/TRAINING PROGRAM

## 2025-01-07 PROCEDURE — 36415 COLL VENOUS BLD VENIPUNCTURE: CPT

## 2025-01-07 PROCEDURE — 70450 CT HEAD/BRAIN W/O DYE: CPT

## 2025-01-07 PROCEDURE — 80053 COMPREHEN METABOLIC PANEL: CPT

## 2025-01-07 PROCEDURE — 2580000003 HC RX 258: Performed by: STUDENT IN AN ORGANIZED HEALTH CARE EDUCATION/TRAINING PROGRAM

## 2025-01-07 PROCEDURE — 82077 ASSAY SPEC XCP UR&BREATH IA: CPT

## 2025-01-07 PROCEDURE — 71045 X-RAY EXAM CHEST 1 VIEW: CPT

## 2025-01-07 PROCEDURE — 96372 THER/PROPH/DIAG INJ SC/IM: CPT

## 2025-01-07 PROCEDURE — 93005 ELECTROCARDIOGRAM TRACING: CPT | Performed by: STUDENT IN AN ORGANIZED HEALTH CARE EDUCATION/TRAINING PROGRAM

## 2025-01-07 PROCEDURE — 96375 TX/PRO/DX INJ NEW DRUG ADDON: CPT

## 2025-01-07 PROCEDURE — 85025 COMPLETE CBC W/AUTO DIFF WBC: CPT

## 2025-01-07 PROCEDURE — 99285 EMERGENCY DEPT VISIT HI MDM: CPT

## 2025-01-07 PROCEDURE — G0378 HOSPITAL OBSERVATION PER HR: HCPCS

## 2025-01-07 PROCEDURE — 93010 ELECTROCARDIOGRAM REPORT: CPT | Performed by: INTERNAL MEDICINE

## 2025-01-07 PROCEDURE — 84443 ASSAY THYROID STIM HORMONE: CPT

## 2025-01-07 PROCEDURE — 83690 ASSAY OF LIPASE: CPT

## 2025-01-07 PROCEDURE — 83605 ASSAY OF LACTIC ACID: CPT

## 2025-01-07 PROCEDURE — 84145 PROCALCITONIN (PCT): CPT

## 2025-01-07 PROCEDURE — 94760 N-INVAS EAR/PLS OXIMETRY 1: CPT

## 2025-01-07 RX ORDER — SODIUM CHLORIDE 9 MG/ML
INJECTION, SOLUTION INTRAVENOUS CONTINUOUS
Status: DISCONTINUED | OUTPATIENT
Start: 2025-01-08 | End: 2025-01-08

## 2025-01-07 RX ORDER — LACTULOSE 10 G/15ML
20 SOLUTION ORAL 3 TIMES DAILY
Status: DISCONTINUED | OUTPATIENT
Start: 2025-01-07 | End: 2025-01-10

## 2025-01-07 RX ORDER — ACETAMINOPHEN 650 MG/1
650 SUPPOSITORY RECTAL EVERY 8 HOURS PRN
Status: DISCONTINUED | OUTPATIENT
Start: 2025-01-07 | End: 2025-01-13 | Stop reason: HOSPADM

## 2025-01-07 RX ORDER — MAGNESIUM SULFATE IN WATER 40 MG/ML
2000 INJECTION, SOLUTION INTRAVENOUS PRN
Status: DISCONTINUED | OUTPATIENT
Start: 2025-01-07 | End: 2025-01-13 | Stop reason: HOSPADM

## 2025-01-07 RX ORDER — 0.9 % SODIUM CHLORIDE 0.9 %
1000 INTRAVENOUS SOLUTION INTRAVENOUS
Status: COMPLETED | OUTPATIENT
Start: 2025-01-07 | End: 2025-01-07

## 2025-01-07 RX ORDER — POTASSIUM CHLORIDE 7.45 MG/ML
10 INJECTION INTRAVENOUS PRN
Status: DISCONTINUED | OUTPATIENT
Start: 2025-01-07 | End: 2025-01-10

## 2025-01-07 RX ORDER — POTASSIUM CHLORIDE 1500 MG/1
40 TABLET, EXTENDED RELEASE ORAL PRN
Status: DISCONTINUED | OUTPATIENT
Start: 2025-01-07 | End: 2025-01-10

## 2025-01-07 RX ORDER — SODIUM CHLORIDE 9 MG/ML
INJECTION, SOLUTION INTRAVENOUS PRN
Status: DISCONTINUED | OUTPATIENT
Start: 2025-01-07 | End: 2025-01-13 | Stop reason: HOSPADM

## 2025-01-07 RX ORDER — ACETAMINOPHEN 325 MG/1
650 TABLET ORAL EVERY 8 HOURS PRN
Status: DISCONTINUED | OUTPATIENT
Start: 2025-01-07 | End: 2025-01-13 | Stop reason: HOSPADM

## 2025-01-07 RX ORDER — HALOPERIDOL 5 MG/ML
1 INJECTION INTRAMUSCULAR EVERY 6 HOURS PRN
Status: DISCONTINUED | OUTPATIENT
Start: 2025-01-07 | End: 2025-01-07

## 2025-01-07 RX ORDER — HALOPERIDOL 5 MG/ML
1 INJECTION INTRAMUSCULAR ONCE
Status: DISCONTINUED | OUTPATIENT
Start: 2025-01-07 | End: 2025-01-07

## 2025-01-07 RX ORDER — HALOPERIDOL 5 MG/ML
1 INJECTION INTRAMUSCULAR EVERY 6 HOURS PRN
Status: DISCONTINUED | OUTPATIENT
Start: 2025-01-07 | End: 2025-01-13 | Stop reason: HOSPADM

## 2025-01-07 RX ORDER — ONDANSETRON 4 MG/1
4 TABLET, ORALLY DISINTEGRATING ORAL EVERY 8 HOURS PRN
Status: DISCONTINUED | OUTPATIENT
Start: 2025-01-07 | End: 2025-01-13 | Stop reason: HOSPADM

## 2025-01-07 RX ORDER — SODIUM CHLORIDE 0.9 % (FLUSH) 0.9 %
5-40 SYRINGE (ML) INJECTION PRN
Status: DISCONTINUED | OUTPATIENT
Start: 2025-01-07 | End: 2025-01-13 | Stop reason: HOSPADM

## 2025-01-07 RX ORDER — LORAZEPAM 2 MG/ML
1 INJECTION INTRAMUSCULAR ONCE
Status: DISCONTINUED | OUTPATIENT
Start: 2025-01-07 | End: 2025-01-07

## 2025-01-07 RX ORDER — ENOXAPARIN SODIUM 100 MG/ML
40 INJECTION SUBCUTANEOUS EVERY 24 HOURS
Status: DISCONTINUED | OUTPATIENT
Start: 2025-01-07 | End: 2025-01-13 | Stop reason: HOSPADM

## 2025-01-07 RX ORDER — ONDANSETRON 2 MG/ML
4 INJECTION INTRAMUSCULAR; INTRAVENOUS EVERY 6 HOURS PRN
Status: DISCONTINUED | OUTPATIENT
Start: 2025-01-07 | End: 2025-01-13 | Stop reason: HOSPADM

## 2025-01-07 RX ORDER — POLYETHYLENE GLYCOL 3350 17 G/17G
17 POWDER, FOR SOLUTION ORAL DAILY PRN
Status: DISCONTINUED | OUTPATIENT
Start: 2025-01-07 | End: 2025-01-13 | Stop reason: HOSPADM

## 2025-01-07 RX ORDER — SODIUM CHLORIDE 0.9 % (FLUSH) 0.9 %
5-40 SYRINGE (ML) INJECTION EVERY 12 HOURS SCHEDULED
Status: DISCONTINUED | OUTPATIENT
Start: 2025-01-07 | End: 2025-01-13 | Stop reason: HOSPADM

## 2025-01-07 RX ORDER — HYDRALAZINE HYDROCHLORIDE 20 MG/ML
10 INJECTION INTRAMUSCULAR; INTRAVENOUS EVERY 6 HOURS PRN
Status: DISCONTINUED | OUTPATIENT
Start: 2025-01-07 | End: 2025-01-13 | Stop reason: HOSPADM

## 2025-01-07 RX ADMIN — SODIUM CHLORIDE 1000 ML: 9 INJECTION, SOLUTION INTRAVENOUS at 14:48

## 2025-01-07 RX ADMIN — LORAZEPAM 1 MG: 2 INJECTION INTRAMUSCULAR; INTRAVENOUS at 14:45

## 2025-01-07 RX ADMIN — ENOXAPARIN SODIUM 40 MG: 100 INJECTION SUBCUTANEOUS at 18:10

## 2025-01-07 RX ADMIN — SODIUM CHLORIDE: 9 INJECTION, SOLUTION INTRAVENOUS at 23:58

## 2025-01-07 RX ADMIN — HALOPERIDOL LACTATE 1 MG: 5 INJECTION, SOLUTION INTRAMUSCULAR at 22:40

## 2025-01-07 NOTE — H&P
Hospitalist History and Physical   Admit Date:  2025  2:23 PM   Name:  Coty Ocampo   Age:  59 y.o.  Sex:  male  :  1965   MRN:  [id number]   [facility]    Presenting/Chief Complaint: Altered Mental Status     Reason(s) for Admission: Hepatic encephalopathy (HCC) [K76.82]  Hyperammonemia (HCC) [E72.20]  Decompensation of cirrhosis of liver (HCC) [K72.90, K74.60]     History of Present Illness:   Coty Ocampo is a 59 y.o. male with medical history of alcoholic cirrhosis and recent hospitalization due to SBO who presented with AMS.    The patient presented with AMS and generalized weakness in ER. He had a recent hospitalization 24 - 1/3/2025 for SBO and hepatic encephalopathy. Treated with NG suction. Discharged with lactulose and rifaximin.  In ER, he was alert but he was not able to follow any commands.  Vitals were relatively unremarkable.  CBC showed platelet 108.  CMP showed sodium 147.  Lactic acid 2.8.  Ammonia levels 98.  Patient was admitted for treatment regarding hepatic encephalopathy.      Assessment & Plan:       Decompensation of cirrhosis of liver  Hepatic encephalopathy  Alcoholic cirrhosis s/p TIPS  -Increase ammonia levels 98 on admission  -Alcohol levels within normal limits  -Continue lactulose and rifaximin.  Need to have bowel movement x 3/day      Chronic thrombocytopenia  -In the setting of cirrhosis  -Transfuse if less than 50,000 with active bleeding or if less than 10,000 with no bleeding      Graves' disease, s/p thyroidectomy  Postsurgical hypothyroidism  -Continue Synthroid              PT/OT evals ordered?  Therapy evals ordered  Diet: Diet NPO Exceptions are: Sips of Water with Meds, Ice Chips, Sips of Clear Liquids  VTE prophylaxis: Lovenox  Code status: Full Code  Code status discussed: No  Blood consent obtained: No      Non-peripheral Lines and Tubes (if present):             Hospital Problems:  Principal Problem:    Decompensation of

## 2025-01-07 NOTE — ED PROVIDER NOTES
Emergency Department Provider Note       PCP: Roslyn Parada, APRN - NP   Age: 59 y.o.   Sex: male     DISPOSITION Admitted 01/07/2025 03:49:51 PM    ICD-10-CM    1. Hepatic encephalopathy (HCC)  K76.82       2. Hyperammonemia (HCC)  E72.20           Medical Decision Making     Visibly confused 59-year-old male with history of alcohol induced liver cirrhosis, recent admission for small bowel obstruction returns to this department days after discharge with progressive confusion, lack of intake.  Broad-based lab workup has been initiated, will obtain CT imaging as a precaution given patient's worsening altered mentation, orders placed for x-ray of the chest and abdomen.    Will give dose of Ativan secondary to patient's agitation  ED Course as of 01/08/25 1510   Tue Jan 07, 2025   1510 EKG interpretation: Sinus rhythm, rate of 74, normal axis, no obvious ischemia [BR]   1524 Patient's bilirubin is elevated consistent with prior lab testing [BR]      ED Course User Index  [BR] Gustavo Palm R, DO     1 or more acute illnesses that pose a threat to life or bodily function.   1 or more chronic illnesses with a severe exacerbation or progression.  Parental controlled substances given in the ED.  Drug therapy given requiring intensive monitoring for toxicity.  Discussion with external consultants.  Chronic medical problems impacting care include Hepatic encephalopathy.  Shared medical decision making was utilized in creating the patients health plan today.  I independently ordered and reviewed each unique test.    I reviewed external records: ED visit note from a different ED.   I reviewed external records: provider visit note from PCP.  I reviewed external records: provider visit note from outside specialist.  I reviewed external records: previous lab results from outside ED.  I reviewed external records: previous imaging study including radiologist interpretation.   The patients assessment required an

## 2025-01-07 NOTE — ED TRIAGE NOTES
Patient was discharged from upstairs on Friday. Patient was admitted with a blockage in the intestines. Since he has been home he has gotten progressively worse.     Patient is not answering any questions. Normally patient is alert and oriented but yesterday he started acting different but then it has gotten worse since then.

## 2025-01-08 ENCOUNTER — APPOINTMENT (OUTPATIENT)
Dept: GENERAL RADIOLOGY | Age: 60
DRG: 442 | End: 2025-01-08
Payer: MEDICARE

## 2025-01-08 PROBLEM — E87.0 HYPERNATREMIA: Status: ACTIVE | Noted: 2025-01-08

## 2025-01-08 LAB
ALBUMIN SERPL-MCNC: 2.5 G/DL (ref 3.5–5)
ALBUMIN/GLOB SERPL: 0.8 (ref 1–1.9)
ALP SERPL-CCNC: 67 U/L (ref 40–129)
ALT SERPL-CCNC: 34 U/L (ref 8–55)
ANION GAP SERPL CALC-SCNC: 12 MMOL/L (ref 7–16)
AST SERPL-CCNC: 37 U/L (ref 15–37)
BILIRUB SERPL-MCNC: 3 MG/DL (ref 0–1.2)
BUN SERPL-MCNC: 26 MG/DL (ref 6–23)
CALCIUM SERPL-MCNC: 8.5 MG/DL (ref 8.8–10.2)
CHLORIDE SERPL-SCNC: 115 MMOL/L (ref 98–107)
CO2 SERPL-SCNC: 23 MMOL/L (ref 20–29)
CREAT SERPL-MCNC: 1.02 MG/DL (ref 0.8–1.3)
GLOBULIN SER CALC-MCNC: 3.1 G/DL (ref 2.3–3.5)
GLUCOSE SERPL-MCNC: 102 MG/DL (ref 70–99)
LACTATE SERPL-SCNC: 1.6 MMOL/L (ref 0.5–2)
POTASSIUM SERPL-SCNC: 3.8 MMOL/L (ref 3.5–5.1)
PROT SERPL-MCNC: 5.6 G/DL (ref 6.3–8.2)
SODIUM SERPL-SCNC: 150 MMOL/L (ref 136–145)

## 2025-01-08 PROCEDURE — 96376 TX/PRO/DX INJ SAME DRUG ADON: CPT

## 2025-01-08 PROCEDURE — 6360000002 HC RX W HCPCS: Performed by: STUDENT IN AN ORGANIZED HEALTH CARE EDUCATION/TRAINING PROGRAM

## 2025-01-08 PROCEDURE — 74018 RADEX ABDOMEN 1 VIEW: CPT

## 2025-01-08 PROCEDURE — 96365 THER/PROPH/DIAG IV INF INIT: CPT

## 2025-01-08 PROCEDURE — 2580000003 HC RX 258: Performed by: INTERNAL MEDICINE

## 2025-01-08 PROCEDURE — 80053 COMPREHEN METABOLIC PANEL: CPT

## 2025-01-08 PROCEDURE — 6360000002 HC RX W HCPCS: Performed by: INTERNAL MEDICINE

## 2025-01-08 PROCEDURE — 6370000000 HC RX 637 (ALT 250 FOR IP): Performed by: STUDENT IN AN ORGANIZED HEALTH CARE EDUCATION/TRAINING PROGRAM

## 2025-01-08 PROCEDURE — 2500000003 HC RX 250 WO HCPCS: Performed by: STUDENT IN AN ORGANIZED HEALTH CARE EDUCATION/TRAINING PROGRAM

## 2025-01-08 PROCEDURE — 83605 ASSAY OF LACTIC ACID: CPT

## 2025-01-08 PROCEDURE — 3E0G76Z INTRODUCTION OF NUTRITIONAL SUBSTANCE INTO UPPER GI, VIA NATURAL OR ARTIFICIAL OPENING: ICD-10-PCS | Performed by: STUDENT IN AN ORGANIZED HEALTH CARE EDUCATION/TRAINING PROGRAM

## 2025-01-08 PROCEDURE — G0378 HOSPITAL OBSERVATION PER HR: HCPCS

## 2025-01-08 PROCEDURE — 2580000003 HC RX 258: Performed by: STUDENT IN AN ORGANIZED HEALTH CARE EDUCATION/TRAINING PROGRAM

## 2025-01-08 PROCEDURE — 0DH67UZ INSERTION OF FEEDING DEVICE INTO STOMACH, VIA NATURAL OR ARTIFICIAL OPENING: ICD-10-PCS | Performed by: STUDENT IN AN ORGANIZED HEALTH CARE EDUCATION/TRAINING PROGRAM

## 2025-01-08 PROCEDURE — 96372 THER/PROPH/DIAG INJ SC/IM: CPT

## 2025-01-08 PROCEDURE — 96361 HYDRATE IV INFUSION ADD-ON: CPT

## 2025-01-08 PROCEDURE — 36415 COLL VENOUS BLD VENIPUNCTURE: CPT

## 2025-01-08 RX ORDER — SODIUM CHLORIDE, SODIUM LACTATE, POTASSIUM CHLORIDE, AND CALCIUM CHLORIDE .6; .31; .03; .02 G/100ML; G/100ML; G/100ML; G/100ML
1000 INJECTION, SOLUTION INTRAVENOUS ONCE
Status: COMPLETED | OUTPATIENT
Start: 2025-01-08 | End: 2025-01-09

## 2025-01-08 RX ADMIN — LACTULOSE 20 G: 10 SOLUTION ORAL at 20:16

## 2025-01-08 RX ADMIN — RIFAXIMIN 550 MG: 550 TABLET ORAL at 20:16

## 2025-01-08 RX ADMIN — SODIUM CHLORIDE, POTASSIUM CHLORIDE, SODIUM LACTATE AND CALCIUM CHLORIDE 1000 ML: 600; 310; 30; 20 INJECTION, SOLUTION INTRAVENOUS at 12:48

## 2025-01-08 RX ADMIN — LACTULOSE 20 G: 10 SOLUTION ORAL at 13:00

## 2025-01-08 RX ADMIN — ENOXAPARIN SODIUM 40 MG: 100 INJECTION SUBCUTANEOUS at 16:07

## 2025-01-08 RX ADMIN — SODIUM CHLORIDE, PRESERVATIVE FREE 1 MG: 5 INJECTION INTRAVENOUS at 21:41

## 2025-01-08 RX ADMIN — SODIUM CHLORIDE, PRESERVATIVE FREE 10 ML: 5 INJECTION INTRAVENOUS at 12:50

## 2025-01-08 RX ADMIN — HALOPERIDOL LACTATE 1 MG: 5 INJECTION, SOLUTION INTRAMUSCULAR at 16:06

## 2025-01-08 RX ADMIN — SODIUM CHLORIDE, PRESERVATIVE FREE 1 MG: 5 INJECTION INTRAVENOUS at 11:29

## 2025-01-08 RX ADMIN — RIFAXIMIN 550 MG: 550 TABLET ORAL at 13:00

## 2025-01-08 RX ADMIN — HALOPERIDOL LACTATE 1 MG: 5 INJECTION, SOLUTION INTRAMUSCULAR at 05:12

## 2025-01-08 RX ADMIN — WATER 5 MG: 1 INJECTION INTRAMUSCULAR; INTRAVENOUS; SUBCUTANEOUS at 10:01

## 2025-01-08 RX ADMIN — CEFTRIAXONE SODIUM 2000 MG: 2 INJECTION, POWDER, FOR SOLUTION INTRAMUSCULAR; INTRAVENOUS at 00:03

## 2025-01-08 ASSESSMENT — PAIN SCALES - GENERAL
PAINLEVEL_OUTOF10: 0
PAINLEVEL_OUTOF10: 0

## 2025-01-08 NOTE — PROGRESS NOTES
The pt's sitter informed this RN that the pt slid out of the bed onto the floor before she could stop the fall. The sitter was able to assist the pt back to bed.  This RN immediately assessed the pt after the fall.  No injuries were found and the pt had no complaints of new pain.  The attending was notified.  Fall huddle was performed.  A Safe Care was placed concerning the fall.  An order for non-violent SAMMY wrist restraints was placed and are applied.  A phone call was placed to the pt's next of kin (Harshal) and a message was left for a return call at 1020.  The pt will be moved closer to the RN's station.

## 2025-01-08 NOTE — PROGRESS NOTES
Occupational/Physical Therapy Note:    Attempted to see patient this AM for occupational/physical therapy evaluation session. Patient currently being placed in restraints, RN asked to hold on evaluation at this time. Will follow and re-attempt as schedule permits/patient available. Thank you,    Digna Hicks, OT    Rehab Caseload Tracker

## 2025-01-08 NOTE — PROGRESS NOTES
This RN spoke with Priya Ocampo (sister) and gave an update.  Priay is aware that the pt had a fall this am and is in SAMMY wrist restraints.

## 2025-01-08 NOTE — PROGRESS NOTES
Patient admitted for treatment regarding hepatic encephalopathy. When this RN attempted to give patient his scheduled lactulose and perform shift assessment, patient not alert or oriented and unable to use straw to swallow water. Due to issues with perfect serve R/T to this RN being floated from [facility], charge nurse Shakila reached out to Saadia LEES, on this Rns behalf. Situation explained in full with telephone call to before mentioned MD about patients ability to take oral meds. No new orders given in relation to giving patient oral meds. Charge nurse notified, this RN continuing to monitor.

## 2025-01-08 NOTE — PROGRESS NOTES
Hospitalist Progress Note   Admit Date:  2025  2:23 PM   Name:  Coty Ocampo   Age:  59 y.o.  Sex:  male  :  1965   MRN:  [id number]   Room:  [facility]/    Presenting/Chief Complaint: Altered Mental Status     Reason(s) for Admission: Hepatic encephalopathy (HCC) [K76.82]  Hyperammonemia (HCC) [E72.20]  Decompensation of cirrhosis of liver (HCC) [K72.90, K74.60]     Hospital Course:   Coty Ocampo is a 59 y.o. male with medical history of alcoholic cirrhosis and recent hospitalization due to SBO who presented with AMS.        Subjective & 24hr Events:   No acute overnight event.    Having more agitation this morning.  Not able to follow commands.  Unable to swallow pills and lactulose.      Assessment & Plan:       Decompensation of cirrhosis of liver  Hepatic encephalopathy  Alcoholic cirrhosis s/p TIPS  -Increased ammonia levels 98 on admission  -Alcohol levels within normal limits  -Continue lactulose and rifaximin.  Need to have bowel movement x 3/day  -Will place NG tube due to inability to follow any commands. He needs to take lactulose and rifaximin.  -Will order Zyprexa daily and Ativan as needed  -Restraint order      Hypernatremia  -In the setting of decreased p.o. intake  -Will order LR        Chronic thrombocytopenia  -In the setting of cirrhosis  -Transfuse if less than 50,000 with active bleeding or if less than 10,000 with no bleeding        Graves' disease, s/p thyroidectomy  Postsurgical hypothyroidism  -Continue Synthroid      Anticipated Discharge Arrangements:   Therapy has not evaluated yet    PT/OT evals ordered?  Therapy evals ordered  Diet:  Diet NPO Exceptions are: Sips of Water with Meds, Ice Chips, Sips of Clear Liquids  VTE prophylaxis: Lovenox  Code status: Full Code      Non-peripheral Lines and Tubes (if present):      NG/OG/NJ/NE Tube Nasogastric 16 fr Right nostril (Active)        Telemetry (if present):           Hospital Problems:  Principal

## 2025-01-08 NOTE — PLAN OF CARE
Problem: Discharge Planning  Goal: Discharge to home or other facility with appropriate resources  Outcome: Not Progressing     Problem: Safety - Adult  Goal: Free from fall injury  Outcome: Not Progressing     Problem: Safety - Medical Restraint  Goal: Remains free of injury from restraints (Restraint for Interference with Medical Device)  Description: INTERVENTIONS:  1. Determine that other, less restrictive measures have been tried or would not be effective before applying the restraint  2. Evaluate the patient's condition at the time of restraint application  3. Inform patient/family regarding the reason for restraint  4. Q2H: Monitor safety, psychosocial status, comfort, nutrition and hydration  Outcome: Not Progressing     Problem: Pain  Goal: Verbalizes/displays adequate comfort level or baseline comfort level  Outcome: Not Progressing     Problem: Skin/Tissue Integrity  Goal: Absence of new skin breakdown  Description: 1.  Monitor for areas of redness and/or skin breakdown  2.  Assess vascular access sites hourly  3.  Every 4-6 hours minimum:  Change oxygen saturation probe site  4.  Every 4-6 hours:  If on nasal continuous positive airway pressure, respiratory therapy assess nares and determine need for appliance change or resting period.  Outcome: Not Progressing     Problem: Discharge Planning  Goal: Discharge to home or other facility with appropriate resources  Outcome: Not Progressing     Problem: Safety - Adult  Goal: Free from fall injury  Outcome: Not Progressing     Problem: Safety - Medical Restraint  Goal: Remains free of injury from restraints (Restraint for Interference with Medical Device)  Description: INTERVENTIONS:  1. Determine that other, less restrictive measures have been tried or would not be effective before applying the restraint  2. Evaluate the patient's condition at the time of restraint application  3. Inform patient/family regarding the reason for restraint  4. Q2H: Monitor

## 2025-01-08 NOTE — ICUWATCH
RRT Clinical Rounding Nurse Assistance    Called to assist primary RN with IV start. 22 left hand    Alice Corley RN  [facility]: [phone]  [facility]: [phone]

## 2025-01-09 LAB
ALBUMIN SERPL-MCNC: 2.6 G/DL (ref 3.5–5)
ALBUMIN/GLOB SERPL: 0.7 (ref 1–1.9)
ALP SERPL-CCNC: 74 U/L (ref 40–129)
ALT SERPL-CCNC: 38 U/L (ref 8–55)
AMMONIA PLAS-SCNC: 57 UMOL/L (ref 16–60)
ANION GAP SERPL CALC-SCNC: 12 MMOL/L (ref 7–16)
AST SERPL-CCNC: 71 U/L (ref 15–37)
BASOPHILS # BLD: 0.01 K/UL (ref 0–0.2)
BASOPHILS NFR BLD: 0.1 % (ref 0–2)
BILIRUB SERPL-MCNC: 3.1 MG/DL (ref 0–1.2)
BUN SERPL-MCNC: 25 MG/DL (ref 6–23)
CALCIUM SERPL-MCNC: 8.5 MG/DL (ref 8.8–10.2)
CHLORIDE SERPL-SCNC: 119 MMOL/L (ref 98–107)
CO2 SERPL-SCNC: 20 MMOL/L (ref 20–29)
CREAT SERPL-MCNC: 0.97 MG/DL (ref 0.8–1.3)
DIFFERENTIAL METHOD BLD: ABNORMAL
EOSINOPHIL # BLD: 0.03 K/UL (ref 0–0.8)
EOSINOPHIL NFR BLD: 0.4 % (ref 0.5–7.8)
ERYTHROCYTE [DISTWIDTH] IN BLOOD BY AUTOMATED COUNT: 14.6 % (ref 11.9–14.6)
GLOBULIN SER CALC-MCNC: 3.6 G/DL (ref 2.3–3.5)
GLUCOSE SERPL-MCNC: 105 MG/DL (ref 70–99)
HCT VFR BLD AUTO: 37.9 % (ref 41.1–50.3)
HGB BLD-MCNC: 13.3 G/DL (ref 13.6–17.2)
IMM GRANULOCYTES # BLD AUTO: 0.06 K/UL (ref 0–0.5)
IMM GRANULOCYTES NFR BLD AUTO: 0.8 % (ref 0–5)
LYMPHOCYTES # BLD: 0.59 K/UL (ref 0.5–4.6)
LYMPHOCYTES NFR BLD: 7.8 % (ref 13–44)
MAGNESIUM SERPL-MCNC: 2 MG/DL (ref 1.8–2.4)
MCH RBC QN AUTO: 35.4 PG (ref 26.1–32.9)
MCHC RBC AUTO-ENTMCNC: 35.1 G/DL (ref 31.4–35)
MCV RBC AUTO: 100.8 FL (ref 82–102)
MONOCYTES # BLD: 0.64 K/UL (ref 0.1–1.3)
MONOCYTES NFR BLD: 8.4 % (ref 4–12)
NEUTS SEG # BLD: 6.26 K/UL (ref 1.7–8.2)
NEUTS SEG NFR BLD: 82.5 % (ref 43–78)
NRBC # BLD: 0 K/UL (ref 0–0.2)
PLATELET # BLD AUTO: 98 K/UL (ref 150–450)
PMV BLD AUTO: 10.9 FL (ref 9.4–12.3)
POTASSIUM SERPL-SCNC: 3.5 MMOL/L (ref 3.5–5.1)
PROT SERPL-MCNC: 6.1 G/DL (ref 6.3–8.2)
RBC # BLD AUTO: 3.76 M/UL (ref 4.23–5.6)
SODIUM SERPL-SCNC: 151 MMOL/L (ref 136–145)
WBC # BLD AUTO: 7.6 K/UL (ref 4.3–11.1)

## 2025-01-09 PROCEDURE — 83735 ASSAY OF MAGNESIUM: CPT

## 2025-01-09 PROCEDURE — 6370000000 HC RX 637 (ALT 250 FOR IP): Performed by: STUDENT IN AN ORGANIZED HEALTH CARE EDUCATION/TRAINING PROGRAM

## 2025-01-09 PROCEDURE — G0378 HOSPITAL OBSERVATION PER HR: HCPCS

## 2025-01-09 PROCEDURE — 96361 HYDRATE IV INFUSION ADD-ON: CPT

## 2025-01-09 PROCEDURE — 85025 COMPLETE CBC W/AUTO DIFF WBC: CPT

## 2025-01-09 PROCEDURE — 82140 ASSAY OF AMMONIA: CPT

## 2025-01-09 PROCEDURE — 2580000003 HC RX 258: Performed by: STUDENT IN AN ORGANIZED HEALTH CARE EDUCATION/TRAINING PROGRAM

## 2025-01-09 PROCEDURE — 2500000003 HC RX 250 WO HCPCS: Performed by: STUDENT IN AN ORGANIZED HEALTH CARE EDUCATION/TRAINING PROGRAM

## 2025-01-09 PROCEDURE — 6360000002 HC RX W HCPCS: Performed by: STUDENT IN AN ORGANIZED HEALTH CARE EDUCATION/TRAINING PROGRAM

## 2025-01-09 PROCEDURE — 96376 TX/PRO/DX INJ SAME DRUG ADON: CPT

## 2025-01-09 PROCEDURE — 96372 THER/PROPH/DIAG INJ SC/IM: CPT

## 2025-01-09 PROCEDURE — 80053 COMPREHEN METABOLIC PANEL: CPT

## 2025-01-09 PROCEDURE — 36415 COLL VENOUS BLD VENIPUNCTURE: CPT

## 2025-01-09 RX ADMIN — LACTULOSE 20 G: 10 SOLUTION ORAL at 14:58

## 2025-01-09 RX ADMIN — SODIUM CHLORIDE, PRESERVATIVE FREE 10 ML: 5 INJECTION INTRAVENOUS at 08:52

## 2025-01-09 RX ADMIN — SODIUM CHLORIDE, PRESERVATIVE FREE 1 MG: 5 INJECTION INTRAVENOUS at 04:06

## 2025-01-09 RX ADMIN — WATER 5 MG: 1 INJECTION INTRAMUSCULAR; INTRAVENOUS; SUBCUTANEOUS at 16:03

## 2025-01-09 RX ADMIN — SODIUM CHLORIDE, PRESERVATIVE FREE 10 ML: 5 INJECTION INTRAVENOUS at 22:11

## 2025-01-09 RX ADMIN — RIFAXIMIN 550 MG: 550 TABLET ORAL at 22:05

## 2025-01-09 RX ADMIN — SODIUM CHLORIDE, PRESERVATIVE FREE 1 MG: 5 INJECTION INTRAVENOUS at 11:44

## 2025-01-09 RX ADMIN — LACTULOSE 20 G: 10 SOLUTION ORAL at 08:44

## 2025-01-09 RX ADMIN — SODIUM CHLORIDE, PRESERVATIVE FREE 1 MG: 5 INJECTION INTRAVENOUS at 22:00

## 2025-01-09 RX ADMIN — LACTULOSE 20 G: 10 SOLUTION ORAL at 22:04

## 2025-01-09 RX ADMIN — ENOXAPARIN SODIUM 40 MG: 100 INJECTION SUBCUTANEOUS at 16:04

## 2025-01-09 RX ADMIN — RIFAXIMIN 550 MG: 550 TABLET ORAL at 08:44

## 2025-01-09 ASSESSMENT — PAIN SCALES - GENERAL: PAINLEVEL_OUTOF10: 0

## 2025-01-09 NOTE — PLAN OF CARE
Problem: Discharge Planning  Goal: Discharge to home or other facility with appropriate resources  1/9/2025 1127 by Jacquelyn Nina RN  Outcome: Progressing  1/9/2025 0415 by Grant Hanson RN  Outcome: Progressing     Problem: Safety - Adult  Goal: Free from fall injury  1/9/2025 1127 by Jacquelyn Nina RN  Outcome: Progressing  1/9/2025 0415 by Grant Hanson RN  Outcome: Progressing     Problem: Safety - Medical Restraint  Goal: Remains free of injury from restraints (Restraint for Interference with Medical Device)  Description: INTERVENTIONS:  1. Determine that other, less restrictive measures have been tried or would not be effective before applying the restraint  2. Evaluate the patient's condition at the time of restraint application  3. Inform patient/family regarding the reason for restraint  4. Q2H: Monitor safety, psychosocial status, comfort, nutrition and hydration  1/9/2025 1127 by Jacquelyn Nina RN  Outcome: Progressing  Flowsheets (Taken 1/9/2025 0609 by Grant Hanson RN)  Remains free of injury from restraints (restraint for interference with medical device): Every 2 hours: Monitor safety, psychosocial status, comfort, nutrition and hydration  1/9/2025 0415 by Grant Hanson RN  Outcome: Progressing  Flowsheets  Taken 1/9/2025 0409  Remains free of injury from restraints (restraint for interference with medical device): Every 2 hours: Monitor safety, psychosocial status, comfort, nutrition and hydration  Taken 1/9/2025 0209  Remains free of injury from restraints (restraint for interference with medical device): Every 2 hours: Monitor safety, psychosocial status, comfort, nutrition and hydration  Taken 1/9/2025 0009  Remains free of injury from restraints (restraint for interference with medical device): Every 2 hours: Monitor safety, psychosocial status, comfort, nutrition and hydration  Taken 1/8/2025 2209  Remains free of injury from restraints (restraint for interference with medical

## 2025-01-09 NOTE — PROGRESS NOTES
Occupational/Physical Therapy Note:    Attempted to see patient this AM for occupational therapy evaluation session. Patient currently in restraints and unable to participate with therapy. VINICIO Connolly asked to hold on evaluation at this time. Will follow and re-attempt as schedule permits/patient available. Thank you,    ABBIE ROBB, OT    Rehab Caseload Tracker

## 2025-01-09 NOTE — PROGRESS NOTES
Hospitalist Progress Note   Admit Date:  2025  2:23 PM   Name:  Coty Ocampo   Age:  59 y.o.  Sex:  male  :  1965   MRN:  [id number]   Room:  [facility]/    Presenting/Chief Complaint: Altered Mental Status     Reason(s) for Admission: Hepatic encephalopathy (HCC) [K76.82]  Hyperammonemia (HCC) [E72.20]  Decompensation of cirrhosis of liver (HCC) [K72.90, K74.60]     Hospital Course:   Ctoy Ocampo is a 59 y.o. male with medical history of alcoholic cirrhosis and recent hospitalization due to SBO who presented with AMS.        Subjective & 24hr Events:   No acute overnight event.    Still on restraint. AxO x0, but asked, \"how are you doing?\"      Assessment & Plan:       Decompensation of cirrhosis of liver  Hepatic encephalopathy  Alcoholic cirrhosis s/p TIPS  -Increased ammonia levels 98 on admission  -Alcohol levels within normal limits  -Continue lactulose and rifaximin.  Need to have bowel movement x 3/day  -Placed NG tube due to inability to follow any commands and agitation. He needs to take lactulose and rifaximin.  -Continue Zyprexa daily and Ativan as needed  -Continue restraint      Hypernatremia  -In the setting of decreased p.o. intake  -s/p LR 1L  -Gentle oral hydration therapy        Chronic thrombocytopenia  -In the setting of cirrhosis  -Transfuse if less than 50,000 with active bleeding or if less than 10,000 with no bleeding        Graves' disease, s/p thyroidectomy  Postsurgical hypothyroidism  -Continue Synthroid      Anticipated Discharge Arrangements:   Therapy has not evaluated yet    PT/OT evals ordered?  Therapy evals ordered  Diet:  Diet NPO Exceptions are: Sips of Water with Meds, Ice Chips, Sips of Clear Liquids  VTE prophylaxis: Lovenox  Code status: Full Code      Non-peripheral Lines and Tubes (if present):      NG/OG/NJ/NE Tube Nasogastric 16 fr Right nostril (Active)        Telemetry (if present):  Cardiac/Telemetry Monitor On: [facility]  (L) 4.23 - 5.6 M/uL    Hemoglobin 13.3 (L) 13.6 - 17.2 g/dL    Hematocrit 37.9 (L) 41.1 - 50.3 %    .8 82.0 - 102.0 FL    MCH 35.4 (H) 26.1 - 32.9 PG    MCHC 35.1 (H) 31.4 - 35.0 g/dL    RDW 14.6 11.9 - 14.6 %    Platelets 98 (L) 150 - 450 K/uL    MPV 10.9 9.4 - 12.3 FL    nRBC 0.00 0.0 - 0.2 K/uL    Differential Type AUTOMATED      Neutrophils % 82.5 (H) 43.0 - 78.0 %    Lymphocytes % 7.8 (L) 13.0 - 44.0 %    Monocytes % 8.4 4.0 - 12.0 %    Eosinophils % 0.4 (L) 0.5 - 7.8 %    Basophils % 0.1 0.0 - 2.0 %    Immature Granulocytes % 0.8 0.0 - 5.0 %    Neutrophils Absolute 6.26 1.70 - 8.20 K/UL    Lymphocytes Absolute 0.59 0.50 - 4.60 K/UL    Monocytes Absolute 0.64 0.10 - 1.30 K/UL    Eosinophils Absolute 0.03 0.00 - 0.80 K/UL    Basophils Absolute 0.01 0.00 - 0.20 K/UL    Immature Granulocytes Absolute 0.06 0.0 - 0.5 K/UL   Comprehensive Metabolic Panel w/ Reflex to MG    Collection Time: 01/09/25  6:22 AM   Result Value Ref Range    Sodium 151 (H) 136 - 145 mmol/L    Potassium 3.5 3.5 - 5.1 mmol/L    Chloride 119 (H) 98 - 107 mmol/L    CO2 20 20 - 29 mmol/L    Anion Gap 12 7 - 16 mmol/L    Glucose 105 (H) 70 - 99 mg/dL    BUN 25 (H) 6 - 23 MG/DL    Creatinine 0.97 0.80 - 1.30 MG/DL    Est, Glom Filt Rate 90 >60 ml/min/1.73m2    Calcium 8.5 (L) 8.8 - 10.2 MG/DL    Total Bilirubin 3.1 (H) 0.0 - 1.2 MG/DL    ALT 38 8 - 55 U/L    AST 71 (H) 15 - 37 U/L    Alk Phosphatase 74 40 - 129 U/L    Total Protein 6.1 (L) 6.3 - 8.2 g/dL    Albumin 2.6 (L) 3.5 - 5.0 g/dL    Globulin 3.6 (H) 2.3 - 3.5 g/dL    Albumin/Globulin Ratio 0.7 (L) 1.0 - 1.9     Magnesium    Collection Time: 01/09/25  6:22 AM   Result Value Ref Range    Magnesium 2.0 1.8 - 2.4 mg/dL   Ammonia    Collection Time: 01/09/25  8:42 AM   Result Value Ref Range    Ammonia 57 16 - 60 umol/L       No results for input(s): \"COVID19\" in the last 72 hours.    Current Meds:  Current Facility-Administered Medications   Medication Dose Route Frequency    lactulose

## 2025-01-09 NOTE — CARE COORDINATION
CASE MANAGEMENT ASSESSMENT NOTE    Patient is a 59 year old male with heaptic encephalopathy.  Is a recent hospital readmission.    Attempted assessment at bedside.  Patient presents to assessment confused, and is restraints.  RNCM contacted brother and sister to conduct assessment per telephone.  At baseline, he is independent with transfers. He lives at home with his father, who is 91 year old.  Patient has [facility] medicare, PCP is Dr. Roslyn Parada.    Patient's family raised concerns about patient's hepatic encephalopathy and questioned need for cognitive testing.  Will confer with attending provider.  They are planning on patient to live with brother instead of father following hospital discharge.  At this time, patient is very disoriented and is in restraints and has a NG tube.  PT/OT evals have been ordered but anticipate these to be held until patient is more stable medically.  Do anticipate need for HH vs SNF Rehab services following this hospitalization.    It is noted that at previous hospitalization, patient declined Home Health and [facility] referrals when offered.  When RNCM questioned patient's family about history of substance abuse, they denied.  Patient will likely need additional support services set up to prevent rehospitalization.    At this time, care if this patient is ongoing and discharge disposition pending.  PT/OT evals have been ordered and awaiting recommendations.  Case management will continue to follow.  Please notify if there are any changes.     Attending Physician: Rebeca Morales MD  Admit Problem: Hepatic encephalopathy (HCC) [K76.82]  Hyperammonemia (HCC) [E72.20]  Decompensation of cirrhosis of liver (HCC) [K72.90, K74.60]  Date/Time of Admission: 1/7/2025  2:23 PM  Problem List:  Patient Active Problem List   Diagnosis    Alcoholic cirrhosis (HCC)    Thyroid eye disease    Pulmonary nodule    Hx of tobacco use, presenting hazards to health    Adenopathy    Postsurgical hypothyroidism

## 2025-01-09 NOTE — PLAN OF CARE
Problem: Discharge Planning  Goal: Discharge to home or other facility with appropriate resources  Outcome: Progressing     Problem: Safety - Adult  Goal: Free from fall injury  Outcome: Progressing     Problem: Safety - Medical Restraint  Goal: Remains free of injury from restraints (Restraint for Interference with Medical Device)  Description: INTERVENTIONS:  1. Determine that other, less restrictive measures have been tried or would not be effective before applying the restraint  2. Evaluate the patient's condition at the time of restraint application  3. Inform patient/family regarding the reason for restraint  4. Q2H: Monitor safety, psychosocial status, comfort, nutrition and hydration  Outcome: Progressing  Flowsheets  Taken 1/9/2025 0009  Remains free of injury from restraints (restraint for interference with medical device): Every 2 hours: Monitor safety, psychosocial status, comfort, nutrition and hydration  Taken 1/8/2025 2209  Remains free of injury from restraints (restraint for interference with medical device): Every 2 hours: Monitor safety, psychosocial status, comfort, nutrition and hydration  Taken 1/8/2025 2009  Remains free of injury from restraints (restraint for interference with medical device): Every 2 hours: Monitor safety, psychosocial status, comfort, nutrition and hydration     Problem: Pain  Goal: Verbalizes/displays adequate comfort level or baseline comfort level  Outcome: Progressing

## 2025-01-10 ENCOUNTER — APPOINTMENT (OUTPATIENT)
Dept: GENERAL RADIOLOGY | Age: 60
DRG: 442 | End: 2025-01-10
Payer: MEDICARE

## 2025-01-10 LAB
ALBUMIN SERPL-MCNC: 2.6 G/DL (ref 3.5–5)
ALBUMIN/GLOB SERPL: 0.7 (ref 1–1.9)
ALP SERPL-CCNC: 77 U/L (ref 40–129)
ALT SERPL-CCNC: 48 U/L (ref 8–55)
ANION GAP SERPL CALC-SCNC: 11 MMOL/L (ref 7–16)
AST SERPL-CCNC: 98 U/L (ref 15–37)
BASOPHILS # BLD: 0.02 K/UL (ref 0–0.2)
BASOPHILS NFR BLD: 0.2 % (ref 0–2)
BILIRUB SERPL-MCNC: 3.6 MG/DL (ref 0–1.2)
BUN SERPL-MCNC: 22 MG/DL (ref 6–23)
CALCIUM SERPL-MCNC: 8.6 MG/DL (ref 8.8–10.2)
CHLORIDE SERPL-SCNC: 122 MMOL/L (ref 98–107)
CO2 SERPL-SCNC: 21 MMOL/L (ref 20–29)
CREAT SERPL-MCNC: 0.94 MG/DL (ref 0.8–1.3)
DIFFERENTIAL METHOD BLD: ABNORMAL
EOSINOPHIL # BLD: 0.1 K/UL (ref 0–0.8)
EOSINOPHIL NFR BLD: 1.1 % (ref 0.5–7.8)
ERYTHROCYTE [DISTWIDTH] IN BLOOD BY AUTOMATED COUNT: 14.7 % (ref 11.9–14.6)
GLOBULIN SER CALC-MCNC: 3.9 G/DL (ref 2.3–3.5)
GLUCOSE SERPL-MCNC: 99 MG/DL (ref 70–99)
HCT VFR BLD AUTO: 40.7 % (ref 41.1–50.3)
HGB BLD-MCNC: 14 G/DL (ref 13.6–17.2)
IMM GRANULOCYTES # BLD AUTO: 0.05 K/UL (ref 0–0.5)
IMM GRANULOCYTES NFR BLD AUTO: 0.6 % (ref 0–5)
LYMPHOCYTES # BLD: 0.98 K/UL (ref 0.5–4.6)
LYMPHOCYTES NFR BLD: 10.9 % (ref 13–44)
MAGNESIUM SERPL-MCNC: 2.1 MG/DL (ref 1.8–2.4)
MCH RBC QN AUTO: 34.6 PG (ref 26.1–32.9)
MCHC RBC AUTO-ENTMCNC: 34.4 G/DL (ref 31.4–35)
MCV RBC AUTO: 100.5 FL (ref 82–102)
MONOCYTES # BLD: 0.59 K/UL (ref 0.1–1.3)
MONOCYTES NFR BLD: 6.6 % (ref 4–12)
NEUTS SEG # BLD: 7.26 K/UL (ref 1.7–8.2)
NEUTS SEG NFR BLD: 80.6 % (ref 43–78)
NRBC # BLD: 0 K/UL (ref 0–0.2)
PHOSPHATE SERPL-MCNC: 2.9 MG/DL (ref 2.5–4.5)
PLATELET # BLD AUTO: 98 K/UL (ref 150–450)
PMV BLD AUTO: 10.3 FL (ref 9.4–12.3)
POTASSIUM SERPL-SCNC: 3.5 MMOL/L (ref 3.5–5.1)
PROT SERPL-MCNC: 6.5 G/DL (ref 6.3–8.2)
RBC # BLD AUTO: 4.05 M/UL (ref 4.23–5.6)
SODIUM SERPL-SCNC: 154 MMOL/L (ref 136–145)
WBC # BLD AUTO: 9 K/UL (ref 4.3–11.1)

## 2025-01-10 PROCEDURE — 84100 ASSAY OF PHOSPHORUS: CPT

## 2025-01-10 PROCEDURE — 96372 THER/PROPH/DIAG INJ SC/IM: CPT

## 2025-01-10 PROCEDURE — 80053 COMPREHEN METABOLIC PANEL: CPT

## 2025-01-10 PROCEDURE — 6370000000 HC RX 637 (ALT 250 FOR IP): Performed by: STUDENT IN AN ORGANIZED HEALTH CARE EDUCATION/TRAINING PROGRAM

## 2025-01-10 PROCEDURE — 1100000000 HC RM PRIVATE

## 2025-01-10 PROCEDURE — 6360000002 HC RX W HCPCS: Performed by: STUDENT IN AN ORGANIZED HEALTH CARE EDUCATION/TRAINING PROGRAM

## 2025-01-10 PROCEDURE — 2500000003 HC RX 250 WO HCPCS: Performed by: STUDENT IN AN ORGANIZED HEALTH CARE EDUCATION/TRAINING PROGRAM

## 2025-01-10 PROCEDURE — 96376 TX/PRO/DX INJ SAME DRUG ADON: CPT

## 2025-01-10 PROCEDURE — 6360000002 HC RX W HCPCS: Performed by: INTERNAL MEDICINE

## 2025-01-10 PROCEDURE — 74018 RADEX ABDOMEN 1 VIEW: CPT

## 2025-01-10 PROCEDURE — 36415 COLL VENOUS BLD VENIPUNCTURE: CPT

## 2025-01-10 PROCEDURE — 2580000003 HC RX 258: Performed by: STUDENT IN AN ORGANIZED HEALTH CARE EDUCATION/TRAINING PROGRAM

## 2025-01-10 PROCEDURE — 6370000000 HC RX 637 (ALT 250 FOR IP)

## 2025-01-10 PROCEDURE — 83735 ASSAY OF MAGNESIUM: CPT

## 2025-01-10 PROCEDURE — 85025 COMPLETE CBC W/AUTO DIFF WBC: CPT

## 2025-01-10 RX ORDER — LACTULOSE 10 G/15ML
20 SOLUTION ORAL 3 TIMES DAILY
Status: DISCONTINUED | OUTPATIENT
Start: 2025-01-10 | End: 2025-01-13 | Stop reason: HOSPADM

## 2025-01-10 RX ORDER — POTASSIUM CHLORIDE 7.45 MG/ML
10 INJECTION INTRAVENOUS PRN
Status: DISCONTINUED | OUTPATIENT
Start: 2025-01-10 | End: 2025-01-13 | Stop reason: HOSPADM

## 2025-01-10 RX ORDER — LACTULOSE 10 G/15ML
20 SOLUTION ORAL 3 TIMES DAILY
Status: DISCONTINUED | OUTPATIENT
Start: 2025-01-10 | End: 2025-01-10

## 2025-01-10 RX ORDER — MENTHOL/CAMPHOR/ALLANTOIN/PHE 0.6-0.5-1%
OINTMENT(EA) TOPICAL PRN
Status: DISCONTINUED | OUTPATIENT
Start: 2025-01-10 | End: 2025-01-13 | Stop reason: HOSPADM

## 2025-01-10 RX ORDER — POTASSIUM CHLORIDE 1500 MG/1
40 TABLET, EXTENDED RELEASE ORAL PRN
Status: DISCONTINUED | OUTPATIENT
Start: 2025-01-10 | End: 2025-01-13 | Stop reason: HOSPADM

## 2025-01-10 RX ORDER — LANOLIN ALCOHOL/MO/W.PET/CERES
100 CREAM (GRAM) TOPICAL DAILY
Status: DISCONTINUED | OUTPATIENT
Start: 2025-01-11 | End: 2025-01-13 | Stop reason: HOSPADM

## 2025-01-10 RX ORDER — LANOLIN ALCOHOL/MO/W.PET/CERES
100 CREAM (GRAM) TOPICAL DAILY
Status: DISCONTINUED | OUTPATIENT
Start: 2025-01-10 | End: 2025-01-10

## 2025-01-10 RX ADMIN — SODIUM CHLORIDE, PRESERVATIVE FREE 1 MG: 5 INJECTION INTRAVENOUS at 05:18

## 2025-01-10 RX ADMIN — LACTULOSE 20 G: 10 SOLUTION ORAL at 20:23

## 2025-01-10 RX ADMIN — RIFAXIMIN 550 MG: 550 TABLET ORAL at 09:32

## 2025-01-10 RX ADMIN — LACTULOSE 20 G: 10 SOLUTION ORAL at 09:32

## 2025-01-10 RX ADMIN — ENOXAPARIN SODIUM 40 MG: 100 INJECTION SUBCUTANEOUS at 18:26

## 2025-01-10 RX ADMIN — RIFAXIMIN 550 MG: 550 TABLET ORAL at 20:31

## 2025-01-10 RX ADMIN — POTASSIUM BICARBONATE 40 MEQ: 391 TABLET, EFFERVESCENT ORAL at 18:53

## 2025-01-10 RX ADMIN — HALOPERIDOL LACTATE 1 MG: 5 INJECTION, SOLUTION INTRAMUSCULAR at 02:35

## 2025-01-10 RX ADMIN — SODIUM CHLORIDE, PRESERVATIVE FREE 1 MG: 5 INJECTION INTRAVENOUS at 20:22

## 2025-01-10 RX ADMIN — SODIUM CHLORIDE, PRESERVATIVE FREE 10 ML: 5 INJECTION INTRAVENOUS at 20:25

## 2025-01-10 NOTE — PROGRESS NOTES
Advance Care Planning Note   Admit Date:  2025  2:23 PM   Name:  Coty Ocampo   Age:  59 y.o.  Sex:  male  :  1965   MRN:  [id number]   Room:  [facility]/    Coty Ocampo has capacity to make his own decisions:   No    If pt unable to make decisions, POA/surrogate decision maker:  Brothers and sister; no definite POA here.    Other people present:   Brothers and sisters on the phone    Patient / surrogate decision-maker directed code status:  Full Code    Other ACP topics discussed, if applicable:   None    Patient or surrogate consented to discussion of the current conditions, workup, management plans, prognosis, and the risk for further deterioration.  Time spent: 41 minutes in direct discussion.      Signed:  Rebeca Morales MD

## 2025-01-10 NOTE — PLAN OF CARE
Problem: Discharge Planning  Goal: Discharge to home or other facility with appropriate resources  1/10/2025 1411 by Ida Vaqsuez RN  Outcome: Progressing  1/10/2025 0147 by Raf Aldana RN  Outcome: Progressing     Problem: Safety - Adult  Goal: Free from fall injury  1/10/2025 1411 by Ida Vasquez RN  Outcome: Progressing  1/10/2025 0147 by Raf Aldana RN  Outcome: Progressing     Problem: Safety - Medical Restraint  Goal: Remains free of injury from restraints (Restraint for Interference with Medical Device)  Description: INTERVENTIONS:  1. Determine that other, less restrictive measures have been tried or would not be effective before applying the restraint  2. Evaluate the patient's condition at the time of restraint application  3. Inform patient/family regarding the reason for restraint  4. Q2H: Monitor safety, psychosocial status, comfort, nutrition and hydration  1/10/2025 1411 by Ida Vasquez RN  Outcome: Progressing  Flowsheets  Taken 1/10/2025 0559 by Raf Aldana RN  Remains free of injury from restraints (restraint for interference with medical device): Every 2 hours: Monitor safety, psychosocial status, comfort, nutrition and hydration  Taken 1/10/2025 0359 by Raf Aldana RN  Remains free of injury from restraints (restraint for interference with medical device): Every 2 hours: Monitor safety, psychosocial status, comfort, nutrition and hydration  Taken 1/10/2025 0159 by Raf Aldana RN  Remains free of injury from restraints (restraint for interference with medical device): Every 2 hours: Monitor safety, psychosocial status, comfort, nutrition and hydration  1/10/2025 0147 by Raf Aldana RN  Outcome: Progressing  Flowsheets  Taken 1/9/2025 2359 by Raf Aldana RN  Remains free of injury from restraints (restraint for interference with medical device): Every 2 hours: Monitor safety, psychosocial status, comfort,  change or resting period.  1/10/2025 1411 by Ida Vasquez, RN  Outcome: Progressing  1/10/2025 0147 by Raf Aldana, RN  Outcome: Progressing

## 2025-01-10 NOTE — PLAN OF CARE
Problem: Discharge Planning  Goal: Discharge to home or other facility with appropriate resources  Outcome: Progressing     Problem: Safety - Adult  Goal: Free from fall injury  Outcome: Progressing     Problem: Safety - Medical Restraint  Goal: Remains free of injury from restraints (Restraint for Interference with Medical Device)  Description: INTERVENTIONS:  1. Determine that other, less restrictive measures have been tried or would not be effective before applying the restraint  2. Evaluate the patient's condition at the time of restraint application  3. Inform patient/family regarding the reason for restraint  4. Q2H: Monitor safety, psychosocial status, comfort, nutrition and hydration  Outcome: Progressing  Flowsheets  Taken 1/9/2025 2359 by Raf Aldana RN  Remains free of injury from restraints (restraint for interference with medical device): Every 2 hours: Monitor safety, psychosocial status, comfort, nutrition and hydration  Taken 1/9/2025 2209 by Raf Aldana RN  Remains free of injury from restraints (restraint for interference with medical device): Every 2 hours: Monitor safety, psychosocial status, comfort, nutrition and hydration  Taken 1/9/2025 2009 by Raf Aldana RN  Remains free of injury from restraints (restraint for interference with medical device): Every 2 hours: Monitor safety, psychosocial status, comfort, nutrition and hydration  Taken 1/9/2025 1809 by Jacquelyn Nina RN  Remains free of injury from restraints (restraint for interference with medical device): Every 2 hours: Monitor safety, psychosocial status, comfort, nutrition and hydration  Taken 1/9/2025 1609 by Jacquelyn Nina RN  Remains free of injury from restraints (restraint for interference with medical device): Every 2 hours: Monitor safety, psychosocial status, comfort, nutrition and hydration  Taken 1/9/2025 1409 by Jacquelyn Nina RN  Remains free of injury from restraints (restraint for interference

## 2025-01-10 NOTE — CONSULTS
Nutrition Assessment  Assessment Type: Initial, Consult  Reason for visit:  Tube Feeding Management (Hospitalists)    Nutrition Intervention:   Food and/or Nutrient Delivery:   Enteral Nutrition:   Enteral Access: Nasogastric  Advance  Formula: Standard without Fiber (Osmolite 1.2 Kimo)  Goal Rate: Continuous 75 ml/hr  Initiate  Water flush  30 ml every hour  Modulars: None not indicated at this time   Enteral regimen at above goal to provide per 24 hours:  1980 calories, 92 grams protein and 2013 ml free fluid.    Above regimen: Intended to meet macronutrient goals  Labs:   Basic Metabolic Panel, Magnesium and Phosphorus active per nutrition parameters  POC Glucoses/SSI Not indicated   Nutrition Related Medication Management:  Electrolyte Replacement:   Continue prn protocol Magnesium and Potassium   Phosphorus replacement to be addressed individually secondary to national shortages.     Intravenous fluids:  Not applicable  Thiamine 100 mg daily x 7 days (EOT 1/17)  Bowel Regimen Active prn  Coordination of Nutrition Care:  Coordination with belen care provider Provider, Dr PAULY Morales and RN, Ida . Provider declined use of free water replacement via NGT for hypernatremia/free water deficit. Alerted RN to prn K replacement orders and need for replacement today.       Malnutrition Assessment:1/10  Malnutrition Status: At risk for malnutrition (Inadequate intake for at least 10 days in the past 3 weeks, +weight loss but unable to validate)  Nutrition Focused Physical Exam, visual:  General thinness, in restraints  Nutrition Assessment:  Food/Nutrition Related History: During admission 12/24/2024 to 1/3/2025 pt had inadequate po intake with NPO/CLD status d/t PSBO as well as AMS 12/24 to 1/2. Received PPN 1/1 to 1/3. Diet progressed to FLD with Ensure Plus 1/3, pt received 1-2 meals of FLD before discharge on 1/3 at 1230. Unknown intake since discharge but noted as presenting with lack of po intake,          Weight  History: Wt hx per EMR review:   Endo OV  12/18/2024 176 lbs   10/25/2024 166 lbs   9/11/2024 173 lbs   8/30/2024 171 lbs 10 oz   6/12/2024 174 lbs   10/20/2023 170 lbs   OV weight without trend of weight loss, weight variances expected given thyroid and liver disease.  Recent admission. Bed scale weights: 155-157#  Weight loss:  5.8 to 7% compared to last admission but up to 17% since 12/18 OV. If accurate is c/w severe weight loss. However variances could also be r/t scale and fluid variances  Nutrition Background:       Pertinent PMH/PSH:graves disease, alcohol induced cirrhosis, s/p TIPS, recurrent SBO and abdominal surgeries. Recent admission  12/24 to 1/3 for hepatic encephalopathy and PSBO  Presented with progressive confusion and lack of po intake  Admitted with decompensation of cirrhosis, hepatic encephalopathy, hypernatremia    Nutrition Monitoring/Evaluation:  NGT placed 1/8 d/t inability to follow any commands and agitation and need for enteral medications for encephalopathy.   Pt see lying in bed with restraints, unable to provide any information. Discussed with VINICIO Prieto.  Abdominal Status (last documented by RN):   Last BM (including prior to admit): 01/08/25, X 7 documented 1/9  Abdomen Inspection: Soft, Flat, RUQ Bowel Sounds: Hyperactive, LUQ Bowel Sounds: Hyperactive, RLQ Bowel Sounds: Hyperactive, LLQ Bowel Sounds: Hyperactive  Pertinent Medications: lactulose, xifaxan  Continuous: none  IVF: none  Electrolyte Replacement:  K and Mg active  Pertinent administered PRN: none  Pertinent Labs:   Lab Results   Component Value Date/Time     01/10/2025 07:03 AM    K 3.5 01/10/2025 07:03 AM     01/10/2025 07:03 AM    CO2 21 01/10/2025 07:03 AM    BUN 22 01/10/2025 07:03 AM    CREATININE 0.94 01/10/2025 07:03 AM    GLUCOSE 99 01/10/2025 07:03 AM    CALCIUM 8.6 01/10/2025 07:03 AM    PHOS 2.9 01/10/2025 07:03 AM    MG 2.1 01/10/2025 07:03 AM      Remarkable for: Low normal K. Phos is

## 2025-01-10 NOTE — CARE COORDINATION
Chart reviewed by RNCM and discussed in IDR     CM following for continued stay.     Patient continues to be confused.  Is in restraints, has NG tube.    Therapy evals are pending but on hold due to medical condition.  Anticipate patient to need STR, or some form of therapy at DC     Please consult case management if any additional discharge needs arise.

## 2025-01-10 NOTE — PROGRESS NOTES
Occupational/Physical Therapy Note:    Attempted to see patient this AM for occupational/physical therapy evaluation session. Patient currently in restraints. MD and RN asked to hold. Based on functional performance from previous hospitalization (date 12/24-1/3/25), anticipating STR for safe discharge.   Will follow and re-attempt as schedule permits/patient available. Thank you,    Digna Hicks, OT    Rehab Caseload Tracker

## 2025-01-10 NOTE — PROGRESS NOTES
Hospitalist Progress Note   Admit Date:  2025  2:23 PM   Name:  Coty Ocampo   Age:  59 y.o.  Sex:  male  :  1965   MRN:  [id number]   Room:  [facility]    Presenting/Chief Complaint: Altered Mental Status     Reason(s) for Admission: Hepatic encephalopathy (HCC) [K76.82]  Hyperammonemia (HCC) [E72.20]  Decompensation of cirrhosis of liver (HCC) [K72.90, K74.60]     Hospital Course:   Coty Ocampo is a 59 y.o. male with medical history of alcoholic cirrhosis and recent hospitalization due to SBO who presented with AMS.        Subjective & 24hr Events:   No acute overnight event.    Still on restraint. AxO x2. Screaming and yelling per nurse.      Assessment & Plan:       Decompensation of cirrhosis of liver  Hepatic encephalopathy  Alcoholic cirrhosis s/p TIPS  -Increased ammonia levels 98 on admission  -Alcohol levels within normal limits  -Continue lactulose and rifaximin.  Need to have bowel movement x 3/day  -Placed NG tube due to inability to follow any commands and agitation. He needs to take lactulose and rifaximin.  -Continue Zyprexa daily and Ativan as needed  -Continue restraint      Hypernatremia  -In the setting of decreased p.o. intake  -s/p LR 1L  -Gentle oral hydration therapy        Chronic thrombocytopenia  -In the setting of cirrhosis  -Transfuse if less than 50,000 with active bleeding or if less than 10,000 with no bleeding        Graves' disease, s/p thyroidectomy  Postsurgical hypothyroidism  -Continue Synthroid      Anticipated Discharge Arrangements:   Therapy has not evaluated yet    PT/OT evals ordered?  Therapy evals ordered  Diet:  Diet NPO Exceptions are: Sips of Water with Meds, Ice Chips, Sips of Clear Liquids  ORAL HYDRATION; Water; 300 ml (10 oz)  ADULT TUBE FEEDING; Nasogastric; Standard without Fiber; Continuous; 25; Yes; 10; Q 8 hours; 75; 30; Q 1 hour  VTE prophylaxis: Lovenox  Code status: Full Code      Non-peripheral Lines and Tubes (if

## 2025-01-11 PROBLEM — E44.0 MODERATE PROTEIN-CALORIE MALNUTRITION (HCC): Status: ACTIVE | Noted: 2025-01-11

## 2025-01-11 LAB
ALBUMIN SERPL-MCNC: 2.5 G/DL (ref 3.5–5)
ALBUMIN/GLOB SERPL: 0.6 (ref 1–1.9)
ALP SERPL-CCNC: 79 U/L (ref 40–129)
ALT SERPL-CCNC: 54 U/L (ref 8–55)
ANION GAP SERPL CALC-SCNC: 11 MMOL/L (ref 7–16)
AST SERPL-CCNC: 97 U/L (ref 15–37)
BASOPHILS # BLD: 0.02 K/UL (ref 0–0.2)
BASOPHILS NFR BLD: 0.3 % (ref 0–2)
BILIRUB SERPL-MCNC: 4 MG/DL (ref 0–1.2)
BUN SERPL-MCNC: 25 MG/DL (ref 6–23)
CALCIUM SERPL-MCNC: 8.7 MG/DL (ref 8.8–10.2)
CHLORIDE SERPL-SCNC: 122 MMOL/L (ref 98–107)
CO2 SERPL-SCNC: 20 MMOL/L (ref 20–29)
CREAT SERPL-MCNC: 0.97 MG/DL (ref 0.8–1.3)
DIFFERENTIAL METHOD BLD: ABNORMAL
EOSINOPHIL # BLD: 0.19 K/UL (ref 0–0.8)
EOSINOPHIL NFR BLD: 2.7 % (ref 0.5–7.8)
ERYTHROCYTE [DISTWIDTH] IN BLOOD BY AUTOMATED COUNT: 14.8 % (ref 11.9–14.6)
GLOBULIN SER CALC-MCNC: 4.1 G/DL (ref 2.3–3.5)
GLUCOSE SERPL-MCNC: 127 MG/DL (ref 70–99)
HCT VFR BLD AUTO: 40.5 % (ref 41.1–50.3)
HGB BLD-MCNC: 14.1 G/DL (ref 13.6–17.2)
IMM GRANULOCYTES # BLD AUTO: 0.04 K/UL (ref 0–0.5)
IMM GRANULOCYTES NFR BLD AUTO: 0.6 % (ref 0–5)
INR PPP: 1.6
LYMPHOCYTES # BLD: 1.25 K/UL (ref 0.5–4.6)
LYMPHOCYTES NFR BLD: 17.5 % (ref 13–44)
MAGNESIUM SERPL-MCNC: 2.3 MG/DL (ref 1.8–2.4)
MCH RBC QN AUTO: 35.1 PG (ref 26.1–32.9)
MCHC RBC AUTO-ENTMCNC: 34.8 G/DL (ref 31.4–35)
MCV RBC AUTO: 100.7 FL (ref 82–102)
MONOCYTES # BLD: 0.43 K/UL (ref 0.1–1.3)
MONOCYTES NFR BLD: 6 % (ref 4–12)
NEUTS SEG # BLD: 5.2 K/UL (ref 1.7–8.2)
NEUTS SEG NFR BLD: 72.9 % (ref 43–78)
NRBC # BLD: 0 K/UL (ref 0–0.2)
PHOSPHATE SERPL-MCNC: 3.5 MG/DL (ref 2.5–4.5)
PLATELET # BLD AUTO: 91 K/UL (ref 150–450)
PMV BLD AUTO: 10.6 FL (ref 9.4–12.3)
POTASSIUM SERPL-SCNC: 4.3 MMOL/L (ref 3.5–5.1)
PROT SERPL-MCNC: 6.5 G/DL (ref 6.3–8.2)
PROTHROMBIN TIME: 19.6 SEC (ref 11.3–14.9)
RBC # BLD AUTO: 4.02 M/UL (ref 4.23–5.6)
SODIUM SERPL-SCNC: 153 MMOL/L (ref 136–145)
WBC # BLD AUTO: 7.1 K/UL (ref 4.3–11.1)

## 2025-01-11 PROCEDURE — 1100000000 HC RM PRIVATE

## 2025-01-11 PROCEDURE — 83735 ASSAY OF MAGNESIUM: CPT

## 2025-01-11 PROCEDURE — 85610 PROTHROMBIN TIME: CPT

## 2025-01-11 PROCEDURE — 2580000003 HC RX 258: Performed by: STUDENT IN AN ORGANIZED HEALTH CARE EDUCATION/TRAINING PROGRAM

## 2025-01-11 PROCEDURE — 2500000003 HC RX 250 WO HCPCS: Performed by: STUDENT IN AN ORGANIZED HEALTH CARE EDUCATION/TRAINING PROGRAM

## 2025-01-11 PROCEDURE — 6370000000 HC RX 637 (ALT 250 FOR IP)

## 2025-01-11 PROCEDURE — 80053 COMPREHEN METABOLIC PANEL: CPT

## 2025-01-11 PROCEDURE — 6360000002 HC RX W HCPCS: Performed by: STUDENT IN AN ORGANIZED HEALTH CARE EDUCATION/TRAINING PROGRAM

## 2025-01-11 PROCEDURE — 36415 COLL VENOUS BLD VENIPUNCTURE: CPT

## 2025-01-11 PROCEDURE — 85025 COMPLETE CBC W/AUTO DIFF WBC: CPT

## 2025-01-11 PROCEDURE — 84100 ASSAY OF PHOSPHORUS: CPT

## 2025-01-11 RX ADMIN — LACTULOSE 20 G: 10 SOLUTION ORAL at 21:05

## 2025-01-11 RX ADMIN — WATER 5 MG: 1 INJECTION INTRAMUSCULAR; INTRAVENOUS; SUBCUTANEOUS at 05:43

## 2025-01-11 RX ADMIN — Medication 100 MG: at 10:11

## 2025-01-11 RX ADMIN — LACTULOSE 20 G: 10 SOLUTION ORAL at 15:21

## 2025-01-11 RX ADMIN — RIFAXIMIN 550 MG: 550 TABLET ORAL at 21:05

## 2025-01-11 RX ADMIN — SODIUM CHLORIDE, PRESERVATIVE FREE 10 ML: 5 INJECTION INTRAVENOUS at 21:07

## 2025-01-11 RX ADMIN — RIFAXIMIN 550 MG: 550 TABLET ORAL at 10:11

## 2025-01-11 RX ADMIN — LACTULOSE 20 G: 10 SOLUTION ORAL at 10:10

## 2025-01-11 RX ADMIN — SODIUM CHLORIDE, PRESERVATIVE FREE 1 MG: 5 INJECTION INTRAVENOUS at 23:42

## 2025-01-11 RX ADMIN — ENOXAPARIN SODIUM 40 MG: 100 INJECTION SUBCUTANEOUS at 18:04

## 2025-01-11 ASSESSMENT — PAIN SCALES - GENERAL: PAINLEVEL_OUTOF10: 0

## 2025-01-11 NOTE — PROGRESS NOTES
PT/OT note:  Patient has had therapy orders since 1/8/25.  His evaluations have been held for 4 days due to agitation and need for restraints.  Therapy will d/c orders until he is able to be out of restraints and participate.    Sarah Medina, PT

## 2025-01-11 NOTE — PROGRESS NOTES
Patient continues to take meds orally without issue, and eating/drinking his meals with assistance.

## 2025-01-11 NOTE — PROGRESS NOTES
Hospitalist Progress Note   Admit Date:  2025  2:23 PM   Name:  Coty Ocampo   Age:  59 y.o.  Sex:  male  :  1965   MRN:  [id number]   Room:  [facility]    Presenting/Chief Complaint: Altered Mental Status     Reason(s) for Admission: Hepatic encephalopathy (HCC) [K76.82]  Hyperammonemia (HCC) [E72.20]  Decompensation of cirrhosis of liver (HCC) [K72.90, K74.60]     Hospital Course:   Coty Ocampo is a 59 y.o. male with medical history of alcoholic cirrhosis and recent hospitalization due to SBO who presented with AMS.        Subjective & 24hr Events:   He pulled out his NG tube last night.    Still on restraint. AxO x2. Received Zyprexa x 1 this morning for agitation.  Per nurse, he was able to follow commands and swallow medicines.      Assessment & Plan:       Decompensation of cirrhosis of liver  Hepatic encephalopathy  Alcoholic cirrhosis s/p TIPS  -Increased ammonia levels 98 on admission  -Alcohol levels within normal limits  -Continue lactulose and rifaximin.  Need to have bowel movement x 3/day  -Placed NG tube due to inability to follow any commands and agitation. He needs to take lactulose and rifaximin.  -Continue Zyprexa daily PRN and Ativan as needed  -Continue restraint      Hypernatremia  -In the setting of decreased p.o. intake  -s/p LR 1L  -Gentle oral hydration therapy        Chronic thrombocytopenia  -In the setting of cirrhosis  -Transfuse if less than 50,000 with active bleeding or if less than 10,000 with no bleeding        Graves' disease, s/p thyroidectomy  Postsurgical hypothyroidism  -Continue Synthroid      Moderate protein calorie malnutrition  -BMI <20 for age <70  -In the setting of cirrhosis  -Consulted nutrition.  Appreciate recommendations.      Anticipated Discharge Arrangements:   Therapy has not evaluated yet    PT/OT evals ordered?  Therapy evals ordered  Diet:  ORAL HYDRATION; Water; 300 ml (10 oz)  ADULT TUBE FEEDING; Nasogastric; Standard  Q6H PRN       Signed:  Rebeca Morales MD    Part of this note may have been written by using a voice dictation software.  The note has been proof read but may still contain some grammatical/other typographical errors.

## 2025-01-11 NOTE — PLAN OF CARE
Problem: Discharge Planning  Goal: Discharge to home or other facility with appropriate resources  Outcome: Progressing     Problem: Safety - Adult  Goal: Free from fall injury  Outcome: Progressing     Problem: Safety - Medical Restraint  Goal: Remains free of injury from restraints (Restraint for Interference with Medical Device)  Description: INTERVENTIONS:  1. Determine that other, less restrictive measures have been tried or would not be effective before applying the restraint  2. Evaluate the patient's condition at the time of restraint application  3. Inform patient/family regarding the reason for restraint  4. Q2H: Monitor safety, psychosocial status, comfort, nutrition and hydration  Outcome: Progressing  Flowsheets  Taken 1/11/2025 0900 by Ida Vasquez RN  Remains free of injury from restraints (restraint for interference with medical device): Every 2 hours: Monitor safety, psychosocial status, comfort, nutrition and hydration  Taken 1/11/2025 0435 by Raf Aldana RN  Remains free of injury from restraints (restraint for interference with medical device): Every 2 hours: Monitor safety, psychosocial status, comfort, nutrition and hydration     Problem: Pain  Goal: Verbalizes/displays adequate comfort level or baseline comfort level  Outcome: Progressing     Problem: Skin/Tissue Integrity  Goal: Absence of new skin breakdown  Description: 1.  Monitor for areas of redness and/or skin breakdown  2.  Assess vascular access sites hourly  3.  Every 4-6 hours minimum:  Change oxygen saturation probe site  4.  Every 4-6 hours:  If on nasal continuous positive airway pressure, respiratory therapy assess nares and determine need for appliance change or resting period.  Outcome: Progressing     Problem: Safety - Violent/Self-destructive Restraint  Goal: Remains free of injury from restraints (Restraint for Violent/Self-Destructive Behavior)  Description: INTERVENTIONS:  1. Determine that

## 2025-01-11 NOTE — PLAN OF CARE
Problem: Discharge Planning  Goal: Discharge to home or other facility with appropriate resources  1/11/2025 0208 by Raf Aldana RN  Outcome: Progressing  1/10/2025 1411 by Ida Vasquez RN  Outcome: Progressing     Problem: Safety - Adult  Goal: Free from fall injury  1/11/2025 0208 by Raf Aldana RN  Outcome: Progressing  1/10/2025 1411 by Ida Vasquez RN  Outcome: Progressing     Problem: Safety - Medical Restraint  Goal: Remains free of injury from restraints (Restraint for Interference with Medical Device)  Description: INTERVENTIONS:  1. Determine that other, less restrictive measures have been tried or would not be effective before applying the restraint  2. Evaluate the patient's condition at the time of restraint application  3. Inform patient/family regarding the reason for restraint  4. Q2H: Monitor safety, psychosocial status, comfort, nutrition and hydration  1/11/2025 0208 by Raf Aldana RN  Outcome: Progressing  Flowsheets (Taken 1/11/2025 0035)  Remains free of injury from restraints (restraint for interference with medical device): Every 2 hours: Monitor safety, psychosocial status, comfort, nutrition and hydration  1/10/2025 1411 by Ida Vasquez RN  Outcome: Progressing  Flowsheets  Taken 1/10/2025 0559 by Raf Aldana RN  Remains free of injury from restraints (restraint for interference with medical device): Every 2 hours: Monitor safety, psychosocial status, comfort, nutrition and hydration  Taken 1/10/2025 0359 by Raf Aldana RN  Remains free of injury from restraints (restraint for interference with medical device): Every 2 hours: Monitor safety, psychosocial status, comfort, nutrition and hydration  Taken 1/10/2025 0159 by Raf Aldana RN  Remains free of injury from restraints (restraint for interference with medical device): Every 2 hours: Monitor safety, psychosocial status, comfort, nutrition and hydration

## 2025-01-12 LAB
ALBUMIN SERPL-MCNC: 2.3 G/DL (ref 3.5–5)
ALBUMIN/GLOB SERPL: 0.6 (ref 1–1.9)
ALP SERPL-CCNC: 81 U/L (ref 40–129)
ALT SERPL-CCNC: 57 U/L (ref 8–55)
ANION GAP SERPL CALC-SCNC: 12 MMOL/L (ref 7–16)
AST SERPL-CCNC: 78 U/L (ref 15–37)
BASOPHILS # BLD: 0.04 K/UL (ref 0–0.2)
BASOPHILS NFR BLD: 0.6 % (ref 0–2)
BILIRUB SERPL-MCNC: 3.4 MG/DL (ref 0–1.2)
BUN SERPL-MCNC: 19 MG/DL (ref 6–23)
CALCIUM SERPL-MCNC: 8.3 MG/DL (ref 8.8–10.2)
CHLORIDE SERPL-SCNC: 115 MMOL/L (ref 98–107)
CO2 SERPL-SCNC: 19 MMOL/L (ref 20–29)
CREAT SERPL-MCNC: 0.95 MG/DL (ref 0.8–1.3)
DIFFERENTIAL METHOD BLD: ABNORMAL
EOSINOPHIL # BLD: 0.39 K/UL (ref 0–0.8)
EOSINOPHIL NFR BLD: 5.4 % (ref 0.5–7.8)
ERYTHROCYTE [DISTWIDTH] IN BLOOD BY AUTOMATED COUNT: 14.6 % (ref 11.9–14.6)
GLOBULIN SER CALC-MCNC: 3.9 G/DL (ref 2.3–3.5)
GLUCOSE SERPL-MCNC: 92 MG/DL (ref 70–99)
HCT VFR BLD AUTO: 37.8 % (ref 41.1–50.3)
HGB BLD-MCNC: 13.6 G/DL (ref 13.6–17.2)
IMM GRANULOCYTES # BLD AUTO: 0.03 K/UL (ref 0–0.5)
IMM GRANULOCYTES NFR BLD AUTO: 0.4 % (ref 0–5)
INR PPP: 1.6
LYMPHOCYTES # BLD: 1.59 K/UL (ref 0.5–4.6)
LYMPHOCYTES NFR BLD: 22.2 % (ref 13–44)
MAGNESIUM SERPL-MCNC: 1.8 MG/DL (ref 1.8–2.4)
MCH RBC QN AUTO: 35.9 PG (ref 26.1–32.9)
MCHC RBC AUTO-ENTMCNC: 36 G/DL (ref 31.4–35)
MCV RBC AUTO: 99.7 FL (ref 82–102)
MONOCYTES # BLD: 0.5 K/UL (ref 0.1–1.3)
MONOCYTES NFR BLD: 7 % (ref 4–12)
NEUTS SEG # BLD: 4.62 K/UL (ref 1.7–8.2)
NEUTS SEG NFR BLD: 64.4 % (ref 43–78)
NRBC # BLD: 0 K/UL (ref 0–0.2)
PHOSPHATE SERPL-MCNC: 3.2 MG/DL (ref 2.5–4.5)
PLATELET # BLD AUTO: 81 K/UL (ref 150–450)
PMV BLD AUTO: 10.5 FL (ref 9.4–12.3)
POTASSIUM SERPL-SCNC: 3.4 MMOL/L (ref 3.5–5.1)
PROT SERPL-MCNC: 6.3 G/DL (ref 6.3–8.2)
PROTHROMBIN TIME: 19.8 SEC (ref 11.3–14.9)
RBC # BLD AUTO: 3.79 M/UL (ref 4.23–5.6)
SODIUM SERPL-SCNC: 146 MMOL/L (ref 136–145)
WBC # BLD AUTO: 7.2 K/UL (ref 4.3–11.1)

## 2025-01-12 PROCEDURE — 2500000003 HC RX 250 WO HCPCS: Performed by: STUDENT IN AN ORGANIZED HEALTH CARE EDUCATION/TRAINING PROGRAM

## 2025-01-12 PROCEDURE — 6360000002 HC RX W HCPCS: Performed by: STUDENT IN AN ORGANIZED HEALTH CARE EDUCATION/TRAINING PROGRAM

## 2025-01-12 PROCEDURE — 80053 COMPREHEN METABOLIC PANEL: CPT

## 2025-01-12 PROCEDURE — 97165 OT EVAL LOW COMPLEX 30 MIN: CPT

## 2025-01-12 PROCEDURE — 36415 COLL VENOUS BLD VENIPUNCTURE: CPT

## 2025-01-12 PROCEDURE — 85610 PROTHROMBIN TIME: CPT

## 2025-01-12 PROCEDURE — 84100 ASSAY OF PHOSPHORUS: CPT

## 2025-01-12 PROCEDURE — 85025 COMPLETE CBC W/AUTO DIFF WBC: CPT

## 2025-01-12 PROCEDURE — 97530 THERAPEUTIC ACTIVITIES: CPT

## 2025-01-12 PROCEDURE — 97161 PT EVAL LOW COMPLEX 20 MIN: CPT

## 2025-01-12 PROCEDURE — 1100000000 HC RM PRIVATE

## 2025-01-12 PROCEDURE — 6370000000 HC RX 637 (ALT 250 FOR IP)

## 2025-01-12 PROCEDURE — 2580000003 HC RX 258: Performed by: STUDENT IN AN ORGANIZED HEALTH CARE EDUCATION/TRAINING PROGRAM

## 2025-01-12 PROCEDURE — 83735 ASSAY OF MAGNESIUM: CPT

## 2025-01-12 RX ADMIN — Medication 100 MG: at 09:09

## 2025-01-12 RX ADMIN — LACTULOSE 20 G: 10 SOLUTION ORAL at 14:52

## 2025-01-12 RX ADMIN — SODIUM CHLORIDE, PRESERVATIVE FREE 10 ML: 5 INJECTION INTRAVENOUS at 21:43

## 2025-01-12 RX ADMIN — LACTULOSE 20 G: 10 SOLUTION ORAL at 21:39

## 2025-01-12 RX ADMIN — SODIUM CHLORIDE, PRESERVATIVE FREE 1 MG: 5 INJECTION INTRAVENOUS at 15:22

## 2025-01-12 RX ADMIN — LACTULOSE 20 G: 10 SOLUTION ORAL at 09:09

## 2025-01-12 RX ADMIN — ENOXAPARIN SODIUM 40 MG: 100 INJECTION SUBCUTANEOUS at 17:53

## 2025-01-12 RX ADMIN — SODIUM CHLORIDE, PRESERVATIVE FREE 1 MG: 5 INJECTION INTRAVENOUS at 21:47

## 2025-01-12 RX ADMIN — RIFAXIMIN 550 MG: 550 TABLET ORAL at 21:40

## 2025-01-12 RX ADMIN — RIFAXIMIN 550 MG: 550 TABLET ORAL at 09:09

## 2025-01-12 NOTE — PROGRESS NOTES
Hospitalist Progress Note   Admit Date:  2025  2:23 PM   Name:  Coty Ocampo   Age:  59 y.o.  Sex:  male  :  1965   MRN:  [id number]   Room:  [facility]    Presenting/Chief Complaint: Altered Mental Status     Reason(s) for Admission: Hepatic encephalopathy (HCC) [K76.82]  Hyperammonemia (HCC) [E72.20]  Decompensation of cirrhosis of liver (HCC) [K72.90, K74.60]     Hospital Course:   Coty Ocampo is a 59 y.o. male with medical history of alcoholic cirrhosis and recent hospitalization due to SBO who presented with AMS.        Subjective & 24hr Events:   He pulled out his NG tube last night.    AxO x3.  However, still showing confusion.  Per PT, he did not know how to use a walker.      Assessment & Plan:       Decompensation of cirrhosis of liver  Hepatic encephalopathy  Alcoholic cirrhosis s/p TIPS  -MELD score 16 on 2025, indicating 6% 3-month mortality rate  -Increased ammonia levels 98 on admission  -Continue lactulose and rifaximin.  Need to have bowel movement x 3/day  -Placed NG tube due to inability to follow any commands and agitation.  Now NG tube off.  -Continue Zyprexa daily PRN and Ativan as needed  -May discontinue restraint.  But will need a sitter.      Hypernatremia  -In the setting of decreased p.o. intake  -s/p LR 1L  -Gentle oral hydration therapy  -Nutrition on board.  Appreciate assistance.        Chronic thrombocytopenia  -In the setting of cirrhosis  -Transfuse if less than 50,000 with active bleeding or if less than 10,000 with no bleeding        Graves' disease, s/p thyroidectomy  Postsurgical hypothyroidism  -Continue Synthroid      Moderate protein calorie malnutrition  -BMI <20 for age <70  -In the setting of cirrhosis  -Consulted nutrition.  Appreciate recommendations.      Anticipated Discharge Arrangements:   Home Health    PT/OT evals ordered?  Therapy evals ordered  Diet:  ORAL HYDRATION; Water; 300 ml (10 oz)  ADULT TUBE FEEDING; Nasogastric;  Standard without Fiber; Continuous; 25; Yes; 10; Q 8 hours; 75; 30; Q 1 hour  ADULT DIET; Easy to Chew  VTE prophylaxis: Lovenox  Code status: Full Code      Non-peripheral Lines and Tubes (if present):      NG/OG/NJ/NE Tube Nasogastric 16 fr Right nostril (Active)       External Urinary Catheter (Active)        Telemetry (if present):  Cardiac/Telemetry Monitor On: No        Hospital Problems:  Principal Problem:    Decompensation of cirrhosis of liver (HCC)  Active Problems:    Alcoholic cirrhosis (HCC)    Postsurgical hypothyroidism    Thrombocytopenia (HCC)    Graves' disease    S/P TIPS (transjugular intrahepatic portosystemic shunt)    Hepatic encephalopathy (HCC)    Hypernatremia    Moderate protein-calorie malnutrition (HCC)  Resolved Problems:    * No resolved hospital problems. *      Objective:   Patient Vitals for the past 24 hrs:   Temp Pulse Resp BP SpO2   01/12/25 0739 97.9 °F (36.6 °C) 71 16 113/72 97 %   01/11/25 2002 98.1 °F (36.7 °C) 73 16 (!) 142/77 98 %       Oxygen Therapy  SpO2: 97 %  Pulse via Oximetry: 77 beats per minute  Pulse Oximeter Device Mode: Intermittent  Pulse Oximeter Device Location: Right, Finger  O2 Device: None (Room air)    Estimated body mass index is 19.75 kg/m² as calculated from the following:    Height as of this encounter: 1.829 m (6').    Weight as of this encounter: 66 kg (145 lb 9.6 oz).    Intake/Output Summary (Last 24 hours) at 1/12/2025 1029  Last data filed at 1/12/2025 0027  Gross per 24 hour   Intake --   Output 450 ml   Net -450 ml         Physical Exam:     General:    Well nourished.  On bilateral wrist restraint  Head:  Normocephalic, atraumatic  Eyes:  Sclerae icteric.  Pupils equally round.  Neck:  No restricted ROM.  Trachea midline   CV:   RRR.  No m/r/g.  No jugular venous distension.  Lungs:   CTAB on the anterior chest.  No wheezing, rhonchi, or rales.  Symmetric expansion.  Abdomen:   Soft, nontender, nondistended.  Extremities: No cyanosis or

## 2025-01-12 NOTE — PLAN OF CARE
Problem: Discharge Planning  Goal: Discharge to home or other facility with appropriate resources  Outcome: Progressing     Problem: Safety - Adult  Goal: Free from fall injury  Outcome: Progressing     Problem: Safety - Medical Restraint  Goal: Remains free of injury from restraints (Restraint for Interference with Medical Device)  Description: INTERVENTIONS:  1. Determine that other, less restrictive measures have been tried or would not be effective before applying the restraint  2. Evaluate the patient's condition at the time of restraint application  3. Inform patient/family regarding the reason for restraint  4. Q2H: Monitor safety, psychosocial status, comfort, nutrition and hydration  Outcome: Progressing     Problem: Pain  Goal: Verbalizes/displays adequate comfort level or baseline comfort level  Outcome: Progressing     Problem: Skin/Tissue Integrity  Goal: Absence of new skin breakdown  Description: 1.  Monitor for areas of redness and/or skin breakdown  2.  Assess vascular access sites hourly  3.  Every 4-6 hours minimum:  Change oxygen saturation probe site  4.  Every 4-6 hours:  If on nasal continuous positive airway pressure, respiratory therapy assess nares and determine need for appliance change or resting period.  Outcome: Progressing     Problem: Safety - Violent/Self-destructive Restraint  Goal: Remains free of injury from restraints (Restraint for Violent/Self-Destructive Behavior)  Description: INTERVENTIONS:  1. Determine that de-escalation and other, less restrictive measures have been tried or would not be effective before applying the restraint  2. Identify and document the criteria for restraint  3. Evaluate the patient's condition at the time of restraint application  4. Inform patient/family regarding the reason for restraint/seclusion  5. Q2H: Monitor comfort, nutrition and hydration needs  6. Q15M: Perform safety checks including skin, circulation, sensory, respiratory and

## 2025-01-12 NOTE — PROGRESS NOTES
ACUTE OCCUPATIONAL THERAPY GOALS:   (Developed with and agreed upon by patient and/or caregiver.)  1. Patient will perform grooming with SPV and adaptive equipment as needed.  2. Patient will perform upper body dressing with SPV and adaptive equipment as needed.  3. Patient will perform lower body dressing with SPV and adaptive equipment as needed.  4. Patient will perform bathing with SPV and adaptive equipment as needed.  5. Patient will perform toileting and toilet transfer with SPV and adaptive equipment as needed.  6. Patient will perform ADL functional mobility and tranfers in room with SPV and adaptive equipment as needed.  7. Patient/family to demonstrate knowledge of home safety and DME recommendations.    Timeframe: 7 visits     OCCUPATIONAL THERAPY Initial Assessment, Daily Note, and PM       OT Visit Days: 1  Acknowledge Orders  Time  OT Charge Capture  Rehab Caseload Tracker      Coty Ocampo is a 59 y.o. male   PRIMARY DIAGNOSIS: Decompensation of cirrhosis of liver (HCC)  Hepatic encephalopathy (HCC) [K76.82]  Hyperammonemia (HCC) [E72.20]  Decompensation of cirrhosis of liver (HCC) [K72.90, K74.60]       Reason for Referral: Generalized Muscle Weakness (M62.81)  Other lack of cordination (R27.8)  Difficulty in walking, Not elsewhere classified (R26.2)  Inpatient: Payor: [facility] MEDICARE / Plan: [facility] MEDICARE ADVANTAGE / Product Type: *No Product type* /     ASSESSMENT:     REHAB RECOMMENDATIONS:   Recommendation to date pending progress:  Setting:  Home Health Therapy vs no needs    Equipment:    None     ASSESSMENT:  Mr. Ocampo presents standing at nurses station with PCT. Known to therapy service from previous admissions. Baseline he lives with family in a 1 level home and is independent with ADLs and mobility. He has been on hold for therapy evaluations for several days due to cognition and being in restraints. Today, he performed household mobility down hallway with SBA. Eager to

## 2025-01-12 NOTE — PROGRESS NOTES
Patient attempting to leave the unit stating he wanted to go home.  RN to bedside.  Security called.  MD notified and to bedside to talk to patient.  Family called and notified.  Brother stated he and father might come up to sit with patient.  [facility] assigned 1:1 sitter to patient.  Patient calm and cooperating at this time.  Will continue to monitor.

## 2025-01-12 NOTE — PROGRESS NOTES
Occupational Therapy Note:    Attempted to see patient this PM for occupational therapy evaluation session. Patient seated in chair by door speaking with MD. Two security guards at door as well. Will follow and re-attempt as schedule permits/patient available.     Thank you,  Sonya Everett, OTR/L     Rehab Caseload Tracker

## 2025-01-12 NOTE — CARE COORDINATION
CM note:    Chart reviewed for updates. Pt continues to present with confusion. His mentation has improved and he is no longer on restraints. Pt has been assigned a 1:1 after attempting to leave the unit to go home. Family was notified. PT reports HH vs none. OT evals pending at this time. CM will continue to follow up with d/c planning.    TRACY Danielle

## 2025-01-12 NOTE — PROGRESS NOTES
ACUTE PHYSICAL THERAPY GOALS:   (Developed with and agreed upon by patient and/or caregiver.)      LTG:  (1.)Mr. Ocampo will move from supine to sit and sit to supine  in bed with INDEPENDENT within 5 treatment day(s).    (2.)Mr. Ocampo will transfer from bed to chair and chair to bed with INDEPENDENT using the least restrictive device within 5 treatment day(s).    (3.)Mr. Ocampo will ambulate with INDEPENDENT for 650 feet with the least restrictive device within 5 treatment day(s).  ________________________________________________________________________________________________      PHYSICAL THERAPY Initial Assessment  (Link to Caseload Tracking: PT Visit Days : 1  Acknowledge Orders  Time In/Out  PT Charge Capture  Rehab Caseload Tracker    Coty Ocampo is a 59 y.o. male   PRIMARY DIAGNOSIS: Decompensation of cirrhosis of liver (HCC)  Hepatic encephalopathy (HCC) [K76.82]  Hyperammonemia (HCC) [E72.20]  Decompensation of cirrhosis of liver (HCC) [K72.90, K74.60]       Reason for Referral: Generalized Muscle Weakness (M62.81)  Other lack of cordination (R27.8)  Difficulty in walking, Not elsewhere classified (R26.2)  Inpatient: Payor: [facility] MEDICARE / Plan: [employer] MEDICARE ADVANTAGE / Product Type: *No Product type* /     ASSESSMENT:     REHAB RECOMMENDATIONS:   Recommendation to date pending progress:  Setting:  HH vs none    Equipment:    None     ASSESSMENT:  Mr. Ocampo admitted with above diagnoses.  Patient presented to the ER with altered mental status.  He was admitted 12/24/24-1/3/25 with SBO and hepatic encephalopathy in the setting of alcoholic liver cirrhosis.  He was only home 4 days before returning to the ER.  Patient has been in restraints due to agitation but mentation reportedly improved for therapy evaluations.  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  During today's assessment, patient was alert and conversant but  GRID:  N/A    AFTER TREATMENT PRECAUTIONS: Alarm Activated, Bed, Bed/Chair Locked, Call light within reach, Needs within reach, and RN notified    INTERDISCIPLINARY COLLABORATION:  MD/ PA/ NP  and RN/ PCT    EDUCATION: Education Given To: Patient  Education Provided: Role of Therapy;Plan of Care;Fall Prevention Strategies  Education Method: Verbal  Barriers to Learning: Cognition  Education Outcome: Continued education needed    TIME IN/OUT:  Time In: 0905  Time Out: 0945  Minutes: 40    Sarah Medina, PT

## 2025-01-12 NOTE — PROGRESS NOTES
PT note:  Provided PCT/sitter with gait belt to ambulate with patient as he was getting a bit agitated.  He did need assist to don his shirt right side out-he couldn't figure out how to fix his shirt that was on inside out.  Patient noted to ambulate around 3rd floor with PCT with better stability than this am.  Patient not needing physical support to prevent loss of balance/fall at this time.  Will continue to follow until d/c.    Sarah Medina, PT

## 2025-01-12 NOTE — PLAN OF CARE
Problem: Discharge Planning  Goal: Discharge to home or other facility with appropriate resources  1/11/2025 2012 by Raf Aldana RN  Outcome: Progressing  1/11/2025 1637 by dIa Vasquez RN  Outcome: Progressing     Problem: Safety - Adult  Goal: Free from fall injury  1/11/2025 2012 by Raf Aldana RN  Outcome: Progressing  1/11/2025 1637 by Ida Vasquez RN  Outcome: Progressing     Problem: Safety - Medical Restraint  Goal: Remains free of injury from restraints (Restraint for Interference with Medical Device)  Description: INTERVENTIONS:  1. Determine that other, less restrictive measures have been tried or would not be effective before applying the restraint  2. Evaluate the patient's condition at the time of restraint application  3. Inform patient/family regarding the reason for restraint  4. Q2H: Monitor safety, psychosocial status, comfort, nutrition and hydration  1/11/2025 2012 by Raf Aldana RN  Outcome: Progressing  1/11/2025 1637 by Ida Vasquez RN  Outcome: Progressing  Flowsheets  Taken 1/11/2025 0900 by Ida Vasquez RN  Remains free of injury from restraints (restraint for interference with medical device): Every 2 hours: Monitor safety, psychosocial status, comfort, nutrition and hydration  Taken 1/11/2025 0435 by Raf Aldana RN  Remains free of injury from restraints (restraint for interference with medical device): Every 2 hours: Monitor safety, psychosocial status, comfort, nutrition and hydration     Problem: Pain  Goal: Verbalizes/displays adequate comfort level or baseline comfort level  1/11/2025 2012 by Raf Aldana RN  Outcome: Progressing  1/11/2025 1637 by Ida Vasquez RN  Outcome: Progressing     Problem: Skin/Tissue Integrity  Goal: Absence of new skin breakdown  Description: 1.  Monitor for areas of redness and/or skin breakdown  2.  Assess vascular access sites hourly  3.  Every 4-6 hours minimum:

## 2025-01-13 VITALS
OXYGEN SATURATION: 98 % | HEART RATE: 69 BPM | RESPIRATION RATE: 16 BRPM | TEMPERATURE: 97.5 F | HEIGHT: 72 IN | SYSTOLIC BLOOD PRESSURE: 115 MMHG | DIASTOLIC BLOOD PRESSURE: 65 MMHG | BODY MASS INDEX: 19.72 KG/M2 | WEIGHT: 145.6 LBS

## 2025-01-13 LAB
ANION GAP SERPL CALC-SCNC: 9 MMOL/L (ref 7–16)
BUN SERPL-MCNC: 17 MG/DL (ref 6–23)
CALCIUM SERPL-MCNC: 8.1 MG/DL (ref 8.8–10.2)
CHLORIDE SERPL-SCNC: 111 MMOL/L (ref 98–107)
CO2 SERPL-SCNC: 22 MMOL/L (ref 20–29)
CREAT SERPL-MCNC: 0.95 MG/DL (ref 0.8–1.3)
GLUCOSE SERPL-MCNC: 89 MG/DL (ref 70–99)
INR PPP: 1.6
MAGNESIUM SERPL-MCNC: 1.9 MG/DL (ref 1.8–2.4)
PHOSPHATE SERPL-MCNC: 3.5 MG/DL (ref 2.5–4.5)
POTASSIUM SERPL-SCNC: 3.3 MMOL/L (ref 3.5–5.1)
PROTHROMBIN TIME: 19.7 SEC (ref 11.3–14.9)
SODIUM SERPL-SCNC: 142 MMOL/L (ref 136–145)

## 2025-01-13 PROCEDURE — 2500000003 HC RX 250 WO HCPCS: Performed by: STUDENT IN AN ORGANIZED HEALTH CARE EDUCATION/TRAINING PROGRAM

## 2025-01-13 PROCEDURE — 6370000000 HC RX 637 (ALT 250 FOR IP)

## 2025-01-13 PROCEDURE — 84100 ASSAY OF PHOSPHORUS: CPT

## 2025-01-13 PROCEDURE — 83735 ASSAY OF MAGNESIUM: CPT

## 2025-01-13 PROCEDURE — 36415 COLL VENOUS BLD VENIPUNCTURE: CPT

## 2025-01-13 PROCEDURE — 85610 PROTHROMBIN TIME: CPT

## 2025-01-13 PROCEDURE — 80048 BASIC METABOLIC PNL TOTAL CA: CPT

## 2025-01-13 PROCEDURE — 97530 THERAPEUTIC ACTIVITIES: CPT

## 2025-01-13 RX ORDER — RIFAXIMIN 550 MG/1
550 TABLET ORAL 2 TIMES DAILY
Qty: 60 TABLET | Refills: 3 | Status: SHIPPED | OUTPATIENT
Start: 2025-01-13

## 2025-01-13 RX ADMIN — RIFAXIMIN 550 MG: 550 TABLET ORAL at 10:15

## 2025-01-13 RX ADMIN — LACTULOSE 20 G: 10 SOLUTION ORAL at 10:15

## 2025-01-13 RX ADMIN — SODIUM CHLORIDE, PRESERVATIVE FREE 10 ML: 5 INJECTION INTRAVENOUS at 10:16

## 2025-01-13 RX ADMIN — LACTULOSE 20 G: 10 SOLUTION ORAL at 14:50

## 2025-01-13 RX ADMIN — Medication 100 MG: at 10:15

## 2025-01-13 ASSESSMENT — PAIN SCALES - WONG BAKER: WONGBAKER_NUMERICALRESPONSE: NO HURT

## 2025-01-13 ASSESSMENT — PAIN SCALES - GENERAL: PAINLEVEL_OUTOF10: 0

## 2025-01-13 NOTE — PLAN OF CARE
Problem: Discharge Planning  Goal: Discharge to home or other facility with appropriate resources  1/12/2025 1957 by Raf Aldana RN  Outcome: Progressing  1/12/2025 1824 by Ida Vasquez RN  Outcome: Progressing     Problem: Safety - Adult  Goal: Free from fall injury  1/12/2025 1957 by Raf Aldana RN  Outcome: Progressing  1/12/2025 1824 by Ida Vasquez RN  Outcome: Progressing     Problem: Safety - Medical Restraint  Goal: Remains free of injury from restraints (Restraint for Interference with Medical Device)  Description: INTERVENTIONS:  1. Determine that other, less restrictive measures have been tried or would not be effective before applying the restraint  2. Evaluate the patient's condition at the time of restraint application  3. Inform patient/family regarding the reason for restraint  4. Q2H: Monitor safety, psychosocial status, comfort, nutrition and hydration  1/12/2025 1957 by Raf Aldana RN  Outcome: Progressing  1/12/2025 1824 by Ida Vasquez RN  Outcome: Progressing     Problem: Pain  Goal: Verbalizes/displays adequate comfort level or baseline comfort level  1/12/2025 1957 by Raf Aldana RN  Outcome: Progressing  1/12/2025 1824 by Ida Vasquez RN  Outcome: Progressing     Problem: Skin/Tissue Integrity  Goal: Absence of new skin breakdown  Description: 1.  Monitor for areas of redness and/or skin breakdown  2.  Assess vascular access sites hourly  3.  Every 4-6 hours minimum:  Change oxygen saturation probe site  4.  Every 4-6 hours:  If on nasal continuous positive airway pressure, respiratory therapy assess nares and determine need for appliance change or resting period.  1/12/2025 1957 by Raf Aldana RN  Outcome: Progressing  1/12/2025 1824 by Ida Vasquez RN  Outcome: Progressing     Problem: Safety - Violent/Self-destructive Restraint  Goal: Remains free of injury from restraints (Restraint for

## 2025-01-13 NOTE — PROGRESS NOTES
ACUTE PHYSICAL THERAPY GOALS:   (Developed with and agreed upon by patient and/or caregiver.)      LTG:  (1.)Mr. Ocampo will move from supine to sit and sit to supine  in bed with INDEPENDENT within 5 treatment day(s).    (2.)Mr. Ocampo will transfer from bed to chair and chair to bed with INDEPENDENT using the least restrictive device within 5 treatment day(s).    (3.)Mr. Ocampo will ambulate with INDEPENDENT for 650 feet with the least restrictive device within 5 treatment day(s).  ________________________________________________________________________________________________      PHYSICAL THERAPY Daily Note and AM  (Link to Caseload Tracking: PT Visit Days : 2  Acknowledge Orders  Time In/Out  PT Charge Capture  Rehab Caseload Tracker    Coty Ocampo is a 59 y.o. male   PRIMARY DIAGNOSIS: Decompensation of cirrhosis of liver (HCC)  Hepatic encephalopathy (HCC) [K76.82]  Hyperammonemia (HCC) [E72.20]  Decompensation of cirrhosis of liver (HCC) [K72.90, K74.60]       Reason for Referral: Generalized Muscle Weakness (M62.81)  Other lack of cordination (R27.8)  Difficulty in walking, Not elsewhere classified (R26.2)  Inpatient: Payor: [facility] MEDICARE / Plan: [facility] MEDICARE ADVANTAGE / Product Type: *No Product type* /     ASSESSMENT:     REHAB RECOMMENDATIONS:   Recommendation to date pending progress:  Setting:  No further skilled physical therapy after discharge from hospital    Equipment:    None     ASSESSMENT:  Mr. Ocampo admitted with above diagnoses.  Patient presented to the ER with altered mental status.  He was admitted 12/24/24-1/3/25 with SBO and hepatic encephalopathy in the setting of alcoholic liver cirrhosis.  He was only home 4 days before returning to the ER.  Patient has been in restraints due to agitation but mentation reportedly improved for therapy evaluations.  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  During

## 2025-01-13 NOTE — PLAN OF CARE
Problem: Discharge Planning  Goal: Discharge to home or other facility with appropriate resources  Outcome: Progressing     Problem: Safety - Adult  Goal: Free from fall injury  Outcome: Progressing     Problem: Safety - Medical Restraint  Goal: Remains free of injury from restraints (Restraint for Interference with Medical Device)  Description: INTERVENTIONS:  1. Determine that other, less restrictive measures have been tried or would not be effective before applying the restraint  2. Evaluate the patient's condition at the time of restraint application  3. Inform patient/family regarding the reason for restraint  4. Q2H: Monitor safety, psychosocial status, comfort, nutrition and hydration  Outcome: Progressing     Problem: Pain  Goal: Verbalizes/displays adequate comfort level or baseline comfort level  Outcome: Progressing     Problem: Skin/Tissue Integrity  Goal: Absence of new skin breakdown  Description: 1.  Monitor for areas of redness and/or skin breakdown  2.  Assess vascular access sites hourly  3.  Every 4-6 hours minimum:  Change oxygen saturation probe site  4.  Every 4-6 hours:  If on nasal continuous positive airway pressure, respiratory therapy assess nares and determine need for appliance change or resting period.  Outcome: Progressing     Problem: Safety - Violent/Self-destructive Restraint  Goal: Remains free of injury from restraints (Restraint for Violent/Self-Destructive Behavior)  Description: INTERVENTIONS:  1. Determine that de-escalation and other, less restrictive measures have been tried or would not be effective before applying the restraint  2. Identify and document the criteria for restraint  3. Evaluate the patient's condition at the time of restraint application  4. Inform patient/family regarding the reason for restraint/seclusion  5. Q2H: Monitor comfort, nutrition and hydration needs  6. Q15M: Perform safety checks including skin, circulation, sensory, respiratory and  psychological status  7. Ensure continuous observation  8. Identify and implement measures to help patient regain control, assess readiness for release and initiate progressive release per policy  Outcome: Progressing

## 2025-01-13 NOTE — CARE COORDINATION
Patient with discharge orders for today.  Patientto discharge home with [facility]: SN, PT, OT, MSW to help prevent rehospitalization. No additional needs made known to CM. Patient has met all treatment goals and milestones for discharge. Family to provide transportation home. CM following until patient is discharged.        01/13/25 1232   Services At/After Discharge   Transition of Care Consult (CM Consult) Home Health   Internal Home Health No   Reason Outside Agency Chosen   (Patient choice)   Services At/After Discharge OT;PT;Home Health;Nursing services    Resource Information Provided? No   Mode of Transport at Discharge Other (see comment)  (Family)   Confirm Follow Up Transport Family   Condition of Participation: Discharge Planning   The Plan for Transition of Care is related to the following treatment goals: Patient to discharge home with home health and return to baseline level of function.   The Patient and/or Patient Representative was provided with a Choice of Provider? Patient   The Patient and/Or Patient Representative agree with the Discharge Plan? Yes   Freedom of Choice list was provided with basic dialogue that supports the patient's individualized plan of care/goals, treatment preferences, and shares the quality data associated with the providers?  Yes

## 2025-01-14 NOTE — DISCHARGE SUMMARY
Hospitalist Discharge Summary   Admit Date:  2025  2:23 PM   DC Note date: 2025  Name:  Coty Ocampo   Age:  59 y.o.  Sex:  male  :  1965   MRN:  [id number]   Room:  [facility]  PCP:  Roslyn Parada APRN - NP    Presenting Complaint: Altered Mental Status     Initial Admission Diagnosis: Hepatic encephalopathy (HCC) [K76.82]  Hyperammonemia (HCC) [E72.20]  Decompensation of cirrhosis of liver (HCC) [K72.90, K74.60]     Problem List for this Hospitalization (present on admission):    Principal Problem:    Decompensation of cirrhosis of liver (HCC)  Active Problems:    Alcoholic cirrhosis (HCC)    Postsurgical hypothyroidism    Thrombocytopenia (HCC)    Graves' disease    S/P TIPS (transjugular intrahepatic portosystemic shunt)    Hepatic encephalopathy (HCC)    Hypernatremia    Moderate protein-calorie malnutrition (HCC)  Resolved Problems:    * No resolved hospital problems. *      Hospital Course:  Coty Ocampo is a 59 y.o. male with medical history of alcoholic cirrhosis and recent hospitalization due to SBO who presented with AMS.       Decompensation of cirrhosis of liver  Hepatic encephalopathy  Alcoholic cirrhosis s/p TIPS  -Increased ammonia levels 98 on admission  -MELD score 16 on 2025, indicating 6% 3-month mortality rate  -Continued lactulose and rifaximin.  Needs to have bowel movement x 3/day  -Placed NG tube initially due to inability to follow any commands and agitation. Required a sitter as well  -Received Zyprexa daily PRN and Ativan as needed  -On the day of discharge, the patient's mentation improved significantly.  Father was present at bedside.  Strongly emphasized to be compliant with lactulose and rifaximin to have bowel movement at least 3 times a day.  Also emphasized the importance of supervision from a family member to ensure the intake of the lactulose and rifaximin.  Had 2 long conversations over the phone with brothers and sister as well  stomach. Electronically signed by Roverto Suárez    XR ABDOMEN (KUB) (SINGLE AP VIEW)    Result Date: 1/2/2025  Satisfactory NG tube placement. Electronically signed by Maurisio Gabriel    XR CHEST 1 VIEW    Result Date: 1/2/2025  1.  NG tube in place with its tip in the stomach. 2.  Mild pulmonary vascular congestion. 3.  No pneumonia. Report signed on 01/02/2025 (03:49 Eastern Time) Signed by: Levi Tapia M.D. Reading Location: [id number]    XR ABDOMEN (KUB) (SINGLE AP VIEW)    Result Date: 1/1/2025  The enteric tube is in appropriate position. Electronically signed by JOCELYN BURCIAGA    XR ABDOMEN (KUB) (SINGLE AP VIEW)    Result Date: 1/1/2025  1. Decreased dilatation of small bowel loops consistent with improvement in partial small bowel obstruction. 2. Contrast present in the colon on prior KUB has partially cleared. 3. NG tube as above. Electronically signed by Caleb Gracia    XR ABDOMEN (KUB) (SINGLE AP VIEW)    Result Date: 12/30/2024  NG tube is in place with the distal end of the gastric fundus in good position. Electronically signed by Adriel Guzman    FL SMALL BOWEL FOLLOW THROUGH ONLY    Result Date: 12/30/2024  Findings are consistent with partial small bowel obstruction at or near to the surgical anastomotic site in the distal jejunum. Small bowel follow-through study is consistent with findings on prior CT of the abdomen 24 December 2024. Electronically signed by Caleb Gracia    XR ABDOMEN (2 VIEWS)    Result Date: 12/28/2024  Findings/impression: There is an abnormal bowel gas pattern characterized by gas distended loops of small bowel particularly of the upper and central abdomen with differential air-fluid levels, compatible with a small bowel obstruction. No definite findings of free air. Previously identified findings of prior TIPS. Consider repeat CT abdomen and pelvis with the benefit of enteric contrast as clinically indicated. Electronically signed by Teofilo Crowley    CT ABDOMEN PELVIS W IV

## 2025-02-12 ENCOUNTER — TELEPHONE (OUTPATIENT)
Dept: ONCOLOGY | Age: 60
End: 2025-02-12

## 2025-02-24 DIAGNOSIS — D69.6 THROMBOCYTOPENIA: Primary | ICD-10-CM

## 2025-02-24 NOTE — PROGRESS NOTES
NEW PATIENT ABSTRACT            Referral Diagnosis: Thrombocytopenia    Referring Provider: Roslyn Parada APRN- NP    Primary Care Provider: Roslyn Parada APRN - NP    Presenting Symptoms:     Family History of Cancer: Cancer-related family history is not on file.    Past Medical History:   Past Medical History:   Diagnosis Date    Arthritis     Cirrhosis of liver (HCC)     Dr. Dawn ([facility]) and Dr. Mendieta ([facility])     Depression     Hypocalcemia     Hypokalemia     Iron deficiency anemia     Multiple fractures of ribs of both sides     Portal hypertension (HCC)     Thyroid disease     Vitamin D deficiency        Family/ Social/ Medical/ Surgical History Updated in Epic: Yes    Chronological History of Pertinent Events (From Onset of Presenting Symptoms):  1/21/25- WBC: 3.43, RBC: 3.14, Hgb: 11.2, Hct: 32.6, Plt: 128   (Lab work from [facility])    1/12/25- WBC: 7.2, RBC: 3.79, Hgb: 13.6, Hct: 37.8, Plt: 81  Record located in Epic.      Pertinent Notes from Referring Provider: Coty Ocampo is a 59 y.o. male with medical history of alcoholic cirrhosis and recent hospitalization due to SBO who presented with AMS.     The patient presented with AMS and generalized weakness in ER. He had a recent hospitalization 12/24/24 - 1/3/2025 for SBO and hepatic encephalopathy. Treated with NG suction. Discharged with lactulose and rifaximin.  In ER, he was alert but he was not able to follow any commands.  Vitals were relatively unremarkable.  CBC showed platelet 108.  CMP showed sodium 147.  Lactic acid 2.8.  Ammonia levels 98.  Patient was admitted for treatment regarding hepatic encephalopathy.    Other Pertinent Information: None

## 2025-02-25 ENCOUNTER — HOSPITAL ENCOUNTER (OUTPATIENT)
Dept: LAB | Age: 60
Discharge: HOME | End: 2025-02-25
Payer: MEDICARE

## 2025-02-25 ENCOUNTER — OFFICE VISIT (OUTPATIENT)
Dept: ONCOLOGY | Age: 60
End: 2025-02-25
Payer: MEDICARE

## 2025-02-25 VITALS
TEMPERATURE: 98 F | HEIGHT: 72 IN | BODY MASS INDEX: 24.11 KG/M2 | OXYGEN SATURATION: 95 % | WEIGHT: 178 LBS | DIASTOLIC BLOOD PRESSURE: 89 MMHG | HEART RATE: 55 BPM | RESPIRATION RATE: 18 BRPM | SYSTOLIC BLOOD PRESSURE: 102 MMHG

## 2025-02-25 DIAGNOSIS — R53.83 FATIGUE, UNSPECIFIED TYPE: ICD-10-CM

## 2025-02-25 DIAGNOSIS — D69.6 THROMBOCYTOPENIA: Primary | ICD-10-CM

## 2025-02-25 DIAGNOSIS — K70.30 ALCOHOLIC CIRRHOSIS OF LIVER WITHOUT ASCITES (HCC): ICD-10-CM

## 2025-02-25 DIAGNOSIS — D69.6 THROMBOCYTOPENIA: ICD-10-CM

## 2025-02-25 LAB
ALBUMIN SERPL-MCNC: 2.6 G/DL (ref 3.5–5)
ALBUMIN/GLOB SERPL: 0.7 (ref 1–1.9)
ALP SERPL-CCNC: 62 U/L (ref 40–129)
ALT SERPL-CCNC: 18 U/L (ref 8–55)
ANION GAP SERPL CALC-SCNC: 9 MMOL/L (ref 7–16)
AST SERPL-CCNC: 36 U/L (ref 15–37)
BASOPHILS # BLD: 0.01 K/UL (ref 0–0.2)
BASOPHILS NFR BLD: 0.4 % (ref 0–2)
BILIRUB SERPL-MCNC: 1.5 MG/DL (ref 0–1.2)
BUN SERPL-MCNC: 11 MG/DL (ref 6–23)
CALCIUM SERPL-MCNC: 8.8 MG/DL (ref 8.8–10.2)
CHLORIDE SERPL-SCNC: 111 MMOL/L (ref 98–107)
CO2 SERPL-SCNC: 22 MMOL/L (ref 20–29)
CREAT SERPL-MCNC: 1.02 MG/DL (ref 0.8–1.3)
DIFFERENTIAL METHOD BLD: ABNORMAL
EOSINOPHIL # BLD: 0.15 K/UL (ref 0–0.8)
EOSINOPHIL NFR BLD: 5.3 % (ref 0.5–7.8)
ERYTHROCYTE [DISTWIDTH] IN BLOOD BY AUTOMATED COUNT: 13.8 % (ref 11.9–14.6)
GLOBULIN SER CALC-MCNC: 3.5 G/DL (ref 2.3–3.5)
GLUCOSE SERPL-MCNC: 87 MG/DL (ref 70–99)
HCT VFR BLD AUTO: 36.5 % (ref 41.1–50.3)
HGB BLD-MCNC: 12.8 G/DL (ref 13.6–17.2)
IMM GRANULOCYTES # BLD AUTO: 0 K/UL (ref 0–0.5)
IMM GRANULOCYTES NFR BLD AUTO: 0 % (ref 0–5)
LYMPHOCYTES # BLD: 0.91 K/UL (ref 0.5–4.6)
LYMPHOCYTES NFR BLD: 32.3 % (ref 13–44)
MCH RBC QN AUTO: 35.1 PG (ref 26.1–32.9)
MCHC RBC AUTO-ENTMCNC: 35.1 G/DL (ref 31.4–35)
MCV RBC AUTO: 100 FL (ref 82–102)
MONOCYTES # BLD: 0.28 K/UL (ref 0.1–1.3)
MONOCYTES NFR BLD: 9.9 % (ref 4–12)
NEUTS SEG # BLD: 1.47 K/UL (ref 1.7–8.2)
NEUTS SEG NFR BLD: 52.1 % (ref 43–78)
NRBC # BLD: 0 K/UL (ref 0–0.2)
PLATELET # BLD AUTO: 80 K/UL (ref 150–450)
PLATELET # BLD AUTO: 94 K/UL (ref 150–450)
PMV BLD AUTO: 9.8 FL (ref 9.4–12.3)
POTASSIUM SERPL-SCNC: 3.8 MMOL/L (ref 3.5–5.1)
PROT SERPL-MCNC: 6.1 G/DL (ref 6.3–8.2)
RBC # BLD AUTO: 3.65 M/UL (ref 4.23–5.6)
SODIUM SERPL-SCNC: 142 MMOL/L (ref 136–145)
WBC # BLD AUTO: 2.8 K/UL (ref 4.3–11.1)

## 2025-02-25 PROCEDURE — 36415 COLL VENOUS BLD VENIPUNCTURE: CPT

## 2025-02-25 PROCEDURE — 85049 AUTOMATED PLATELET COUNT: CPT

## 2025-02-25 PROCEDURE — 80053 COMPREHEN METABOLIC PANEL: CPT

## 2025-02-25 PROCEDURE — 85025 COMPLETE CBC W/AUTO DIFF WBC: CPT

## 2025-02-25 PROCEDURE — 99204 OFFICE O/P NEW MOD 45 MIN: CPT | Performed by: INTERNAL MEDICINE

## 2025-02-25 RX ORDER — SILDENAFIL 100 MG/1
100 TABLET, FILM COATED ORAL PRN
COMMUNITY
Start: 2024-09-23

## 2025-02-25 ASSESSMENT — PATIENT HEALTH QUESTIONNAIRE - PHQ9
SUM OF ALL RESPONSES TO PHQ QUESTIONS 1-9: 0
SUM OF ALL RESPONSES TO PHQ9 QUESTIONS 1 & 2: 0
1. LITTLE INTEREST OR PLEASURE IN DOING THINGS: NOT AT ALL
2. FEELING DOWN, DEPRESSED OR HOPELESS: NOT AT ALL

## 2025-02-25 NOTE — PATIENT INSTRUCTIONS
Patient Information from Today's Visit    Diagnosis:   New patient visit for low platelet count, low hemoglobin, and low white blood cell count  Hx Cirrhosis     Follow Up Instructions:     Labs, history, plan reviewed with you today per Dr [facility].     Follow up as needed     Treatment Summary has been discussed and given to patient: N/A      Current Labs:  Hospital Outpatient Visit on 02/25/2025   Component Date Value Ref Range Status    WBC 02/25/2025 2.8 (L)  4.3 - 11.1 K/uL Final    RBC 02/25/2025 3.65 (L)  4.23 - 5.6 M/uL Final    Hemoglobin 02/25/2025 12.8 (L)  13.6 - 17.2 g/dL Final    Hematocrit 02/25/2025 36.5 (L)  41.1 - 50.3 % Final    MCV 02/25/2025 100.0  82.0 - 102.0 FL Final    MCH 02/25/2025 35.1 (H)  26.1 - 32.9 PG Final    MCHC 02/25/2025 35.1 (H)  31.4 - 35.0 g/dL Final    RDW 02/25/2025 13.8  11.9 - 14.6 % Final    Platelets 02/25/2025 94 (L)  150 - 450 K/uL Final    MPV 02/25/2025 9.8  9.4 - 12.3 FL Final    nRBC 02/25/2025 0.00  0.0 - 0.2 K/uL Final    **Note: Absolute NRBC parameter is now reported with Hemogram**    Neutrophils % 02/25/2025 52.1  43.0 - 78.0 % Final    Lymphocytes % 02/25/2025 32.3  13.0 - 44.0 % Final    Monocytes % 02/25/2025 9.9  4.0 - 12.0 % Final    Eosinophils % 02/25/2025 5.3  0.5 - 7.8 % Final    Basophils % 02/25/2025 0.4  0.0 - 2.0 % Final    Immature Granulocytes % 02/25/2025 0.0  0.0 - 5.0 % Final    Neutrophils Absolute 02/25/2025 1.47 (L)  1.70 - 8.20 K/UL Final    Lymphocytes Absolute 02/25/2025 0.91  0.50 - 4.60 K/UL Final    Monocytes Absolute 02/25/2025 0.28  0.10 - 1.30 K/UL Final    Eosinophils Absolute 02/25/2025 0.15  0.00 - 0.80 K/UL Final    Basophils Absolute 02/25/2025 0.01  0.00 - 0.20 K/UL Final    Immature Granulocytes Absolute 02/25/2025 0.00  0.00 - 0.50 K/UL Final    Differential Type 02/25/2025 AUTOMATED    Final    Sodium 02/25/2025 142  136 - 145 mmol/L Final    Potassium 02/25/2025 3.8  3.5 - 5.1 mmol/L Final    Chloride 02/25/2025 111 (H)

## 2025-02-25 NOTE — PROGRESS NOTES
stable liver disease. His bilirubin levels have improved since last month, now at 1.5. His platelet count has remained stable, ranging from the 80s to 90s, which is acceptable for non-procedural procedures. His hemoglobin and white blood cell counts have also been fluctuating, but these do not contribute to his fatigue. His albumin levels have improved from 2.3 to 2.6, and his bilirubin levels have also improved. He was informed that low albumin does not typically cause bleeding and that low platelet counts can increase the risk of bleeding. He was advised to continue monitoring his liver function and albumin levels with the liver team.    2. Thrombocytopenia.  His platelet count is currently in the 80s to 90s and has been stable since 2020. He was informed that low platelet counts can increase bleeding risk and that normal platelet levels are between 150 and 450. He was advised that maintaining platelet levels between 50 and 100 is acceptable for most surgical procedures. No immediate intervention is required unless his platelet count drops below 10.    3. Small bowel obstruction.  He recently experienced a small bowel obstruction, which was managed conservatively. He was hospitalized for almost 2 weeks due to this condition. Scar tissue from previous surgeries is expected to be prevalent.    4. Encephalopathy.  He had a severe encephalopathy following a hernia surgery 5 years ago and in the recent [facility] hospitalization. He was reassured that low albumin does not usually cause bleeding.    5. Knee pain.  He needs knee replacement surgery. He was advised to follow up with his orthopedic surgeon in [facility] if conservative management remains adequate. He should discuss the necessity of surgery with the surgeon at [facility]. If conservative management is insufficient, surgery may be considered.    PROCEDURE  The patient underwent a hernia surgery five years ago. He also received a steroid injection for knee pain.      All

## 2025-03-06 ENCOUNTER — TELEPHONE (OUTPATIENT)
Dept: ONCOLOGY | Age: 60
End: 2025-03-06

## 2025-03-06 ENCOUNTER — OFFICE VISIT (OUTPATIENT)
Dept: ENDOCRINOLOGY | Age: 60
End: 2025-03-06
Payer: MEDICARE

## 2025-03-06 VITALS
DIASTOLIC BLOOD PRESSURE: 67 MMHG | BODY MASS INDEX: 23.98 KG/M2 | OXYGEN SATURATION: 96 % | SYSTOLIC BLOOD PRESSURE: 119 MMHG | RESPIRATION RATE: 18 BRPM | HEART RATE: 52 BPM | WEIGHT: 177 LBS | HEIGHT: 72 IN

## 2025-03-06 DIAGNOSIS — H57.89 THYROID EYE DISEASE: ICD-10-CM

## 2025-03-06 DIAGNOSIS — E05.00 GRAVES' DISEASE: ICD-10-CM

## 2025-03-06 DIAGNOSIS — E05.00 GRAVES' DISEASE: Primary | ICD-10-CM

## 2025-03-06 DIAGNOSIS — E07.9 THYROID EYE DISEASE: ICD-10-CM

## 2025-03-06 PROBLEM — E89.0 POSTSURGICAL HYPOTHYROIDISM: Status: RESOLVED | Noted: 2019-11-06 | Resolved: 2025-03-06

## 2025-03-06 LAB
T4 FREE SERPL-MCNC: 0.8 NG/DL (ref 0.9–1.7)
TSH W FREE THYROID IF ABNORMAL: 0.24 UIU/ML (ref 0.27–4.2)

## 2025-03-06 PROCEDURE — G2211 COMPLEX E/M VISIT ADD ON: HCPCS | Performed by: INTERNAL MEDICINE

## 2025-03-06 PROCEDURE — 99214 OFFICE O/P EST MOD 30 MIN: CPT | Performed by: INTERNAL MEDICINE

## 2025-03-06 RX ORDER — FLUTICASONE PROPIONATE 50 MCG
SPRAY, SUSPENSION (ML) NASAL
COMMUNITY

## 2025-03-06 RX ORDER — TADALAFIL 20 MG/1
TABLET ORAL
COMMUNITY

## 2025-03-06 ASSESSMENT — ENCOUNTER SYMPTOMS
CONSTIPATION: 0
DIARRHEA: 0

## 2025-03-06 NOTE — TELEPHONE ENCOUNTER
Call back to patient. No answer. VM left to call office back    Patient returned call. He states that he is good and doesn't need anything. He went to see a different doctor today but they were able to pull up his labs. He appreciated the call back

## 2025-03-06 NOTE — TELEPHONE ENCOUNTER
Physician provider: Dr. Dillon  Reason for today's call (Please detail here patients chief complaint): labs  Last office visit:NA  Patient Callback Number: [phone]  Was callback number verified?: Yes  Preferred pharmacy (If refill request): NA  Calls to office within the last 48 hours?:No    Patient notified that their information will be routed to the [facility] clinical triage team for review. Patient is advised that they will receive a phone call from the triage department. If symptom related and symptoms worsen before receiving a call back, the patient has been advised to proceed to the nearest ED.      Patient is requesting a copy of labs  [phone]

## 2025-03-06 NOTE — PROGRESS NOTES
is stable.  He denies diplopia.  He quit smoking 5/22/2023.  Selenium was ineffective and caused headaches.  He has been seen by Dr. Hancock and is now going to [facility].  He is taking Restasis for severe dry eyes, which has helped.  He may need tear duct surgery.   Cardiovascular:  Negative for palpitations.   Gastrointestinal:  Negative for constipation and diarrhea.   Endocrine: Negative for cold intolerance and heat intolerance.   Neurological:  Negative for tremors.   Psychiatric/Behavioral:  Negative for sleep disturbance.        Vital Signs:  /67   Pulse 52   Resp 18   Ht 1.829 m (6')   Wt 80.3 kg (177 lb)   SpO2 96%   BMI 24.01 kg/m²     Wt Readings from Last 3 Encounters:   03/06/25 80.3 kg (177 lb)   02/25/25 80.7 kg (178 lb)   01/10/25 66 kg (145 lb 9.6 oz)       Physical Exam  Constitutional:       General: He is not in acute distress.  Eyes:      Comments: No proptosis.  Subtle left upper lid ptosis.     Neck:      Comments: Thyroidectomy scar.  Cardiovascular:      Rate and Rhythm: Normal rate and regular rhythm.   Lymphadenopathy:      Cervical: No cervical adenopathy.   Neurological:      Motor: No tremor.         Orders Placed This Encounter   Procedures    TSH reflex to FT4     Standing Status:   Future     Standing Expiration Date:   3/6/2026    TSH reflex to FT4     Standing Status:   Future     Standing Expiration Date:   3/6/2026         Current Outpatient Medications   Medication Sig Dispense Refill    tadalafil (CIALIS) 20 MG tablet TAKE 1 TABLET BY MOUTH EVERY DAY AS NEEDED FOR 90 DAYS      fluticasone (FLONASE) 50 MCG/ACT nasal spray SPRAY 1 SPRAY INTO EACH NOSTRIL EVERY DAY FOR 30 DAYS      sildenafil (VIAGRA) 100 MG tablet Take 1 tablet by mouth as needed      XIFAXAN 550 MG tablet Take 1 tablet by mouth 2 times daily 60 tablet 3     No current facility-administered medications for this visit.

## 2025-04-02 NOTE — ED NOTES
Appears to be sleeping at the present time The patient's goals for the shift include  GET UP TO USE BSC    The clinical goals for the shift include Maintain pt safety    Over the shift, the patient did make progress toward the following goals.     Problem: Pain - Adult  Goal: Verbalizes/displays adequate comfort level or baseline comfort level  Outcome: Progressing  Flowsheets (Taken 4/2/2025 1459)  Verbalizes/displays adequate comfort level or baseline comfort level:   Encourage patient to monitor pain and request assistance   Assess pain using appropriate pain scale   Administer analgesics based on type and severity of pain and evaluate response   Implement non-pharmacological measures as appropriate and evaluate response     Problem: Safety - Adult  Goal: Free from fall injury  Outcome: Progressing  Flowsheets (Taken 4/2/2025 1459)  Free from fall injury:   Instruct family/caregiver on patient safety   Based on caregiver fall risk screen, instruct family/caregiver to ask for assistance with transferring infant if caregiver noted to have fall risk factors     Problem: Discharge Planning  Goal: Discharge to home or other facility with appropriate resources  Outcome: Progressing  Flowsheets (Taken 4/2/2025 1459)  Discharge to home or other facility with appropriate resources:   Identify barriers to discharge with patient and caregiver   Arrange for needed discharge resources and transportation as appropriate   Identify discharge learning needs (meds, wound care, etc)   Arrange for interpreters to assist at discharge as needed     Problem: Chronic Conditions and Co-morbidities  Goal: Patient's chronic conditions and co-morbidity symptoms are monitored and maintained or improved  Outcome: Progressing  Flowsheets (Taken 4/2/2025 1459)  Care Plan - Patient's Chronic Conditions and Co-Morbidity Symptoms are Monitored and Maintained or Improved:   Monitor and assess patient's chronic conditions and comorbid symptoms for stability, deterioration, or improvement    Collaborate with multidisciplinary team to address chronic and comorbid conditions and prevent exacerbation or deterioration   Update acute care plan with appropriate goals if chronic or comorbid symptoms are exacerbated and prevent overall improvement and discharge     Problem: Nutrition  Goal: Nutrient intake appropriate for maintaining nutritional needs  Outcome: Progressing     Problem: Fall/Injury  Goal: Not fall by end of shift  Outcome: Progressing  Goal: Be free from injury by end of the shift  Outcome: Progressing  Goal: Verbalize understanding of personal risk factors for fall in the hospital  Outcome: Progressing  Goal: Verbalize understanding of risk factor reduction measures to prevent injury from fall in the home  Outcome: Progressing  Goal: Use assistive devices by end of the shift  Outcome: Progressing  Goal: Pace activities to prevent fatigue by end of the shift  Outcome: Progressing     Problem: Pain  Goal: Takes deep breaths with improved pain control throughout the shift  Outcome: Progressing  Goal: Turns in bed with improved pain control throughout the shift  Outcome: Progressing  Goal: Walks with improved pain control throughout the shift  Outcome: Progressing  Goal: Performs ADL's with improved pain control throughout shift  Outcome: Progressing  Goal: Participates in PT with improved pain control throughout the shift  Outcome: Progressing  Goal: Free from opioid side effects throughout the shift  Outcome: Progressing  Goal: Free from acute confusion related to pain meds throughout the shift  Outcome: Progressing     Problem: Skin  Goal: Decreased wound size/increased tissue granulation at next dressing change  Outcome: Progressing  Flowsheets (Taken 4/2/2025 1909)  Decreased wound size/increased tissue granulation at next dressing change: Promote sleep for wound healing  Goal: Participates in plan/prevention/treatment measures  Outcome: Progressing  Flowsheets (Taken 4/2/2025  1459)  Participates in plan/prevention/treatment measures:   Discuss with provider PT/OT consult   Increase activity/out of bed for meals   Elevate heels  Goal: Prevent/manage excess moisture  Outcome: Progressing  Flowsheets (Taken 4/2/2025 1459)  Prevent/manage excess moisture:   Cleanse incontinence/protect with barrier cream   Follow provider orders for dressing changes   Moisturize dry skin  Goal: Prevent/minimize sheer/friction injuries  Outcome: Progressing  Flowsheets (Taken 4/2/2025 1459)  Prevent/minimize sheer/friction injuries:   Increase activity/out of bed for meals   HOB 30 degrees or less   Turn/reposition every 2 hours/use positioning/transfer devices  Goal: Promote/optimize nutrition  Outcome: Progressing  Flowsheets (Taken 4/2/2025 1459)  Promote/optimize nutrition:   Assist with feeding   Monitor/record intake including meals   Consume > 50% meals/supplements  Goal: Promote skin healing  Outcome: Progressing  Flowsheets (Taken 4/2/2025 1459)  Promote skin healing:   Rotate device position/do not position patient on device   Assess skin/pad under line(s)/device(s)     Problem: Recent hospitalization or complication while living with DM  Goal: Patient will effectively self-manage their DM disease process  Outcome: Progressing     Problem: Lack of Diabetes disease process knowledge  Goal: Increase knowledge/understanding of diabetes and how to reduce risk of complications  Outcome: Progressing     Problem: Lack of glucose monitoring and target numbers for before and after meals knowledge  Goal: Increase knowledge/understanding of monitoring devices and interpret data to assist with lifestyle change  Outcome: Progressing     Problem: Lack of knowledge on diet for diabetes  Goal: Discover best plan for balanced nutrition to manage diabetes  Outcome: Progressing     Problem: Address barriers to lifestyle change  Goal: Find workable solutions to meet health goals  Outcome: Progressing     Problem: Lack  of knowledge on benefits of activity for meeting blood glucose targets and health goals  Goal: Discover best plan for being active and address any barriers  Outcome: Progressing     Problem: Lack of knowldge regarding diabetes medications  Goal: Increase knowledge via education  Outcome: Progressing     Problem: Lack of knowledge on where to find additional support for diabetes  Goal: Increase knowledge of where support can be found to best address patient's need  Outcome: Progressing     Problem: Recent hospitalization or complication while living with CVA  Goal: Patient will effectively self-manage their CVA disease process  Outcome: Progressing

## (undated) DEVICE — CANNULA NSL ORAL AD FOR CAPNOFLEX CO2 O2 AIRLFE

## (undated) DEVICE — GLOVE SURG SZ 75 CRM LTX FREE POLYISOPRENE POLYMER BEAD ANTI

## (undated) DEVICE — BLOCK BITE AD 60FR W/ VELC STRP ADDRESSES MOST PT AND

## (undated) DEVICE — MINOR SPLIT GENERAL: Brand: MEDLINE INDUSTRIES, INC.

## (undated) DEVICE — KENDALL RADIOLUCENT FOAM MONITORING ELECTRODE RECTANGULAR SHAPE: Brand: KENDALL

## (undated) DEVICE — SUTURE MONOCRYL + SZ 4-0 L18IN ABSRB UD L19MM PS-2 3/8 CIR MCP496G

## (undated) DEVICE — SOLUTION IRRIG 1000ML 0.9% SOD CHL USP POUR PLAS BTL

## (undated) DEVICE — CONNECTOR TBNG OD5-7MM O2 END DISP

## (undated) DEVICE — NEEDLE HYPO 21GA L1.5IN INTRAMUSCULAR S STL LATCH BVL UP

## (undated) DEVICE — CANISTER, RIGID, 2000CC: Brand: MEDLINE INDUSTRIES, INC.

## (undated) DEVICE — PAD,NON-ADHERENT,3X8,STERILE,LF,1/PK: Brand: MEDLINE

## (undated) DEVICE — FORCEPS BX L240CM JAW DIA2.8MM L CAP W/ NDL MIC MESH TOOTH

## (undated) DEVICE — SUTURE VICRYL + SZ 3-0 L27IN ABSRB UD L26MM SH 1/2 CIR VCP416H

## (undated) DEVICE — CONTAINER PREFIL FRMLN 40ML --